# Patient Record
Sex: MALE | Race: BLACK OR AFRICAN AMERICAN | Employment: OTHER | URBAN - METROPOLITAN AREA
[De-identification: names, ages, dates, MRNs, and addresses within clinical notes are randomized per-mention and may not be internally consistent; named-entity substitution may affect disease eponyms.]

---

## 2017-02-06 ENCOUNTER — OFFICE VISIT (OUTPATIENT)
Dept: INTERNAL MEDICINE CLINIC | Age: 77
End: 2017-02-06

## 2017-02-06 VITALS
HEIGHT: 78 IN | OXYGEN SATURATION: 98 % | WEIGHT: 269.5 LBS | BODY MASS INDEX: 31.18 KG/M2 | TEMPERATURE: 98.1 F | SYSTOLIC BLOOD PRESSURE: 149 MMHG | DIASTOLIC BLOOD PRESSURE: 80 MMHG | HEART RATE: 64 BPM

## 2017-02-06 DIAGNOSIS — I10 ESSENTIAL HYPERTENSION: ICD-10-CM

## 2017-02-06 DIAGNOSIS — E78.5 DYSLIPIDEMIA: ICD-10-CM

## 2017-02-06 DIAGNOSIS — N40.0 BENIGN NON-NODULAR PROSTATIC HYPERPLASIA WITHOUT LOWER URINARY TRACT SYMPTOMS: ICD-10-CM

## 2017-02-06 DIAGNOSIS — M17.0 PRIMARY OSTEOARTHRITIS OF BOTH KNEES: ICD-10-CM

## 2017-02-06 DIAGNOSIS — I87.2 VENOUS INSUFFICIENCY: ICD-10-CM

## 2017-02-06 NOTE — MR AVS SNAPSHOT
Visit Information Date & Time Provider Department Dept. Phone Encounter #  
 2/6/2017 12:45 PM Marlen Tapia MD [facility] Medicine and Primary [facility] [phone] [id number] Upcoming Health Maintenance Date Due  
 EYE EXAM RETINAL OR DILATED Q1 3/31/2016 HEMOGLOBIN A1C Q6M 8/29/2016 MICROALBUMIN Q1 10/5/2016 LIPID PANEL Q1 2/28/2017 GLAUCOMA SCREENING Q2Y 3/31/2017 FOOT EXAM Q1 4/11/2017 MEDICARE YEARLY EXAM 4/12/2017 Pneumococcal 65+ Low/Medium Risk (2 of 2 - PPSV23) 2/6/2018 DTaP/Tdap/Td series (2 - Td) 2/6/2027 Allergies as of 2/6/2017  Review Complete On: 2/6/2017 By: Mickie Vazquez No Known Allergies Current Immunizations  Reviewed on 2/28/2011 Name Date Influenza Vaccine Whole 11/1/2010 Not reviewed this visit You Were Diagnosed With   
  
 Codes Comments DM (diabetes mellitus), type 2, uncontrolled, periph vascular complic ([facility] [address]    -  Primary ICD-10-CM: E11.51, E11.65 ICD-9-CM: 250.72, 443.81 Essential hypertension     ICD-10-CM: I10 
ICD-9-CM: 401.9 Dyslipidemia     ICD-10-CM: E78.5 ICD-9-CM: 272.4 Venous insufficiency     ICD-10-CM: I87.2 ICD-9-CM: 459.81 Primary osteoarthritis of both knees     ICD-10-CM: M17.0 ICD-9-CM: 715.16 Benign non-nodular prostatic hyperplasia without lower urinary tract symptoms     ICD-10-CM: N40.0 ICD-9-CM: 600.90 Vitals BP Pulse Temp Height(growth percentile) Weight(growth percentile) SpO2  
 149/80 (BP 1 Location: Left arm, BP Patient Position: Sitting) 64 98.1 °F (36.7 °C) (Oral) 6' 9\" (2.057 m) 269 lb 8 oz (122.2 kg) 98% BMI Smoking Status 28.88 kg/m2 Former Smoker Vitals History BMI and BSA Data Body Mass Index Body Surface Area  
 28.88 kg/m 2 2.64 m 2 Preferred Pharmacy Pharmacy Name Phone [address], [address] [place] [address] [phone] Your Updated Medication List  
  
   
This list is accurate as of: 2/6/17  3:48 PM.  Always use your most recent med list.  
  
  
  
  
 aspirin 81 mg tablet Take 81 mg by mouth. cephALEXin 500 mg capsule Commonly known as:  Delwyn Pay Take 1 Cap by mouth four (4) times daily. clindamycin 150 mg capsule Commonly known as:  CLEOCIN  
  
 FLUZONE HIGH-DOSE [id number] (PF) Syrg injection Generic drug:  influenza vaccine 2015-16 (65yr+)(PF)  
  
 * glucose blood VI test strips strip Commonly known as:  ACCU-CHEK CLAUDINE Use to test blood sugar twice daily. DX.E11.9  
  
 * glucose blood VI test strips strip Commonly known as:  ACCU-CHEK CLAUDINE PLUS TEST STRP Test three times daily HYDROcodone-acetaminophen 2.5-500 mg per tablet Commonly known as:  Christen Tiffanie Take 1 Tab by mouth every six (6) hours as needed for Pain. ibuprofen 400 mg tablet Commonly known as:  MOTRIN Take 400 mg by mouth as needed. insulin aspart 100 unit/mL Inpn Commonly known as:  Vergie Percy 15 units ac  
  
 insulin NPH/insulin regular 100 unit/mL (70-30) injection Commonly known as:  NovoLIN 70/30  
14 units ac breakfast and dinner Insulin Syringe-Needle U-100 1 mL 31 gauge x 5/16 Syrg USE ONE  TWICE DAILY  
  
 lisinopril 40 mg tablet Commonly known as:  Monalisa [place] Take 1 Tab by mouth daily. meloxicam 15 mg tablet Commonly known as:  MOBIC Take 1 Tab by mouth daily. metoprolol tartrate 25 mg tablet Commonly known as:  LOPRESSOR Take 1 Tab by mouth two (2) times a day. * NOVOTWIST 32 gauge x 1/5\" Ndle Generic drug:  pen needle, diabetic * Insulin Needles (Disposable) 30 gauge x 1/3\" Commonly known as:  NOVOFINE 30 Inject 5 times a day  
  
 oxyCODONE-acetaminophen 5-325 mg per tablet Commonly known as:  PERCOCET  
  
 simvastatin 20 mg tablet Commonly known as:  ZOCOR Take 1 Tab by mouth nightly. * Notice: This list has 4 medication(s) that are the same as other medications prescribed for you. Read the directions carefully, and ask your doctor or other care provider to review them with you. We Performed the Following AMB POC EKG ROUTINE W/ 12 LEADS, INTER & REP [26000 CPT(R)] APOLIPOPROTEIN B [id number] CPT(R)] CBC WITH AUTOMATED DIFF [76867 CPT(R)] HEMOGLOBIN A1C WITH EAG [51813 CPT(R)] LIPID PANEL [20700 CPT(R)] METABOLIC PANEL, COMPREHENSIVE [04152 CPT(R)] NE COLLECTION VENOUS BLOOD,VENIPUNCTURE P812651 CPT(R)] PSA DIAGNOSTIC (PROSTATIC SPECIFIC AG) [id number] CPT(R)] URINALYSIS W/ RFLX MICROSCOPIC [43954 CPT(R)] Introducing [facility] SERVICES! [employer] introduces Elixserve patient portal. Now you can access parts of your medical record, email your doctor's office, and request medication refills online. 1. In your internet browser, go to [email or web address] 2. Click on the First Time User? Click Here link in the Sign In box. You will see the New Member Sign Up page. 3. Enter your Elixserve Access Code exactly as it appears below. You will not need to use this code after youve completed the sign-up process. If you do not sign up before the expiration date, you must request a new code. · Elixserve Access Code: [email or web address] Expires: 5/7/2017  3:48 PM 
 
4. Enter the last four digits of your Social Security Number (xxxx) and Date of Birth (mm/dd/yyyy) as indicated and click Submit. You will be taken to the next sign-up page. 5. Create a Elixserve ID. This will be your Elixserve login ID and cannot be changed, so think of one that is secure and easy to remember. 6. Create a Elixserve password. You can change your password at any time. 7. Enter your Password Reset Question and Answer. This can be used at a later time if you forget your password. 8. Enter your e-mail address.  You will receive e-mail notification when new information is available in ONTRAPORT. 9. Click Sign Up. You can now view and download portions of your medical record. 10. Click the Download Summary menu link to download a portable copy of your medical information. If you have questions, please visit the Frequently Asked Questions section of the ONTRAPORT website. Remember, ONTRAPORT is NOT to be used for urgent needs. For medical emergencies, dial 911. Now available from your iPhone and Android! Please provide this summary of care documentation to your next provider. Your primary care clinician is listed as Liz Fam. If you have any questions after today's visit, please call [phone].

## 2017-02-06 NOTE — PROGRESS NOTES
Chief Complaint   Patient presents with    Diabetes     1 month follow up     Headache     patient states that he has been having \"intermittent\" headaches x a couple of months. 1. Have you been to the ER, urgent care clinic since your last visit? Hospitalized since your last visit? No    2. Have you seen or consulted any other health care providers outside of the [address] [place] [address] since your last visit? Include any pap smears or colon screening.  No

## 2017-02-07 LAB
ALBUMIN SERPL-MCNC: 4.1 G/DL (ref 3.5–4.8)
ALBUMIN/GLOB SERPL: 1.4 {RATIO} (ref 1.1–2.5)
ALP SERPL-CCNC: 63 IU/L (ref 39–117)
ALT SERPL-CCNC: 26 IU/L (ref 0–44)
APO B SERPL-MCNC: 91 MG/DL (ref 52–135)
APPEARANCE UR: CLEAR
AST SERPL-CCNC: 21 IU/L (ref 0–40)
BACTERIA #/AREA URNS HPF: ABNORMAL /[HPF]
BASOPHILS # BLD AUTO: 0 X10E3/UL (ref 0–0.2)
BASOPHILS NFR BLD AUTO: 0 %
BILIRUB SERPL-MCNC: 0.5 MG/DL (ref 0–1.2)
BILIRUB UR QL STRIP: NEGATIVE
BUN SERPL-MCNC: 15 MG/DL (ref 8–27)
BUN/CREAT SERPL: 12 (ref 10–22)
CALCIUM SERPL-MCNC: 9.6 MG/DL (ref 8.6–10.2)
CASTS URNS QL MICRO: ABNORMAL /LPF
CHLORIDE SERPL-SCNC: 101 MMOL/L (ref 96–106)
CHOLEST SERPL-MCNC: 152 MG/DL (ref 100–199)
CO2 SERPL-SCNC: 25 MMOL/L (ref 18–29)
COLOR UR: YELLOW
CREAT SERPL-MCNC: 1.3 MG/DL (ref 0.76–1.27)
EOSINOPHIL # BLD AUTO: 0.1 X10E3/UL (ref 0–0.4)
EOSINOPHIL NFR BLD AUTO: 2 %
EPI CELLS #/AREA URNS HPF: ABNORMAL /HPF
ERYTHROCYTE [DISTWIDTH] IN BLOOD BY AUTOMATED COUNT: 14.6 % (ref 12.3–15.4)
EST. AVERAGE GLUCOSE BLD GHB EST-MCNC: 280 MG/DL
GLOBULIN SER CALC-MCNC: 3 G/DL (ref 1.5–4.5)
GLUCOSE SERPL-MCNC: 112 MG/DL (ref 65–99)
GLUCOSE UR QL: NEGATIVE
HBA1C MFR BLD: 11.4 % (ref 4.8–5.6)
HCT VFR BLD AUTO: 39.3 % (ref 37.5–51)
HDLC SERPL-MCNC: 44 MG/DL
HGB BLD-MCNC: 13.2 G/DL (ref 12.6–17.7)
HGB UR QL STRIP: ABNORMAL
IMM GRANULOCYTES # BLD: 0 X10E3/UL (ref 0–0.1)
IMM GRANULOCYTES NFR BLD: 0 %
KETONES UR QL STRIP: NEGATIVE
LDLC SERPL CALC-MCNC: 95 MG/DL (ref 0–99)
LEUKOCYTE ESTERASE UR QL STRIP: NEGATIVE
LYMPHOCYTES # BLD AUTO: 2.2 X10E3/UL (ref 0.7–3.1)
LYMPHOCYTES NFR BLD AUTO: 37 %
MCH RBC QN AUTO: 28.1 PG (ref 26.6–33)
MCHC RBC AUTO-ENTMCNC: 33.6 G/DL (ref 31.5–35.7)
MCV RBC AUTO: 84 FL (ref 79–97)
MICRO URNS: ABNORMAL
MONOCYTES # BLD AUTO: 0.4 X10E3/UL (ref 0.1–0.9)
MONOCYTES NFR BLD AUTO: 7 %
MUCOUS THREADS URNS QL MICRO: PRESENT
NEUTROPHILS # BLD AUTO: 3.2 X10E3/UL (ref 1.4–7)
NEUTROPHILS NFR BLD AUTO: 54 %
NITRITE UR QL STRIP: NEGATIVE
PH UR STRIP: 5.5 [PH] (ref 5–7.5)
PLATELET # BLD AUTO: 220 X10E3/UL (ref 150–379)
POTASSIUM SERPL-SCNC: 4.8 MMOL/L (ref 3.5–5.2)
PROT SERPL-MCNC: 7.1 G/DL (ref 6–8.5)
PROT UR QL STRIP: ABNORMAL
PSA SERPL-MCNC: 1.2 NG/ML (ref 0–4)
RBC # BLD AUTO: 4.69 X10E6/UL (ref 4.14–5.8)
RBC #/AREA URNS HPF: ABNORMAL /HPF
SODIUM SERPL-SCNC: 141 MMOL/L (ref 134–144)
SP GR UR: 1.02 (ref 1–1.03)
TRIGL SERPL-MCNC: 64 MG/DL (ref 0–149)
UROBILINOGEN UR STRIP-MCNC: 0.2 MG/DL (ref 0.2–1)
VLDLC SERPL CALC-MCNC: 13 MG/DL (ref 5–40)
WBC # BLD AUTO: 5.9 X10E3/UL (ref 3.4–10.8)
WBC #/AREA URNS HPF: ABNORMAL /HPF

## 2017-02-07 NOTE — PROGRESS NOTES
[facility] and Primary Care  [place] [address]  [address]  [place] [address]  Phone:  [phone]  Fax: [phone]       Chief Complaint   Patient presents with    Diabetes     1 month follow up     Headache     patient states that he has been having \"intermittent\" headaches x a couple of months. .      SUBJECTIVE:    Akhil Morgan is a 68 y.o. male comes in for return visit after a long absence. Blood sugars have remained in the low 200's. Interestingly he does not do the thinks that he is supposed to do which we have discussed repetitively. He has not formally worked as has been the case for the last year and the two things that he was supposed to have done above and beyond follow-up is eat at the same time every day and to minimize carbohydrate intake. Since I last saw him he has gained a most amount of weight with significant hyperglycemia. He has been taking his antihypertensive medication as prescribed as is the case with his statin in view of his increased cardiovascular risk. He continues to have significant osteoarthritis of his knees but fortunately it is not symptomatic at this point and does not interfere with his functional status. Current Outpatient Prescriptions   Medication Sig Dispense Refill    Insulin Syringe-Needle U-100 1 mL 31 gauge x 5/16 syrg USE ONE  TWICE DAILY 100 Syringe 11    insulin NPH/insulin regular (NOVOLIN 70/30) 100 unit/mL (70-30) injection 14 units ac breakfast and dinner 10 mL 11    lisinopril (PRINIVIL, ZESTRIL) 40 mg tablet Take 1 Tab by mouth daily. 30 Tab 11    glucose blood VI test strips (ACCU-CHEK CLAUDINE PLUS TEST STRP) strip Test three times daily 300 Strip 4    glucose blood VI test strips (ACCU-CHEK CLAUDINE) strip Use to test blood sugar twice daily. DX.E11.9 200 Strip 3    simvastatin (ZOCOR) 20 mg tablet Take 1 Tab by mouth nightly.  90 Tab 3    metoprolol (LOPRESSOR) 25 mg tablet Take 1 Tab by mouth two (2) times a day. 180 Tab 3    meloxicam (MOBIC) 15 mg tablet Take 1 Tab by mouth daily. 30 Tab 0    clindamycin (CLEOCIN) 150 mg capsule   0    FLUZONE HIGH-DOSE 2015-16, PF, syrg injection   0    oxyCODONE-acetaminophen (PERCOCET) 5-325 mg per tablet   0    cephALEXin (KEFLEX) 500 mg capsule Take 1 Cap by mouth four (4) times daily. 40 Cap 0    insulin aspart (NOVOLOG FLEXPEN) 100 unit/mL flexpen 15 units ac 15 Syringe 3    Insulin Needles, Disposable, (NOVOFINE 30) 30 x 1/3 \" Inject 5 times a day 500 Package 3    NOVOTWIST 32 x 1/5 \" ndle       HYDROcodone-acetaminophen (LORTAB) 2.5-500 mg per tablet Take 1 Tab by mouth every six (6) hours as needed for Pain. 20 Tab 0    ibuprofen (MOTRIN) 400 mg tablet Take 400 mg by mouth as needed.  aspirin 81 mg tablet Take 81 mg by mouth.        Past Medical History   Diagnosis Date    CAD (coronary artery disease)      palpitations    Diabetes ([facility] [address]     Hypertension     Other ill-defined conditions(799.89)      left foot ulcer     Past Surgical History   Procedure Laterality Date    Hx other surgical       surgery to left foot ulcer, PICC right arm    Hx heent       bilateral cataract surgery     No Known Allergies      REVIEW OF SYSTEMS:  General: negative for - chills or fever  ENT: negative for - headaches, nasal congestion or tinnitus  Respiratory: negative for - cough, hemoptysis, shortness of breath or wheezing  Cardiovascular : negative for - chest pain, edema, palpitations or shortness of breath  Gastrointestinal: negative for - abdominal pain, blood in stools, heartburn or nausea/vomiting  Genito-Urinary: no dysuria, trouble voiding, or hematuria  Musculoskeletal: negative for - gait disturbance, joint pain, joint stiffness or joint swelling  Neurological: no TIA or stroke symptoms  Hematologic: no bruises, no bleeding, no swollen glands  Integument: no lumps, mole changes, nail changes or rash  Endocrine: no malaise/lethargy or unexpected weight changes      Social History     Social History    Marital status:      Spouse name: N/A    Number of children: 3    Years of education: 12     Occupational History    employed-----formerly retired      Social History Main Topics    Smoking status: Former Smoker    Smokeless tobacco: Never Used    Alcohol use No    Drug use: No    Sexual activity: Not Asked     Other Topics Concern    None     Social History Narrative     Family History   Problem Relation Age of Onset    Cancer Father     No Known Problems Mother        OBJECTIVE:    Visit Vitals    /80 (BP 1 Location: Left arm, BP Patient Position: Sitting)    Pulse 64    Temp 98.1 °F (36.7 °C) (Oral)    Ht 6' 9\" (2.057 m)    Wt 269 lb 8 oz (122.2 kg)    SpO2 98%    BMI 28.88 kg/m2     CONSTITUTIONAL: well , well nourished, appears age appropriate  EYES: perrla, eom intact  ENMT:moist mucous membranes, pharynx clear  NECK: supple. Thyroid normal  RESPIRATORY: Chest: clear to ascultation and percussion   CARDIOVASCULAR: Heart: regular rate and rhythm  GASTROINTESTINAL: Abdomen: soft, bowel sounds active  HEMATOLOGIC: no pathological lymph nodes palpated  MUSCULOSKELETAL: Extremities: 2+ edema distally, pulse 1+, bony exostosis ankle suggestive Charcot joints  INTEGUMENT: No unusual rashes or suspicious skin lesions noted. Nails appear normal, hyperpigmented changes distal lower extremities. NEUROLOGIC: non-focal exam   MENTAL STATUS: alert and oriented, appropriate affect  Rectal: No rectal masses, brown stool heme test negative, prostate 2+ firm and nontender      ASSESSMENT:  1. DM (diabetes mellitus), type 2, uncontrolled, periph vascular complic ([address]    2. Essential hypertension    3. Dyslipidemia    4. Venous insufficiency    5. Primary osteoarthritis of both knees    6. Benign non-nodular prostatic hyperplasia without lower urinary tract symptoms        PLAN:    1. His diabetes remains poorly controlled.   I again remind him of the importance of eating at the same time every day, specifically consuming breakfast, lunch and dinner and bedtime snack at nine. He will also have to watch carbohydrate intake, specifically avoiding sweets, white bread, and white potatoes. He is currently taking premixed insulin primarily for cost reasons at 20 units twice a day. He checks blood sugars quite sporadically and does not call any numbers in to the office. I suggest if at all possible he call at least several weeks of blood sugars to us. When he has done this he generally follows directions as far as timeliness of eating quite well. 2.  Blood pressure is excellent at 138/70. No adjustments are made. 3.  He will continue his statin as prescribed and efficacy and compliance will be assessed. 4.  He has chronic stasis changes of his lower extremities. Fortunately no ulcerations have formed but the skin is rather dry. I suggest using Vaseline to moisturize the skin. 5.  He does have osteoarthritic knees but they are reasonably stable at this point. .  Orders Placed This Encounter    APOLIPOPROTEIN B    CBC WITH AUTOMATED DIFF    LIPID PANEL    URINALYSIS W/ RFLX MICROSCOPIC    PROSTATE SPECIFIC AG    METABOLIC PANEL, COMPREHENSIVE    HEMOGLOBIN A1C WITH EAG    MICROSCOPIC EXAMINATION    AMB POC EKG ROUTINE W/ 12 LEADS, INTER & REP         Follow-up Disposition:  Return in about 3 months (around 5/6/2017).       Rachna Mike MD

## 2017-02-15 ENCOUNTER — TELEPHONE (OUTPATIENT)
Dept: INTERNAL MEDICINE CLINIC | Age: 77
End: 2017-02-15

## 2017-02-23 ENCOUNTER — TELEPHONE (OUTPATIENT)
Dept: INTERNAL MEDICINE CLINIC | Age: 77
End: 2017-02-23

## 2017-03-08 ENCOUNTER — TELEPHONE (OUTPATIENT)
Dept: INTERNAL MEDICINE CLINIC | Age: 77
End: 2017-03-08

## 2017-03-08 NOTE — TELEPHONE ENCOUNTER
Patient called in blood sugar readings  2/28      3/1     3/2     3/3     3/4      3/5      3/6         135        91     219     116   148      132    146  151       156     203     183   110     136     115 per dr. Dusty Chavez no change.

## 2017-05-15 ENCOUNTER — OFFICE VISIT (OUTPATIENT)
Dept: INTERNAL MEDICINE CLINIC | Age: 77
End: 2017-05-15

## 2017-05-15 VITALS
RESPIRATION RATE: 16 BRPM | SYSTOLIC BLOOD PRESSURE: 137 MMHG | BODY MASS INDEX: 30.78 KG/M2 | DIASTOLIC BLOOD PRESSURE: 78 MMHG | WEIGHT: 266 LBS | HEIGHT: 78 IN | TEMPERATURE: 97.7 F | HEART RATE: 60 BPM

## 2017-05-15 DIAGNOSIS — E78.5 DYSLIPIDEMIA: ICD-10-CM

## 2017-05-15 DIAGNOSIS — I87.2 VENOUS INSUFFICIENCY: ICD-10-CM

## 2017-05-15 DIAGNOSIS — I10 ESSENTIAL HYPERTENSION: ICD-10-CM

## 2017-05-15 DIAGNOSIS — R31.29 MICROSCOPIC HEMATURIA: ICD-10-CM

## 2017-05-15 NOTE — PROGRESS NOTES
[address] [place] [address] and Primary Care  [facility]  [address] [place] [address]  Phone:  [phone]  Fax: [phone]       Chief Complaint   Patient presents with    Hypertension     follow-up    Diabetes     follow-up   . SUBJECTIVE:    Leo Murphy is a 68 y.o. male comes in for return visit for follow-up of his diabetes. This is very good because he usually does not follow-up. I remind him that his biggest flaw is the dietary indiscretion related to excessive carbohydrate intake. We also talk about the importance of attempting to eat at the same time every day. This minimizes hypoglycemia and glycemic variability, both of which are quite important. He is not as physically active as he should be. He has a past history of primary hypertension and dyslipidemia. He has been having episodic swelling of his lower extremities which is a chronic, ongoing problem. Current Outpatient Prescriptions   Medication Sig Dispense Refill    Insulin Syringe-Needle U-100 1 mL 31 gauge x 5/16 syrg USE ONE  TWICE DAILY 100 Syringe 11    insulin NPH/insulin regular (NOVOLIN 70/30) 100 unit/mL (70-30) injection 14 units ac breakfast and dinner 10 mL 11    lisinopril (PRINIVIL, ZESTRIL) 40 mg tablet Take 1 Tab by mouth daily. 30 Tab 11    glucose blood VI test strips (ACCU-CHEK CLAUDINE PLUS TEST STRP) strip Test three times daily 300 Strip 4    glucose blood VI test strips (ACCU-CHEK CLAUDINE) strip Use to test blood sugar twice daily. DX.E11.9 200 Strip 3    simvastatin (ZOCOR) 20 mg tablet Take 1 Tab by mouth nightly. 90 Tab 3    metoprolol (LOPRESSOR) 25 mg tablet Take 1 Tab by mouth two (2) times a day. 180 Tab 3    meloxicam (MOBIC) 15 mg tablet Take 1 Tab by mouth daily.  30 Tab 0    clindamycin (CLEOCIN) 150 mg capsule   0    FLUZONE HIGH-DOSE 2015-16, PF, syrg injection   0    oxyCODONE-acetaminophen (PERCOCET) 5-325 mg per tablet   0    cephALEXin (KEFLEX) 500 mg capsule Take 1 Cap by mouth four (4) times daily. 40 Cap 0    insulin aspart (NOVOLOG FLEXPEN) 100 unit/mL flexpen 15 units ac 15 Syringe 3    Insulin Needles, Disposable, (NOVOFINE 30) 30 x 1/3 \" Inject 5 times a day 500 Package 3    NOVOTWIST 32 x 1/5 \" ndle       HYDROcodone-acetaminophen (LORTAB) 2.5-500 mg per tablet Take 1 Tab by mouth every six (6) hours as needed for Pain. 20 Tab 0    ibuprofen (MOTRIN) 400 mg tablet Take 400 mg by mouth as needed.  aspirin 81 mg tablet Take 81 mg by mouth.        Past Medical History:   Diagnosis Date    CAD (coronary artery disease)     palpitations    Diabetes ([facility] [address]     Hypertension     Other ill-defined conditions     left foot ulcer     Past Surgical History:   Procedure Laterality Date    HX HEENT      bilateral cataract surgery    HX OTHER SURGICAL      surgery to left foot ulcer, PICC right arm     No Known Allergies      REVIEW OF SYSTEMS:  General: negative for - chills or fever  ENT: negative for - headaches, nasal congestion or tinnitus  Respiratory: negative for - cough, hemoptysis, shortness of breath or wheezing  Cardiovascular : negative for - chest pain, edema, palpitations or shortness of breath  Gastrointestinal: negative for - abdominal pain, blood in stools, heartburn or nausea/vomiting  Genito-Urinary: no dysuria, trouble voiding, or hematuria  Musculoskeletal: negative for - gait disturbance, joint pain, joint stiffness or joint swelling  Neurological: no TIA or stroke symptoms  Hematologic: no bruises, no bleeding, no swollen glands  Integument: no lumps, mole changes, nail changes or rash  Endocrine: no malaise/lethargy or unexpected weight changes      Social History     Social History    Marital status:      Spouse name: N/A    Number of children: 4    Years of education: 12     Occupational History    employed-----formerly retired      Social History Main Topics    Smoking status: Former Smoker    Smokeless tobacco: Never Used    Alcohol use No    Drug use: No    Sexual activity: Not Asked     Other Topics Concern    None     Social History Narrative     Family History   Problem Relation Age of Onset    Cancer Father     No Known Problems Mother        OBJECTIVE:    Visit Vitals    /78    Pulse 60    Temp 97.7 °F (36.5 °C)    Resp 16    Ht 6' 9\" (2.057 m)    Wt 266 lb (120.7 kg)    BMI 28.5 kg/m2     CONSTITUTIONAL: well , well nourished, appears age appropriate  EYES: perrla, eom intact  ENMT:moist mucous membranes, pharynx clear  NECK: supple. Thyroid normal  RESPIRATORY: Chest: clear to ascultation and percussion   CARDIOVASCULAR: Heart: regular rate and rhythm  GASTROINTESTINAL: Abdomen: soft, bowel sounds active  HEMATOLOGIC: no pathological lymph nodes palpated  MUSCULOSKELETAL: Extremities: no edema, pulse 1+, Charcot joint of ankles  INTEGUMENT: No unusual rashes or suspicious skin lesions noted. Nails appear normal.  NEUROLOGIC: non-focal exam   MENTAL STATUS: alert and oriented, appropriate affect      ASSESSMENT:  1. DM (diabetes mellitus), type 2, uncontrolled, periph vascular complic ([address]    2. Dyslipidemia    3. Essential hypertension    4. Venous insufficiency    5. Microscopic hematuria        PLAN:    1. He states that his diabetes is much better. Hopefully this is indeed the case. His last hemoglobin A1C was 11.5. This is exclusively related to dietary indiscretion. 2. He will continue his statin as prescribe and efficacy and compliance will be assessed. 3. Blood pressure is excellent today. No adjustments are made. 4. He has chronic venous insufficiency leading to the edema of his lower extremities. This is no worse than it has been. This is asymmetrical, left being somewhat greater than the right. 5. He does have a history of microscopic hematuria and a urinalysis will be checked to ensure this has completely abated.       .  Orders Placed This Encounter    APOLIPOPROTEIN B  HEMOGLOBIN A1C WITH EAG    URINALYSIS W/ RFLX MICROSCOPIC    MICROSCOPIC EXAMINATION         Follow-up Disposition:  Return in about 3 months (around 8/15/2017).       Jill Diaz MD

## 2017-05-15 NOTE — MR AVS SNAPSHOT
Visit Information Date & Time Provider Department Dept. Phone Encounter #  
 5/15/2017 10:30 AM Williams Carranza MD [facility] Sports Medicine and [address] [id number] Upcoming Health Maintenance Date Due  
 EYE EXAM RETINAL OR DILATED Q1 3/31/2016 MICROALBUMIN Q1 10/5/2016 GLAUCOMA SCREENING Q2Y 3/31/2017 FOOT EXAM Q1 4/11/2017 MEDICARE YEARLY EXAM 4/12/2017 INFLUENZA AGE 9 TO ADULT 8/1/2017 HEMOGLOBIN A1C Q6M 8/6/2017 Pneumococcal 65+ Low/Medium Risk (2 of 2 - PPSV23) 2/6/2018 LIPID PANEL Q1 2/6/2018 DTaP/Tdap/Td series (2 - Td) 2/6/2027 Allergies as of 5/15/2017  Review Complete On: 5/15/2017 By: Seth Mckinley LPN No Known Allergies Current Immunizations  Reviewed on 2/28/2011 Name Date Influenza Vaccine Whole 11/1/2010 Not reviewed this visit You Were Diagnosed With   
  
 Codes Comments DM (diabetes mellitus), type 2, uncontrolled, periph vascular complic ([facility] [address]    -  Primary ICD-10-CM: E11.51, E11.65 ICD-9-CM: 250.72, 443.81 Dyslipidemia     ICD-10-CM: E78.5 ICD-9-CM: 272.4 Essential hypertension     ICD-10-CM: I10 
ICD-9-CM: 401.9 Venous insufficiency     ICD-10-CM: I87.2 ICD-9-CM: 459.81 Microscopic hematuria     ICD-10-CM: R31.29 ICD-9-CM: 599.72 Vitals BP Pulse Temp Resp Height(growth percentile) Weight(growth percentile)  
 137/78 60 97.7 °F (36.5 °C) 16 6' 9\" (2.057 m) 266 lb (120.7 kg) BMI Smoking Status 28.5 kg/m2 Former Smoker BMI and BSA Data Body Mass Index Body Surface Area 28.5 kg/m 2 2.63 m 2 Preferred Pharmacy Pharmacy Name Phone [facility] [address] [phone] Your Updated Medication List  
  
   
This list is accurate as of: 5/15/17  1:22 PM.  Always use your most recent med list.  
  
  
  
  
 aspirin 81 mg tablet Take 81 mg by mouth. cephALEXin 500 mg capsule Commonly known as:  Erling Soliman Take 1 Cap by mouth four (4) times daily. clindamycin 150 mg capsule Commonly known as:  CLEOCIN  
  
 FLUZONE HIGH-DOSE [id number] (PF) Syrg injection Generic drug:  influenza vaccine 2015-16 (65yr+)(PF)  
  
 * glucose blood VI test strips strip Commonly known as:  ACCU-CHEK CLAUDINE Use to test blood sugar twice daily. DX.E11.9  
  
 * glucose blood VI test strips strip Commonly known as:  ACCU-CHEK CLAUDINE PLUS TEST STRP Test three times daily HYDROcodone-acetaminophen 2.5-500 mg per tablet Commonly known as:  [address] Take 1 Tab by mouth every six (6) hours as needed for Pain. ibuprofen 400 mg tablet Commonly known as:  MOTRIN Take 400 mg by mouth as needed. insulin aspart 100 unit/mL Inpn Commonly known as:  Scott Rakers 15 units ac  
  
 insulin NPH/insulin regular 100 unit/mL (70-30) injection Commonly known as:  NovoLIN 70/30  
14 units ac breakfast and dinner Insulin Syringe-Needle U-100 1 mL 31 gauge x 5/16 Syrg USE ONE  TWICE DAILY  
  
 lisinopril 40 mg tablet Commonly known as:  Rexanne  Take 1 Tab by mouth daily. meloxicam 15 mg tablet Commonly known as:  MOBIC Take 1 Tab by mouth daily. metoprolol tartrate 25 mg tablet Commonly known as:  LOPRESSOR Take 1 Tab by mouth two (2) times a day. * NOVOTWIST 32 gauge x 1/5\" Ndle Generic drug:  pen needle, diabetic * Insulin Needles (Disposable) 30 gauge x 1/3\" Commonly known as:  NOVOFINE 30 Inject 5 times a day  
  
 oxyCODONE-acetaminophen 5-325 mg per tablet Commonly known as:  PERCOCET  
  
 simvastatin 20 mg tablet Commonly known as:  ZOCOR Take 1 Tab by mouth nightly. * Notice: This list has 4 medication(s) that are the same as other medications prescribed for you. Read the directions carefully, and ask your doctor or other care provider to review them with you. We Performed the Following APOLIPOPROTEIN B P0057747 CPT(R)] HEMOGLOBIN A1C WITH EAG [80650 CPT(R)] URINALYSIS W/ RFLX MICROSCOPIC [41580 CPT(R)] Introducing [facility] SERVICES! Selvin Ortiz introduces [employer] patient portal. Now you can access parts of your medical record, email your doctor's office, and request medication refills online. 1. In your internet browser, go to [email or web address] 2. Click on the First Time User? Click Here link in the Sign In box. You will see the New Member Sign Up page. 3. Enter your [employer] Access Code exactly as it appears below. You will not need to use this code after youve completed the sign-up process. If you do not sign up before the expiration date, you must request a new code. · [employer] Access Code: [email or web address] Expires: 8/13/2017  1:22 PM 
 
4. Enter the last four digits of your Social Security Number (xxxx) and Date of Birth (mm/dd/yyyy) as indicated and click Submit. You will be taken to the next sign-up page. 5. Create a Bio[employer]ct ID. This will be your [employer]ct login ID and cannot be changed, so think of one that is secure and easy to remember. 6. Create a [employer] password. You can change your password at any time. 7. Enter your Password Reset Question and Answer. This can be used at a later time if you forget your password. 8. Enter your e-mail address. You will receive e-mail notification when new information is available in [address] 9. Click Sign Up. You can now view and download portions of your medical record. 10. Click the Download Summary menu link to download a portable copy of your medical information. If you have questions, please visit the Frequently Asked Questions section of the [employer] website. Remember, [employer] is NOT to be used for urgent needs. For medical emergencies, dial 911. Now available from your iPhone and Android! Please provide this summary of care documentation to your next provider. Your primary care clinician is listed as Liz Fam. If you have any questions after today's visit, please call [phone].

## 2017-05-16 LAB
APO B SERPL-MCNC: 90 MG/DL (ref 52–135)
APPEARANCE UR: CLEAR
BACTERIA #/AREA URNS HPF: NORMAL /[HPF]
BILIRUB UR QL STRIP: NEGATIVE
CASTS URNS QL MICRO: NORMAL /LPF
COLOR UR: YELLOW
EPI CELLS #/AREA URNS HPF: NORMAL /HPF
EST. AVERAGE GLUCOSE BLD GHB EST-MCNC: 232 MG/DL
GLUCOSE UR QL: NEGATIVE
HBA1C MFR BLD: 9.7 % (ref 4.8–5.6)
HGB UR QL STRIP: NEGATIVE
KETONES UR QL STRIP: NEGATIVE
LEUKOCYTE ESTERASE UR QL STRIP: NEGATIVE
MICRO URNS: ABNORMAL
MUCOUS THREADS URNS QL MICRO: PRESENT
NITRITE UR QL STRIP: NEGATIVE
PH UR STRIP: 5.5 [PH] (ref 5–7.5)
PROT UR QL STRIP: ABNORMAL
RBC #/AREA URNS HPF: NORMAL /HPF
SP GR UR: 1.02 (ref 1–1.03)
UROBILINOGEN UR STRIP-MCNC: 0.2 MG/DL (ref 0.2–1)
WBC #/AREA URNS HPF: NORMAL /HPF

## 2017-05-26 ENCOUNTER — TELEPHONE (OUTPATIENT)
Dept: INTERNAL MEDICINE CLINIC | Age: 77
End: 2017-05-26

## 2017-05-26 NOTE — TELEPHONE ENCOUNTER
Patient notified by phone and ask how much insulin he is taking and he states 20 units bid. Patient also ask to return to the office for SPOT URINE FOR PROTEIN AND CREATININE.

## 2017-05-27 DIAGNOSIS — I10 ESSENTIAL HYPERTENSION: ICD-10-CM

## 2017-05-29 DIAGNOSIS — I10 ESSENTIAL HYPERTENSION: Primary | ICD-10-CM

## 2017-05-29 RX ORDER — METOPROLOL TARTRATE 25 MG/1
TABLET, FILM COATED ORAL
Qty: 180 TAB | Refills: 3 | Status: SHIPPED | OUTPATIENT
Start: 2017-05-29 | End: 2018-07-07 | Stop reason: SDUPTHER

## 2017-05-29 RX ORDER — LISINOPRIL 40 MG/1
40 TABLET ORAL DAILY
Qty: 90 TAB | Refills: 3 | Status: SHIPPED | OUTPATIENT
Start: 2017-05-29 | End: 2018-07-07 | Stop reason: SDUPTHER

## 2017-05-29 RX ORDER — LISINOPRIL 20 MG/1
TABLET ORAL
Qty: 90 TAB | Refills: 3 | OUTPATIENT
Start: 2017-05-29

## 2017-05-30 ENCOUNTER — LAB ONLY (OUTPATIENT)
Dept: INTERNAL MEDICINE CLINIC | Age: 77
End: 2017-05-30

## 2017-05-30 DIAGNOSIS — I10 ESSENTIAL HYPERTENSION: Primary | ICD-10-CM

## 2017-06-01 LAB
CREAT UR-MCNC: 96.1 MG/DL
PROT UR-MCNC: 67.3 MG/DL

## 2017-08-02 RX ORDER — BLOOD SUGAR DIAGNOSTIC
STRIP MISCELLANEOUS
Qty: 300 STRIP | Refills: 4 | Status: ON HOLD | OUTPATIENT
Start: 2017-08-02 | End: 2017-10-11

## 2017-09-07 ENCOUNTER — APPOINTMENT (OUTPATIENT)
Dept: GENERAL RADIOLOGY | Age: 77
DRG: 271 | End: 2017-09-07
Attending: INTERNAL MEDICINE
Payer: MEDICARE

## 2017-09-07 ENCOUNTER — OFFICE VISIT (OUTPATIENT)
Dept: INTERNAL MEDICINE CLINIC | Age: 77
End: 2017-09-07

## 2017-09-07 ENCOUNTER — HOSPITAL ENCOUNTER (INPATIENT)
Age: 77
LOS: 12 days | Discharge: HOME HEALTH | DRG: 271 | End: 2017-09-19
Attending: INTERNAL MEDICINE | Admitting: INTERNAL MEDICINE
Payer: MEDICARE

## 2017-09-07 VITALS
WEIGHT: 260.9 LBS | RESPIRATION RATE: 16 BRPM | HEART RATE: 64 BPM | TEMPERATURE: 98.7 F | BODY MASS INDEX: 30.19 KG/M2 | DIASTOLIC BLOOD PRESSURE: 65 MMHG | SYSTOLIC BLOOD PRESSURE: 144 MMHG | OXYGEN SATURATION: 97 % | HEIGHT: 78 IN

## 2017-09-07 DIAGNOSIS — L97.512 FOOT ULCER, RIGHT, WITH FAT LAYER EXPOSED (HCC): Primary | ICD-10-CM

## 2017-09-07 DIAGNOSIS — E11.610 CHARCOT FOOT DUE TO DIABETES MELLITUS (HCC): Chronic | ICD-10-CM

## 2017-09-07 PROBLEM — L97.502 FOOT ULCER WITH FAT LAYER EXPOSED (HCC): Status: ACTIVE | Noted: 2017-09-07

## 2017-09-07 LAB
ALBUMIN SERPL-MCNC: 3.2 G/DL (ref 3.5–5)
ALBUMIN/GLOB SERPL: 0.6 {RATIO} (ref 1.1–2.2)
ALP SERPL-CCNC: 72 U/L (ref 45–117)
ALT SERPL-CCNC: 28 U/L (ref 12–78)
ANION GAP SERPL CALC-SCNC: 6 MMOL/L (ref 5–15)
AST SERPL-CCNC: 18 U/L (ref 15–37)
BASOPHILS # BLD: 0 K/UL
BASOPHILS NFR BLD: 0 %
BILIRUB SERPL-MCNC: 0.6 MG/DL (ref 0.2–1)
BLASTS NFR BLD MANUAL: 0 %
BUN SERPL-MCNC: 26 MG/DL (ref 6–20)
BUN/CREAT SERPL: 17 (ref 12–20)
CALCIUM SERPL-MCNC: 9.4 MG/DL (ref 8.5–10.1)
CHLORIDE SERPL-SCNC: 103 MMOL/L (ref 97–108)
CO2 SERPL-SCNC: 27 MMOL/L (ref 21–32)
CREAT SERPL-MCNC: 1.52 MG/DL (ref 0.7–1.3)
DIFFERENTIAL METHOD BLD: ABNORMAL
EOSINOPHIL # BLD: 0.1 K/UL
EOSINOPHIL NFR BLD: 1 %
ERYTHROCYTE [DISTWIDTH] IN BLOOD BY AUTOMATED COUNT: 13.9 % (ref 11.5–14.5)
GLOBULIN SER CALC-MCNC: 5 G/DL (ref 2–4)
GLUCOSE BLD STRIP.AUTO-MCNC: 150 MG/DL (ref 65–100)
GLUCOSE BLD STRIP.AUTO-MCNC: 178 MG/DL (ref 65–100)
GLUCOSE BLD STRIP.AUTO-MCNC: 201 MG/DL (ref 65–100)
GLUCOSE SERPL-MCNC: 123 MG/DL (ref 65–100)
HCT VFR BLD AUTO: 36.5 % (ref 36.6–50.3)
HGB BLD-MCNC: 12.3 G/DL (ref 12.1–17)
LYMPHOCYTES # BLD: 1.5 K/UL
LYMPHOCYTES NFR BLD: 17 %
MANUAL DIFFERENTIAL PERFORMED BLD QL: ABNORMAL
MCH RBC QN AUTO: 28.4 PG (ref 26–34)
MCHC RBC AUTO-ENTMCNC: 33.7 G/DL (ref 30–36.5)
MCV RBC AUTO: 84.3 FL (ref 80–99)
METAMYELOCYTES NFR BLD MANUAL: 0 %
MONOCYTES # BLD: 0.9 K/UL
MONOCYTES NFR BLD: 10 %
MYELOCYTES NFR BLD MANUAL: 0 %
NEUTS BAND NFR BLD MANUAL: 0 %
NEUTS SEG # BLD: 6.4 K/UL
NEUTS SEG NFR BLD: 72 %
NRBC # BLD: 0 K/UL (ref 0–0.01)
NRBC BLD-RTO: 0 PER 100 WBC
OTHER CELLS NFR BLD MANUAL: 0 %
PLATELET # BLD AUTO: 260 K/UL (ref 150–400)
POTASSIUM SERPL-SCNC: 4.2 MMOL/L (ref 3.5–5.1)
PROMYELOCYTES NFR BLD MANUAL: 0 %
PROT SERPL-MCNC: 8.2 G/DL (ref 6.4–8.2)
RBC # BLD AUTO: 4.33 M/UL (ref 4.1–5.7)
RBC MORPH BLD: ABNORMAL
SERVICE CMNT-IMP: ABNORMAL
SODIUM SERPL-SCNC: 136 MMOL/L (ref 136–145)
WBC # BLD AUTO: 8.9 K/UL (ref 4.1–11.1)

## 2017-09-07 PROCEDURE — 87205 SMEAR GRAM STAIN: CPT | Performed by: INTERNAL MEDICINE

## 2017-09-07 PROCEDURE — 71020 XR CHEST PA LAT: CPT

## 2017-09-07 PROCEDURE — 87186 SC STD MICRODIL/AGAR DIL: CPT | Performed by: INTERNAL MEDICINE

## 2017-09-07 PROCEDURE — 74011636637 HC RX REV CODE- 636/637: Performed by: INTERNAL MEDICINE

## 2017-09-07 PROCEDURE — 82962 GLUCOSE BLOOD TEST: CPT

## 2017-09-07 PROCEDURE — 80053 COMPREHEN METABOLIC PANEL: CPT | Performed by: INTERNAL MEDICINE

## 2017-09-07 PROCEDURE — 74011250636 HC RX REV CODE- 250/636: Performed by: INTERNAL MEDICINE

## 2017-09-07 PROCEDURE — 74011250637 HC RX REV CODE- 250/637: Performed by: INTERNAL MEDICINE

## 2017-09-07 PROCEDURE — 65270000015 HC RM PRIVATE ONCOLOGY

## 2017-09-07 PROCEDURE — 85027 COMPLETE CBC AUTOMATED: CPT | Performed by: INTERNAL MEDICINE

## 2017-09-07 PROCEDURE — 87077 CULTURE AEROBIC IDENTIFY: CPT | Performed by: INTERNAL MEDICINE

## 2017-09-07 PROCEDURE — 36415 COLL VENOUS BLD VENIPUNCTURE: CPT | Performed by: INTERNAL MEDICINE

## 2017-09-07 RX ORDER — SODIUM CHLORIDE 0.9 % (FLUSH) 0.9 %
5-10 SYRINGE (ML) INJECTION EVERY 8 HOURS
Status: DISCONTINUED | OUTPATIENT
Start: 2017-09-07 | End: 2017-09-19 | Stop reason: HOSPADM

## 2017-09-07 RX ORDER — METOPROLOL TARTRATE 25 MG/1
25 TABLET, FILM COATED ORAL 2 TIMES DAILY
Status: DISCONTINUED | OUTPATIENT
Start: 2017-09-07 | End: 2017-09-19 | Stop reason: HOSPADM

## 2017-09-07 RX ORDER — ATORVASTATIN CALCIUM 20 MG/1
20 TABLET, FILM COATED ORAL DAILY
Status: DISCONTINUED | OUTPATIENT
Start: 2017-09-08 | End: 2017-09-19 | Stop reason: HOSPADM

## 2017-09-07 RX ORDER — INSULIN LISPRO 100 [IU]/ML
10 INJECTION, SOLUTION INTRAVENOUS; SUBCUTANEOUS
Status: DISCONTINUED | OUTPATIENT
Start: 2017-09-07 | End: 2017-09-09

## 2017-09-07 RX ORDER — HYDROCODONE BITARTRATE AND ACETAMINOPHEN 5; 325 MG/1; MG/1
1 TABLET ORAL
Status: DISCONTINUED | OUTPATIENT
Start: 2017-09-07 | End: 2017-09-19 | Stop reason: HOSPADM

## 2017-09-07 RX ORDER — ASPIRIN 81 MG/1
81 TABLET ORAL DAILY
Status: DISCONTINUED | OUTPATIENT
Start: 2017-09-08 | End: 2017-09-19 | Stop reason: HOSPADM

## 2017-09-07 RX ORDER — SODIUM CHLORIDE 0.9 % (FLUSH) 0.9 %
5-10 SYRINGE (ML) INJECTION AS NEEDED
Status: DISCONTINUED | OUTPATIENT
Start: 2017-09-07 | End: 2017-09-19 | Stop reason: HOSPADM

## 2017-09-07 RX ORDER — NALOXONE HYDROCHLORIDE 0.4 MG/ML
0.4 INJECTION, SOLUTION INTRAMUSCULAR; INTRAVENOUS; SUBCUTANEOUS AS NEEDED
Status: DISCONTINUED | OUTPATIENT
Start: 2017-09-07 | End: 2017-09-19 | Stop reason: HOSPADM

## 2017-09-07 RX ORDER — INSULIN GLARGINE 100 [IU]/ML
6 INJECTION, SOLUTION SUBCUTANEOUS DAILY
Status: DISCONTINUED | OUTPATIENT
Start: 2017-09-08 | End: 2017-09-08

## 2017-09-07 RX ORDER — LISINOPRIL 20 MG/1
40 TABLET ORAL DAILY
Status: DISCONTINUED | OUTPATIENT
Start: 2017-09-08 | End: 2017-09-19 | Stop reason: HOSPADM

## 2017-09-07 RX ORDER — ACETAMINOPHEN 325 MG/1
650 TABLET ORAL
Status: DISCONTINUED | OUTPATIENT
Start: 2017-09-07 | End: 2017-09-19 | Stop reason: HOSPADM

## 2017-09-07 RX ADMIN — Medication 10 ML: at 18:27

## 2017-09-07 RX ADMIN — METOPROLOL TARTRATE 25 MG: 25 TABLET, FILM COATED ORAL at 18:26

## 2017-09-07 RX ADMIN — INSULIN LISPRO 10 UNITS: 100 INJECTION, SOLUTION INTRAVENOUS; SUBCUTANEOUS at 18:24

## 2017-09-07 RX ADMIN — VANCOMYCIN HYDROCHLORIDE 3000 MG: 10 INJECTION, POWDER, LYOPHILIZED, FOR SOLUTION INTRAVENOUS at 17:37

## 2017-09-07 NOTE — IP AVS SNAPSHOT
[address] [facility] 
[phone] Patient: Matthew Guillen MRN: [id number] CQ You are allergic to the following No active allergies Recent Documentation Weight BMI Smoking Status 114.8 kg 27.12 kg/m2 Former Smoker Emergency Contacts Name Discharge Info Relation Home Work Mobile Lexy Olguin CAREGIVER [3] Spouse [3] [phone]    
 Maria Elena STEEL  Spouse [3] [phone] Desirae Silva  Daughter [21]   [phone] About your hospitalization You were admitted on:  2017 You last received care in the:  [facility] 2 GENERAL SURGERY You were discharged on:  2017 Unit phone number:  [phone] Why you were hospitalized Your primary diagnosis was:  Not on File Your diagnoses also included: Foot Ulcer With Fat Layer Exposed (Hcc) Providers Seen During Your Hospitalizations Provider Role Specialty Primary office phone Sanchez Rice MD Attending Provider Internal Medicine [phone] Naheed Ambrose MD Attending Provider Vascular Surgery [phone] Your Primary Care Physician (PCP) Primary Care Physician Office Phone Office Fax [facility] [phone] Follow-up Information Follow up With Details Comments Contact Info [address]  This is your home health provider  [address] 
1st Floor [facility] [address] 
[phone] Sanchez Rice MD   [facility] [address] [place] [address] 
[phone] [facility]  Request was sent to [facility] and they will receive order from Dr. Hamida Armas to process rolling walker request.  [address] [facility] [address] 
[phone] Naheed Ambrose MD In 1 week For wound re-check [address] [facility] 
[phone] Your Appointments Wednesday September 20, 2017 To Be Determined START OF CARE with CHRISTOPHER Dillon [address] ([address]) [address] ([address]) Thursday September 21, 2017 To Be Determined OT EVALUATION with DAR[address] ([address]) [address] ([address]) Current Discharge Medication List  
  
START taking these medications Dose & Instructions Dispensing Information Comments Morning Noon Evening Bedtime  
 amoxicillin-clavulanate 875-125 mg per tablet Commonly known as:  AUGMENTIN Your last dose was: Your next dose is:    
   
   
 Dose:  1 Tab Take 1 Tab by mouth every twelve (12) hours for 14 days. Quantity:  28 Tab Refills:  0 CONTINUE these medications which have NOT CHANGED Dose & Instructions Dispensing Information Comments Morning Noon Evening Bedtime  
 aspirin 81 mg tablet Your last dose was: Your next dose is:    
   
   
 Dose:  81 mg Take 81 mg by mouth. Refills:  0  
     
   
   
   
  
 * glucose blood VI test strips strip Commonly known as:  ACCU-CHEK CLAUDINE Your last dose was: Your next dose is:    
   
   
 Use to test blood sugar twice daily. DX.E11.9 Quantity:  200 Strip Refills:  3  
     
   
   
   
  
 * glucose blood VI test strips strip Commonly known as:  ACCU-CHEK CLAUDINE PLUS TEST STRP Your last dose was: Your next dose is:    
   
   
 Test three times daily Quantity:  300 Strip Refills:  4  
     
   
   
   
  
 * ACCU-CHEK CLAUDINE PLUS TEST STRP strip Generic drug:  glucose blood VI test strips Your last dose was: Your next dose is:    
   
   
 TEST THREE TIMES DAILY Quantity:  300 Strip Refills:  4  
     
   
   
   
  
 insulin NPH/insulin regular 100 unit/mL (70-30) injection Commonly known as:  NovoLIN 70/30 Your last dose was: Your next dose is:    
   
   
 18 units before meals breakfast and dinner Quantity:  2 Vial  
Refills:  11 Insulin Syringe-Needle U-100 1 mL 31 gauge x 5/16 Syrg Your last dose was: Your next dose is:    
   
   
 USE ONE  TWICE DAILY Quantity:  100 Syringe Refills:  11  
     
   
   
   
  
 lisinopril 40 mg tablet Commonly known as:  Marina Brenna Your last dose was: Your next dose is:    
   
   
 Dose:  40 mg Take 1 Tab by mouth daily. Quantity:  90 Tab Refills:  3  
     
   
   
   
  
 meloxicam 15 mg tablet Commonly known as:  MOBIC Your last dose was: Your next dose is:    
   
   
 Dose:  15 mg Take 1 Tab by mouth daily. Quantity:  30 Tab Refills:  0  
     
   
   
   
  
 metoprolol tartrate 25 mg tablet Commonly known as:  LOPRESSOR Your last dose was: Your next dose is: TAKE ONE TABLET BY MOUTH TWICE DAILY Quantity:  180 Tab Refills:  3  
     
   
   
   
  
 * NOVOTWIST 32 gauge x 1/5\" Ndle Generic drug:  pen needle, diabetic Your last dose was: Your next dose is:    
   
   
  Refills:  0  
     
   
   
   
  
 * Insulin Needles (Disposable) 30 gauge x 1/3\" Commonly known as:  NOVOFINE 30 Your last dose was: Your next dose is:    
   
   
 Inject 5 times a day Quantity:  500 Package Refills:  3  
     
   
   
   
  
 simvastatin 20 mg tablet Commonly known as:  ZOCOR Your last dose was: Your next dose is: TAKE ONE TABLET BY MOUTH NIGHTLY. Quantity:  90 Tab Refills:  3  
     
   
   
   
  
 * Notice:   This list has 5 medication(s) that are the same as other medications prescribed for you. Read the directions carefully, and ask your doctor or other care provider to review them with you. STOP taking these medications   
 cephALEXin 500 mg capsule Commonly known as:  KEFLEX  
   
  
 clindamycin 150 mg capsule Commonly known as:  CLEOCIN  
   
  
 FLUZONE HIGH-DOSE [id number] (PF) Syrg injection Generic drug:  influenza vaccine 2015-16 (65yr+)(PF)  
   
  
 HYDROcodone-acetaminophen 2.5-500 mg per tablet Commonly known as:  LORTAB  
   
  
 ibuprofen 400 mg tablet Commonly known as:  MOTRIN  
   
  
 insulin aspart 100 unit/mL Inpn Commonly known as:  NovoLOG Flexpen  
   
  
 oxyCODONE-acetaminophen 5-325 mg per tablet Commonly known as:  PERCOCET Where to Get Your Medications These medications were sent to [address] [place] [address], [address] [place], [place] [address] Phone:  [phone]  
  amoxicillin-clavulanate 875-125 mg per tablet Discharge Instructions General Discharge Instructions Patient ID: 
Rosa Garcia [id number] 
18 y.o. 
1940 Patient Instructions The following personal items were collected during your admission and were returned to you. Take Home Medications What to do at [facility] Recommended diet: Diabetic Diet Recommended activity: Activity as tolerated Follow-up with Radha Pollock MD  in 1 week. Information obtained by : 
I understand that if any problems occur once I am at home I am to contact my physician. I understand and acknowledge receipt of the instructions indicated above. Physician's or R.N.'s Signature                                                                  Date/Time Patient or Representative Signature                                                          Date/Time Discharge Orders None Introducing [facility] SERVICES! Anne Urias introduces [employer] patient portal. Now you can access parts of your medical record, email your doctor's office, and request medication refills online. 1. In your internet browser, go to [email or web address] 2. Click on the First Time User? Click Here link in the Sign In box. You will see the New Member Sign Up page. 3. Enter your XDC Access Code exactly as it appears below. You will not need to use this code after youve completed the sign-up process. If you do not sign up before the expiration date, you must request a new code. · X[employer] Access Code: [email or web address] Expires: 12/6/2017 11:20 AM 
 
4. Enter the last four digits of your Social Security Number (xxxx) and Date of Birth (mm/dd/yyyy) as indicated and click Submit. You will be taken to the next sign-up page. 5. Create a XDC ID. This will be your XDC login ID and cannot be changed, so think of one that is secure and easy to remember. 6. Create a XDC password. You can change your password at any time. 7. Enter your Password Reset Question and Answer. This can be used at a later time if you forget your password. 8. Enter your e-mail address. You will receive e-mail notification when new information is available in [address] 9. Click Sign Up. You can now view and download portions of your medical record. 10. Click the Download Summary menu link to download a portable copy of your medical information. If you have questions, please visit the Frequently Asked Questions section of the XDC website. Remember, [employer]DC is NOT to be used for urgent needs. For medical emergencies, dial 911. Now available from your iPhone and Android! General Information Please provide this summary of care documentation to your next provider. Patient Signature:  ____________________________________________________________ Date:  ____________________________________________________________  
  
Eileen Gerry Provider Signature:  ____________________________________________________________ Date:  ____________________________________________________________

## 2017-09-07 NOTE — PROGRESS NOTES
Pharmacy Automatic Renal Dosing Protocol - Antimicrobials    Indication for Antimicrobials: skin and soft tissue infection  Current Regimen of Each Antimicrobial (Start Day & Day of Therapy):  Vancomycin 3000mg IV x 1 loading dose ()  Vancomycin 2250mg IV q 24hr (to start on , Day 2)    Significant cultures:   : wound: pending    CAPD, Intermittent Hemodialysis or Renal Replacement Therapy: none  Paralysis, amputations, malnutrition: none  Recent Labs      17   1604   CREA  1.52*   BUN  26*   WBC  8.9     Temp (24hrs), Av.8 °F (37.1 °C), Min:98.7 °F (37.1 °C), Max:98.8 °F (37.1 °C)    Creatinine Clearance (ml/min): 57    Goal Vancomycin Level(s):    Impression/Plan:   -based on skin and soft tissue  -Give Vancomycin 3000mg IV x 1 loading dose on   -Start Vancomycin 2250mg IV q 24hr to start on   -should yield trough of ~13-17mcg/mL          Pharmacy will follow daily and adjust doses of monitored medications as appropriate for renal function and/or serum levels.     Thank you,  MARILYN Robbins

## 2017-09-07 NOTE — PROGRESS NOTES
Chief Complaint   Patient presents with   [facility] Wellness Visit   . SUBECTIVE:    Cricket Oleary is a 68 y.o. male Comes in for return visit stating that one to two weeks ago while at the beach developed an ulcer on his right foot. In reality it started as a bullous that burst, and he subsequently went to his podiatrist when he came home. This ws dressed and he was given antibiotics. He comes in today for follow up. He has severe peripheral neuropathy, so he has no pain. There is a Charcot joint also. His diabetic control has not historically been good. He states that since  This happened he has improved his diabetic control significantly.           Facility-Administered Medications Ordered in Other Visits   Medication Dose Route Frequency Provider Last Rate Last Dose    piperacillin-tazobactam (ZOSYN) 3.375 g in 0.9% sodium chloride (MBP/ADV) 100 mL  3.375 g IntraVENous Q8H Edilia Briseno MD 25 mL/hr at 09/09/17 0900 3.375 g at 09/09/17 0900    insulin glargine (LANTUS) injection 10 Units  10 Units SubCUTAneous DAILY Edilia Briseno MD   10 Units at 09/09/17 8007    aspirin delayed-release tablet 81 mg  81 mg Oral DAILY Edilia Briseno MD   81 mg at 09/09/17 0913    insulin lispro (HUMALOG) injection 10 Units  10 Units SubCUTAneous TIDAC Edilia Briseno MD   10 Units at 09/09/17 1142    lisinopril (PRINIVIL, ZESTRIL) tablet 40 mg  40 mg Oral DAILY Edilia Briseno MD   40 mg at 09/09/17 0912    metoprolol tartrate (LOPRESSOR) tablet 25 mg  25 mg Oral BID Edilia Briseno MD   25 mg at 09/09/17 0520    atorvastatin (LIPITOR) tablet 20 mg  20 mg Oral DAILY Edilia Briseno MD   20 mg at 09/09/17 0912    sodium chloride (NS) flush 5-10 mL  5-10 mL IntraVENous Q8H Edilia Briseno MD   10 mL at 09/09/17 1340    sodium chloride (NS) flush 5-10 mL  5-10 mL IntraVENous PRN Edilia Briseno MD        naloxone [facility]) injection 0.4 mg  0.4 mg IntraVENous PRN Edilia Briseno MD        acetaminophen (TYLENOL) tablet 650 mg  650 mg Oral Q4H PRN Harrison Whittaker MD        HYDROcodone-acetaminophen [facility]) 5-325 mg per tablet 1 Tab  1 Tab Oral Q4H PRN Harrison Whittaker MD        vancomycin (VANCOCIN) 2,250 mg in 0.9% sodium chloride 500 mL IVPB  2,250 mg IntraVENous Q24H Harrison Whittaker MD   2,250 mg at 09/08/17 1618     Past Medical History:   Diagnosis Date    CAD (coronary artery disease)     palpitations    Diabetes ([address]     Hypertension     Other ill-defined conditions     left foot ulcer     Past Surgical History:   Procedure Laterality Date    HX HEENT      bilateral cataract surgery    HX OTHER SURGICAL      surgery to left foot ulcer, PICC right arm     No Known Allergies    REVIEW OF SYSTEMS:  Review of Systems - Negative except   ENT ROS: negative for - headaches, hearing change, nasal congestion, oral lesions, tinnitus, visual changes or   Respiratory ROS: no cough, shortness of breath, or wheezing  Cardiovascular ROS: no chest pain or dyspnea on exertion  Gastrointestinal ROS: no abdominal pain, change in bowel habits, or black or blood  Genito-Urinary ROS: no dysuria, trouble voiding, or hematuria  Musculoskeletal ROS: negative  Neurological ROS: no TIA or stroke symptoms      Social History     Social History    Marital status:      Spouse name: N/A    Number of children: 4    Years of education: 12     Occupational History    employed-----formerly retired      Social History Main Topics    Smoking status: Former Smoker    Smokeless tobacco: Never Used    Alcohol use No    Drug use: No    Sexual activity: Not Asked     Other Topics Concern    None     Social History Narrative   r  Family History   Problem Relation Age of Onset    Cancer Father     No Known Problems Mother        OBJECTIVE:  Visit Vitals    /65 (BP 1 Location: Left arm, BP Patient Position: Sitting)    Pulse 64    Temp 98.7 °F (37.1 °C) (Oral)    Resp 16    Ht 6' 9\" (2.057 m)    Wt 260 lb 14.4 oz (118.3 kg)    SpO2 97%    BMI 27.96 kg/m2     ENT: perrla,  eom intact  NECK: supple. Thyroid normal, no JVD  CHEST: clear to ascultation and percussion   HEART: regular rate and rhythm  ABD: soft, bowel sounds active,   EXTREMITIES: no edema, pulse 1+, Charcot joint of ankles and feet  INTEGUMENT: Large ulcer ventral aspect right foot with ulceration of third fourth and fifth digits      ASSESSMENT:  1. Foot ulcer, right, with fat layer exposed ([address]    2. Charcot foot due to diabetes mellitus ([address]    3. DM (diabetes mellitus), type 2, uncontrolled, periph vascular complic ([address]        PLAN:    1. The patient's foot ulcer is somewhat bothersome. He has the skin on the fifth, fourth, and third digits has exfoliated and he has fat layer exposure. The toes appear to be viable, but grossly infected. In view of this, the patient will be admitted to one of the local hospitals for more aggressive care. Unfortunately this is in the setting of severe peripheral neuropathy. 2. Improved diabetic control is mandatory in order to allow white cells to be more effective at eradicating the infection. Follow-up Disposition:  Return Admit to [facility] today.       Rosanne Armas MD

## 2017-09-07 NOTE — IP AVS SNAPSHOT
[address] [facility] 
[phone] Patient: Rosa Garcia MRN: [id number] [id number] Current Discharge Medication List  
  
START taking these medications Dose & Instructions Dispensing Information Comments Morning Noon Evening Bedtime  
 amoxicillin-clavulanate 875-125 mg per tablet Commonly known as:  AUGMENTIN Your last dose was: Your next dose is:    
   
   
 Dose:  1 Tab Take 1 Tab by mouth every twelve (12) hours for 14 days. Quantity:  28 Tab Refills:  0 CONTINUE these medications which have NOT CHANGED Dose & Instructions Dispensing Information Comments Morning Noon Evening Bedtime  
 aspirin 81 mg tablet Your last dose was: Your next dose is:    
   
   
 Dose:  81 mg Take 81 mg by mouth. Refills:  0  
     
   
   
   
  
 * glucose blood VI test strips strip Commonly known as:  ACCU-CHEK CLAUDINE Your last dose was: Your next dose is:    
   
   
 Use to test blood sugar twice daily. DX.E11.9 Quantity:  200 Strip Refills:  3  
     
   
   
   
  
 * glucose blood VI test strips strip Commonly known as:  ACCU-CHEK CLAUDINE PLUS TEST STRP Your last dose was: Your next dose is:    
   
   
 Test three times daily Quantity:  300 Strip Refills:  4  
     
   
   
   
  
 * ACCU-CHEK CLAUDINE PLUS TEST STRP strip Generic drug:  glucose blood VI test strips Your last dose was: Your next dose is:    
   
   
 TEST THREE TIMES DAILY Quantity:  300 Strip Refills:  4  
     
   
   
   
  
 insulin NPH/insulin regular 100 unit/mL (70-30) injection Commonly known as:  NovoLIN 70/30 Your last dose was: Your next dose is:    
   
   
 18 units before meals breakfast and dinner  Quantity:  2 Vial  
Refills:  11  
     
   
   
   
  
 Insulin Syringe-Needle U-100 1 mL 31 gauge x 5/16 Syrg Your last dose was: Your next dose is:    
   
   
 USE ONE  TWICE DAILY Quantity:  100 Syringe Refills:  11  
     
   
   
   
  
 lisinopril 40 mg tablet Commonly known as:  Macey Primmer Your last dose was: Your next dose is:    
   
   
 Dose:  40 mg Take 1 Tab by mouth daily. Quantity:  90 Tab Refills:  3  
     
   
   
   
  
 meloxicam 15 mg tablet Commonly known as:  MOBIC Your last dose was: Your next dose is:    
   
   
 Dose:  15 mg Take 1 Tab by mouth daily. Quantity:  30 Tab Refills:  0  
     
   
   
   
  
 metoprolol tartrate 25 mg tablet Commonly known as:  LOPRESSOR Your last dose was: Your next dose is: TAKE ONE TABLET BY MOUTH TWICE DAILY Quantity:  180 Tab Refills:  3  
     
   
   
   
  
 * NOVOTWIST 32 gauge x 1/5\" Ndle Generic drug:  pen needle, diabetic Your last dose was: Your next dose is:    
   
   
  Refills:  0  
     
   
   
   
  
 * Insulin Needles (Disposable) 30 gauge x 1/3\" Commonly known as:  NOVOFINE 30 Your last dose was: Your next dose is:    
   
   
 Inject 5 times a day Quantity:  500 Package Refills:  3  
     
   
   
   
  
 simvastatin 20 mg tablet Commonly known as:  ZOCOR Your last dose was: Your next dose is: TAKE ONE TABLET BY MOUTH NIGHTLY. Quantity:  90 Tab Refills:  3  
     
   
   
   
  
 * Notice: This list has 5 medication(s) that are the same as other medications prescribed for you. Read the directions carefully, and ask your doctor or other care provider to review them with you. STOP taking these medications   
 cephALEXin 500 mg capsule Commonly known as:  KEFLEX  
   
  
 clindamycin 150 mg capsule Commonly known as:  CLEOCIN  
   
  
 FLUZONE HIGH-DOSE [id number] (PF) Syrg injection Generic drug:  influenza vaccine 2015-16 (65yr+)(PF)  
   
  
 HYDROcodone-acetaminophen 2.5-500 mg per tablet Commonly known as:  LORTAB  
   
  
 ibuprofen 400 mg tablet Commonly known as:  MOTRIN  
   
  
 insulin aspart 100 unit/mL Inpn Commonly known as:  NovoLOG Flexpen  
   
  
 oxyCODONE-acetaminophen 5-325 mg per tablet Commonly known as:  PERCOCET Where to Get Your Medications These medications were sent to [address] [place] [address], [address] [place] [address]. [address] Phone:  [phone]  
  amoxicillin-clavulanate 875-125 mg per tablet

## 2017-09-07 NOTE — MR AVS SNAPSHOT
Visit Information Date & Time Provider Department Dept. Phone Encounter #  
 9/7/2017  9:30 AM Marisol Ceja MD [facility] [phone] [id number] Upcoming Health Maintenance Date Due MICROALBUMIN Q1 10/5/2016 MEDICARE YEARLY EXAM 4/12/2017 GLAUCOMA SCREENING Q2Y 12/7/2017* FOOT EXAM Q1 12/7/2017* EYE EXAM RETINAL OR DILATED Q1 1/7/2018* HEMOGLOBIN A1C Q6M 11/15/2017 Pneumococcal 65+ Low/Medium Risk (2 of 2 - PPSV23) 2/6/2018 LIPID PANEL Q1 2/6/2018 DTaP/Tdap/Td series (2 - Td) 2/6/2027 *Topic was postponed. The date shown is not the original due date. Allergies as of 9/7/2017  Review Complete On: 9/7/2017 By: Shaheed Velez No Known Allergies Current Immunizations  Reviewed on 2/28/2011 Name Date Influenza Vaccine Whole 11/1/2010 Not reviewed this visit You Were Diagnosed With   
  
 Codes Comments Foot ulcer, right, with fat layer exposed ([facility] [address]    -  Primary ICD-10-CM: D52.339 ICD-9-CM: 707.15 Charcot foot due to diabetes mellitus ([facility] [address]     ICD-10-CM: E11.610 ICD-9-CM: 250.60, 713.5 DM (diabetes mellitus), type 2, uncontrolled, periph vascular complic (HCC)     INS-15-XQ: E11.51, E11.65 ICD-9-CM: 250.72, 443.81 Vitals BP Pulse Temp Resp Height(growth percentile) Weight(growth percentile) 144/65 (BP 1 Location: Left arm, BP Patient Position: Sitting) 64 98.7 °F (37.1 °C) (Oral) 16 6' 9\" (2.057 m) 260 lb 14.4 oz (118.3 kg) SpO2 BMI Smoking Status 97% 27.96 kg/m2 Former Smoker BMI and BSA Data Body Mass Index Body Surface Area  
 27.96 kg/m 2 2.6 m 2 Preferred Pharmacy Pharmacy Name Phone [address] [phone] Your Updated Medication List  
  
   
This list is accurate as of: 9/7/17 11:20 AM.  Always use your most recent med list.  
  
  
  
  
 aspirin 81 mg tablet Take 81 mg by mouth. cephALEXin 500 mg capsule Commonly known as:  [facility] Take 1 Cap by mouth four (4) times daily. clindamycin 150 mg capsule Commonly known as:  CLEOCIN  
  
 FLUZONE HIGH-DOSE [id number] (PF) Syrg injection Generic drug:  influenza vaccine 2015-16 (65yr+)(PF)  
  
 * glucose blood VI test strips strip Commonly known as:  ACCU-CHEK CLAUDINE Use to test blood sugar twice daily. DX.E11.9  
  
 * glucose blood VI test strips strip Commonly known as:  ACCU-CHEK CLAUDINE PLUS TEST STRP Test three times daily * ACCU-CHEK CLAUDINE PLUS TEST STRP strip Generic drug:  glucose blood VI test strips TEST THREE TIMES DAILY HYDROcodone-acetaminophen 2.5-500 mg per tablet Commonly known as:  [place] Corns Take 1 Tab by mouth every six (6) hours as needed for Pain. ibuprofen 400 mg tablet Commonly known as:  MOTRIN Take 400 mg by mouth as needed. insulin aspart 100 unit/mL Inpn Commonly known as:  [place] 15 units ac  
  
 insulin NPH/insulin regular 100 unit/mL (70-30) injection Commonly known as:  NovoLIN 70/30  
18 units before meals breakfast and dinner Insulin Syringe-Needle U-100 1 mL 31 gauge x 5/16 Syrg USE ONE  TWICE DAILY  
  
 lisinopril 40 mg tablet Commonly known as:  Tawny Bills Take 1 Tab by mouth daily. meloxicam 15 mg tablet Commonly known as:  MOBIC Take 1 Tab by mouth daily. metoprolol tartrate 25 mg tablet Commonly known as:  LOPRESSOR  
TAKE ONE TABLET BY MOUTH TWICE DAILY  
  
 * NOVOTWIST 32 gauge x 1/5\" Ndle Generic drug:  pen needle, diabetic * Insulin Needles (Disposable) 30 gauge x 1/3\" Commonly known as:  NOVOFINE 30 Inject 5 times a day  
  
 oxyCODONE-acetaminophen 5-325 mg per tablet Commonly known as:  PERCOCET  
  
 simvastatin 20 mg tablet Commonly known as:  ZOCOR  
TAKE ONE TABLET BY MOUTH NIGHTLY. * Notice: This list has 5 medication(s) that are the same as other medications prescribed for you. Read the directions carefully, and ask your doctor or other care provider to review them with you. Introducing [facility] SERVICES! [employer] introduces Ter[employer]Pass patient portal. Now you can access parts of your medical record, email your doctor's office, and request medication refills online. 1. In your internet browser, go to [email or web address] 2. Click on the First Time User? Click Here link in the Sign In box. You will see the New Member Sign Up page. 3. Enter your TerraPass Access Code exactly as it appears below. You will not need to use this code after youve completed the sign-up process. If you do not sign up before the expiration date, you must request a new code. · TerraPass Access Code: [email or web address] Expires: 12/6/2017 11:20 AM 
 
4. Enter the last four digits of your Social Security Number (xxxx) and Date of Birth (mm/dd/yyyy) as indicated and click Submit. You will be taken to the next sign-up page. 5. Create a TerraPass ID. This will be your TerraPass login ID and cannot be changed, so think of one that is secure and easy to remember. 6. Create a TerraPass password. You can change your password at any time. 7. Enter your Password Reset Question and Answer. This can be used at a later time if you forget your password. 8. Enter your e-mail address. You will receive e-mail notification when new information is available in [address] 9. Click Sign Up. You can now view and download portions of your medical record. 10. Click the Download Summary menu link to download a portable copy of your medical information. If you have questions, please visit the Frequently Asked Questions section of the TerraPass website. Remember, TerraPass is NOT to be used for urgent needs. For medical emergencies, dial 911. Now available from your iPhone and Android! Please provide this summary of care documentation to your next provider. Your primary care clinician is listed as Liz Fam. If you have any questions after today's visit, please call [phone].

## 2017-09-07 NOTE — PROGRESS NOTES
2:29 PM-Paged Dr. Melissa Salas regarding orders for patient. Per , Dr. Melissa Salas putting orders in at this time. Will await new orders. 3:45 PM- Spoke with Dr. Mary Galeana regarding consult about foot ulcer. MD recommended wound care team come to see patient and if needed, he will follow up with the patient tomorrow.

## 2017-09-07 NOTE — PROGRESS NOTES
Chief Complaint   Patient presents with   Olaf Garcia Annual Wellness Visit     1. Have you been to the ER, urgent care clinic since your last visit? Hospitalized since your last visit? No    2. Have you seen or consulted any other health care providers outside of the [address] [place] [address] since your last visit? Include any pap smears or colon screening.  No

## 2017-09-08 ENCOUNTER — APPOINTMENT (OUTPATIENT)
Dept: GENERAL RADIOLOGY | Age: 77
DRG: 271 | End: 2017-09-08
Attending: INTERNAL MEDICINE
Payer: MEDICARE

## 2017-09-08 ENCOUNTER — APPOINTMENT (OUTPATIENT)
Dept: MRI IMAGING | Age: 77
DRG: 271 | End: 2017-09-08
Attending: INTERNAL MEDICINE
Payer: MEDICARE

## 2017-09-08 LAB
ANION GAP SERPL CALC-SCNC: 4 MMOL/L (ref 5–15)
BUN SERPL-MCNC: 24 MG/DL (ref 6–20)
BUN/CREAT SERPL: 17 (ref 12–20)
CALCIUM SERPL-MCNC: 8.3 MG/DL (ref 8.5–10.1)
CHLORIDE SERPL-SCNC: 105 MMOL/L (ref 97–108)
CO2 SERPL-SCNC: 26 MMOL/L (ref 21–32)
CREAT SERPL-MCNC: 1.39 MG/DL (ref 0.7–1.3)
EST. AVERAGE GLUCOSE BLD GHB EST-MCNC: 206 MG/DL
GLUCOSE BLD STRIP.AUTO-MCNC: 102 MG/DL (ref 65–100)
GLUCOSE BLD STRIP.AUTO-MCNC: 138 MG/DL (ref 65–100)
GLUCOSE BLD STRIP.AUTO-MCNC: 182 MG/DL (ref 65–100)
GLUCOSE BLD STRIP.AUTO-MCNC: 95 MG/DL (ref 65–100)
GLUCOSE SERPL-MCNC: 137 MG/DL (ref 65–100)
HBA1C MFR BLD: 8.8 % (ref 4.2–6.3)
POTASSIUM SERPL-SCNC: 4.4 MMOL/L (ref 3.5–5.1)
SERVICE CMNT-IMP: ABNORMAL
SERVICE CMNT-IMP: NORMAL
SODIUM SERPL-SCNC: 135 MMOL/L (ref 136–145)

## 2017-09-08 PROCEDURE — 74011250636 HC RX REV CODE- 250/636: Performed by: INTERNAL MEDICINE

## 2017-09-08 PROCEDURE — 74011250637 HC RX REV CODE- 250/637: Performed by: INTERNAL MEDICINE

## 2017-09-08 PROCEDURE — 74011000258 HC RX REV CODE- 258: Performed by: INTERNAL MEDICINE

## 2017-09-08 PROCEDURE — A9577 INJ MULTIHANCE: HCPCS | Performed by: INTERNAL MEDICINE

## 2017-09-08 PROCEDURE — 80048 BASIC METABOLIC PNL TOTAL CA: CPT | Performed by: INTERNAL MEDICINE

## 2017-09-08 PROCEDURE — 65270000015 HC RM PRIVATE ONCOLOGY

## 2017-09-08 PROCEDURE — 93922 UPR/L XTREMITY ART 2 LEVELS: CPT

## 2017-09-08 PROCEDURE — 36415 COLL VENOUS BLD VENIPUNCTURE: CPT | Performed by: INTERNAL MEDICINE

## 2017-09-08 PROCEDURE — 73630 X-RAY EXAM OF FOOT: CPT

## 2017-09-08 PROCEDURE — 82962 GLUCOSE BLOOD TEST: CPT

## 2017-09-08 PROCEDURE — 83036 HEMOGLOBIN GLYCOSYLATED A1C: CPT | Performed by: INTERNAL MEDICINE

## 2017-09-08 PROCEDURE — 73720 MRI LWR EXTREMITY W/O&W/DYE: CPT

## 2017-09-08 PROCEDURE — 74011636637 HC RX REV CODE- 636/637: Performed by: INTERNAL MEDICINE

## 2017-09-08 RX ORDER — INSULIN GLARGINE 100 [IU]/ML
10 INJECTION, SOLUTION SUBCUTANEOUS DAILY
Status: DISCONTINUED | OUTPATIENT
Start: 2017-09-09 | End: 2017-09-13

## 2017-09-08 RX ADMIN — ASPIRIN 81 MG: 81 TABLET, COATED ORAL at 09:33

## 2017-09-08 RX ADMIN — PIPERACILLIN SODIUM,TAZOBACTAM SODIUM 3.38 G: 3; .375 INJECTION, POWDER, FOR SOLUTION INTRAVENOUS at 23:48

## 2017-09-08 RX ADMIN — INSULIN LISPRO 10 UNITS: 100 INJECTION, SOLUTION INTRAVENOUS; SUBCUTANEOUS at 17:15

## 2017-09-08 RX ADMIN — LISINOPRIL 40 MG: 20 TABLET ORAL at 09:30

## 2017-09-08 RX ADMIN — PIPERACILLIN SODIUM,TAZOBACTAM SODIUM 3.38 G: 3; .375 INJECTION, POWDER, FOR SOLUTION INTRAVENOUS at 15:19

## 2017-09-08 RX ADMIN — INSULIN LISPRO 10 UNITS: 100 INJECTION, SOLUTION INTRAVENOUS; SUBCUTANEOUS at 09:30

## 2017-09-08 RX ADMIN — METOPROLOL TARTRATE 25 MG: 25 TABLET, FILM COATED ORAL at 09:30

## 2017-09-08 RX ADMIN — ATORVASTATIN CALCIUM 20 MG: 20 TABLET, FILM COATED ORAL at 09:30

## 2017-09-08 RX ADMIN — INSULIN LISPRO 10 UNITS: 100 INJECTION, SOLUTION INTRAVENOUS; SUBCUTANEOUS at 12:35

## 2017-09-08 RX ADMIN — Medication 10 ML: at 15:19

## 2017-09-08 RX ADMIN — Medication 10 ML: at 06:00

## 2017-09-08 RX ADMIN — Medication 10 ML: at 19:35

## 2017-09-08 RX ADMIN — VANCOMYCIN HYDROCHLORIDE 2250 MG: 10 INJECTION, POWDER, LYOPHILIZED, FOR SOLUTION INTRAVENOUS at 16:18

## 2017-09-08 RX ADMIN — INSULIN GLARGINE 6 UNITS: 100 INJECTION, SOLUTION SUBCUTANEOUS at 09:30

## 2017-09-08 RX ADMIN — METOPROLOL TARTRATE 25 MG: 25 TABLET, FILM COATED ORAL at 17:15

## 2017-09-08 RX ADMIN — GADOBENATE DIMEGLUMINE 20 ML: 529 INJECTION, SOLUTION INTRAVENOUS at 20:09

## 2017-09-08 NOTE — PROGRESS NOTES
Primary Nurse Renée Frias and Ata Renner, RN performed a dual skin assessment on this patient Impairment noted- see wound doc flow sheet noted to right foot and toes.     Lobo score is 21

## 2017-09-08 NOTE — PROCEDURES
[facility]  *** FINAL REPORT ***    Name: Rodger Sosa  MRN: [id number]    Inpatient  : 02 Oct 1940  HIS Order #: [id number]  [address] [facility] Visit #: [id number]  Date: 08 Sep 2017    TYPE OF TEST: Peripheral Arterial Testing    REASON FOR TEST  Extremity ulceration (right side)    Right Leg  Doppler:    Normal  Ankle/Brachial: 0.89    Left Leg  Doppler:    Abnormal  Ankle/Brachial: 0.99    INTERPRETATION/FINDINGS  PROCEDURE:  Ankle, brachial and digital arterial pressures, CW Doppler   waveforms and digital PPG waveforms were performed. 1. Mild peripheral arterial disease indicated at rest in the right  leg. 2. Abnormality of the left dorsalis pedis continuous wave Doppler  signals noted. 3. The right ankle/brachial index is 0.89 and the left ankle/brachial  index is 0.99.  4. The right great toe/brachial index is 0.18 and the left great  toe/brachial index is 0.52 (Right severe pedal pulse/digit disease). ADDITIONAL COMMENTS    I have personally reviewed the data relevant to the interpretation of  this  study. TECHNOLOGIST: Tari Hampton.  IFRAH Stein, RDMS  Signed: 2017 02:16 PM    PHYSICIAN: Asaf Mcmillan MD  Signed: 09/15/2017 05:36 PM

## 2017-09-08 NOTE — PROGRESS NOTES
RN completed wound care on patients right foot according to the order and applied new dressing. Patient has wound care consult ordered. Wound appears to be large open area of tissue on top of foot with small wounds to toes.

## 2017-09-08 NOTE — H&P
[place] [place], [address]   HISTORY AND PHYSICAL       Name:  Olivia Landa   MR#:  [id number]   :  1940   Account #:  [de-identified]        Date of Adm:  2017       HISTORY OF PRESENT ILLNESS: The patient is a 68-year-old   gentleman who has a 30+ year history of diabetes, presented to the   office stating that 2 weeks ago while at the beach, he apparently   developed a large bulla on the ventral aspect of his right foot. This was   decompressed and he was left with a fairly large ulcer present. One   week later, he went to see his podiatrist who dressed the lesion and he   now in to see me. He has preexisting severe peripheral neuropathy   with an accompanying Charcot joint for his ankles. When I evaluated   him in the office, he had a fairly large ulcer on the ventral aspect with a   moderate amount of ulceration on the third, fourth and fifth digits. No   bones evident at this particular time. In view of this, it was elected to   admit him for further evaluation and care. Complicating this is the fact   that his diabetes has been poorly controlled historically primarily   because of noncompliance. His last hemoglobin A1c several months   ago was 9.7. PAST MEDICAL HISTORY   1. The patient has a history of coronary artery disease. 2. He has a long history of hypertension. 3. Known dyslipidemia and is in a secondary cardiovascular prevention   mode. 4. He has had cataract surgery. SOCIAL HISTORY: He is  and has 5 children, has a college   degree. He never smoked cigarettes, drank alcohol. ALLERGIES: HE HAS NO KNOWN ALLERGIES. CURRENT MEDICATIONS   Include the followin. Humulin 70/30 with 14 units a.c. breakfast and dinner. 2. Simvastatin 20 mg at bedtime. 3. Metoprolol 25 mg b.i.d.   4. Aspirin 81 mg daily. REVIEW OF SYSTEMS   HEENT: Partial compensated vision.    CARDIOVASCULAR: See past medical history, currently stable with   no chest pain, shortness of breath. PULMONARY: Negative. GASTROINTESTINAL: Negative. GENITOURINARY: Negative. NEUROLOGICAL: History of peripheral neuropathy. MUSCULOSKELETAL: See past medical history. FAMILY HISTORY: The patient's mother  of complications related   to alcoholism. Father  from cancer. She has 1 sister who is alive   and well. PHYSICAL EXAMINATION   VITAL SIGNS: Blood pressure 144/65, pulse 64, temperature 98.7   degrees Fahrenheit, respiratory rate 18. Height 6 feet 9 inches, weight   260 pounds. GENERAL: The patient is a 68-year-old well-developed, well-  nourished, fairly healthy-looking black male in no acute respiratory   distress at rest. He is alert, oriented x3 and cooperative. SKIN: Warm, dry without pallor or cyanosis. HEENT: Pupils equal, round and reactive to light. He has fair dentition. NECK: There is no JVD, HJR, thyromegaly, carotid bruits,   lymphadenopathy noted. CHEST: Clear to A and P. HEART: PMI is not palpable. S1, S2 within normal limits. No murmurs,   gallops noted. ABDOMEN: Soft, without obvious organomegaly, masses or areas of   tenderness. EXTREMITIES: Pulses are full distally. He has 1+ edema in both distal   lower extremities. There is bony enlargement of both ankles. There is a   large ulcer on the ventral aspect of the right foot extending from the   first metatarsal bone to the fifth. There is an ulcerated area involving   the third, fourth and fifth digits and that includes interdigital. No visible   bone is present. Pulses are full distally. NEUROLOGICAL: There is decreased sensation in both distal lower   extremities. IMPRESSION   1. Large ulcer of the ventral aspect of the right foot involving the third,   fourth and fifth digits. 2. Severe peripheral neuropathy. 3. Charcot joint. 4. Diabetes mellitus type 2, poorly controlled. 5. Primary hypertension. 6. Dyslipidemia.    7. History of coronary artery disease. DISPOSITION   1. Aggressive approach has to occur with this gentleman because he   is at high risk of progression and possible digital ulceration depending   on the integrity of the bones. Wound care will also be aggressive with   surgical intervention and possible debridement. 2. Second most important thing is tight diabetic control to ensure   maximizing the healing process with optimal white blood cell function. MD Carroll De La Garza Poster / Faiza Perez   D:  09/07/2017   21:56   T:  09/07/2017   22:52   Job #:  [id number]

## 2017-09-08 NOTE — PROGRESS NOTES
General Daily Progress Note    Admit Date: 9/7/2017    Subjective:     Patient has no complaint. Current Facility-Administered Medications   Medication Dose Route Frequency    [START ON 9/9/2017] piperacillin-tazobactam (ZOSYN) 3.375 g in 0.9% sodium chloride (MBP/ADV) 100 mL  3.375 g IntraVENous Q8H    aspirin delayed-release tablet 81 mg  81 mg Oral DAILY    insulin lispro (HUMALOG) injection 10 Units  10 Units SubCUTAneous TIDAC    lisinopril (PRINIVIL, ZESTRIL) tablet 40 mg  40 mg Oral DAILY    metoprolol tartrate (LOPRESSOR) tablet 25 mg  25 mg Oral BID    atorvastatin (LIPITOR) tablet 20 mg  20 mg Oral DAILY    sodium chloride (NS) flush 5-10 mL  5-10 mL IntraVENous Q8H    sodium chloride (NS) flush 5-10 mL  5-10 mL IntraVENous PRN    naloxone (NARCAN) injection 0.4 mg  0.4 mg IntraVENous PRN    acetaminophen (TYLENOL) tablet 650 mg  650 mg Oral Q4H PRN    HYDROcodone-acetaminophen (NORCO) 5-325 mg per tablet 1 Tab  1 Tab Oral Q4H PRN    vancomycin (VANCOCIN) 2,250 mg in 0.9% sodium chloride 500 mL IVPB  2,250 mg IntraVENous Q24H    insulin glargine (LANTUS) injection 6 Units  6 Units SubCUTAneous DAILY        Review of Systems  A comprehensive review of systems was negative.     Objective:     Patient Vitals for the past 24 hrs:   BP Temp Pulse Resp SpO2   09/08/17 1505 137/60 98.5 °F (36.9 °C) 66 18 99 %   09/08/17 0740 161/62 97.4 °F (36.3 °C) 70 16 100 %   09/08/17 0005 136/62 98.4 °F (36.9 °C) 78 16 100 %   09/07/17 1946 131/58 98.2 °F (36.8 °C) 78 16 98 %             Physical Exam:   Visit Vitals    /60 (BP 1 Location: Left arm, BP Patient Position: At rest)    Pulse 66    Temp 98.5 °F (36.9 °C)    Resp 18    SpO2 99%     General appearance: alert, cooperative, no distress, appears stated age  Neck: supple, symmetrical, trachea midline, no adenopathy, thyroid: not enlarged, symmetric, no tenderness/mass/nodules, no carotid bruit and no JVD  Lungs: clear to auscultation bilaterally  Heart: regular rate and rhythm, S1, S2 normal, no murmur, click, rub or gallop  Abdomen: soft, non-tender. Bowel sounds normal. No masses,  no organomegaly  Extremities: Bandaged right foot        Data Review   Recent Results (from the past 24 hour(s))   GLUCOSE, POC    Collection Time: 09/07/17  7:10 PM   Result Value Ref Range    Glucose (POC) 201 (H) 65 - 100 mg/dL    Performed by Cierra Kennedy    GLUCOSE, POC    Collection Time: 09/07/17  8:31 PM   Result Value Ref Range    Glucose (POC) 178 (H) 65 - 100 mg/dL    Performed by Grace Naylor (PCT)    HEMOGLOBIN A1C WITH EAG    Collection Time: 09/08/17 12:07 AM   Result Value Ref Range    Hemoglobin A1c 8.8 (H) 4.2 - 6.3 %    Est. average glucose 205 mg/dL   METABOLIC PANEL, BASIC    Collection Time: 09/08/17 12:07 AM   Result Value Ref Range    Sodium 135 (L) 136 - 145 mmol/L    Potassium 4.4 3.5 - 5.1 mmol/L    Chloride 105 97 - 108 mmol/L    CO2 26 21 - 32 mmol/L    Anion gap 4 (L) 5 - 15 mmol/L    Glucose 137 (H) 65 - 100 mg/dL    BUN 24 (H) 6 - 20 MG/DL    Creatinine 1.39 (H) 0.70 - 1.30 MG/DL    BUN/Creatinine ratio 17 12 - 20      GFR est AA >60 >60 ml/min/1.73m2    GFR est non-AA 50 (L) >60 ml/min/1.73m2    Calcium 8.3 (L) 8.5 - 10.1 MG/DL   GLUCOSE, POC    Collection Time: 09/08/17  7:43 AM   Result Value Ref Range    Glucose (POC) 138 (H) 65 - 100 mg/dL    Performed by Verneice Specking    GLUCOSE, POC    Collection Time: 09/08/17 11:45 AM   Result Value Ref Range    Glucose (POC) 182 (H) 65 - 100 mg/dL    Performed by Verneice Specking    GLUCOSE, POC    Collection Time: 09/08/17  4:45 PM   Result Value Ref Range    Glucose (POC) 95 65 - 100 mg/dL    Performed by Verneice Specking            Assessment:     Active Problems:    Foot ulcer with fat layer exposed ([address] (9/7/2017)        Plan:     1. Await results of CT/MRI to determine if there is any bony involvement. Pulse appears to be adequate for healing.   Continue broad-spectrum antibiotic coverage with wound care for now. 2.  Continue tight diabetic control. 3.  Ambulation slightly limited for now.

## 2017-09-08 NOTE — PROGRESS NOTES
Oncology Nursing Communication Tool      8:22 PM  9/7/2017     Bedside and Verbal shift change report given to Serena German RN (incoming nurse) by Beba Erickson (outgoing nurse) on Jama Trimble a 68 y.o. male who was admitted on 9/7/2017 12:34 PM. Report included the following information SBAR, Kardex, Intake/Output, MAR and Accordion. Significant changes during shift: none      Issues for physician to address: none            Code Status: Full Code     Infections: No current active infections     Allergies: Review of patient's allergies indicates no known allergies. Current diet: DIET DIABETIC CONSISTENT CARB Regular       Pain Controlled [x] yes [] no   Bowel Movement [] yes [x] no   Last Bowel Movement (date)             Vital Signs:   Patient Vitals for the past 12 hrs:   Temp Pulse Resp BP SpO2   09/07/17 1946 98.2 °F (36.8 °C) 78 16 131/58 98 %   09/07/17 1826 - 83 - 137/71 -   09/07/17 1738 - 81 - 167/79 -   09/07/17 1330 98.8 °F (37.1 °C) 71 16 139/59 98 %      Intake & Output:   No intake or output data in the 24 hours ending 09/07/17 2022   Laboratory Results:     Recent Results (from the past 12 hour(s))   METABOLIC PANEL, COMPREHENSIVE    Collection Time: 09/07/17  4:04 PM   Result Value Ref Range    Sodium 136 136 - 145 mmol/L    Potassium 4.2 3.5 - 5.1 mmol/L    Chloride 103 97 - 108 mmol/L    CO2 27 21 - 32 mmol/L    Anion gap 6 5 - 15 mmol/L    Glucose 123 (H) 65 - 100 mg/dL    BUN 26 (H) 6 - 20 MG/DL    Creatinine 1.52 (H) 0.70 - 1.30 MG/DL    BUN/Creatinine ratio 17 12 - 20      GFR est AA 54 (L) >60 ml/min/1.73m2    GFR est non-AA 45 (L) >60 ml/min/1.73m2    Calcium 9.4 8.5 - 10.1 MG/DL    Bilirubin, total 0.6 0.2 - 1.0 MG/DL    ALT (SGPT) 28 12 - 78 U/L    AST (SGOT) 18 15 - 37 U/L    Alk.  phosphatase 72 45 - 117 U/L    Protein, total 8.2 6.4 - 8.2 g/dL    Albumin 3.2 (L) 3.5 - 5.0 g/dL    Globulin 5.0 (H) 2.0 - 4.0 g/dL    A-G Ratio 0.6 (L) 1.1 - 2.2     CBC WITH MANUAL DIFF    Collection Time: 09/07/17  4:04 PM   Result Value Ref Range    WBC 8.9 4.1 - 11.1 K/uL    RBC 4.33 4.10 - 5.70 M/uL    HGB 12.3 12.1 - 17.0 g/dL    HCT 36.5 (L) 36.6 - 50.3 %    MCV 84.3 80.0 - 99.0 FL    MCH 28.4 26.0 - 34.0 PG    MCHC 33.7 30.0 - 36.5 g/dL    RDW 13.9 11.5 - 14.5 %    PLATELET 666 348 - 759 K/uL    NEUTROPHILS PENDING %    LYMPHOCYTES PENDING %    MONOCYTES PENDING %    EOSINOPHILS PENDING %    BASOPHILS PENDING %    BAND NEUTROPHILS PENDING %    PROMYELOCYTES PENDING %    MYELOCYTES PENDING %    METAMYELOCYTES PENDING %    BLASTS PENDING %    OTHER CELL PENDING     ABS. NEUTROPHILS PENDING K/UL    ABS. LYMPHOCYTES PENDING K/UL    ABS. MONOCYTES PENDING K/UL    ABS. EOSINOPHILS PENDING K/UL    ABS. BASOPHILS PENDING K/UL    RBC COMMENTS PENDING     DF PENDING     NRBC 0.0 0  WBC    ABSOLUTE NRBC 0.00 0.00 - 0.01 K/uL    DIFFERENTIAL MANUAL DIFFERENTIAL ORDERED     GLUCOSE, POC    Collection Time: 09/07/17  5:06 PM   Result Value Ref Range    Glucose (POC) 150 (H) 65 - 100 mg/dL    Performed by Casi Oh    GLUCOSE, POC    Collection Time: 09/07/17  7:10 PM   Result Value Ref Range    Glucose (POC) 201 (H) 65 - 100 mg/dL    Performed by Leandro Allan               Opportunity for questions and clarifications were given to the incoming nurse. Patient's bed is in low position, side rails x2, door open PRN, call bell within reach and patient not in distress.       Meera Rivera

## 2017-09-08 NOTE — PROGRESS NOTES
Oncology Nursing Communication Tool      7:54 AM  9/8/2017     Bedside shift change report given to Ashley Castillo RN (incoming nurse) by Param Herbert RN (outgoing nurse) on Mercedes Varghese a 68 y.o. male who was admitted on 9/7/2017 12:34 PM. Report included the following information SBAR, Kardex, MAR and Accordion. Significant changes during shift: Wound care completed, no complaints of pain, MRI of foot today      Issues for physician to address:             Code Status: Full Code     Infections: No current active infections     Allergies: Review of patient's allergies indicates no known allergies.      Current diet: DIET DIABETIC CONSISTENT CARB Regular       Pain Controlled [x] yes [] no   Bowel Movement [] yes [x] no   Last Bowel Movement (date)     9//7/17            Vital Signs:   Patient Vitals for the past 12 hrs:   Temp Pulse Resp BP SpO2   09/08/17 0740 97.4 °F (36.3 °C) 70 16 161/62 100 %   09/08/17 0005 98.4 °F (36.9 °C) 78 16 136/62 100 %      Intake & Output:   No intake or output data in the 24 hours ending 09/08/17 0754   Laboratory Results:     Recent Results (from the past 12 hour(s))   GLUCOSE, POC    Collection Time: 09/07/17  8:31 PM   Result Value Ref Range    Glucose (POC) 178 (H) 65 - 100 mg/dL    Performed by Kimberley Otero (PCT)    HEMOGLOBIN A1C WITH EAG    Collection Time: 09/08/17 12:07 AM   Result Value Ref Range    Hemoglobin A1c 8.8 (H) 4.2 - 6.3 %    Est. average glucose 462 mg/dL   METABOLIC PANEL, BASIC    Collection Time: 09/08/17 12:07 AM   Result Value Ref Range    Sodium 135 (L) 136 - 145 mmol/L    Potassium 4.4 3.5 - 5.1 mmol/L    Chloride 105 97 - 108 mmol/L    CO2 26 21 - 32 mmol/L    Anion gap 4 (L) 5 - 15 mmol/L    Glucose 137 (H) 65 - 100 mg/dL    BUN 24 (H) 6 - 20 MG/DL    Creatinine 1.39 (H) 0.70 - 1.30 MG/DL    BUN/Creatinine ratio 17 12 - 20      GFR est AA >60 >60 ml/min/1.73m2    GFR est non-AA 50 (L) >60 ml/min/1.73m2    Calcium 8.3 (L) 8.5 - 10.1 MG/DL              Opportunity for questions and clarifications were given to the incoming nurse. Patient's bed is in low position, side rails x2, door open PRN, call bell within reach and patient not in distress.       Edis Gerber RN

## 2017-09-08 NOTE — PROGRESS NOTES
Spoke with Dr. Hamida Armas. Ok to add Zosyn for gram neg coverage.     Zosyn dosed at 3.375g IV q8h extended dosing interval per protocol

## 2017-09-08 NOTE — PROGRESS NOTES
Pt is a 67 y/o male that was admitted on 9/7/17 d/t Dx of DM foot ulcer with fat layer exposed. Pt is alert and oriented. Pt is a Full Code. Pt is retired from the [employer]. Pt lives with his spouse in a two story home with 7 [address]. Pt bedroom is on the first floor. Pt has a straight cane and glucometer. Pt has had HH in the past but can not remember the name of the provider. Pt has been to rehab in the past on [facility] but he cannot remember the name of the SNF and it was not a good experience. Pt indicated that he is I with ADLs and IADLs. Pt drives. Pt's preferred pharmacy is [facility]. CM will continue to monitor discharge plan. Care Management Interventions  PCP Verified by CM: Yes (Pt sees Dr. Brent Moss every 3 MO. Dr. Brent Moss is his PCP and attending MD in the hospital. )  Mode of Transport at Discharge: Other (see comment) (Pt spouse will transport him home in the car. )  Transition of Care Consult (CM Consult): Discharge Planning  Discharge Durable Medical Equipment: No (Pt has cane and glucometer)  Physical Therapy Consult: No  Occupational Therapy Consult: No  Speech Therapy Consult: No  Current Support Network: Lives with Spouse, Own Home (Pt lives with spouse in a two story home with 7 [address].   Bedroom on first floor. )  Confirm Follow Up Transport: Self  Discharge Location  Discharge Placement:  (TBD)    Brenda Daniel

## 2017-09-08 NOTE — WOUND CARE
Wound care consult for POA right foot wound. Patient is a 67 y/o 6'9\". 260lb AAM with DM with a gangrenous wound to right lateral, dorsal foot and includes 3rd, 4th and 5th toes. PMHX: CAD, HTN. Patient states he was on vacation last weekend when a large blister appeared on his right foot and it popped had had foul smelling drainage. He went to his \"foot doctor\", Dr Sohan Roman Tuesday and he gave him some antibiotics and then he went to Dr Sarah Quinn his PCP yesterday who sent him directly to [facility]. Right lateral, dorsal and plantar gangrenous vascular wound extending to include the 3rd, 4th and 5th toes. No pulses palpable in foot, skin cool to touch with some edema. The base of wound is black, gangrenous smelling with a few areas of skin peeled back allowing scan amount of soupy drainage. Dr Reilly Pastor consulted and saw patient this am - consult should have been made to vascular surgeon, not general surgeon, but believe that Dr Reilly Pastor will pass it on to his vascular partner Dr Caity Sanon and order some vascular tests. In the mean time:  WOUND POA CONDITIONS    Wound Foot Right (Active)   DRESSING STATUS Removed 9/8/2017 10:54 AM   DRESSING TYPE Dry dressing 9/8/2017 10:54 AM   Non-Pressure Injury Full thickness (subcut/muscle) 9/8/2017 10:54 AM   Wound Length (cm) 13 cm 9/8/2017 10:54 AM   Wound Width (cm) 13 cm 9/8/2017 10:54 AM   Wound Depth (cm) 0.1 9/8/2017 10:54 AM   Wound Surface area (cm^3) 16.9 cm^2 9/8/2017 10:54 AM   Condition of Base Eschar;Other (comment) 9/8/2017 10:54 AM   Tissue Type Black; Yellow;Red 9/8/2017 10:54 AM   Tissue Type Percent Black 95 % 9/8/2017 10:54 AM   Tissue Type Percent Red 2 9/8/2017 10:54 AM   Tissue Type Percent Yellow 3 9/8/2017 10:54 AM   Drainage Amount  Scant 9/8/2017 10:54 AM   Drainage Color Serosanguinous 9/8/2017 10:54 AM   Wound Odor Strong; Foul 9/8/2017 10:54 AM   Periwound Skin Condition Erythema, non-blanchable;Edematous 9/8/2017 10:54 AM   Cleansing and Cleansing Agents  Betadine 9/8/2017 10:54 AM   Dressing Type Applied Dry dressing 9/8/2017 10:54 AM   Procedure Tolerated Well 9/8/2017 10:54 AM   Number of days:1           Recommend and done by AMADO Hopper 23 nurse:  Right foot wound: daily cleanse wound and toes with Betadine/Povidine moist 4x4. Apply Betadine moist 4x4's on wound and weave one between toes, cover with dry dressing.   Peggy Klein, RN, CWON, zone ph# [phone]

## 2017-09-08 NOTE — ROUTINE PROCESS

## 2017-09-08 NOTE — PROGRESS NOTES
[facility]  Preliminary Report: Ankle Brachial Index    Pressure (mmHg) Right    Left    Brachial:  160   158  Ankle PTA:  109   159  Ankle DPA:  142   146  Great Toe:  29   83    Waveform:  Right   Left  CFA:   Triphasic  Triphasic  Popliteal:  Triphasic  Triphasic  PTA:   Triphasic  Triphasic  DPA: Biphasic  Monophasic  Great Toe:  Reduced  Pulsatile    Right NORA:   0.89  Left NORA: 0.99  Right TBI: 0.18  Left TBI: 0.52    Final report to follow.     CFA = Common Femoral Artery  PTA = Posterior Tibial Artery  DPA = Dorsalis Pedis Artery  NORA = Ankle Brachial Index  TBI = Toe Brachial Index  NC = Non-compressible

## 2017-09-08 NOTE — PROGRESS NOTES
Bedside shift change report given to Felicitas RN (oncoming nurse) by Belkis Lazo RN (offgoing nurse). Report included the following information SBAR.

## 2017-09-08 NOTE — PROGRESS NOTES
Visited by Brenda Concepcion Partner on 9/8/17.   EVERT Pacheco, [facility], [address] [place] [address] [address]     Paging Service  [phone]

## 2017-09-08 NOTE — PROGRESS NOTES
Surgery    Courtesy    Pt has diabetic foot infection but I personally do not feel a pulse in the foot and thus also likely has a vascular component.   Favor vascular consultation +/- podiatry consultation as I do not treat either condition    Tameka Nolan MD FACS

## 2017-09-09 LAB
ANION GAP SERPL CALC-SCNC: 8 MMOL/L (ref 5–15)
BASOPHILS # BLD: 0 K/UL (ref 0–0.1)
BASOPHILS NFR BLD: 0 % (ref 0–1)
BUN SERPL-MCNC: 20 MG/DL (ref 6–20)
BUN/CREAT SERPL: 15 (ref 12–20)
CALCIUM SERPL-MCNC: 8.5 MG/DL (ref 8.5–10.1)
CHLORIDE SERPL-SCNC: 104 MMOL/L (ref 97–108)
CO2 SERPL-SCNC: 24 MMOL/L (ref 21–32)
CREAT SERPL-MCNC: 1.37 MG/DL (ref 0.7–1.3)
EOSINOPHIL # BLD: 0.1 K/UL (ref 0–0.4)
EOSINOPHIL NFR BLD: 2 % (ref 0–7)
ERYTHROCYTE [DISTWIDTH] IN BLOOD BY AUTOMATED COUNT: 13.7 % (ref 11.5–14.5)
GLUCOSE BLD STRIP.AUTO-MCNC: 104 MG/DL (ref 65–100)
GLUCOSE BLD STRIP.AUTO-MCNC: 120 MG/DL (ref 65–100)
GLUCOSE BLD STRIP.AUTO-MCNC: 218 MG/DL (ref 65–100)
GLUCOSE BLD STRIP.AUTO-MCNC: 98 MG/DL (ref 65–100)
GLUCOSE SERPL-MCNC: 114 MG/DL (ref 65–100)
HCT VFR BLD AUTO: 32.2 % (ref 36.6–50.3)
HGB BLD-MCNC: 10.6 G/DL (ref 12.1–17)
LYMPHOCYTES # BLD: 1.8 K/UL (ref 0.8–3.5)
LYMPHOCYTES NFR BLD: 27 % (ref 12–49)
MCH RBC QN AUTO: 27.7 PG (ref 26–34)
MCHC RBC AUTO-ENTMCNC: 32.9 G/DL (ref 30–36.5)
MCV RBC AUTO: 84.1 FL (ref 80–99)
MONOCYTES # BLD: 0.8 K/UL (ref 0–1)
MONOCYTES NFR BLD: 11 % (ref 5–13)
NEUTS SEG # BLD: 4 K/UL (ref 1.8–8)
NEUTS SEG NFR BLD: 60 % (ref 32–75)
PLATELET # BLD AUTO: 228 K/UL (ref 150–400)
POTASSIUM SERPL-SCNC: 4.4 MMOL/L (ref 3.5–5.1)
RBC # BLD AUTO: 3.83 M/UL (ref 4.1–5.7)
SERVICE CMNT-IMP: ABNORMAL
SERVICE CMNT-IMP: NORMAL
SODIUM SERPL-SCNC: 136 MMOL/L (ref 136–145)
WBC # BLD AUTO: 6.7 K/UL (ref 4.1–11.1)

## 2017-09-09 PROCEDURE — 74011250636 HC RX REV CODE- 250/636: Performed by: INTERNAL MEDICINE

## 2017-09-09 PROCEDURE — 74011250637 HC RX REV CODE- 250/637: Performed by: INTERNAL MEDICINE

## 2017-09-09 PROCEDURE — 82962 GLUCOSE BLOOD TEST: CPT

## 2017-09-09 PROCEDURE — 85025 COMPLETE CBC W/AUTO DIFF WBC: CPT | Performed by: INTERNAL MEDICINE

## 2017-09-09 PROCEDURE — 36415 COLL VENOUS BLD VENIPUNCTURE: CPT | Performed by: INTERNAL MEDICINE

## 2017-09-09 PROCEDURE — 65270000015 HC RM PRIVATE ONCOLOGY

## 2017-09-09 PROCEDURE — 80048 BASIC METABOLIC PNL TOTAL CA: CPT | Performed by: INTERNAL MEDICINE

## 2017-09-09 PROCEDURE — 74011636637 HC RX REV CODE- 636/637: Performed by: INTERNAL MEDICINE

## 2017-09-09 PROCEDURE — 74011000258 HC RX REV CODE- 258: Performed by: INTERNAL MEDICINE

## 2017-09-09 RX ORDER — INSULIN LISPRO 100 [IU]/ML
10 INJECTION, SOLUTION INTRAVENOUS; SUBCUTANEOUS
Status: DISCONTINUED | OUTPATIENT
Start: 2017-09-10 | End: 2017-09-19 | Stop reason: HOSPADM

## 2017-09-09 RX ORDER — INSULIN LISPRO 100 [IU]/ML
10 INJECTION, SOLUTION INTRAVENOUS; SUBCUTANEOUS
Status: DISCONTINUED | OUTPATIENT
Start: 2017-09-09 | End: 2017-09-19 | Stop reason: HOSPADM

## 2017-09-09 RX ORDER — INSULIN LISPRO 100 [IU]/ML
15 INJECTION, SOLUTION INTRAVENOUS; SUBCUTANEOUS
Status: DISCONTINUED | OUTPATIENT
Start: 2017-09-10 | End: 2017-09-14

## 2017-09-09 RX ADMIN — INSULIN GLARGINE 10 UNITS: 100 INJECTION, SOLUTION SUBCUTANEOUS at 09:06

## 2017-09-09 RX ADMIN — PIPERACILLIN SODIUM,TAZOBACTAM SODIUM 3.38 G: 3; .375 INJECTION, POWDER, FOR SOLUTION INTRAVENOUS at 15:58

## 2017-09-09 RX ADMIN — INSULIN LISPRO 10 UNITS: 100 INJECTION, SOLUTION INTRAVENOUS; SUBCUTANEOUS at 17:02

## 2017-09-09 RX ADMIN — Medication 10 ML: at 22:41

## 2017-09-09 RX ADMIN — ASPIRIN 81 MG: 81 TABLET, COATED ORAL at 09:13

## 2017-09-09 RX ADMIN — PIPERACILLIN SODIUM,TAZOBACTAM SODIUM 3.38 G: 3; .375 INJECTION, POWDER, FOR SOLUTION INTRAVENOUS at 23:45

## 2017-09-09 RX ADMIN — ATORVASTATIN CALCIUM 20 MG: 20 TABLET, FILM COATED ORAL at 09:12

## 2017-09-09 RX ADMIN — PIPERACILLIN SODIUM,TAZOBACTAM SODIUM 3.38 G: 3; .375 INJECTION, POWDER, FOR SOLUTION INTRAVENOUS at 09:00

## 2017-09-09 RX ADMIN — Medication 10 ML: at 13:40

## 2017-09-09 RX ADMIN — VANCOMYCIN HYDROCHLORIDE 2250 MG: 10 INJECTION, POWDER, LYOPHILIZED, FOR SOLUTION INTRAVENOUS at 17:32

## 2017-09-09 RX ADMIN — LISINOPRIL 40 MG: 20 TABLET ORAL at 09:12

## 2017-09-09 RX ADMIN — METOPROLOL TARTRATE 25 MG: 25 TABLET, FILM COATED ORAL at 17:43

## 2017-09-09 RX ADMIN — METOPROLOL TARTRATE 25 MG: 25 TABLET, FILM COATED ORAL at 09:09

## 2017-09-09 RX ADMIN — INSULIN LISPRO 10 UNITS: 100 INJECTION, SOLUTION INTRAVENOUS; SUBCUTANEOUS at 11:42

## 2017-09-09 RX ADMIN — Medication 10 ML: at 04:30

## 2017-09-09 RX ADMIN — INSULIN LISPRO 10 UNITS: 100 INJECTION, SOLUTION INTRAVENOUS; SUBCUTANEOUS at 09:22

## 2017-09-09 NOTE — PROGRESS NOTES
Patient Active Problem List   Diagnosis Code    DM (diabetes mellitus), type 2, uncontrolled, periph vascular complic ([facility] P18.51, C60.71    Charcot foot due to diabetes mellitus ([facility] E11.610    Essential hypertension I10    Dyslipidemia E78.5    Leg ulcer ([facility] L97.909    Venous insufficiency I87.2    Synovitis of knee M65.9    Primary osteoarthritis of both knees M17.0    Foot ulcer with fat layer exposed ([facility] L97.502       Current Facility-Administered Medications:     piperacillin-tazobactam (ZOSYN) 3.375 g in 0.9% sodium chloride (MBP/ADV) 100 mL, 3.375 g, IntraVENous, Q8H, Octavio Riley MD, Last Rate: 25 mL/hr at 09/09/17 0900, 3.375 g at 09/09/17 0900    insulin glargine (LANTUS) injection 10 Units, 10 Units, SubCUTAneous, DAILY, Octavio Riley MD, 10 Units at 09/09/17 0906    aspirin delayed-release tablet 81 mg, 81 mg, Oral, DAILY, Octavio Riley MD, 81 mg at 09/09/17 0913    insulin lispro (HUMALOG) injection 10 Units, 10 Units, SubCUTAneous, TIDAC, Octavio Riley MD, 10 Units at 09/09/17 1142    lisinopril (PRINIVIL, ZESTRIL) tablet 40 mg, 40 mg, Oral, DAILY, Octavio Riley MD, 40 mg at 09/09/17 0912    metoprolol tartrate (LOPRESSOR) tablet 25 mg, 25 mg, Oral, BID, Octavio Riley MD, 25 mg at 09/09/17 0909    atorvastatin (LIPITOR) tablet 20 mg, 20 mg, Oral, DAILY, Octavio Riley MD, 20 mg at 09/09/17 0912    sodium chloride (NS) flush 5-10 mL, 5-10 mL, IntraVENous, Q8H, Octavio Riley MD, 10 mL at 09/09/17 0430    sodium chloride (NS) flush 5-10 mL, 5-10 mL, IntraVENous, PRN, Octavio Riley MD    naloxone [facility]) injection 0.4 mg, 0.4 mg, IntraVENous, PRN, Octavio Riley MD    acetaminophen (TYLENOL) tablet 650 mg, 650 mg, Oral, Q4H PRN, Octavio Riley MD    HYDROcodone-acetaminophen [facility]) 5-325 mg per tablet 1 Tab, 1 Tab, Oral, Q4H PRN, Octavio Riley MD    vancomycin (VANCOCIN) 2,250 mg in 0.9% sodium chloride 500 mL IVPB, 2,250 mg, IntraVENous, Q24H, Avery COHEN Mika Matson MD, 2,250 mg at 09/08/17 1618    No Known Allergies    Patient Vitals for the past 24 hrs:   Temp Pulse Resp BP SpO2   09/09/17 0743 98.1 °F (36.7 °C) 97 16 144/61 (!) 70 %   09/09/17 0019 98.4 °F (36.9 °C) 69 18 146/60 99 %   09/08/17 2022 98.5 °F (36.9 °C) 68 18 160/77 96 %   09/08/17 1505 98.5 °F (36.9 °C) 66 18 137/60 99 %     Recent Results (from the past 24 hour(s))   GLUCOSE, POC    Collection Time: 09/08/17  4:45 PM   Result Value Ref Range    Glucose (POC) 95 65 - 100 mg/dL    Performed by Alina Jarquin    GLUCOSE, POC    Collection Time: 09/08/17  9:11 PM   Result Value Ref Range    Glucose (POC) 102 (H) 65 - 100 mg/dL    Performed by Omi Collier    METABOLIC PANEL, BASIC    Collection Time: 09/09/17  4:11 AM   Result Value Ref Range    Sodium 136 136 - 145 mmol/L    Potassium 4.4 3.5 - 5.1 mmol/L    Chloride 104 97 - 108 mmol/L    CO2 24 21 - 32 mmol/L    Anion gap 8 5 - 15 mmol/L    Glucose 114 (H) 65 - 100 mg/dL    BUN 20 6 - 20 MG/DL    Creatinine 1.37 (H) 0.70 - 1.30 MG/DL    BUN/Creatinine ratio 15 12 - 20      GFR est AA >60 >60 ml/min/1.73m2    GFR est non-AA 51 (L) >60 ml/min/1.73m2    Calcium 8.5 8.5 - 10.1 MG/DL   CBC WITH AUTOMATED DIFF    Collection Time: 09/09/17  4:11 AM   Result Value Ref Range    WBC 6.7 4.1 - 11.1 K/uL    RBC 3.83 (L) 4.10 - 5.70 M/uL    HGB 10.6 (L) 12.1 - 17.0 g/dL    HCT 32.2 (L) 36.6 - 50.3 %    MCV 84.1 80.0 - 99.0 FL    MCH 27.7 26.0 - 34.0 PG    MCHC 32.9 30.0 - 36.5 g/dL    RDW 13.7 11.5 - 14.5 %    PLATELET 247 901 - 362 K/uL    NEUTROPHILS 60 32 - 75 %    LYMPHOCYTES 27 12 - 49 %    MONOCYTES 11 5 - 13 %    EOSINOPHILS 2 0 - 7 %    BASOPHILS 0 0 - 1 %    ABS. NEUTROPHILS 4.0 1.8 - 8.0 K/UL    ABS. LYMPHOCYTES 1.8 0.8 - 3.5 K/UL    ABS. MONOCYTES 0.8 0.0 - 1.0 K/UL    ABS. EOSINOPHILS 0.1 0.0 - 0.4 K/UL    ABS.  BASOPHILS 0.0 0.0 - 0.1 K/UL   GLUCOSE, POC    Collection Time: 09/09/17  7:48 AM   Result Value Ref Range    Glucose (POC) 120 (H) 65 - 100 mg/dL    Performed by [employer]    GLUCOSE, POC    Collection Time: 09/09/17 11:32 AM   Result Value Ref Range    Glucose (POC) 218 (H) 65 - 100 mg/dL    Performed by [employer]      All Micro Results     Procedure Component Value Units Date/Time    CULTURE, Sanjeev Reagin STAIN [[id number]]  (Abnormal) Collected:  09/07/17 8415    Order Status:  Completed Specimen:  Wound from Ulcer Updated:  09/08/17 1322     Special Requests: NO SPECIAL REQUESTS        GRAM STAIN RARE WBCS SEEN         FEW GRAM NEGATIVE RODS        Culture result:         LIGHT PROBABLE PROTEUS SPECIES (A)              LIGHT PROBABLE 2ND GRAM NEGATIVE EMMA (A)              LIGHT MIXED SKIN EM ISOLATED            Exam:  Awake. No pain. Foot dressed. (Pt says wound looks clean)    Continue wound care.

## 2017-09-09 NOTE — PROGRESS NOTES
Oncology Nursing Communication Tool      7:50 PM  9/9/2017     Bedside shift change report given to CHRISTOPHER Smyth (incoming nurse) by Maria Antonia Romero RN (outgoing nurse) on Warren Burt a 68 y.o. male who was admitted on 9/7/2017 12:34 PM. Report included the following information SBAR, Kardex and MAR. Significant changes during shift: none       Issues for physician to address:  MRI results          Code Status: Full Code     Infections: No current active infections     Allergies: Review of patient's allergies indicates no known allergies. Current diet: DIET DIABETIC CONSISTENT CARB Regular       Pain Controlled [x] yes [] no   Bowel Movement [x] yes [] no   Last Bowel Movement (date)     9/7            Vital Signs:   Patient Vitals for the past 12 hrs:   Temp Pulse Resp BP SpO2   09/09/17 1743 - 63 - 132/59 -   09/09/17 1446 98.3 °F (36.8 °C) 62 16 125/57 99 %      Intake & Output:   No intake or output data in the 24 hours ending 09/09/17 1950   Laboratory Results:     Recent Results (from the past 12 hour(s))   GLUCOSE, POC    Collection Time: 09/09/17 11:32 AM   Result Value Ref Range    Glucose (POC) 218 (H) 65 - 100 mg/dL    Performed by Wesley Denise    GLUCOSE, POC    Collection Time: 09/09/17  4:38 PM   Result Value Ref Range    Glucose (POC) 104 (H) 65 - 100 mg/dL    Performed by Ayaka Stallings for questions and clarifications were given to the incoming nurse. Patient's bed is in low position, side rails x2, door open PRN, call bell within reach and patient not in distress.       Maria Antonia Romero [facility]

## 2017-09-09 NOTE — PROGRESS NOTES
Pt  at 0745,  Scheduled med for Humalog for 10 units with no sliding scale. Nurse \" I do not feel comfortable giving him 10 units\"  Dr. Daija Rothman, Brionna Patrick it\".    Nurse administered 10 units of Humalog per order  Nurse will continue to monitor pt

## 2017-09-09 NOTE — PROGRESS NOTES
Oncology Nursing Communication Tool      6:54 AM  9/9/2017     Bedside shift change report given to [facility] and Ju Johnson RN (incoming nurse) by Yanick Gomez RN (outgoing nurse) on Rosa Garcia a 68 y.o. male who was admitted on 9/7/2017 12:34 PM. Report included the following information SBAR, Valencia Kin and Recent Results. Significant changes during shift: None      Issues for physician to address: None           Code Status: Full Code     Infections: No current active infections     Allergies: Review of patient's allergies indicates no known allergies.      Current diet: DIET DIABETIC CONSISTENT CARB Regular       Pain Controlled [x] yes [] no   Bowel Movement [] yes [x] no   Last Bowel Movement (date)     9/7/17            Vital Signs:   Patient Vitals for the past 12 hrs:   Temp Pulse Resp BP SpO2   09/09/17 0019 98.4 °F (36.9 °C) 69 18 146/60 99 %   09/08/17 2022 98.5 °F (36.9 °C) 68 18 160/77 96 %      Intake & Output:   No intake or output data in the 24 hours ending 09/09/17 0654   Laboratory Results:     Recent Results (from the past 12 hour(s))   GLUCOSE, POC    Collection Time: 09/08/17  9:11 PM   Result Value Ref Range    Glucose (POC) 102 (H) 65 - 100 mg/dL    Performed by [facility]    METABOLIC PANEL, BASIC    Collection Time: 09/09/17  4:11 AM   Result Value Ref Range    Sodium 136 136 - 145 mmol/L    Potassium 4.4 3.5 - 5.1 mmol/L    Chloride 104 97 - 108 mmol/L    CO2 24 21 - 32 mmol/L    Anion gap 8 5 - 15 mmol/L    Glucose 114 (H) 65 - 100 mg/dL    BUN 20 6 - 20 MG/DL    Creatinine 1.37 (H) 0.70 - 1.30 MG/DL    BUN/Creatinine ratio 15 12 - 20      GFR est AA >60 >60 ml/min/1.73m2    GFR est non-AA 51 (L) >60 ml/min/1.73m2    Calcium 8.5 8.5 - 10.1 MG/DL   CBC WITH AUTOMATED DIFF    Collection Time: 09/09/17  4:11 AM   Result Value Ref Range    WBC 6.7 4.1 - 11.1 K/uL    RBC 3.83 (L) 4.10 - 5.70 M/uL    HGB 10.6 (L) 12.1 - 17.0 g/dL    HCT 32.2 (L) 36.6 - 50.3 %    MCV 84.1 80.0 - 99.0 FL    MCH 27.7 26.0 - 34.0 PG    MCHC 32.9 30.0 - 36.5 g/dL    RDW 13.7 11.5 - 14.5 %    PLATELET 308 585 - 375 K/uL    NEUTROPHILS 60 32 - 75 %    LYMPHOCYTES 27 12 - 49 %    MONOCYTES 11 5 - 13 %    EOSINOPHILS 2 0 - 7 %    BASOPHILS 0 0 - 1 %    ABS. NEUTROPHILS 4.0 1.8 - 8.0 K/UL    ABS. LYMPHOCYTES 1.8 0.8 - 3.5 K/UL    ABS. MONOCYTES 0.8 0.0 - 1.0 K/UL    ABS. EOSINOPHILS 0.1 0.0 - 0.4 K/UL    ABS. BASOPHILS 0.0 0.0 - 0.1 K/UL              Opportunity for questions and clarifications were given to the incoming nurse. Patient's bed is in low position, side rails x2, door open PRN, call bell within reach and patient not in distress.       Abby Contreras RN

## 2017-09-10 LAB
ANION GAP SERPL CALC-SCNC: 7 MMOL/L (ref 5–15)
BACTERIA SPEC CULT: ABNORMAL
BUN SERPL-MCNC: 16 MG/DL (ref 6–20)
BUN/CREAT SERPL: 12 (ref 12–20)
CALCIUM SERPL-MCNC: 8.6 MG/DL (ref 8.5–10.1)
CHLORIDE SERPL-SCNC: 105 MMOL/L (ref 97–108)
CO2 SERPL-SCNC: 24 MMOL/L (ref 21–32)
CREAT SERPL-MCNC: 1.32 MG/DL (ref 0.7–1.3)
DATE LAST DOSE: ABNORMAL
GLUCOSE BLD STRIP.AUTO-MCNC: 114 MG/DL (ref 65–100)
GLUCOSE BLD STRIP.AUTO-MCNC: 123 MG/DL (ref 65–100)
GLUCOSE BLD STRIP.AUTO-MCNC: 138 MG/DL (ref 65–100)
GLUCOSE BLD STRIP.AUTO-MCNC: 175 MG/DL (ref 65–100)
GLUCOSE SERPL-MCNC: 128 MG/DL (ref 65–100)
GRAM STN SPEC: ABNORMAL
GRAM STN SPEC: ABNORMAL
POTASSIUM SERPL-SCNC: 4.4 MMOL/L (ref 3.5–5.1)
REPORTED DOSE,DOSE: ABNORMAL UNITS
REPORTED DOSE/TIME,TMG: ABNORMAL
SERVICE CMNT-IMP: ABNORMAL
SODIUM SERPL-SCNC: 136 MMOL/L (ref 136–145)
VANCOMYCIN TROUGH SERPL-MCNC: 10.4 UG/ML (ref 5–10)

## 2017-09-10 PROCEDURE — 74011000258 HC RX REV CODE- 258: Performed by: INTERNAL MEDICINE

## 2017-09-10 PROCEDURE — 74011250636 HC RX REV CODE- 250/636: Performed by: INTERNAL MEDICINE

## 2017-09-10 PROCEDURE — 36415 COLL VENOUS BLD VENIPUNCTURE: CPT | Performed by: INTERNAL MEDICINE

## 2017-09-10 PROCEDURE — 82962 GLUCOSE BLOOD TEST: CPT

## 2017-09-10 PROCEDURE — 74011636637 HC RX REV CODE- 636/637: Performed by: INTERNAL MEDICINE

## 2017-09-10 PROCEDURE — 80202 ASSAY OF VANCOMYCIN: CPT | Performed by: INTERNAL MEDICINE

## 2017-09-10 PROCEDURE — 80048 BASIC METABOLIC PNL TOTAL CA: CPT | Performed by: INTERNAL MEDICINE

## 2017-09-10 PROCEDURE — 65270000015 HC RM PRIVATE ONCOLOGY

## 2017-09-10 PROCEDURE — 74011250637 HC RX REV CODE- 250/637: Performed by: INTERNAL MEDICINE

## 2017-09-10 RX ADMIN — INSULIN LISPRO 15 UNITS: 100 INJECTION, SOLUTION INTRAVENOUS; SUBCUTANEOUS at 09:31

## 2017-09-10 RX ADMIN — ATORVASTATIN CALCIUM 20 MG: 20 TABLET, FILM COATED ORAL at 09:33

## 2017-09-10 RX ADMIN — ASPIRIN 81 MG: 81 TABLET, COATED ORAL at 09:00

## 2017-09-10 RX ADMIN — INSULIN LISPRO 10 UNITS: 100 INJECTION, SOLUTION INTRAVENOUS; SUBCUTANEOUS at 16:55

## 2017-09-10 RX ADMIN — METOPROLOL TARTRATE 25 MG: 25 TABLET, FILM COATED ORAL at 17:01

## 2017-09-10 RX ADMIN — INSULIN LISPRO 10 UNITS: 100 INJECTION, SOLUTION INTRAVENOUS; SUBCUTANEOUS at 14:04

## 2017-09-10 RX ADMIN — VANCOMYCIN HYDROCHLORIDE 2250 MG: 10 INJECTION, POWDER, LYOPHILIZED, FOR SOLUTION INTRAVENOUS at 20:45

## 2017-09-10 RX ADMIN — INSULIN GLARGINE 10 UNITS: 100 INJECTION, SOLUTION SUBCUTANEOUS at 09:47

## 2017-09-10 RX ADMIN — METOPROLOL TARTRATE 25 MG: 25 TABLET, FILM COATED ORAL at 09:33

## 2017-09-10 RX ADMIN — LISINOPRIL 40 MG: 20 TABLET ORAL at 09:33

## 2017-09-10 RX ADMIN — PIPERACILLIN SODIUM,TAZOBACTAM SODIUM 3.38 G: 3; .375 INJECTION, POWDER, FOR SOLUTION INTRAVENOUS at 09:32

## 2017-09-10 RX ADMIN — PIPERACILLIN SODIUM,TAZOBACTAM SODIUM 3.38 G: 3; .375 INJECTION, POWDER, FOR SOLUTION INTRAVENOUS at 16:56

## 2017-09-10 RX ADMIN — Medication 10 ML: at 05:32

## 2017-09-10 RX ADMIN — Medication 10 ML: at 14:04

## 2017-09-10 NOTE — PROGRESS NOTES
Patient Active Problem List   Diagnosis Code    DM (diabetes mellitus), type 2, uncontrolled, periph vascular complic ([facility] U35.07, I28.23    Charcot foot due to diabetes mellitus ([facility] E11.610    Essential hypertension I10    Dyslipidemia E78.5    Leg ulcer ([facility] L97.909    Venous insufficiency I87.2    Synovitis of knee M65.9    Primary osteoarthritis of both knees M17.0    Foot ulcer with fat layer exposed ([facility] L97.502       Current Facility-Administered Medications:     insulin lispro (HUMALOG) injection 10 Units, 10 Units, SubCUTAneous, ACL, Deedee Cleveland MD    insulin lispro (HUMALOG) injection 15 Units, 15 Units, SubCUTAneous, ACB, Deedee Cleveland MD, 15 Units at 09/10/17 0931    insulin lispro (HUMALOG) injection 10 Units, 10 Units, SubCUTAneous, ACD, Deedee Cleveland MD, 10 Units at 09/09/17 1702    piperacillin-tazobactam (ZOSYN) 3.375 g in 0.9% sodium chloride (MBP/ADV) 100 mL, 3.375 g, IntraVENous, Q8H, Deedee Cleveland MD, Last Rate: 25 mL/hr at 09/10/17 0932, 3.375 g at 09/10/17 0932    insulin glargine (LANTUS) injection 10 Units, 10 Units, SubCUTAneous, DAILY, Deedee Cleveland MD, 10 Units at 09/10/17 5423    aspirin delayed-release tablet 81 mg, 81 mg, Oral, DAILY, Deedee Cleveland MD, 81 mg at 09/10/17 0900    lisinopril (PRINIVIL, ZESTRIL) tablet 40 mg, 40 mg, Oral, DAILY, Deedee Cleveland MD, 40 mg at 09/10/17 0933    metoprolol tartrate (LOPRESSOR) tablet 25 mg, 25 mg, Oral, BID, Deedee Cleveland MD, 25 mg at 09/10/17 0933    atorvastatin (LIPITOR) tablet 20 mg, 20 mg, Oral, DAILY, Deedee Cleveland MD, 20 mg at 09/10/17 0933    sodium chloride (NS) flush 5-10 mL, 5-10 mL, IntraVENous, Q8H, Deedee Cleveland MD, 10 mL at 09/10/17 0532    sodium chloride (NS) flush 5-10 mL, 5-10 mL, IntraVENous, PRN, Deedee Cleveland MD    naloxone [facility]) injection 0.4 mg, 0.4 mg, IntraVENous, PRN, Deedee Cleveland MD    acetaminophen (TYLENOL) tablet 650 mg, 650 mg, Oral, Q4H PRN, Deedee Salts, MD    HYDROcodone-acetaminophen (NORCO) 5-325 mg per tablet 1 Tab, 1 Tab, Oral, Q4H PRN, Laura Sanford MD    vancomycin (VANCOCIN) 2,250 mg in 0.9% sodium chloride 500 mL IVPB, 2,250 mg, IntraVENous, Q24H, Laura Sanford MD, 2,250 mg at 09/09/17 1732     Patient Vitals for the past 24 hrs:   Temp Pulse Resp BP SpO2   09/10/17 0933 - 68 - 149/62 -   09/10/17 0653 98.3 °F (36.8 °C) 67 16 153/66 97 %   09/09/17 2215 98.3 °F (36.8 °C) 65 16 146/57 98 %   09/09/17 1920 98.4 °F (36.9 °C) 70 16 127/50 98 %   09/09/17 1743 - 63 - 132/59 -   09/09/17 1446 98.3 °F (36.8 °C) 62 16 125/57 99 %     Recent Results (from the past 24 hour(s))   GLUCOSE, POC    Collection Time: 09/09/17  4:38 PM   Result Value Ref Range    Glucose (POC) 104 (H) 65 - 100 mg/dL    Performed by Haleigh Davalos    GLUCOSE, POC    Collection Time: 09/09/17  8:52 PM   Result Value Ref Range    Glucose (POC) 98 65 - 100 mg/dL    Performed by [address] [place] [address] 95, BASIC    Collection Time: 09/10/17  5:32 AM   Result Value Ref Range    Sodium 136 136 - 145 mmol/L    Potassium 4.4 3.5 - 5.1 mmol/L    Chloride 105 97 - 108 mmol/L    CO2 24 21 - 32 mmol/L    Anion gap 7 5 - 15 mmol/L    Glucose 128 (H) 65 - 100 mg/dL    BUN 16 6 - 20 MG/DL    Creatinine 1.32 (H) 0.70 - 1.30 MG/DL    BUN/Creatinine ratio 12 12 - 20      GFR est AA >60 >60 ml/min/1.73m2    GFR est non-AA 53 (L) >60 ml/min/1.73m2    Calcium 8.6 8.5 - 10.1 MG/DL   GLUCOSE, POC    Collection Time: 09/10/17  8:22 AM   Result Value Ref Range    Glucose (POC) 138 (H) 65 - 100 mg/dL    Performed by Omar Humphries      Exam:  Awake. No pain. Foot dressed.      Continue wound care.

## 2017-09-10 NOTE — PROGRESS NOTES
Problem: Patient Education: Go to Patient Education Activity  Goal: Patient/Family Education  Outcome: Progressing Towards Goal  Patient education provided on basic diabetes self care including importance of checking his feet daily for wounds as well as receiving prompt medical attention for any injuries. Rationale for maintaining tight glycemic control provided; patient verbalized understanding. Will continue to reinforce throughout the shift.

## 2017-09-11 LAB
GLUCOSE BLD STRIP.AUTO-MCNC: 115 MG/DL (ref 65–100)
GLUCOSE BLD STRIP.AUTO-MCNC: 133 MG/DL (ref 65–100)
GLUCOSE BLD STRIP.AUTO-MCNC: 140 MG/DL (ref 65–100)
GLUCOSE BLD STRIP.AUTO-MCNC: 187 MG/DL (ref 65–100)
SERVICE CMNT-IMP: ABNORMAL

## 2017-09-11 PROCEDURE — 74011250636 HC RX REV CODE- 250/636: Performed by: INTERNAL MEDICINE

## 2017-09-11 PROCEDURE — 82962 GLUCOSE BLOOD TEST: CPT

## 2017-09-11 PROCEDURE — 74011000258 HC RX REV CODE- 258: Performed by: INTERNAL MEDICINE

## 2017-09-11 PROCEDURE — 65270000015 HC RM PRIVATE ONCOLOGY

## 2017-09-11 PROCEDURE — 74011636637 HC RX REV CODE- 636/637: Performed by: INTERNAL MEDICINE

## 2017-09-11 PROCEDURE — 74011250637 HC RX REV CODE- 250/637: Performed by: INTERNAL MEDICINE

## 2017-09-11 RX ORDER — VANCOMYCIN HYDROCHLORIDE
1250 EVERY 12 HOURS
Status: DISCONTINUED | OUTPATIENT
Start: 2017-09-11 | End: 2017-09-19 | Stop reason: HOSPADM

## 2017-09-11 RX ADMIN — ASPIRIN 81 MG: 81 TABLET, COATED ORAL at 08:30

## 2017-09-11 RX ADMIN — Medication 10 ML: at 00:14

## 2017-09-11 RX ADMIN — ATORVASTATIN CALCIUM 20 MG: 20 TABLET, FILM COATED ORAL at 08:30

## 2017-09-11 RX ADMIN — PIPERACILLIN SODIUM,TAZOBACTAM SODIUM 3.38 G: 3; .375 INJECTION, POWDER, FOR SOLUTION INTRAVENOUS at 00:13

## 2017-09-11 RX ADMIN — VANCOMYCIN HYDROCHLORIDE 1250 MG: 10 INJECTION, POWDER, LYOPHILIZED, FOR SOLUTION INTRAVENOUS at 12:48

## 2017-09-11 RX ADMIN — INSULIN LISPRO 10 UNITS: 100 INJECTION, SOLUTION INTRAVENOUS; SUBCUTANEOUS at 12:13

## 2017-09-11 RX ADMIN — HYDROCODONE BITARTRATE AND ACETAMINOPHEN 1 TABLET: 5; 325 TABLET ORAL at 00:22

## 2017-09-11 RX ADMIN — PIPERACILLIN SODIUM,TAZOBACTAM SODIUM 3.38 G: 3; .375 INJECTION, POWDER, FOR SOLUTION INTRAVENOUS at 15:33

## 2017-09-11 RX ADMIN — METOPROLOL TARTRATE 25 MG: 25 TABLET, FILM COATED ORAL at 08:30

## 2017-09-11 RX ADMIN — Medication 10 ML: at 08:40

## 2017-09-11 RX ADMIN — PIPERACILLIN SODIUM,TAZOBACTAM SODIUM 3.38 G: 3; .375 INJECTION, POWDER, FOR SOLUTION INTRAVENOUS at 08:28

## 2017-09-11 RX ADMIN — INSULIN GLARGINE 10 UNITS: 100 INJECTION, SOLUTION SUBCUTANEOUS at 10:22

## 2017-09-11 RX ADMIN — LISINOPRIL 40 MG: 20 TABLET ORAL at 08:30

## 2017-09-11 RX ADMIN — INSULIN LISPRO 10 UNITS: 100 INJECTION, SOLUTION INTRAVENOUS; SUBCUTANEOUS at 17:36

## 2017-09-11 RX ADMIN — Medication 10 ML: at 15:39

## 2017-09-11 RX ADMIN — METOPROLOL TARTRATE 25 MG: 25 TABLET, FILM COATED ORAL at 17:38

## 2017-09-11 RX ADMIN — INSULIN LISPRO 6 UNITS: 100 INJECTION, SOLUTION INTRAVENOUS; SUBCUTANEOUS at 08:26

## 2017-09-11 RX ADMIN — VANCOMYCIN HYDROCHLORIDE 1250 MG: 10 INJECTION, POWDER, LYOPHILIZED, FOR SOLUTION INTRAVENOUS at 22:35

## 2017-09-11 NOTE — PROGRESS NOTES
Oncology Nursing Communication Tool      7:30 PM  9/11/2017     Bedside and Verbal shift change report given to Aviva/CHRISTOPHER Farnsworth (incoming nurse) by Jean-Paul Jacinto (outgoing nurse) on Cornel Carpio a 68 y.o. male who was admitted on 9/7/2017 12:34 PM. Report included the following information SBAR, Kardex, Intake/Output, MAR, Accordion and Recent Results. Significant changes during shift: RN consulted with Dr. Rachel Yanes prior to each Humalog administration, Dr. Rachel Yanes advised to decrease morning dose to 6 units, afternoon and evening were administered as ordered per Dr. Rachel Yanes. Issues for physician to address: Blood glucose and insulin requirements          Code Status: Full Code     Infections: No current active infections     Allergies: Review of patient's allergies indicates no known allergies.      Current diet: DIET DIABETIC CONSISTENT CARB Regular       Pain Controlled [x] yes [] no   Bowel Movement [] yes [x] no   Last Bowel Movement (date)             Vital Signs:   Patient Vitals for the past 12 hrs:   Temp Pulse Resp BP SpO2   09/11/17 1738 - 72 - 139/68 -   09/11/17 1451 98.4 °F (36.9 °C) 68 18 156/59 100 %   09/11/17 0830 - 75 - 124/65 -   09/11/17 0738 98.4 °F (36.9 °C) 69 18 170/73 99 %      Intake & Output:     Intake/Output Summary (Last 24 hours) at 09/11/17 1809  Last data filed at 09/11/17 1741   Gross per 24 hour   Intake                0 ml   Output              925 ml   Net             -925 ml      Laboratory Results:     Recent Results (from the past 12 hour(s))   GLUCOSE, POC    Collection Time: 09/11/17  7:23 AM   Result Value Ref Range    Glucose (POC) 115 (H) 65 - 100 mg/dL    Performed by Ricardo Durham)    GLUCOSE, POC    Collection Time: 09/11/17 11:07 AM   Result Value Ref Range    Glucose (POC) 187 (H) 65 - 100 mg/dL    Performed by Emmanuel 64, POC    Collection Time: 09/11/17  4:31 PM   Result Value Ref Range    Glucose (POC) 133 (H) 65 - 100 mg/dL    Performed by [facility] for questions and clarifications were given to the incoming nurse. Patient's bed is in low position, side rails x2, door open PRN, call bell within reach and patient not in distress.       Art Olivera

## 2017-09-11 NOTE — PROGRESS NOTES
Bedside shift change report given to Bernarda Roberto RN (oncoming nurse) by Brodie Arrington RN (offgoing nurse). Report included the following information SBAR, Kardex, Intake/Output, MAR, Accordion and Recent Results. Patient Vitals for the past 12 hrs:   Temp Pulse Resp BP SpO2   09/10/17 1917 98.3 °F (36.8 °C) 60 18 125/48 100 %   09/10/17 1701 - 60 - 136/58 -   09/10/17 1520 98.6 °F (37 °C) (!) 58 16 125/68 100 %   09/10/17 0933 - 68 - 149/62 -     Patient is in bed; bed is in lowest position, wheels locked, side rails times two, call light, bedside tray, phone, and personal items are within reach. Patient is alert and oriented times four and is up ad darrian. He has been instructed to call for assistance and has verbalized understanding of instructions provided. An opportunity for questions and clarification was provided.

## 2017-09-11 NOTE — PROGRESS NOTES
Pharmacy Antimicrobial stewardship:  Please consider deescalating antibiotics. Patient is currently on both Zosyn and Vancomycin. All growth from wound ulcer are sensitive to Zosyn. Please consider discontinuing the Vancomycin .   Sharonda Hankins [facility]

## 2017-09-11 NOTE — PROGRESS NOTES
Oncology Nursing Communication Tool      8:02 AM  9/11/2017     Bedside shift change report given to Yudelka Naik RN (incoming nurse) by Narciso Hernandez (outgoing nurse) on Boubacar Ronquillo a 68 y.o. male who was admitted on 9/7/2017 12:34 PM. Report included the following information SBAR, Kardex, Intake/Output, MAR and Recent Results. Significant changes during shift: none      Issues for physician to address: none            Code Status: Full Code     Infections: No current active infections     Allergies: Review of patient's allergies indicates no known allergies. Current diet: DIET DIABETIC CONSISTENT CARB Regular       Pain Controlled [x] yes [] no   Bowel Movement [] yes [x] no   Last Bowel Movement (date)     9/10/17            Vital Signs:   Patient Vitals for the past 12 hrs:   Temp Pulse Resp BP SpO2   09/11/17 0738 98.4 °F (36.9 °C) 69 18 170/73 99 %   09/10/17 2324 98.5 °F (36.9 °C) 61 18 138/65 99 %      Intake & Output:   No intake or output data in the 24 hours ending 09/11/17 0802   Laboratory Results:     Recent Results (from the past 12 hour(s))   GLUCOSE, POC    Collection Time: 09/10/17  9:07 PM   Result Value Ref Range    Glucose (POC) 123 (H) 65 - 100 mg/dL    Performed by Anette Hernandez 30, POC    Collection Time: 09/11/17  7:23 AM   Result Value Ref Range    Glucose (POC) 115 (H) 65 - 100 mg/dL    Performed by Anival Bryant)               Opportunity for questions and clarifications were given to the incoming nurse. Patient's bed is in low position, side rails x2, door open PRN, call bell within reach and patient not in distress.       Narciso Hernandez

## 2017-09-11 NOTE — PROGRESS NOTES
Problem: Falls - Risk of  Goal: *Absence of Falls  Document Jose Fall Risk and appropriate interventions in the flowsheet. Outcome: Progressing Towards Goal  Fall Risk Interventions:  Mobility Interventions: Communicate number of staff needed for ambulation/transfer           Medication Interventions: Patient to call before getting OOB, Teach patient to arise slowly           History of Falls Interventions:  Investigate reason for fall

## 2017-09-11 NOTE — PROGRESS NOTES
Pharmacy Automatic Renal Dosing Protocol - Antimicrobials    Indication for Antimicrobials: skin and soft tissue infection, foot ulcer    Current Regimen of Each Antimicrobial (Start Day & Day of Therapy):  Vancomycin 2250mg IV q 24hr (start , Day 4  Zosyn 3.375g IV q8h (start  Day 3    Significant cultures:   : Ulcer wound: Moderate PROVIDENCIA STUARTII, Moderate PSEUDOMONAS AERUGINOSA and LIGHT PROTEUS SPECIES, light skin mixed skin lb (Final) (all sensitive to Zosyn)    CAPD, Intermittent Hemodialysis or Renal Replacement Therapy: none  Paralysis, amputations, malnutrition: none  Recent Labs      09/10/17   0532  17   0411   CREA  1.32*  1.37*   BUN  16  20   WBC   --   6.7     Temp (24hrs), Av.5 °F (36.9 °C), Min:98.3 °F (36.8 °C), Max:98.6 °F (37 °C)    Creatinine Clearance (ml/min): ~62    Goal Vancomycin Level(s):    Impression/Plan:   - Cultures are all sensitive to Zosyn. Will leave note to deescalate and try to tiger text also. In the meanwhile, the trough of 10.4 is below goal . Will change dose to 1250 mg q12h for a predicted trough of 16.3     - Per Admission note yesterday evening, \"Aggressive approach has to occur with this gentleman because he is at high risk of progression and possible digital ulceration depending on the integrity of the bones. Wound care will also be aggressive with   surgical intervention and possible debridement. \"           Pharmacy will follow daily and adjust doses of monitored medications as appropriate for renal function and/or serum levels.     Thank you,  [address], [facility]

## 2017-09-12 LAB
ANION GAP SERPL CALC-SCNC: 6 MMOL/L (ref 5–15)
BUN SERPL-MCNC: 12 MG/DL (ref 6–20)
BUN/CREAT SERPL: 9 (ref 12–20)
CALCIUM SERPL-MCNC: 8.7 MG/DL (ref 8.5–10.1)
CHLORIDE SERPL-SCNC: 105 MMOL/L (ref 97–108)
CO2 SERPL-SCNC: 25 MMOL/L (ref 21–32)
CREAT SERPL-MCNC: 1.28 MG/DL (ref 0.7–1.3)
GLUCOSE BLD STRIP.AUTO-MCNC: 116 MG/DL (ref 65–100)
GLUCOSE BLD STRIP.AUTO-MCNC: 126 MG/DL (ref 65–100)
GLUCOSE BLD STRIP.AUTO-MCNC: 134 MG/DL (ref 65–100)
GLUCOSE BLD STRIP.AUTO-MCNC: 164 MG/DL (ref 65–100)
GLUCOSE BLD STRIP.AUTO-MCNC: 187 MG/DL (ref 65–100)
GLUCOSE SERPL-MCNC: 131 MG/DL (ref 65–100)
POTASSIUM SERPL-SCNC: 4.2 MMOL/L (ref 3.5–5.1)
SERVICE CMNT-IMP: ABNORMAL
SODIUM SERPL-SCNC: 136 MMOL/L (ref 136–145)

## 2017-09-12 PROCEDURE — 36415 COLL VENOUS BLD VENIPUNCTURE: CPT | Performed by: INTERNAL MEDICINE

## 2017-09-12 PROCEDURE — 74011636637 HC RX REV CODE- 636/637: Performed by: INTERNAL MEDICINE

## 2017-09-12 PROCEDURE — 80048 BASIC METABOLIC PNL TOTAL CA: CPT | Performed by: INTERNAL MEDICINE

## 2017-09-12 PROCEDURE — 65270000015 HC RM PRIVATE ONCOLOGY

## 2017-09-12 PROCEDURE — 74011250637 HC RX REV CODE- 250/637: Performed by: INTERNAL MEDICINE

## 2017-09-12 PROCEDURE — 74011250636 HC RX REV CODE- 250/636: Performed by: INTERNAL MEDICINE

## 2017-09-12 PROCEDURE — 82962 GLUCOSE BLOOD TEST: CPT

## 2017-09-12 PROCEDURE — 74011000258 HC RX REV CODE- 258: Performed by: INTERNAL MEDICINE

## 2017-09-12 RX ORDER — FENTANYL CITRATE 50 UG/ML
25-50 INJECTION, SOLUTION INTRAMUSCULAR; INTRAVENOUS
Status: DISCONTINUED | OUTPATIENT
Start: 2017-09-12 | End: 2017-09-12 | Stop reason: ALTCHOICE

## 2017-09-12 RX ORDER — HEPARIN SODIUM 1000 [USP'U]/ML
1000-10000 INJECTION, SOLUTION INTRAVENOUS; SUBCUTANEOUS
Status: DISCONTINUED | OUTPATIENT
Start: 2017-09-12 | End: 2017-09-12 | Stop reason: ALTCHOICE

## 2017-09-12 RX ORDER — HEPARIN SODIUM 200 [USP'U]/100ML
500 INJECTION, SOLUTION INTRAVENOUS ONCE
Status: DISCONTINUED | OUTPATIENT
Start: 2017-09-12 | End: 2017-09-12 | Stop reason: ALTCHOICE

## 2017-09-12 RX ORDER — LIDOCAINE HYDROCHLORIDE 10 MG/ML
1-20 INJECTION INFILTRATION; PERINEURAL
Status: DISCONTINUED | OUTPATIENT
Start: 2017-09-12 | End: 2017-09-12 | Stop reason: ALTCHOICE

## 2017-09-12 RX ORDER — INSULIN GLARGINE 100 [IU]/ML
5 INJECTION, SOLUTION SUBCUTANEOUS ONCE
Status: COMPLETED | OUTPATIENT
Start: 2017-09-12 | End: 2017-09-12

## 2017-09-12 RX ORDER — MIDAZOLAM HYDROCHLORIDE 1 MG/ML
.5-2 INJECTION, SOLUTION INTRAMUSCULAR; INTRAVENOUS
Status: DISCONTINUED | OUTPATIENT
Start: 2017-09-12 | End: 2017-09-12 | Stop reason: ALTCHOICE

## 2017-09-12 RX ADMIN — INSULIN LISPRO 5 UNITS: 100 INJECTION, SOLUTION INTRAVENOUS; SUBCUTANEOUS at 17:50

## 2017-09-12 RX ADMIN — Medication 10 ML: at 05:34

## 2017-09-12 RX ADMIN — VANCOMYCIN HYDROCHLORIDE 1250 MG: 10 INJECTION, POWDER, LYOPHILIZED, FOR SOLUTION INTRAVENOUS at 11:59

## 2017-09-12 RX ADMIN — Medication 10 ML: at 16:23

## 2017-09-12 RX ADMIN — POTASSIUM CHLORIDE: 149 INJECTION, SOLUTION, CONCENTRATE INTRAVENOUS at 09:30

## 2017-09-12 RX ADMIN — INSULIN GLARGINE 5 UNITS: 100 INJECTION, SOLUTION SUBCUTANEOUS at 18:14

## 2017-09-12 RX ADMIN — METOPROLOL TARTRATE 25 MG: 25 TABLET, FILM COATED ORAL at 17:52

## 2017-09-12 RX ADMIN — PIPERACILLIN SODIUM,TAZOBACTAM SODIUM 3.38 G: 3; .375 INJECTION, POWDER, FOR SOLUTION INTRAVENOUS at 01:21

## 2017-09-12 RX ADMIN — PIPERACILLIN SODIUM,TAZOBACTAM SODIUM 3.38 G: 3; .375 INJECTION, POWDER, FOR SOLUTION INTRAVENOUS at 07:43

## 2017-09-12 RX ADMIN — METOPROLOL TARTRATE 25 MG: 25 TABLET, FILM COATED ORAL at 08:34

## 2017-09-12 RX ADMIN — ATORVASTATIN CALCIUM 20 MG: 20 TABLET, FILM COATED ORAL at 08:34

## 2017-09-12 RX ADMIN — Medication 10 ML: at 01:22

## 2017-09-12 RX ADMIN — VANCOMYCIN HYDROCHLORIDE 1250 MG: 10 INJECTION, POWDER, LYOPHILIZED, FOR SOLUTION INTRAVENOUS at 22:42

## 2017-09-12 RX ADMIN — LISINOPRIL 40 MG: 20 TABLET ORAL at 08:34

## 2017-09-12 RX ADMIN — Medication 10 ML: at 22:42

## 2017-09-12 RX ADMIN — PIPERACILLIN SODIUM,TAZOBACTAM SODIUM 3.38 G: 3; .375 INJECTION, POWDER, FOR SOLUTION INTRAVENOUS at 16:17

## 2017-09-12 NOTE — PROGRESS NOTES
Oncology Nursing Communication Tool      7:26 PM  9/12/2017     Bedside and Verbal shift change report given to Nelia Liu RN (incoming nurse) by Flora Sr (outgoing nurse) on Warren Burt a 68 y.o. male who was admitted on 9/7/2017 12:34 PM. Report included the following information SBAR, Kardex, Intake/Output, MAR, Accordion and Recent Results. Significant changes during shift: Pt arteriogram has been rescheduled for tomorrow. Patient can eat tonight. Please see note regarding pt insulin. Issues for physician to address: Patient glucose and insulin requirements for tomorrow. Code Status: Full Code     Infections: No current active infections     Allergies: Review of patient's allergies indicates no known allergies.      Current diet: DIET DIABETIC CONSISTENT CARB Regular       Pain Controlled [x] yes [] no   Bowel Movement [] yes [x] no   Last Bowel Movement (date)             Vital Signs:   Patient Vitals for the past 12 hrs:   Temp Pulse Resp BP SpO2   09/12/17 1916 98 °F (36.7 °C) 75 18 139/55 97 %   09/12/17 1752 - 80 - 151/69 -   09/12/17 1345 98.1 °F (36.7 °C) 69 18 147/82 99 %   09/12/17 0837 - 75 - 136/67 -   09/12/17 0745 97.8 °F (36.6 °C) 77 16 171/80 98 %      Intake & Output:     Intake/Output Summary (Last 24 hours) at 09/12/17 1926  Last data filed at 09/12/17 1815   Gross per 24 hour   Intake              500 ml   Output              325 ml   Net              175 ml      Laboratory Results:     Recent Results (from the past 12 hour(s))   GLUCOSE, POC    Collection Time: 09/12/17 11:27 AM   Result Value Ref Range    Glucose (POC) 126 (H) 65 - 100 mg/dL    Performed by Kianna Atkins    GLUCOSE, POC    Collection Time: 09/12/17  4:26 PM   Result Value Ref Range    Glucose (POC) 116 (H) 65 - 100 mg/dL    Performed by Kavya Parsons    GLUCOSE, POC    Collection Time: 09/12/17  6:56 PM   Result Value Ref Range    Glucose (POC) 164 (H) 65 - 100 mg/dL Performed by Matthew Crook               Opportunity for questions and clarifications were given to the incoming nurse. Patient's bed is in low position, side rails x2, door open PRN, call bell within reach and patient not in distress.       Rosalio Johnson

## 2017-09-12 NOTE — PROGRESS NOTES
RN called cath lab to get update on time of patients procedure. Cath lab RN informed RN it would be at least another hour. 1649:  Dr. Seferino Oswald at bedside and informed patient that arteriogram will not be completed today and has been rescheduled until tomorrow at lunch time. Per Dr. Seferino Oswald patient may have diabetic dinner tray this evening and he will put in dietary orders for tomorrow. RN to contact Dr. Dmitriy Arzate to inform. 1659:  RN contacted Dr. Dmitriy Arzate to inform of above. Dr. Dmitriy Arzate informed RN to administer 5 units Lantus this evening and 5 units of Humalog prior to patient's dinner. RN verified by readback.

## 2017-09-12 NOTE — PROGRESS NOTES
Patient is to be NPO for arteriogram at 1415. Dr. Curtis Carpenter advised RN to hold insulin while patient is NPO and to hold Lantus until after the procedure, message sent to pharmacy to retime for post procedure.

## 2017-09-12 NOTE — PROGRESS NOTES
General Daily Progress Note    Admit Date: 9/7/2017    Subjective:     Patient has no complaint. Current Facility-Administered Medications   Medication Dose Route Frequency    0.45% sodium chloride 1,000 mL with potassium chloride 10 mEq infusion   IntraVENous CONTINUOUS    [START ON 9/13/2017] Vancomycin Trough Reminder Note  1 Each Other ONCE    vancomycin (VANCOCIN) 1250 mg in  ml infusion  1,250 mg IntraVENous Q12H    insulin lispro (HUMALOG) injection 10 Units  10 Units SubCUTAneous ACL    insulin lispro (HUMALOG) injection 15 Units  15 Units SubCUTAneous ACB    insulin lispro (HUMALOG) injection 10 Units  10 Units SubCUTAneous ACD    piperacillin-tazobactam (ZOSYN) 3.375 g in 0.9% sodium chloride (MBP/ADV) 100 mL  3.375 g IntraVENous Q8H    insulin glargine (LANTUS) injection 10 Units  10 Units SubCUTAneous DAILY    aspirin delayed-release tablet 81 mg  81 mg Oral DAILY    lisinopril (PRINIVIL, ZESTRIL) tablet 40 mg  40 mg Oral DAILY    metoprolol tartrate (LOPRESSOR) tablet 25 mg  25 mg Oral BID    atorvastatin (LIPITOR) tablet 20 mg  20 mg Oral DAILY    sodium chloride (NS) flush 5-10 mL  5-10 mL IntraVENous Q8H    sodium chloride (NS) flush 5-10 mL  5-10 mL IntraVENous PRN    naloxone (NARCAN) injection 0.4 mg  0.4 mg IntraVENous PRN    acetaminophen (TYLENOL) tablet 650 mg  650 mg Oral Q4H PRN    HYDROcodone-acetaminophen (NORCO) 5-325 mg per tablet 1 Tab  1 Tab Oral Q4H PRN        Review of Systems  A comprehensive review of systems was negative.     Objective:     Patient Vitals for the past 24 hrs:   BP Temp Pulse Resp SpO2 Weight   09/12/17 1345 147/82 98.1 °F (36.7 °C) 69 18 99 % -   09/12/17 1217 - - - - - 253 lb 1.4 oz (114.8 kg)   09/12/17 0837 136/67 - 75 - - -   09/12/17 0745 171/80 97.8 °F (36.6 °C) 77 16 98 % -   09/11/17 2354 162/65 98.3 °F (36.8 °C) 65 18 97 % -   09/11/17 1829 156/74 98.5 °F (36.9 °C) 74 18 99 % -   09/11/17 1738 139/68 - 72 - - -   09/11/17 1454 156/59 98.4 °F (36.9 °C) 68 18 100 % -        09/10 1901 - 09/12 0700  In: 500 [P.O.:500]  Out: 36 [Urine:925]    Physical Exam:   Visit Vitals    /82 (BP 1 Location: Left arm, BP Patient Position: Sitting)    Pulse 69    Temp 98.1 °F (36.7 °C)    Resp 18    Wt 253 lb 1.4 oz (114.8 kg)    SpO2 99%    BMI 27.12 kg/m2     General appearance: alert, cooperative, no distress, appears stated age  Neck: supple, symmetrical, trachea midline, no adenopathy, thyroid: not enlarged, symmetric, no tenderness/mass/nodules, no carotid bruit and no JVD  Lungs: clear to auscultation bilaterally  Heart: regular rate and rhythm, S1, S2 normal, no murmur, click, rub or gallop  Abdomen: soft, non-tender.  Bowel sounds normal. No masses,  no organomegaly  Extremities: Bandaged right foot        Data Review   Recent Results (from the past 24 hour(s))   GLUCOSE, POC    Collection Time: 09/11/17  4:31 PM   Result Value Ref Range    Glucose (POC) 133 (H) 65 - 100 mg/dL    Performed by [employer], POC    Collection Time: 09/11/17  9:36 PM   Result Value Ref Range    Glucose (POC) 140 (H) 65 - 100 mg/dL    Performed by Chacho Jensen    METABOLIC PANEL, BASIC    Collection Time: 09/12/17  4:07 AM   Result Value Ref Range    Sodium 136 136 - 145 mmol/L    Potassium 4.2 3.5 - 5.1 mmol/L    Chloride 105 97 - 108 mmol/L    CO2 25 21 - 32 mmol/L    Anion gap 6 5 - 15 mmol/L    Glucose 131 (H) 65 - 100 mg/dL    BUN 12 6 - 20 MG/DL    Creatinine 1.28 0.70 - 1.30 MG/DL    BUN/Creatinine ratio 9 (L) 12 - 20      GFR est AA >60 >60 ml/min/1.73m2    GFR est non-AA 55 (L) >60 ml/min/1.73m2    Calcium 8.7 8.5 - 10.1 MG/DL   GLUCOSE, POC    Collection Time: 09/12/17  7:22 AM   Result Value Ref Range    Glucose (POC) 134 (H) 65 - 100 mg/dL    Performed by [employer], POC    Collection Time: 09/12/17 11:27 AM   Result Value Ref Range    Glucose (POC) 126 (H) 65 - 100 mg/dL    Performed by Fouzia Aponte Assessment:     Active Problems:    Foot ulcer with fat layer exposed ([address] (9/7/2017)        Plan:     1. Arteriogram today for assessment of circulatory status. 2.  Continue current wound care with parenteral antibiotics. 3.  Continue diabetic control. General Daily Progress Note    Admit Date: 9/7/2017    Subjective:     Patient has no complaint . Current Facility-Administered Medications   Medication Dose Route Frequency    0.45% sodium chloride 1,000 mL with potassium chloride 10 mEq infusion   IntraVENous CONTINUOUS    [START ON 9/13/2017] Vancomycin Trough Reminder Note  1 Each Other ONCE    vancomycin (VANCOCIN) 1250 mg in  ml infusion  1,250 mg IntraVENous Q12H    insulin lispro (HUMALOG) injection 10 Units  10 Units SubCUTAneous ACL    insulin lispro (HUMALOG) injection 15 Units  15 Units SubCUTAneous ACB    insulin lispro (HUMALOG) injection 10 Units  10 Units SubCUTAneous ACD    piperacillin-tazobactam (ZOSYN) 3.375 g in 0.9% sodium chloride (MBP/ADV) 100 mL  3.375 g IntraVENous Q8H    insulin glargine (LANTUS) injection 10 Units  10 Units SubCUTAneous DAILY    aspirin delayed-release tablet 81 mg  81 mg Oral DAILY    lisinopril (PRINIVIL, ZESTRIL) tablet 40 mg  40 mg Oral DAILY    metoprolol tartrate (LOPRESSOR) tablet 25 mg  25 mg Oral BID    atorvastatin (LIPITOR) tablet 20 mg  20 mg Oral DAILY    sodium chloride (NS) flush 5-10 mL  5-10 mL IntraVENous Q8H    sodium chloride (NS) flush 5-10 mL  5-10 mL IntraVENous PRN    naloxone (NARCAN) injection 0.4 mg  0.4 mg IntraVENous PRN    acetaminophen (TYLENOL) tablet 650 mg  650 mg Oral Q4H PRN    HYDROcodone-acetaminophen (NORCO) 5-325 mg per tablet 1 Tab  1 Tab Oral Q4H PRN        Review of Systems  A comprehensive review of systems was negative.     Objective:     Patient Vitals for the past 24 hrs:   BP Temp Pulse Resp SpO2 Weight   09/12/17 1345 147/82 98.1 °F (36.7 °C) 69 18 99 % -   09/12/17 1217 - - - - - 253 lb 1.4 oz (114.8 kg)   09/12/17 0837 136/67 - 75 - - -   09/12/17 0745 171/80 97.8 °F (36.6 °C) 77 16 98 % -   09/11/17 2354 162/65 98.3 °F (36.8 °C) 65 18 97 % -   09/11/17 1829 156/74 98.5 °F (36.9 °C) 74 18 99 % -   09/11/17 1738 139/68 - 72 - - -   09/11/17 1451 156/59 98.4 °F (36.9 °C) 68 18 100 % -        09/10 1901 - 09/12 0700  In: 500 [P.O.:500]  Out: 925 [Urine:925]    Physical Exam:   Visit Vitals    /82 (BP 1 Location: Left arm, BP Patient Position: Sitting)    Pulse 69    Temp 98.1 °F (36.7 °C)    Resp 18    Wt 253 lb 1.4 oz (114.8 kg)    SpO2 99%    BMI 27.12 kg/m2     General appearance: alert, cooperative, no distress, appears stated age  Neck: supple, symmetrical, trachea midline, no adenopathy, thyroid: not enlarged, symmetric, no tenderness/mass/nodules, no carotid bruit and no JVD  Lungs: clear to auscultation bilaterally  Heart: regular rate and rhythm, S1, S2 normal, no murmur, click, rub or gallop  Abdomen: soft, non-tender.  Bowel sounds normal. No masses,  no organomegaly  Extremities: Right foot bandaged        Data Review   Recent Results (from the past 24 hour(s))   GLUCOSE, POC    Collection Time: 09/11/17  4:31 PM   Result Value Ref Range    Glucose (POC) 133 (H) 65 - 100 mg/dL    Performed by [employer], POC    Collection Time: 09/11/17  9:36 PM   Result Value Ref Range    Glucose (POC) 140 (H) 65 - 100 mg/dL    Performed by Antonia Luke    METABOLIC PANEL, BASIC    Collection Time: 09/12/17  4:07 AM   Result Value Ref Range    Sodium 136 136 - 145 mmol/L    Potassium 4.2 3.5 - 5.1 mmol/L    Chloride 105 97 - 108 mmol/L    CO2 25 21 - 32 mmol/L    Anion gap 6 5 - 15 mmol/L    Glucose 131 (H) 65 - 100 mg/dL    BUN 12 6 - 20 MG/DL    Creatinine 1.28 0.70 - 1.30 MG/DL    BUN/Creatinine ratio 9 (L) 12 - 20      GFR est AA >60 >60 ml/min/1.73m2    GFR est non-AA 55 (L) >60 ml/min/1.73m2 Calcium 8.7 8.5 - 10.1 MG/DL   GLUCOSE, POC    Collection Time: 09/12/17  7:22 AM   Result Value Ref Range    Glucose (POC) 134 (H) 65 - 100 mg/dL    Performed by Eleanor Patel, POC    Collection Time: 09/12/17 11:27 AM   Result Value Ref Range    Glucose (POC) 126 (H) 65 - 100 mg/dL    Performed by Beata Giron            Assessment:     Active Problems:    Foot ulcer with fat layer exposed ([facility] [address] (9/7/2017)        Plan:     1. Arteriogram today for vascular assessment. 2.  Continue wound care with parenteral antibiotics. 3.  Continue diabetic control.

## 2017-09-12 NOTE — PROGRESS NOTES
Oncology Nursing Communication Tool      6:52 AM  9/12/2017     Bedside shift change report given to Maria D Mac RN (incoming nurse) by Jessica Roman (outgoing nurse) on Emiliana Mosley a 68 y.o. male who was admitted on 9/7/2017 12:34 PM. Report included the following information SBAR, Kardex, Intake/Output, MAR and Recent Results. Significant changes during shift: none      Issues for physician to address: none            Code Status: Full Code     Infections: No current active infections     Allergies: Review of patient's allergies indicates no known allergies. Current diet: DIET DIABETIC CONSISTENT CARB Regular  DIET NPO       Pain Controlled [x] yes [] no   Bowel Movement [] yes [x] no   Last Bowel Movement (date)     9/11/17            Vital Signs:   Patient Vitals for the past 12 hrs:   Temp Pulse Resp BP SpO2   09/11/17 2354 98.3 °F (36.8 °C) 65 18 162/65 97 %      Intake & Output:     Intake/Output Summary (Last 24 hours) at 09/12/17 3349  Last data filed at 09/11/17 1841   Gross per 24 hour   Intake              500 ml   Output              925 ml   Net             -425 ml      Laboratory Results:     Recent Results (from the past 12 hour(s))   GLUCOSE, POC    Collection Time: 09/11/17  9:36 PM   Result Value Ref Range    Glucose (POC) 140 (H) 65 - 100 mg/dL    Performed by Jaime Souza    METABOLIC PANEL, BASIC    Collection Time: 09/12/17  4:07 AM   Result Value Ref Range    Sodium 136 136 - 145 mmol/L    Potassium 4.2 3.5 - 5.1 mmol/L    Chloride 105 97 - 108 mmol/L    CO2 25 21 - 32 mmol/L    Anion gap 6 5 - 15 mmol/L    Glucose 131 (H) 65 - 100 mg/dL    BUN 12 6 - 20 MG/DL    Creatinine 1.28 0.70 - 1.30 MG/DL    BUN/Creatinine ratio 9 (L) 12 - 20      GFR est AA >60 >60 ml/min/1.73m2    GFR est non-AA 55 (L) >60 ml/min/1.73m2    Calcium 8.7 8.5 - 10.1 MG/DL              Opportunity for questions and clarifications were given to the incoming nurse.  Patient's bed is in low position, side rails x2, door open PRN, call bell within reach and patient not in distress.       Wilbur Valverde

## 2017-09-12 NOTE — PROGRESS NOTES
General Daily Progress Note    Admit Date: 9/7/2017    Subjective:     Patient has no complaint . Current Facility-Administered Medications   Medication Dose Route Frequency    vancomycin (VANCOCIN) 1250 mg in  ml infusion  1,250 mg IntraVENous Q12H    insulin lispro (HUMALOG) injection 10 Units  10 Units SubCUTAneous ACL    insulin lispro (HUMALOG) injection 15 Units  15 Units SubCUTAneous ACB    insulin lispro (HUMALOG) injection 10 Units  10 Units SubCUTAneous ACD    piperacillin-tazobactam (ZOSYN) 3.375 g in 0.9% sodium chloride (MBP/ADV) 100 mL  3.375 g IntraVENous Q8H    insulin glargine (LANTUS) injection 10 Units  10 Units SubCUTAneous DAILY    aspirin delayed-release tablet 81 mg  81 mg Oral DAILY    lisinopril (PRINIVIL, ZESTRIL) tablet 40 mg  40 mg Oral DAILY    metoprolol tartrate (LOPRESSOR) tablet 25 mg  25 mg Oral BID    atorvastatin (LIPITOR) tablet 20 mg  20 mg Oral DAILY    sodium chloride (NS) flush 5-10 mL  5-10 mL IntraVENous Q8H    sodium chloride (NS) flush 5-10 mL  5-10 mL IntraVENous PRN    naloxone (NARCAN) injection 0.4 mg  0.4 mg IntraVENous PRN    acetaminophen (TYLENOL) tablet 650 mg  650 mg Oral Q4H PRN    HYDROcodone-acetaminophen (NORCO) 5-325 mg per tablet 1 Tab  1 Tab Oral Q4H PRN        Review of Systems  A comprehensive review of systems was negative.     Objective:     Patient Vitals for the past 24 hrs:   BP Temp Pulse Resp SpO2   09/11/17 1829 156/74 98.5 °F (36.9 °C) 74 18 99 %   09/11/17 1738 139/68 - 72 - -   09/11/17 1451 156/59 98.4 °F (36.9 °C) 68 18 100 %   09/11/17 0830 124/65 - 75 - -   09/11/17 0738 170/73 98.4 °F (36.9 °C) 69 18 99 %   09/10/17 2324 138/65 98.5 °F (36.9 °C) 61 18 99 %        09/10 0701 - 09/11 1900  In: 500 [P.O.:500]  Out: 925 [Urine:925]    Physical Exam:   Visit Vitals    /74    Pulse 74    Temp 98.5 °F (36.9 °C)    Resp 18    SpO2 99%     General appearance: alert, cooperative, no distress, appears stated age  Neck: supple, symmetrical, trachea midline, no adenopathy, thyroid: not enlarged, symmetric, no tenderness/mass/nodules, no carotid bruit and no JVD  Lungs: clear to auscultation bilaterally  Heart: regular rate and rhythm, S1, S2 normal, no murmur, click, rub or gallop  Abdomen: soft, non-tender. Bowel sounds normal. No masses,  no organomegaly  Extremities: Ulcer currently dry as is the third fourth and fifth digits. Eschar versus necrotic tissue present        Data Review   Recent Results (from the past 24 hour(s))   GLUCOSE, POC    Collection Time: 09/11/17  7:23 AM   Result Value Ref Range    Glucose (POC) 115 (H) 65 - 100 mg/dL    Performed by Vanessa Moreira    GLUCOSE, POC    Collection Time: 09/11/17 11:07 AM   Result Value Ref Range    Glucose (POC) 187 (H) 65 - 100 mg/dL    Performed by Tammy Jacob    GLUCOSE, POC    Collection Time: 09/11/17  4:31 PM   Result Value Ref Range    Glucose (POC) 133 (H) 65 - 100 mg/dL    Performed by Jono Jacobs            Assessment:     Active Problems:    Foot ulcer with fat layer exposed ([address] (9/7/2017)        Plan:     1. MRI does not suggest osteomyelitis indicating only soft tissue changes. The current wounds look reasonably stable with no obvious reason for debridement. Continue IV antibiotics. Will await vascular surgery comments. 2.  Continue diabetic control. General Daily Progress Note    Admit Date: 9/7/2017    Subjective:     Patient has no complaint .     Current Facility-Administered Medications   Medication Dose Route Frequency    vancomycin (VANCOCIN) 1250 mg in  ml infusion  1,250 mg IntraVENous Q12H    insulin lispro (HUMALOG) injection 10 Units  10 Units SubCUTAneous ACL    insulin lispro (HUMALOG) injection 15 Units  15 Units SubCUTAneous ACB    insulin lispro (HUMALOG) injection 10 Units  10 Units SubCUTAneous ACD    piperacillin-tazobactam (ZOSYN) 3.375 g in 0.9% sodium chloride (MBP/ADV) 100 mL  3.375 g IntraVENous Q8H    insulin glargine (LANTUS) injection 10 Units  10 Units SubCUTAneous DAILY    aspirin delayed-release tablet 81 mg  81 mg Oral DAILY    lisinopril (PRINIVIL, ZESTRIL) tablet 40 mg  40 mg Oral DAILY    metoprolol tartrate (LOPRESSOR) tablet 25 mg  25 mg Oral BID    atorvastatin (LIPITOR) tablet 20 mg  20 mg Oral DAILY    sodium chloride (NS) flush 5-10 mL  5-10 mL IntraVENous Q8H    sodium chloride (NS) flush 5-10 mL  5-10 mL IntraVENous PRN    naloxone (NARCAN) injection 0.4 mg  0.4 mg IntraVENous PRN    acetaminophen (TYLENOL) tablet 650 mg  650 mg Oral Q4H PRN    HYDROcodone-acetaminophen (NORCO) 5-325 mg per tablet 1 Tab  1 Tab Oral Q4H PRN        Review of Systems  A comprehensive review of systems was negative. Objective:     Patient Vitals for the past 24 hrs:   BP Temp Pulse Resp SpO2   09/11/17 1829 156/74 98.5 °F (36.9 °C) 74 18 99 %   09/11/17 1738 139/68 - 72 - -   09/11/17 1451 156/59 98.4 °F (36.9 °C) 68 18 100 %   09/11/17 0830 124/65 - 75 - -   09/11/17 0738 170/73 98.4 °F (36.9 °C) 69 18 99 %   09/10/17 2324 138/65 98.5 °F (36.9 °C) 61 18 99 %        09/10 0701 - 09/11 1900  In: 500 [P.O.:500]  Out: 925 [Urine:925]    Physical Exam: General appearance: alert, cooperative, no distress, appears stated age  Neck: supple, symmetrical, trachea midline, no adenopathy, thyroid: not enlarged, symmetric, no tenderness/mass/nodules, no carotid bruit and no JVD  Lungs: clear to auscultation bilaterally  Heart: regular rate and rhythm, S1, S2 normal, no murmur, click, rub or gallop  Abdomen: soft, non-tender.  Bowel sounds normal. No masses,  no organomegaly  Extremities: Dry wounds        Data Review   Recent Results (from the past 24 hour(s))   GLUCOSE, POC    Collection Time: 09/11/17  7:23 AM   Result Value Ref Range    Glucose (POC) 115 (H) 65 - 100 mg/dL    Performed by Dread Fernández)    GLUCOSE, POC Collection Time: 09/11/17 11:07 AM   Result Value Ref Range    Glucose (POC) 187 (H) 65 - 100 mg/dL    Performed by Mario Jasmine    GLUCOSE, POC    Collection Time: 09/11/17  4:31 PM   Result Value Ref Range    Glucose (POC) 133 (H) 65 - 100 mg/dL    Performed by Kianna Atkins            Assessment:     Active Problems:    Foot ulcer with fat layer exposed ([address] (9/7/2017)        Plan:     1. Continue wound care. Treatment probably not necessary. No bony involvement. Await comments regarding vascular status. 2.;  Continue diabetic control.

## 2017-09-12 NOTE — PROGRESS NOTES
RN called pharmacy to check compatibility of continuous fluids with antibiotic,  Ok to run 0.45% normal saline with 10 mEq potassium chloride with zosyn and vancomycin per pharmacy.

## 2017-09-12 NOTE — PROGRESS NOTES
Spiritual Care Assessment/Progress Notes    Lorraine Marquez [id number]  [id number]    1940  68 y.o.  male    Patient Telephone Number: [phone] (home)   Advent Affiliation: Moravian   Language: English   Extended Emergency Contact Information  Primary Emergency Contact: Nicho Edge  Address: [address], [address] Phone: [phone]  Relation: Spouse  Secondary Emergency Contact: [address] [facility] Phone: [phone]  Relation: Spouse   Patient Active Problem List    Diagnosis Date Noted    Foot ulcer with fat layer exposed ([facility] 09/07/2017    Synovitis of knee 03/19/2016    Primary osteoarthritis of both knees 03/19/2016    Leg ulcer ([facility] 07/02/2015    Venous insufficiency 07/02/2015    Essential hypertension 05/03/2015    Dyslipidemia 05/03/2015    Charcot foot due to diabetes mellitus ([facility] 08/19/2014    DM (diabetes mellitus), type 2, uncontrolled, periph vascular complic ([facility] 27/55/9804        Date: 9/12/2017       Level of Advent/Spiritual Activity:  []         Involved in hussein tradition/spiritual practice    []         Not involved in hussein tradition/spiritual practice  [x]         Spiritually oriented    []         Claims no spiritual orientation    []         seeking spiritual identity  []         Feels alienated from Yazdanism practice/tradition  []         Feels angry about Yazdanism practice/tradition  []         Spirituality/Yazdanism tradition is a resource for coping at this time.   []         Not able to assess due to medical condition    Services Provided Today:  []         crisis intervention    []         reading Scriptures  [x]         spiritual assessment    []         prayer  [x]         empathic listening/emotional support  []         rites and rituals (cite in comments)  [x]         life review     []         Yazdanism support  []         theological development    []         advocacy  []         ethical dialog     [] blessing  []         bereavement support    []         support to family  []         anticipatory grief support   []         help with AMD  []         spiritual guidance    []         meditation      Spiritual Care Needs  []         Emotional Support  []         Spiritual/Methodist Care  []         Loss/Adjustment  []         Advocacy/Referral                /Ethics  [x]         No needs expressed at               this time  []         Other: (note in               comments)  [address]  []         Follow up visits with               pt/family  []         Provide materials  []         Schedule sacraments  []         Contact Community               Clergy  [x]         Follow up as needed  []         Other: (note in               comments)     Comments:   Initial visit on Gen Surg for spiritual assessment. No visitors present. Provided listening presence as patient engaged in life review. Patient talked about growing up in [place], his marriage to a [place] woman, raising four daughters and working as a . He shared about coming of age at the beginning of the Civil Rights era and the impact that has had on him and his beliefs. Advised of  availability.   EVERT Veliz, [facility], [address]     Paging Service  [phone]

## 2017-09-13 LAB
ANION GAP SERPL CALC-SCNC: 6 MMOL/L (ref 5–15)
BUN SERPL-MCNC: 12 MG/DL (ref 6–20)
BUN/CREAT SERPL: 9 (ref 12–20)
CALCIUM SERPL-MCNC: 8.9 MG/DL (ref 8.5–10.1)
CHLORIDE SERPL-SCNC: 105 MMOL/L (ref 97–108)
CO2 SERPL-SCNC: 24 MMOL/L (ref 21–32)
CREAT SERPL-MCNC: 1.33 MG/DL (ref 0.7–1.3)
DATE LAST DOSE: ABNORMAL
GLUCOSE BLD STRIP.AUTO-MCNC: 130 MG/DL (ref 65–100)
GLUCOSE BLD STRIP.AUTO-MCNC: 144 MG/DL (ref 65–100)
GLUCOSE BLD STRIP.AUTO-MCNC: 167 MG/DL (ref 65–100)
GLUCOSE BLD STRIP.AUTO-MCNC: 169 MG/DL (ref 65–100)
GLUCOSE SERPL-MCNC: 164 MG/DL (ref 65–100)
POTASSIUM SERPL-SCNC: 4.2 MMOL/L (ref 3.5–5.1)
REPORTED DOSE,DOSE: ABNORMAL UNITS
REPORTED DOSE/TIME,TMG: 2300
SERVICE CMNT-IMP: ABNORMAL
SODIUM SERPL-SCNC: 135 MMOL/L (ref 136–145)
VANCOMYCIN TROUGH SERPL-MCNC: 16.1 UG/ML (ref 5–10)

## 2017-09-13 PROCEDURE — 75710 ARTERY X-RAYS ARM/LEG: CPT

## 2017-09-13 PROCEDURE — C1887 CATHETER, GUIDING: HCPCS

## 2017-09-13 PROCEDURE — 65660000000 HC RM CCU STEPDOWN

## 2017-09-13 PROCEDURE — 76937 US GUIDE VASCULAR ACCESS: CPT

## 2017-09-13 PROCEDURE — C1769 GUIDE WIRE: HCPCS

## 2017-09-13 PROCEDURE — 74011000250 HC RX REV CODE- 250

## 2017-09-13 PROCEDURE — 74011250636 HC RX REV CODE- 250/636

## 2017-09-13 PROCEDURE — 36415 COLL VENOUS BLD VENIPUNCTURE: CPT | Performed by: INTERNAL MEDICINE

## 2017-09-13 PROCEDURE — C1894 INTRO/SHEATH, NON-LASER: HCPCS

## 2017-09-13 PROCEDURE — 75625 CONTRAST EXAM ABDOMINL AORTA: CPT

## 2017-09-13 PROCEDURE — 80202 ASSAY OF VANCOMYCIN: CPT | Performed by: INTERNAL MEDICINE

## 2017-09-13 PROCEDURE — 74011636637 HC RX REV CODE- 636/637: Performed by: INTERNAL MEDICINE

## 2017-09-13 PROCEDURE — 04CU3ZZ EXTIRPATION OF MATTER FROM LEFT PERONEAL ARTERY, PERCUTANEOUS APPROACH: ICD-10-PCS | Performed by: SURGERY

## 2017-09-13 PROCEDURE — 85347 COAGULATION TIME ACTIVATED: CPT

## 2017-09-13 PROCEDURE — 99152 MOD SED SAME PHYS/QHP 5/>YRS: CPT

## 2017-09-13 PROCEDURE — 37229 HC ARTHERC TIB/PER UNI +/-PTA INIT: CPT

## 2017-09-13 PROCEDURE — 74011250636 HC RX REV CODE- 250/636: Performed by: SURGERY

## 2017-09-13 PROCEDURE — B4001ZZ PLAIN RADIOGRAPHY OF ABDOMINAL AORTA USING LOW OSMOLAR CONTRAST: ICD-10-PCS | Performed by: SURGERY

## 2017-09-13 PROCEDURE — 04CQ3ZZ EXTIRPATION OF MATTER FROM LEFT ANTERIOR TIBIAL ARTERY, PERCUTANEOUS APPROACH: ICD-10-PCS | Performed by: SURGERY

## 2017-09-13 PROCEDURE — 77030029065 HC DRSG HEMO QCLOT ZMED -B

## 2017-09-13 PROCEDURE — 77030019697 HC SYR ANGI INFL MRTM -B

## 2017-09-13 PROCEDURE — 99153 MOD SED SAME PHYS/QHP EA: CPT

## 2017-09-13 PROCEDURE — 65270000029 HC RM PRIVATE

## 2017-09-13 PROCEDURE — 82962 GLUCOSE BLOOD TEST: CPT

## 2017-09-13 PROCEDURE — 74011250637 HC RX REV CODE- 250/637: Performed by: INTERNAL MEDICINE

## 2017-09-13 PROCEDURE — C1725 CATH, TRANSLUMIN NON-LASER: HCPCS

## 2017-09-13 PROCEDURE — 04CS3ZZ EXTIRPATION OF MATTER FROM LEFT POSTERIOR TIBIAL ARTERY, PERCUTANEOUS APPROACH: ICD-10-PCS | Performed by: SURGERY

## 2017-09-13 PROCEDURE — 74011000258 HC RX REV CODE- 258: Performed by: INTERNAL MEDICINE

## 2017-09-13 PROCEDURE — 37228 HC PTA TIB/PER UNI INIT: CPT

## 2017-09-13 PROCEDURE — 74011250636 HC RX REV CODE- 250/636: Performed by: INTERNAL MEDICINE

## 2017-09-13 PROCEDURE — 74011636320 HC RX REV CODE- 636/320

## 2017-09-13 PROCEDURE — 80048 BASIC METABOLIC PNL TOTAL CA: CPT | Performed by: INTERNAL MEDICINE

## 2017-09-13 RX ORDER — FENTANYL CITRATE 50 UG/ML
25-50 INJECTION, SOLUTION INTRAMUSCULAR; INTRAVENOUS
Status: DISCONTINUED | OUTPATIENT
Start: 2017-09-13 | End: 2017-09-13

## 2017-09-13 RX ORDER — SODIUM CHLORIDE 9 MG/ML
50 INJECTION, SOLUTION INTRAVENOUS CONTINUOUS
Status: DISCONTINUED | OUTPATIENT
Start: 2017-09-13 | End: 2017-09-14

## 2017-09-13 RX ORDER — MIDAZOLAM HYDROCHLORIDE 1 MG/ML
INJECTION, SOLUTION INTRAMUSCULAR; INTRAVENOUS
Status: COMPLETED
Start: 2017-09-13 | End: 2017-09-13

## 2017-09-13 RX ORDER — IODIXANOL 320 MG/ML
INJECTION, SOLUTION INTRAVASCULAR
Status: COMPLETED
Start: 2017-09-13 | End: 2017-09-13

## 2017-09-13 RX ORDER — MIDAZOLAM HYDROCHLORIDE 1 MG/ML
.5-2 INJECTION, SOLUTION INTRAMUSCULAR; INTRAVENOUS
Status: DISCONTINUED | OUTPATIENT
Start: 2017-09-13 | End: 2017-09-13

## 2017-09-13 RX ORDER — HYDRALAZINE HYDROCHLORIDE 20 MG/ML
INJECTION INTRAMUSCULAR; INTRAVENOUS
Status: COMPLETED
Start: 2017-09-13 | End: 2017-09-13

## 2017-09-13 RX ORDER — FENTANYL CITRATE 50 UG/ML
INJECTION, SOLUTION INTRAMUSCULAR; INTRAVENOUS
Status: COMPLETED
Start: 2017-09-13 | End: 2017-09-13

## 2017-09-13 RX ORDER — HYDRALAZINE HYDROCHLORIDE 20 MG/ML
10 INJECTION INTRAMUSCULAR; INTRAVENOUS ONCE
Status: COMPLETED | OUTPATIENT
Start: 2017-09-13 | End: 2017-09-13

## 2017-09-13 RX ORDER — PROTAMINE SULFATE 10 MG/ML
INJECTION, SOLUTION INTRAVENOUS
Status: COMPLETED
Start: 2017-09-13 | End: 2017-09-13

## 2017-09-13 RX ORDER — PROTAMINE SULFATE 10 MG/ML
40 INJECTION, SOLUTION INTRAVENOUS
Status: COMPLETED | OUTPATIENT
Start: 2017-09-13 | End: 2017-09-13

## 2017-09-13 RX ORDER — INSULIN GLARGINE 100 [IU]/ML
10 INJECTION, SOLUTION SUBCUTANEOUS DAILY
Status: DISCONTINUED | OUTPATIENT
Start: 2017-09-13 | End: 2017-09-18

## 2017-09-13 RX ORDER — LIDOCAINE HYDROCHLORIDE 10 MG/ML
1-20 INJECTION INFILTRATION; PERINEURAL
Status: DISCONTINUED | OUTPATIENT
Start: 2017-09-13 | End: 2017-09-13

## 2017-09-13 RX ORDER — VERAPAMIL HYDROCHLORIDE 2.5 MG/ML
INJECTION, SOLUTION INTRAVENOUS
Status: COMPLETED
Start: 2017-09-13 | End: 2017-09-13

## 2017-09-13 RX ORDER — HEPARIN SODIUM 200 [USP'U]/100ML
500 INJECTION, SOLUTION INTRAVENOUS ONCE
Status: COMPLETED | OUTPATIENT
Start: 2017-09-13 | End: 2017-09-13

## 2017-09-13 RX ORDER — ONDANSETRON 2 MG/ML
8 INJECTION INTRAMUSCULAR; INTRAVENOUS ONCE
Status: COMPLETED | OUTPATIENT
Start: 2017-09-13 | End: 2017-09-13

## 2017-09-13 RX ORDER — HEPARIN SODIUM 1000 [USP'U]/ML
INJECTION, SOLUTION INTRAVENOUS; SUBCUTANEOUS
Status: COMPLETED
Start: 2017-09-13 | End: 2017-09-13

## 2017-09-13 RX ORDER — IODIXANOL 320 MG/ML
0-200 INJECTION, SOLUTION INTRAVASCULAR
Status: DISCONTINUED | OUTPATIENT
Start: 2017-09-13 | End: 2017-09-13

## 2017-09-13 RX ORDER — LIDOCAINE HYDROCHLORIDE 10 MG/ML
INJECTION INFILTRATION; PERINEURAL
Status: COMPLETED
Start: 2017-09-13 | End: 2017-09-13

## 2017-09-13 RX ORDER — VERAPAMIL HYDROCHLORIDE 2.5 MG/ML
5 INJECTION, SOLUTION INTRAVENOUS ONCE
Status: COMPLETED | OUTPATIENT
Start: 2017-09-13 | End: 2017-09-13

## 2017-09-13 RX ORDER — ONDANSETRON 2 MG/ML
INJECTION INTRAMUSCULAR; INTRAVENOUS
Status: COMPLETED
Start: 2017-09-13 | End: 2017-09-13

## 2017-09-13 RX ORDER — HEPARIN SODIUM 200 [USP'U]/100ML
INJECTION, SOLUTION INTRAVENOUS
Status: COMPLETED
Start: 2017-09-13 | End: 2017-09-13

## 2017-09-13 RX ORDER — HEPARIN SODIUM 1000 [USP'U]/ML
1000-10000 INJECTION, SOLUTION INTRAVENOUS; SUBCUTANEOUS
Status: DISCONTINUED | OUTPATIENT
Start: 2017-09-13 | End: 2017-09-13

## 2017-09-13 RX ADMIN — INSULIN GLARGINE 10 UNITS: 100 INJECTION, SOLUTION SUBCUTANEOUS at 08:49

## 2017-09-13 RX ADMIN — PIPERACILLIN SODIUM,TAZOBACTAM SODIUM 3.38 G: 3; .375 INJECTION, POWDER, FOR SOLUTION INTRAVENOUS at 23:59

## 2017-09-13 RX ADMIN — MIDAZOLAM HYDROCHLORIDE 1 MG: 1 INJECTION, SOLUTION INTRAMUSCULAR; INTRAVENOUS at 13:13

## 2017-09-13 RX ADMIN — HYDRALAZINE HYDROCHLORIDE 10 MG: 20 INJECTION INTRAMUSCULAR; INTRAVENOUS at 14:30

## 2017-09-13 RX ADMIN — Medication 10 ML: at 22:14

## 2017-09-13 RX ADMIN — FENTANYL CITRATE 50 MCG: 50 INJECTION, SOLUTION INTRAMUSCULAR; INTRAVENOUS at 14:55

## 2017-09-13 RX ADMIN — LIDOCAINE HYDROCHLORIDE 2 ML: 10 INJECTION, SOLUTION INFILTRATION; PERINEURAL at 13:13

## 2017-09-13 RX ADMIN — MIDAZOLAM HYDROCHLORIDE 1 MG: 1 INJECTION INTRAMUSCULAR; INTRAVENOUS at 13:13

## 2017-09-13 RX ADMIN — HEPARIN SODIUM 1000 UNITS: 200 INJECTION, SOLUTION INTRAVENOUS at 13:31

## 2017-09-13 RX ADMIN — PROTAMINE SULFATE 40 MG: 10 INJECTION, SOLUTION INTRAVENOUS at 14:21

## 2017-09-13 RX ADMIN — LISINOPRIL 40 MG: 20 TABLET ORAL at 08:48

## 2017-09-13 RX ADMIN — ONDANSETRON HYDROCHLORIDE 8 MG: 2 INJECTION, SOLUTION INTRAMUSCULAR; INTRAVENOUS at 14:49

## 2017-09-13 RX ADMIN — MIDAZOLAM HYDROCHLORIDE 1 MG: 1 INJECTION, SOLUTION INTRAMUSCULAR; INTRAVENOUS at 13:19

## 2017-09-13 RX ADMIN — IODIXANOL 85 ML: 320 INJECTION, SOLUTION INTRAVASCULAR at 14:13

## 2017-09-13 RX ADMIN — ACETAMINOPHEN 650 MG: 325 TABLET ORAL at 19:44

## 2017-09-13 RX ADMIN — PIPERACILLIN SODIUM,TAZOBACTAM SODIUM 3.38 G: 3; .375 INJECTION, POWDER, FOR SOLUTION INTRAVENOUS at 08:52

## 2017-09-13 RX ADMIN — HEPARIN SODIUM 7000 UNITS: 1000 INJECTION, SOLUTION INTRAVENOUS; SUBCUTANEOUS at 13:27

## 2017-09-13 RX ADMIN — ATORVASTATIN CALCIUM 20 MG: 20 TABLET, FILM COATED ORAL at 08:48

## 2017-09-13 RX ADMIN — HEPARIN SODIUM 1000 UNITS: 200 INJECTION, SOLUTION INTRAVENOUS at 13:33

## 2017-09-13 RX ADMIN — VERAPAMIL HYDROCHLORIDE 5 MG: 2.5 INJECTION, SOLUTION INTRAVENOUS at 13:58

## 2017-09-13 RX ADMIN — VANCOMYCIN HYDROCHLORIDE 1250 MG: 10 INJECTION, POWDER, LYOPHILIZED, FOR SOLUTION INTRAVENOUS at 11:31

## 2017-09-13 RX ADMIN — FENTANYL CITRATE 50 MCG: 50 INJECTION, SOLUTION INTRAMUSCULAR; INTRAVENOUS at 14:50

## 2017-09-13 RX ADMIN — ONDANSETRON 8 MG: 2 INJECTION INTRAMUSCULAR; INTRAVENOUS at 14:49

## 2017-09-13 RX ADMIN — FENTANYL CITRATE 50 MCG: 50 INJECTION, SOLUTION INTRAMUSCULAR; INTRAVENOUS at 13:13

## 2017-09-13 RX ADMIN — VERAPAMIL HYDROCHLORIDE 5 MG: 2.5 INJECTION INTRAVENOUS at 13:58

## 2017-09-13 RX ADMIN — LIDOCAINE HYDROCHLORIDE 2 ML: 10 INJECTION INFILTRATION; PERINEURAL at 13:13

## 2017-09-13 RX ADMIN — PIPERACILLIN SODIUM,TAZOBACTAM SODIUM 3.38 G: 3; .375 INJECTION, POWDER, FOR SOLUTION INTRAVENOUS at 00:41

## 2017-09-13 RX ADMIN — PIPERACILLIN SODIUM,TAZOBACTAM SODIUM 3.38 G: 3; .375 INJECTION, POWDER, FOR SOLUTION INTRAVENOUS at 15:48

## 2017-09-13 RX ADMIN — FENTANYL CITRATE 50 MCG: 50 INJECTION, SOLUTION INTRAMUSCULAR; INTRAVENOUS at 13:19

## 2017-09-13 RX ADMIN — VANCOMYCIN HYDROCHLORIDE 1250 MG: 10 INJECTION, POWDER, LYOPHILIZED, FOR SOLUTION INTRAVENOUS at 22:10

## 2017-09-13 RX ADMIN — INSULIN LISPRO 10 UNITS: 100 INJECTION, SOLUTION INTRAVENOUS; SUBCUTANEOUS at 17:51

## 2017-09-13 RX ADMIN — METOPROLOL TARTRATE 25 MG: 25 TABLET, FILM COATED ORAL at 17:51

## 2017-09-13 RX ADMIN — Medication 10 ML: at 06:00

## 2017-09-13 RX ADMIN — INSULIN LISPRO 8 UNITS: 100 INJECTION, SOLUTION INTRAVENOUS; SUBCUTANEOUS at 08:14

## 2017-09-13 RX ADMIN — HYDROCODONE BITARTRATE AND ACETAMINOPHEN 1 TABLET: 5; 325 TABLET ORAL at 15:47

## 2017-09-13 RX ADMIN — METOPROLOL TARTRATE 25 MG: 25 TABLET, FILM COATED ORAL at 08:48

## 2017-09-13 NOTE — PROGRESS NOTES
Pharmacy Automatic Renal Dosing Protocol - Antimicrobials    Indication for Antimicrobials: Foot ulcer with osteomyelitis of R foot 5th digit  Current Regimen of Each Antimicrobial (Start Day & Day of Therapy):  Vancomycin 1250 mg IV every 12 hours ( Day #7)  Zosyn 3.375 g IV every 8 hours ( Day #6)    Goal Vancomycin Level (if needed): 15-20 mcg/mL   Level(s) Ordered (if needed): None  Measured / Extrapolated Vancomycin Levels (if drawn):   17 @ 10:11- 16.1 mcg/mL (extrapolated to 15.6 mcg/mL)    Significant Cultures:   : Ulcer wound: Moderate PROVIDENCIA STUARTII, Moderate PSEUDOMONAS AERUGINOSA and LIGHT PROTEUS SPECIES, light skin mixed skin lb (Final) (all sensitive to Zosyn)  CAPD, Hemodialysis or Renal Replacement Therapy: None documented    Paralysis, amputations, malnutrition: None documented   Recent Labs      17   0607  17   0407   CREA  1.33*  1.28   BUN  12  12     Temp (24hrs), Av °F (36.7 °C), Min:98 °F (36.7 °C), Max:98.1 °F (36.7 °C)    Creatinine Clearance (Creatinine Clearance (ml/min)): 66 mL/min      Impression/Plan: Vancomycin trough resulted as therapeutic (15.6 mcg/mL) so will continue current regimen. Will continue current regimen for Zosyn as appropriate for indication and renal function. Pharmacy will follow daily and adjust medications as appropriate for renal function and/or serum levels.     Thank you,  Faina Dejesus, PHARMD

## 2017-09-13 NOTE — PROGRESS NOTES
RN contacted Juana Martin, pharmacist regarding patients overlapping Zosyn and Vancomycin. Pharmacist informed RN that Zosyn and Vancomycin were ok to run together.

## 2017-09-13 NOTE — PROGRESS NOTES
General Daily Progress Note    Admit Date: 9/7/2017    Subjective:     Patient has no complaint. Current Facility-Administered Medications   Medication Dose Route Frequency    insulin glargine (LANTUS) injection 10 Units  10 Units SubCUTAneous DAILY    0.45% sodium chloride 1,000 mL with potassium chloride 10 mEq infusion   IntraVENous CONTINUOUS    Vancomycin Trough Reminder Note  1 Each Other ONCE    vancomycin (VANCOCIN) 1250 mg in  ml infusion  1,250 mg IntraVENous Q12H    insulin lispro (HUMALOG) injection 10 Units  10 Units SubCUTAneous ACL    insulin lispro (HUMALOG) injection 15 Units  15 Units SubCUTAneous ACB    insulin lispro (HUMALOG) injection 10 Units  10 Units SubCUTAneous ACD    piperacillin-tazobactam (ZOSYN) 3.375 g in 0.9% sodium chloride (MBP/ADV) 100 mL  3.375 g IntraVENous Q8H    aspirin delayed-release tablet 81 mg  81 mg Oral DAILY    lisinopril (PRINIVIL, ZESTRIL) tablet 40 mg  40 mg Oral DAILY    metoprolol tartrate (LOPRESSOR) tablet 25 mg  25 mg Oral BID    atorvastatin (LIPITOR) tablet 20 mg  20 mg Oral DAILY    sodium chloride (NS) flush 5-10 mL  5-10 mL IntraVENous Q8H    sodium chloride (NS) flush 5-10 mL  5-10 mL IntraVENous PRN    naloxone (NARCAN) injection 0.4 mg  0.4 mg IntraVENous PRN    acetaminophen (TYLENOL) tablet 650 mg  650 mg Oral Q4H PRN    HYDROcodone-acetaminophen (NORCO) 5-325 mg per tablet 1 Tab  1 Tab Oral Q4H PRN        Review of Systems  A comprehensive review of systems was negative.     Objective:     Patient Vitals for the past 24 hrs:   BP Temp Pulse Resp SpO2 Weight   09/13/17 0848 134/63 - 75 - - -   09/13/17 0729 135/68 98 °F (36.7 °C) 93 18 100 % -   09/12/17 2243 141/58 98.1 °F (36.7 °C) 71 18 97 % -   09/12/17 1916 139/55 98 °F (36.7 °C) 75 18 97 % -   09/12/17 1752 151/69 - 80 - - -   09/12/17 1345 147/82 98.1 °F (36.7 °C) 69 18 99 % -   09/12/17 1217 - - - - - 253 lb 1.4 oz (114.8 kg)     09/13 0701 - 09/13 1900  In: 240 [P.O.:240]  Out: -   09/11 1901 - 09/13 0700  In: 500 [P.O.:500]  Out: 525 [Urine:525]    Physical Exam:   Visit Vitals    /63    Pulse 75    Temp 98 °F (36.7 °C)    Resp 18    Wt 253 lb 1.4 oz (114.8 kg)    SpO2 100%    BMI 27.12 kg/m2     General appearance: alert, cooperative, no distress, appears stated age  Neck: supple, symmetrical, trachea midline, no adenopathy, thyroid: not enlarged, symmetric, no tenderness/mass/nodules, no carotid bruit and no JVD  Lungs: clear to auscultation bilaterally  Heart: regular rate and rhythm, S1, S2 normal, no murmur, click, rub or gallop  Abdomen: soft, non-tender.  Bowel sounds normal. No masses,  no organomegaly  Extremities: extremities normal, atraumatic, no cyanosis or edema  Neurologic: Grossly normal        Data Review   Recent Results (from the past 24 hour(s))   GLUCOSE, POC    Collection Time: 09/12/17 11:27 AM   Result Value Ref Range    Glucose (POC) 126 (H) 65 - 100 mg/dL    Performed by [employer], POC    Collection Time: 09/12/17  4:26 PM   Result Value Ref Range    Glucose (POC) 116 (H) 65 - 100 mg/dL    Performed by [place] [employer]    GLUCOSE, POC    Collection Time: 09/12/17  6:56 PM   Result Value Ref Range    Glucose (POC) 164 (H) 65 - 100 mg/dL    Performed by Mae Channel    GLUCOSE, POC    Collection Time: 09/12/17  9:39 PM   Result Value Ref Range    Glucose (POC) 187 (H) 65 - 100 mg/dL    Performed by [id number], BASIC    Collection Time: 09/13/17  6:07 AM   Result Value Ref Range    Sodium 135 (L) 136 - 145 mmol/L    Potassium 4.2 3.5 - 5.1 mmol/L    Chloride 105 97 - 108 mmol/L    CO2 24 21 - 32 mmol/L    Anion gap 6 5 - 15 mmol/L    Glucose 164 (H) 65 - 100 mg/dL    BUN 12 6 - 20 MG/DL    Creatinine 1.33 (H) 0.70 - 1.30 MG/DL    BUN/Creatinine ratio 9 (L) 12 - 20      GFR est AA >60 >60 ml/min/1.73m2    GFR est non-AA 52 (L) >60 ml/min/1.73m2    Calcium 8.9 8.5 - 10.1 MG/DL   GLUCOSE, POC Collection Time: 09/13/17  7:19 AM   Result Value Ref Range    Glucose (POC) 167 (H) 65 - 100 mg/dL    Performed by Jackie Scherer)            Assessment:     Active Problems:    Foot ulcer with fat layer exposed ([address] (9/7/2017)        Plan:     1. Arteriogram today. 2.  Continue wound care and parenteral antibiotics. 3.  Continue diabetic control.

## 2017-09-13 NOTE — PROGRESS NOTES
Nutrition Services      Nutrition Screen:  Wt Readings from Last 10 Encounters:   09/12/17 114.8 kg (253 lb 1.4 oz)   09/07/17 118.3 kg (260 lb 14.4 oz)   09/08/17 117.9 kg (260 lb)   05/15/17 120.7 kg (266 lb)   02/06/17 122.2 kg (269 lb 8 oz)   06/02/16 121.1 kg (267 lb)   04/11/16 120.2 kg (265 lb)   02/29/16 123.4 kg (272 lb)   11/13/15 73.4 kg (161 lb 12.8 oz)   10/05/15 122.1 kg (269 lb 1.6 oz)     Body mass index is 27.12 kg/(m^2). Supplements:                        _____ ordered ______  declined. __ __  Pt is nutritionally stable at this time, will rescreen in 7 days. _x __    Pt is at nutritional risk and will be rescreened in 2-3 days. __ __  Pt is at moderate or high nutritional risk, will refer to RD for assessment.        Ann Wetzel  Dietetic Technician, Registered

## 2017-09-13 NOTE — PROGRESS NOTES
TRANSFER - OUT REPORT:    Verbal report given to Dex Burnett RN(name) on Silvia Kinsey  being transferred to IVCU(unit) for routine progression of care. Report consisted of patients Situation, Background, Assessment and   Recommendations(SBAR). Information from the following report(s) SBAR, Kardex, Intake/Output, MAR, Accordion and Recent Results was reviewed with the receiving nurse. RN advised receiving RN of patients blood glucose results and insulin administration over last couple days and advised RN of ongoing conversations with Dr. Gatito Mary. Lines:   Peripheral IV 09/09/17 Left; Inner Forearm (Active)   Site Assessment Clean, dry, & intact 9/13/2017  7:27 AM   Phlebitis Assessment 0 9/13/2017  7:27 AM   Infiltration Assessment 0 9/13/2017  7:27 AM   Dressing Status Clean, dry, & intact 9/13/2017  7:27 AM   Dressing Type Tape;Transparent 9/13/2017  7:27 AM   Hub Color/Line Status Pink 9/13/2017  7:27 AM   Action Taken Other (comment) 9/12/2017  7:10 PM   Alcohol Cap Used No 9/11/2017  8:30 PM       Peripheral IV 09/12/17 Right Forearm (Active)   Site Assessment Clean, dry, & intact 9/13/2017  7:27 AM   Phlebitis Assessment 0 9/13/2017  7:27 AM   Infiltration Assessment 0 9/13/2017  7:27 AM   Dressing Status Clean, dry, & intact 9/13/2017  7:27 AM   Dressing Type Tape;Transparent 9/13/2017  7:27 AM   Hub Color/Line Status Pink; Infusing 9/13/2017  7:27 AM        Opportunity for questions and clarification was provided. Patient to be transported from cath lab to [facility] for routine progression of care.

## 2017-09-13 NOTE — PROCEDURES
RLE angio shows mild SFA stenosis, 95% TPT stenosis with single vessel runoff via peroneal which reconstitutes the distal PT into plantar arteries. Proximal AT and PT occluded. Distal AT and DP reconstituted. TPT stenosis treated w/ atherectomy and angioplasty using CSI 1.25 and 4x4 balloon. No residual stenosis on completion angio. Will need foot debridement +/- toe amps Friday.

## 2017-09-13 NOTE — PROGRESS NOTES
Patient being transported off unit for arteriogram.  Pts belongings left in room, with exception of patients wallet which he gave to his daughter to hold.

## 2017-09-13 NOTE — PROGRESS NOTES
Surgical Progress Note - Vika Trimble ACNP-BC  9/13/2017 3:01 PM       Subjective:     Mr. Billie Jacobsen was admitted last week for and infected right foot wound. After evaluation it was determined that patient has ischemic injury. Plan is for arteriogram when it is available. Nursing Data:   VSS   Exam:     General: NAD  Cardio/Pulm: Regular and even  Abd: Soft, NT, ND  Extremities: Right groing pulse intact. Unable to assess popliteal pulse or pedal pulse. Skin: Dressing to RLE C/D/I. Neuro: A&Ox 3. Very pleasant. Lab Review:     No results for input(s): WBC, HGB, HCT, PLT, HGBEXT, HCTEXT, PLTEXT in the last 72 hours. Recent Labs       09/12/17   0407   NA   136   K    4.2   CL   105   CO2   25   BUN    12   CREA    1.28   GLU    131*   CA    8.7        Assessment:      PVD likely w microvascular arterial disease. H/O venous sufficiency        DM foot wound of the right ventral foot involving the 3-5th digits. 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 Day 6 of IV antibx. Currently on Vancomycin and Zosyn w wound care. Wound cx positive for Providencia stuartii, Proteus, and Pseudomonas.      Poorly controlled IDDM - BG control improved since admission. CAD    Plan:     Arrange for Arteriogram as soon as possible.     Management of comorbidities by primary team.

## 2017-09-13 NOTE — PROGRESS NOTES
Dr. Diane Denney informed RN to administer 10 units of Lantus this morning and 8 units of Humalog. RN verified by readback. Pt is to be NPO for procedure after 1030.

## 2017-09-14 ENCOUNTER — ANESTHESIA EVENT (OUTPATIENT)
Dept: SURGERY | Age: 77
DRG: 271 | End: 2017-09-14
Payer: MEDICARE

## 2017-09-14 LAB
ANION GAP SERPL CALC-SCNC: 8 MMOL/L (ref 5–15)
BUN SERPL-MCNC: 16 MG/DL (ref 6–20)
BUN/CREAT SERPL: 12 (ref 12–20)
CALCIUM SERPL-MCNC: 8.4 MG/DL (ref 8.5–10.1)
CHLORIDE SERPL-SCNC: 104 MMOL/L (ref 97–108)
CO2 SERPL-SCNC: 24 MMOL/L (ref 21–32)
CREAT SERPL-MCNC: 1.39 MG/DL (ref 0.7–1.3)
GLUCOSE BLD STRIP.AUTO-MCNC: 130 MG/DL (ref 65–100)
GLUCOSE BLD STRIP.AUTO-MCNC: 135 MG/DL (ref 65–100)
GLUCOSE BLD STRIP.AUTO-MCNC: 96 MG/DL (ref 65–100)
GLUCOSE SERPL-MCNC: 145 MG/DL (ref 65–100)
POTASSIUM SERPL-SCNC: 4.1 MMOL/L (ref 3.5–5.1)
SERVICE CMNT-IMP: ABNORMAL
SERVICE CMNT-IMP: ABNORMAL
SERVICE CMNT-IMP: NORMAL
SODIUM SERPL-SCNC: 136 MMOL/L (ref 136–145)

## 2017-09-14 PROCEDURE — 74011250636 HC RX REV CODE- 250/636: Performed by: INTERNAL MEDICINE

## 2017-09-14 PROCEDURE — 74011250636 HC RX REV CODE- 250/636: Performed by: NURSE PRACTITIONER

## 2017-09-14 PROCEDURE — 74011000258 HC RX REV CODE- 258: Performed by: INTERNAL MEDICINE

## 2017-09-14 PROCEDURE — 80048 BASIC METABOLIC PNL TOTAL CA: CPT | Performed by: INTERNAL MEDICINE

## 2017-09-14 PROCEDURE — 36415 COLL VENOUS BLD VENIPUNCTURE: CPT | Performed by: INTERNAL MEDICINE

## 2017-09-14 PROCEDURE — 82962 GLUCOSE BLOOD TEST: CPT

## 2017-09-14 PROCEDURE — 65270000029 HC RM PRIVATE

## 2017-09-14 PROCEDURE — 74011000258 HC RX REV CODE- 258: Performed by: NURSE PRACTITIONER

## 2017-09-14 PROCEDURE — 74011250637 HC RX REV CODE- 250/637: Performed by: INTERNAL MEDICINE

## 2017-09-14 PROCEDURE — 74011636637 HC RX REV CODE- 636/637: Performed by: INTERNAL MEDICINE

## 2017-09-14 PROCEDURE — 65660000000 HC RM CCU STEPDOWN

## 2017-09-14 PROCEDURE — 74011250637 HC RX REV CODE- 250/637: Performed by: SURGERY

## 2017-09-14 RX ORDER — INSULIN LISPRO 100 [IU]/ML
10 INJECTION, SOLUTION INTRAVENOUS; SUBCUTANEOUS
Status: DISCONTINUED | OUTPATIENT
Start: 2017-09-15 | End: 2017-09-17

## 2017-09-14 RX ORDER — MORPHINE SULFATE 2 MG/ML
1 INJECTION, SOLUTION INTRAMUSCULAR; INTRAVENOUS
Status: DISCONTINUED | OUTPATIENT
Start: 2017-09-14 | End: 2017-09-19 | Stop reason: HOSPADM

## 2017-09-14 RX ORDER — ZOLPIDEM TARTRATE 5 MG/1
5 TABLET ORAL ONCE
Status: COMPLETED | OUTPATIENT
Start: 2017-09-14 | End: 2017-09-14

## 2017-09-14 RX ADMIN — INSULIN LISPRO 10 UNITS: 100 INJECTION, SOLUTION INTRAVENOUS; SUBCUTANEOUS at 11:55

## 2017-09-14 RX ADMIN — INSULIN LISPRO 15 UNITS: 100 INJECTION, SOLUTION INTRAVENOUS; SUBCUTANEOUS at 08:15

## 2017-09-14 RX ADMIN — ZOLPIDEM TARTRATE 5 MG: 5 TABLET ORAL at 21:34

## 2017-09-14 RX ADMIN — VANCOMYCIN HYDROCHLORIDE 1250 MG: 10 INJECTION, POWDER, LYOPHILIZED, FOR SOLUTION INTRAVENOUS at 22:50

## 2017-09-14 RX ADMIN — METOPROLOL TARTRATE 25 MG: 25 TABLET, FILM COATED ORAL at 18:37

## 2017-09-14 RX ADMIN — INSULIN GLARGINE 10 UNITS: 100 INJECTION, SOLUTION SUBCUTANEOUS at 14:17

## 2017-09-14 RX ADMIN — ASPIRIN 81 MG: 81 TABLET, COATED ORAL at 08:16

## 2017-09-14 RX ADMIN — METOPROLOL TARTRATE 25 MG: 25 TABLET, FILM COATED ORAL at 08:16

## 2017-09-14 RX ADMIN — ATORVASTATIN CALCIUM 20 MG: 20 TABLET, FILM COATED ORAL at 08:16

## 2017-09-14 RX ADMIN — INSULIN LISPRO 10 UNITS: 100 INJECTION, SOLUTION INTRAVENOUS; SUBCUTANEOUS at 18:37

## 2017-09-14 RX ADMIN — VANCOMYCIN HYDROCHLORIDE 1250 MG: 10 INJECTION, POWDER, LYOPHILIZED, FOR SOLUTION INTRAVENOUS at 12:28

## 2017-09-14 RX ADMIN — PIPERACILLIN SODIUM,TAZOBACTAM SODIUM 3.38 G: 3; .375 INJECTION, POWDER, FOR SOLUTION INTRAVENOUS at 08:15

## 2017-09-14 RX ADMIN — POTASSIUM CHLORIDE: 149 INJECTION, SOLUTION, CONCENTRATE INTRAVENOUS at 15:20

## 2017-09-14 RX ADMIN — LISINOPRIL 40 MG: 20 TABLET ORAL at 08:16

## 2017-09-14 RX ADMIN — PIPERACILLIN SODIUM,TAZOBACTAM SODIUM 3.38 G: 3; .375 INJECTION, POWDER, FOR SOLUTION INTRAVENOUS at 16:56

## 2017-09-14 NOTE — PROGRESS NOTES
Surgical Progress Note - Maria C Cornell ACNP-BC  2017 12:24 PM       Subjective:     Mr. Sonido Alcantara is a 67 yo AAM who was admitted 1 week ago for a right foot wound w osteomylitis. He is s/p atherectomy and angioplasty of the TPT on 17. Nursing Data:     Visit Vitals    /54 (BP 1 Location: Left arm, BP Patient Position: At rest)    Pulse 69    Temp 98.3 °F (36.8 °C)    Resp 16    Wt 114.8 kg (253 lb 1.4 oz)    SpO2 99%    BMI 27.12 kg/m2      ---------------------------------------------------------------------------------------------------------  Temp (24hrs), Av.4 °F (36.9 °C), Min:98.3 °F (36.8 °C), Max:98.7 °F (37.1 °C)    ---------------------------------------------------------------------------------------------------------    Intake/Output Summary (Last 24 hours) at 17 1224  Last data filed at 17 0723   Gross per 24 hour   Intake                0 ml   Output              900 ml   Net             -900 ml       Exam:     Physical Exam   Constitutional: He is oriented to person, place, and time. He appears well-nourished. HENT:   Head: Normocephalic and atraumatic. Eyes: Pupils are equal, round, and reactive to light. Neck: Normal range of motion. Cardiovascular: Normal rate and regular rhythm. Pulmonary/Chest: Effort normal and breath sounds normal.   Abdominal: Soft. Bowel sounds are normal.   Musculoskeletal: Normal range of motion. Neurological: He is alert and oriented to person, place, and time. Skin:            Lab Review:     .   Recent Results (from the past 24 hour(s))   GLUCOSE, POC    Collection Time: 17  3:09 PM   Result Value Ref Range    Glucose (POC) 130 (H) 65 - 100 mg/dL    Performed by Margot Bhardwaj ([facility])*    GLUCOSE, POC    Collection Time: 17  5:05 PM   Result Value Ref Range    Glucose (POC) 144 (H) 65 - 100 mg/dL    Performed by MARCY Lomeli    Collection Time: 17  3:47 AM   Result Value Ref Range    Sodium 136 136 - 145 mmol/L    Potassium 4.1 3.5 - 5.1 mmol/L    Chloride 104 97 - 108 mmol/L    CO2 24 21 - 32 mmol/L    Anion gap 8 5 - 15 mmol/L    Glucose 145 (H) 65 - 100 mg/dL    BUN 16 6 - 20 MG/DL    Creatinine 1.39 (H) 0.70 - 1.30 MG/DL    BUN/Creatinine ratio 12 12 - 20      GFR est AA >60 >60 ml/min/1.73m2    GFR est non-AA 50 (L) >60 ml/min/1.73m2    Calcium 8.4 (L) 8.5 - 10.1 MG/DL   GLUCOSE, POC    Collection Time: 09/14/17  7:32 AM   Result Value Ref Range    Glucose (POC) 130 (H) 65 - 100 mg/dL    Performed by [address], POC    Collection Time: 09/14/17 11:35 AM   Result Value Ref Range    Glucose (POC) 135 (H) 65 - 100 mg/dL    Performed by Neel GAMEZ      XR Results (most recent):    Results from Hospital Encounter encounter on 09/07/17   XR FOOT RT MIN 3 V   Narrative EXAM:  XR FOOT RT MIN 3 V    INDICATION:   Assess bony integrity-----questionable suggestive features of  osteomyelitis third fourth fifth digits. COMPARISON:  None. FINDINGS:  Three views of the right foot demonstrate degenerative change in the  1st interphalangeal joint. No visible cortical disruption in the 4th or 5th  digits. Plantar heel spur. Minimal degenerative change in the midfoot. Minimal  soft tissue swelling about the ankle         Impression IMPRESSION:    1. No visible cortical disruption in the 4th or 5th digits. If there is clinical  concern for osteomyelitis, MRI is the imaging study of choice. 2. Degenerative appearing change in the 1st interphalangeal joint. Assessment:      PAD s/p atherectomy and angioplasty of the TPT on 09/13/17.    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        DM foot wound of the right ventral foot involving the 3-5th digits.    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  Day 8 of IV antibx.  Currently on Vancomycin and Zosyn w wound care.  Wound cx positive for Providencia stuartii, Proteus, and Pseudomonas.      Poorly controlled IDDM - BG control improved since admission. CKD III. Unchanged since presentation.       CAD    Plan:     OR in the am for likely amputation of the 3-5 digits and wound debridement. Wound will need to heal by second intention as he does not have enough healthy tissue for closure. Continue current management for DM as serum BG is fairly well controlled. Creatinine is stable. Plan per Dr. Alyssa Salcedo. VTE prophylaxis:  Hold for procedure in the am.     Disposition:   Likely home w HH. sometime next week.

## 2017-09-14 NOTE — PROGRESS NOTES
General Daily Progress Note    Admit Date: 9/7/2017    Subjective:     Patient has no complaint. Current Facility-Administered Medications   Medication Dose Route Frequency    [START ON 9/15/2017] insulin lispro (HUMALOG) injection 10 Units  10 Units SubCUTAneous ACB    insulin glargine (LANTUS) injection 10 Units  10 Units SubCUTAneous DAILY    0.45% sodium chloride 1,000 mL with potassium chloride 10 mEq infusion   IntraVENous CONTINUOUS    vancomycin (VANCOCIN) 1250 mg in  ml infusion  1,250 mg IntraVENous Q12H    insulin lispro (HUMALOG) injection 10 Units  10 Units SubCUTAneous ACL    insulin lispro (HUMALOG) injection 10 Units  10 Units SubCUTAneous ACD    piperacillin-tazobactam (ZOSYN) 3.375 g in 0.9% sodium chloride (MBP/ADV) 100 mL  3.375 g IntraVENous Q8H    aspirin delayed-release tablet 81 mg  81 mg Oral DAILY    lisinopril (PRINIVIL, ZESTRIL) tablet 40 mg  40 mg Oral DAILY    metoprolol tartrate (LOPRESSOR) tablet 25 mg  25 mg Oral BID    atorvastatin (LIPITOR) tablet 20 mg  20 mg Oral DAILY    sodium chloride (NS) flush 5-10 mL  5-10 mL IntraVENous Q8H    sodium chloride (NS) flush 5-10 mL  5-10 mL IntraVENous PRN    naloxone (NARCAN) injection 0.4 mg  0.4 mg IntraVENous PRN    acetaminophen (TYLENOL) tablet 650 mg  650 mg Oral Q4H PRN    HYDROcodone-acetaminophen (NORCO) 5-325 mg per tablet 1 Tab  1 Tab Oral Q4H PRN        Review of Systems  A comprehensive review of systems was negative.     Objective:     Patient Vitals for the past 24 hrs:   BP Temp Pulse Resp SpO2 Height   09/14/17 0723 132/54 98.3 °F (36.8 °C) 69 16 99 % -   09/14/17 0354 117/56 98.4 °F (36.9 °C) 69 16 100 % -   09/14/17 0008 122/48 98.7 °F (37.1 °C) 64 17 96 % -   09/13/17 2100 (!) 125/114 - 79 21 98 % -   09/13/17 2000 134/51 - 88 14 96 % -   09/13/17 1900 144/52 - 96 18 96 % -   09/13/17 1800 166/78 98.3 °F (36.8 °C) 94 16 98 % -   09/13/17 1700 161/64 - 88 - 97 % -   09/13/17 1630 165/69 - 86 22 96 % -   09/13/17 1615 161/68 - 84 16 95 % -   09/13/17 1600 163/68 - 84 18 96 % -   09/13/17 1545 155/69 - 82 16 97 % -   09/13/17 1535 160/65 - 81 21 96 % -   09/13/17 1505 161/68 98.3 °F (36.8 °C) 79 16 95 % -     09/14 0701 - 09/14 1900  In: -   Out: 450 [Urine:450]  09/12 1901 - 09/14 0700  In: 240 [P.O.:240]  Out: 850 [Urine:850]    Physical Exam:   Visit Vitals    /54 (BP 1 Location: Left arm, BP Patient Position: At rest)    Pulse 69    Temp 98.3 °F (36.8 °C)    Resp 16    Wt 253 lb 1.4 oz (114.8 kg)    SpO2 99%    BMI 27.12 kg/m2     General appearance: alert, cooperative, no distress, appears stated age  Neck: supple, symmetrical, trachea midline, no adenopathy, thyroid: not enlarged, symmetric, no tenderness/mass/nodules, no carotid bruit and no JVD  Lungs: clear to auscultation bilaterally  Heart: regular rate and rhythm, S1, S2 normal, no murmur, click, rub or gallop  Abdomen: soft, non-tender.  Bowel sounds normal. No masses,  no organomegaly  Extremities: edema, bandaged right foot        Data Review   Recent Results (from the past 24 hour(s))   VANCOMYCIN, TROUGH    Collection Time: 09/13/17 10:11 AM   Result Value Ref Range    Vancomycin,trough 16.1 (H) 5.0 - 10.0 ug/mL    Reported dose date: 20170912      Reported dose time: 2300      Reported dose: 1250MG UNITS   GLUCOSE, POC    Collection Time: 09/13/17 11:24 AM   Result Value Ref Range    Glucose (POC) 169 (H) 65 - 100 mg/dL    Performed by Saqib Vines    GLUCOSE, POC    Collection Time: 09/13/17  3:09 PM   Result Value Ref Range    Glucose (POC) 130 (H) 65 - 100 mg/dL    Performed by Jacqulyne Barthel ([facility])*    GLUCOSE, POC    Collection Time: 09/13/17  5:05 PM   Result Value Ref Range    Glucose (POC) 144 (H) 65 - 100 mg/dL    Performed by Yani, BASIC    Collection Time: 09/14/17  3:47 AM   Result Value Ref Range    Sodium 136 136 - 145 mmol/L    Potassium 4.1 3.5 - 5.1 mmol/L    Chloride 104 97 - 108 mmol/L    CO2 24 21 - 32 mmol/L    Anion gap 8 5 - 15 mmol/L    Glucose 145 (H) 65 - 100 mg/dL    BUN 16 6 - 20 MG/DL    Creatinine 1.39 (H) 0.70 - 1.30 MG/DL    BUN/Creatinine ratio 12 12 - 20      GFR est AA >60 >60 ml/min/1.73m2    GFR est non-AA 50 (L) >60 ml/min/1.73m2    Calcium 8.4 (L) 8.5 - 10.1 MG/DL   GLUCOSE, POC    Collection Time: 09/14/17  7:32 AM   Result Value Ref Range    Glucose (POC) 130 (H) 65 - 100 mg/dL    Performed by Lizbeth Dougherty            Assessment:     Active Problems:    Foot ulcer with fat layer exposed ([address] (9/7/2017)        Plan:     1. Continue wound care and parenteral antibiotics. Will discuss with Dr. Zelda Gee need for debridement. 2.  Hopefully vascular status is significantly better allowing increased blood volume right foot to facilitate healing. 3.  Continue diabetic control.

## 2017-09-14 NOTE — PROGRESS NOTES
Bedside shift change report given to Bassam Yeung (oncoming nurse) by Scott Curry (offgoing nurse). Report included the following information SBAR, Kardex, Intake/Output, MAR and Recent Results. Pt up am walking in room, no complaints of pain/sob, groin site remains same.

## 2017-09-15 ENCOUNTER — ANESTHESIA (OUTPATIENT)
Dept: SURGERY | Age: 77
DRG: 271 | End: 2017-09-15
Payer: MEDICARE

## 2017-09-15 LAB
ACT BLD: 142 SECS (ref 79–138)
ACT BLD: 180 SECS (ref 79–138)
ANION GAP SERPL CALC-SCNC: 5 MMOL/L (ref 5–15)
BUN SERPL-MCNC: 15 MG/DL (ref 6–20)
BUN/CREAT SERPL: 11 (ref 12–20)
CALCIUM SERPL-MCNC: 8.4 MG/DL (ref 8.5–10.1)
CHLORIDE SERPL-SCNC: 107 MMOL/L (ref 97–108)
CO2 SERPL-SCNC: 25 MMOL/L (ref 21–32)
CREAT SERPL-MCNC: 1.37 MG/DL (ref 0.7–1.3)
ERYTHROCYTE [DISTWIDTH] IN BLOOD BY AUTOMATED COUNT: 13.7 % (ref 11.5–14.5)
GLUCOSE BLD STRIP.AUTO-MCNC: 135 MG/DL (ref 65–100)
GLUCOSE BLD STRIP.AUTO-MCNC: 152 MG/DL (ref 65–100)
GLUCOSE BLD STRIP.AUTO-MCNC: 157 MG/DL (ref 65–100)
GLUCOSE BLD STRIP.AUTO-MCNC: 179 MG/DL (ref 65–100)
GLUCOSE BLD STRIP.AUTO-MCNC: 237 MG/DL (ref 65–100)
GLUCOSE SERPL-MCNC: 196 MG/DL (ref 65–100)
HCT VFR BLD AUTO: 30.8 % (ref 36.6–50.3)
HGB BLD-MCNC: 10.2 G/DL (ref 12.1–17)
MCH RBC QN AUTO: 27.9 PG (ref 26–34)
MCHC RBC AUTO-ENTMCNC: 33.1 G/DL (ref 30–36.5)
MCV RBC AUTO: 84.2 FL (ref 80–99)
PLATELET # BLD AUTO: 268 K/UL (ref 150–400)
POTASSIUM SERPL-SCNC: 4.4 MMOL/L (ref 3.5–5.1)
RBC # BLD AUTO: 3.66 M/UL (ref 4.1–5.7)
SERVICE CMNT-IMP: ABNORMAL
SODIUM SERPL-SCNC: 137 MMOL/L (ref 136–145)
WBC # BLD AUTO: 5.4 K/UL (ref 4.1–11.1)

## 2017-09-15 PROCEDURE — 0Y6T0Z0 DETACHMENT AT RIGHT 3RD TOE, COMPLETE, OPEN APPROACH: ICD-10-PCS | Performed by: SURGERY

## 2017-09-15 PROCEDURE — 74011000258 HC RX REV CODE- 258: Performed by: NURSE PRACTITIONER

## 2017-09-15 PROCEDURE — 74011000258 HC RX REV CODE- 258: Performed by: INTERNAL MEDICINE

## 2017-09-15 PROCEDURE — 74011250636 HC RX REV CODE- 250/636: Performed by: NURSE PRACTITIONER

## 2017-09-15 PROCEDURE — 77030020782 HC GWN BAIR PAWS FLX 3M -B

## 2017-09-15 PROCEDURE — 74011250637 HC RX REV CODE- 250/637: Performed by: INTERNAL MEDICINE

## 2017-09-15 PROCEDURE — 74011636637 HC RX REV CODE- 636/637: Performed by: INTERNAL MEDICINE

## 2017-09-15 PROCEDURE — 74011000250 HC RX REV CODE- 250: Performed by: SURGERY

## 2017-09-15 PROCEDURE — 85027 COMPLETE CBC AUTOMATED: CPT | Performed by: NURSE PRACTITIONER

## 2017-09-15 PROCEDURE — 77030012881 HC SHOE PSTOP DERY -A

## 2017-09-15 PROCEDURE — 87077 CULTURE AEROBIC IDENTIFY: CPT | Performed by: SURGERY

## 2017-09-15 PROCEDURE — 76060000032 HC ANESTHESIA 0.5 TO 1 HR: Performed by: SURGERY

## 2017-09-15 PROCEDURE — 77030031139 HC SUT VCRL2 J&J -A: Performed by: SURGERY

## 2017-09-15 PROCEDURE — 74011250636 HC RX REV CODE- 250/636: Performed by: INTERNAL MEDICINE

## 2017-09-15 PROCEDURE — 74011250636 HC RX REV CODE- 250/636

## 2017-09-15 PROCEDURE — 77030018836 HC SOL IRR NACL ICUM -A: Performed by: SURGERY

## 2017-09-15 PROCEDURE — 76010000138 HC OR TIME 0.5 TO 1 HR: Performed by: SURGERY

## 2017-09-15 PROCEDURE — 74011000250 HC RX REV CODE- 250

## 2017-09-15 PROCEDURE — 87205 SMEAR GRAM STAIN: CPT | Performed by: SURGERY

## 2017-09-15 PROCEDURE — 77030002916 HC SUT ETHLN J&J -A: Performed by: SURGERY

## 2017-09-15 PROCEDURE — 77030011640 HC PAD GRND REM COVD -A: Performed by: SURGERY

## 2017-09-15 PROCEDURE — 88305 TISSUE EXAM BY PATHOLOGIST: CPT | Performed by: SURGERY

## 2017-09-15 PROCEDURE — 76210000006 HC OR PH I REC 0.5 TO 1 HR: Performed by: SURGERY

## 2017-09-15 PROCEDURE — 74011000272 HC RX REV CODE- 272: Performed by: SURGERY

## 2017-09-15 PROCEDURE — 88311 DECALCIFY TISSUE: CPT | Performed by: SURGERY

## 2017-09-15 PROCEDURE — 87075 CULTR BACTERIA EXCEPT BLOOD: CPT | Performed by: SURGERY

## 2017-09-15 PROCEDURE — 77030026438 HC STYL ET INTUB CARD -A: Performed by: ANESTHESIOLOGY

## 2017-09-15 PROCEDURE — 0Y6V0Z0 DETACHMENT AT RIGHT 4TH TOE, COMPLETE, OPEN APPROACH: ICD-10-PCS | Performed by: SURGERY

## 2017-09-15 PROCEDURE — 77030008684 HC TU ET CUF COVD -B: Performed by: ANESTHESIOLOGY

## 2017-09-15 PROCEDURE — 80048 BASIC METABOLIC PNL TOTAL CA: CPT | Performed by: INTERNAL MEDICINE

## 2017-09-15 PROCEDURE — 65270000029 HC RM PRIVATE

## 2017-09-15 PROCEDURE — 87186 SC STD MICRODIL/AGAR DIL: CPT | Performed by: SURGERY

## 2017-09-15 PROCEDURE — 77030019908 HC STETH ESOPH SIMS -A: Performed by: ANESTHESIOLOGY

## 2017-09-15 PROCEDURE — 0Y6X0Z0 DETACHMENT AT RIGHT 5TH TOE, COMPLETE, OPEN APPROACH: ICD-10-PCS | Performed by: SURGERY

## 2017-09-15 PROCEDURE — 82962 GLUCOSE BLOOD TEST: CPT

## 2017-09-15 PROCEDURE — 36415 COLL VENOUS BLD VENIPUNCTURE: CPT | Performed by: INTERNAL MEDICINE

## 2017-09-15 RX ORDER — MORPHINE SULFATE 2 MG/ML
4 INJECTION, SOLUTION INTRAMUSCULAR; INTRAVENOUS
Status: DISCONTINUED | OUTPATIENT
Start: 2017-09-15 | End: 2017-09-19 | Stop reason: HOSPADM

## 2017-09-15 RX ORDER — FENTANYL CITRATE 50 UG/ML
INJECTION, SOLUTION INTRAMUSCULAR; INTRAVENOUS AS NEEDED
Status: DISCONTINUED | OUTPATIENT
Start: 2017-09-15 | End: 2017-09-15 | Stop reason: HOSPADM

## 2017-09-15 RX ORDER — ACETAMINOPHEN 10 MG/ML
INJECTION, SOLUTION INTRAVENOUS AS NEEDED
Status: DISCONTINUED | OUTPATIENT
Start: 2017-09-15 | End: 2017-09-15 | Stop reason: HOSPADM

## 2017-09-15 RX ORDER — SUCCINYLCHOLINE CHLORIDE 20 MG/ML
INJECTION INTRAMUSCULAR; INTRAVENOUS AS NEEDED
Status: DISCONTINUED | OUTPATIENT
Start: 2017-09-15 | End: 2017-09-15 | Stop reason: HOSPADM

## 2017-09-15 RX ORDER — ETOMIDATE 2 MG/ML
INJECTION INTRAVENOUS AS NEEDED
Status: DISCONTINUED | OUTPATIENT
Start: 2017-09-15 | End: 2017-09-15 | Stop reason: HOSPADM

## 2017-09-15 RX ORDER — ONDANSETRON 2 MG/ML
INJECTION INTRAMUSCULAR; INTRAVENOUS AS NEEDED
Status: DISCONTINUED | OUTPATIENT
Start: 2017-09-15 | End: 2017-09-15 | Stop reason: HOSPADM

## 2017-09-15 RX ORDER — LIDOCAINE HYDROCHLORIDE 20 MG/ML
INJECTION, SOLUTION EPIDURAL; INFILTRATION; INTRACAUDAL; PERINEURAL AS NEEDED
Status: DISCONTINUED | OUTPATIENT
Start: 2017-09-15 | End: 2017-09-15 | Stop reason: HOSPADM

## 2017-09-15 RX ORDER — SODIUM CHLORIDE, SODIUM LACTATE, POTASSIUM CHLORIDE, CALCIUM CHLORIDE 600; 310; 30; 20 MG/100ML; MG/100ML; MG/100ML; MG/100ML
INJECTION, SOLUTION INTRAVENOUS
Status: DISCONTINUED | OUTPATIENT
Start: 2017-09-15 | End: 2017-09-15 | Stop reason: HOSPADM

## 2017-09-15 RX ORDER — MORPHINE SULFATE 2 MG/ML
2 INJECTION, SOLUTION INTRAMUSCULAR; INTRAVENOUS
Status: DISCONTINUED | OUTPATIENT
Start: 2017-09-15 | End: 2017-09-19 | Stop reason: HOSPADM

## 2017-09-15 RX ORDER — OXYCODONE AND ACETAMINOPHEN 5; 325 MG/1; MG/1
1-2 TABLET ORAL
Status: DISCONTINUED | OUTPATIENT
Start: 2017-09-15 | End: 2017-09-19 | Stop reason: HOSPADM

## 2017-09-15 RX ORDER — ROCURONIUM BROMIDE 10 MG/ML
INJECTION, SOLUTION INTRAVENOUS AS NEEDED
Status: DISCONTINUED | OUTPATIENT
Start: 2017-09-15 | End: 2017-09-15 | Stop reason: HOSPADM

## 2017-09-15 RX ORDER — MIDAZOLAM HYDROCHLORIDE 1 MG/ML
INJECTION, SOLUTION INTRAMUSCULAR; INTRAVENOUS AS NEEDED
Status: DISCONTINUED | OUTPATIENT
Start: 2017-09-15 | End: 2017-09-15 | Stop reason: HOSPADM

## 2017-09-15 RX ADMIN — ETOMIDATE 40 MG: 2 INJECTION INTRAVENOUS at 16:01

## 2017-09-15 RX ADMIN — INSULIN GLARGINE 5 UNITS: 100 INJECTION, SOLUTION SUBCUTANEOUS at 13:23

## 2017-09-15 RX ADMIN — POTASSIUM CHLORIDE: 149 INJECTION, SOLUTION, CONCENTRATE INTRAVENOUS at 20:56

## 2017-09-15 RX ADMIN — METOPROLOL TARTRATE 25 MG: 25 TABLET, FILM COATED ORAL at 08:40

## 2017-09-15 RX ADMIN — SUCCINYLCHOLINE CHLORIDE 160 MG: 20 INJECTION INTRAMUSCULAR; INTRAVENOUS at 16:01

## 2017-09-15 RX ADMIN — PIPERACILLIN SODIUM,TAZOBACTAM SODIUM 3.38 G: 3; .375 INJECTION, POWDER, FOR SOLUTION INTRAVENOUS at 16:27

## 2017-09-15 RX ADMIN — MIDAZOLAM HYDROCHLORIDE 2 MG: 1 INJECTION, SOLUTION INTRAMUSCULAR; INTRAVENOUS at 15:52

## 2017-09-15 RX ADMIN — PIPERACILLIN SODIUM,TAZOBACTAM SODIUM 3.38 G: 3; .375 INJECTION, POWDER, FOR SOLUTION INTRAVENOUS at 00:53

## 2017-09-15 RX ADMIN — ONDANSETRON 4 MG: 2 INJECTION INTRAMUSCULAR; INTRAVENOUS at 16:22

## 2017-09-15 RX ADMIN — PIPERACILLIN SODIUM,TAZOBACTAM SODIUM 3.38 G: 3; .375 INJECTION, POWDER, FOR SOLUTION INTRAVENOUS at 08:40

## 2017-09-15 RX ADMIN — LISINOPRIL 40 MG: 20 TABLET ORAL at 08:40

## 2017-09-15 RX ADMIN — ACETAMINOPHEN 1000 MG: 10 INJECTION, SOLUTION INTRAVENOUS at 16:20

## 2017-09-15 RX ADMIN — SODIUM CHLORIDE, SODIUM LACTATE, POTASSIUM CHLORIDE, CALCIUM CHLORIDE: 600; 310; 30; 20 INJECTION, SOLUTION INTRAVENOUS at 15:52

## 2017-09-15 RX ADMIN — ATORVASTATIN CALCIUM 20 MG: 20 TABLET, FILM COATED ORAL at 08:40

## 2017-09-15 RX ADMIN — Medication 10 ML: at 20:57

## 2017-09-15 RX ADMIN — ROCURONIUM BROMIDE 10 MG: 10 INJECTION, SOLUTION INTRAVENOUS at 16:01

## 2017-09-15 RX ADMIN — METOPROLOL TARTRATE 25 MG: 25 TABLET, FILM COATED ORAL at 18:14

## 2017-09-15 RX ADMIN — FENTANYL CITRATE 100 MCG: 50 INJECTION, SOLUTION INTRAMUSCULAR; INTRAVENOUS at 16:01

## 2017-09-15 RX ADMIN — VANCOMYCIN HYDROCHLORIDE 1250 MG: 10 INJECTION, POWDER, LYOPHILIZED, FOR SOLUTION INTRAVENOUS at 14:57

## 2017-09-15 RX ADMIN — VANCOMYCIN HYDROCHLORIDE 1250 MG: 10 INJECTION, POWDER, LYOPHILIZED, FOR SOLUTION INTRAVENOUS at 22:22

## 2017-09-15 RX ADMIN — LIDOCAINE HYDROCHLORIDE 80 MG: 20 INJECTION, SOLUTION EPIDURAL; INFILTRATION; INTRACAUDAL; PERINEURAL at 16:01

## 2017-09-15 NOTE — ANESTHESIA PREPROCEDURE EVALUATION
Anesthetic History   No history of anesthetic complications            Review of Systems / Medical History  Patient summary reviewed, nursing notes reviewed and pertinent labs reviewed    Pulmonary                Comments: Former smoker   Neuro/Psych   Within defined limits           Cardiovascular    Hypertension: poorly controlled          PAD and hyperlipidemia  Pertinent negatives: No CAD  Exercise tolerance: >4 METS  Comments: Palpitations     GI/Hepatic/Renal         Renal disease: CRI       Endo/Other    Diabetes: poorly controlled, type 2, using insulin    Arthritis     Other Findings   Comments: Charcot foot secondary to DMII           Physical Exam    Airway  Mallampati: II  TM Distance: > 6 cm  Neck ROM: normal range of motion   Mouth opening: Normal     Cardiovascular  Regular rate and rhythm,  S1 and S2 normal,  no murmur, click, rub, or gallop             Dental    Dentition: Poor dentition  Comments: Multiple missing teeth, none loose.    Pulmonary  Breath sounds clear to auscultation               Abdominal  GI exam deferred       Other Findings            Anesthetic Plan    ASA: 3  Anesthesia type: general          Induction: Intravenous  Anesthetic plan and risks discussed with: Patient

## 2017-09-15 NOTE — PROGRESS NOTES
Problem: Falls - Risk of  Goal: *Absence of Falls  Document Jose Fall Risk and appropriate interventions in the flowsheet. Outcome: Progressing Towards Goal  Fall Risk Interventions:  Mobility Interventions: Communicate number of staff needed for ambulation/transfer, Utilize walker, cane, or other assitive device           Medication Interventions: Patient to call before getting OOB, Teach patient to arise slowly     Elimination Interventions: Call light in reach     History of Falls Interventions:  Investigate reason for fall

## 2017-09-15 NOTE — PROGRESS NOTES
General Daily Progress Note    Admit Date: 9/7/2017    Subjective:     Patient has no complaint . Current Facility-Administered Medications   Medication Dose Route Frequency    insulin lispro (HUMALOG) injection 10 Units  10 Units SubCUTAneous ACB    morphine injection 1 mg  1 mg IntraVENous Q3H PRN    insulin glargine (LANTUS) injection 10 Units  10 Units SubCUTAneous DAILY    0.45% sodium chloride 1,000 mL with potassium chloride 10 mEq infusion   IntraVENous CONTINUOUS    vancomycin (VANCOCIN) 1250 mg in  ml infusion  1,250 mg IntraVENous Q12H    insulin lispro (HUMALOG) injection 10 Units  10 Units SubCUTAneous ACL    insulin lispro (HUMALOG) injection 10 Units  10 Units SubCUTAneous ACD    piperacillin-tazobactam (ZOSYN) 3.375 g in 0.9% sodium chloride (MBP/ADV) 100 mL  3.375 g IntraVENous Q8H    aspirin delayed-release tablet 81 mg  81 mg Oral DAILY    lisinopril (PRINIVIL, ZESTRIL) tablet 40 mg  40 mg Oral DAILY    metoprolol tartrate (LOPRESSOR) tablet 25 mg  25 mg Oral BID    atorvastatin (LIPITOR) tablet 20 mg  20 mg Oral DAILY    sodium chloride (NS) flush 5-10 mL  5-10 mL IntraVENous Q8H    sodium chloride (NS) flush 5-10 mL  5-10 mL IntraVENous PRN    naloxone (NARCAN) injection 0.4 mg  0.4 mg IntraVENous PRN    acetaminophen (TYLENOL) tablet 650 mg  650 mg Oral Q4H PRN    HYDROcodone-acetaminophen (NORCO) 5-325 mg per tablet 1 Tab  1 Tab Oral Q4H PRN        Review of Systems  A comprehensive review of systems was negative.     Objective:     Patient Vitals for the past 24 hrs:   BP Temp Pulse Resp SpO2   09/15/17 0707 157/58 98.4 °F (36.9 °C) 67 16 100 %   09/15/17 0239 133/56 97.8 °F (36.6 °C) 65 16 97 %   09/14/17 1839 136/63 - 73 - -   09/14/17 1837 136/63 98.3 °F (36.8 °C) 70 16 100 %   09/14/17 1545 143/65 99.1 °F (37.3 °C) 71 16 100 %   09/14/17 1257 130/50 98.2 °F (36.8 °C) 70 16 99 %        09/13 1901 - 09/15 0700  In: 300 [P.O.:300]  Out: 2850 [Urine:2850]    Physical Exam:   Visit Vitals    /58    Pulse 67    Temp 98.4 °F (36.9 °C)    Resp 16    Wt 253 lb 1.4 oz (114.8 kg)    SpO2 100%    BMI 27.12 kg/m2     General appearance: alert, cooperative, no distress, appears stated age  Neck: supple, symmetrical, trachea midline, no adenopathy, thyroid: not enlarged, symmetric, no tenderness/mass/nodules, no carotid bruit and no JVD  Lungs: clear to auscultation bilaterally  Heart: regular rate and rhythm, S1, S2 normal, no murmur, click, rub or gallop  Abdomen: soft, non-tender.  Bowel sounds normal. No masses,  no organomegaly  Extremities: edema , bandaged right foot  Neurologic: Grossly normal        Data Review   Recent Results (from the past 24 hour(s))   GLUCOSE, POC    Collection Time: 09/14/17 11:35 AM   Result Value Ref Range    Glucose (POC) 135 (H) 65 - 100 mg/dL    Performed by [address], POC    Collection Time: 09/14/17  5:04 PM   Result Value Ref Range    Glucose (POC) 96 65 - 100 mg/dL    Performed by Henry Kelley    METABOLIC PANEL, BASIC    Collection Time: 09/15/17  2:33 AM   Result Value Ref Range    Sodium 137 136 - 145 mmol/L    Potassium 4.4 3.5 - 5.1 mmol/L    Chloride 107 97 - 108 mmol/L    CO2 25 21 - 32 mmol/L    Anion gap 5 5 - 15 mmol/L    Glucose 196 (H) 65 - 100 mg/dL    BUN 15 6 - 20 MG/DL    Creatinine 1.37 (H) 0.70 - 1.30 MG/DL    BUN/Creatinine ratio 11 (L) 12 - 20      GFR est AA >60 >60 ml/min/1.73m2    GFR est non-AA 51 (L) >60 ml/min/1.73m2    Calcium 8.4 (L) 8.5 - 10.1 MG/DL   CBC W/O DIFF    Collection Time: 09/15/17  2:33 AM   Result Value Ref Range    WBC 5.4 4.1 - 11.1 K/uL    RBC 3.66 (L) 4.10 - 5.70 M/uL    HGB 10.2 (L) 12.1 - 17.0 g/dL    HCT 30.8 (L) 36.6 - 50.3 %    MCV 84.2 80.0 - 99.0 FL    MCH 27.9 26.0 - 34.0 PG    MCHC 33.1 30.0 - 36.5 g/dL    RDW 13.7 11.5 - 14.5 %    PLATELET 370 289 - 833 K/uL   GLUCOSE, POC    Collection Time: 09/15/17  7:34 AM   Result Value Ref Range    Glucose (POC) 179 (H) 65 - 100 mg/dL    Performed by Oral Nunez            Assessment:     Active Problems:    Foot ulcer with fat layer exposed ([facility] [address] (9/7/2017)        Plan:     1.  OR today for surgical debridement. 2.  Hopefully vascular status has improved to facilitate anticipated healing. 3.  Wound care continues with parenteral antibiotics and new orders anticipated postoperatively. 4.  Continue diabetic control.

## 2017-09-15 NOTE — PROGRESS NOTES
Brief Nutrition Note    Chart reviewed. Attempted to visit with pt however he was in OR. RN documentation shows pt has good appetite. BG controlled <180 mg/dL. Will follow-up at a later date.     Jeremias De Los Santos RDN  Pager: [phone]

## 2017-09-15 NOTE — PROGRESS NOTES
Met with patient earlier today about arranging home care. Patient wanted this writer to check with his daughter. Have left a message for Nadeen Seip [phone].

## 2017-09-15 NOTE — ROUTINE PROCESS
TRANSFER - IN REPORT:    Verbal report received from JJ Dodson RN(name) on Jama Trimble  being received from OR(unit) for routine post - op      Report consisted of patients Situation, Background, Assessment and   Recommendations(SBAR). Information from the following report(s) OR Summary was reviewed with the receiving nurse. Opportunity for questions and clarification was provided. Assessment completed upon patients arrival to unit and care assumed.

## 2017-09-15 NOTE — PERIOP NOTES
TRANSFER - OUT REPORT:    Verbal report given to [facility] RN(name) on Burnadette Montserratian  being transferred to 2103(unit) for routine progression of care       Report consisted of patients Situation, Background, Assessment and   Recommendations(SBAR). Information from the following report(s) OR Summary, Procedure Summary, Intake/Output and MAR was reviewed with the receiving nurse. Opportunity for questions and clarification was provided.       Patient transported with:   Monitor  Registered Nurse

## 2017-09-15 NOTE — BRIEF OP NOTE
BRIEF OPERATIVE NOTE    Date of Procedure: 9/15/2017   Preoperative Diagnosis: PVD  Postoperative Diagnosis: Gangrene right foot      Procedure(s):  RIGHT FOOT DEBRIDEMENT THIRD FOURTH FIFTH TOE AMPUTATION RIGHT FOOT   Surgeon(s) and Role:     * Naheed Ambrose MD - Primary         Assistant Staff:       Surgical Staff:  Circ-1: Raquel Sanches RN  Scrub Tech-1: Marcie Radford  Float Staff: Juana Richey RN; Cam Vogel  Event Time In   Incision Start 1613   Incision Close 1644     Anesthesia: General   Estimated Blood Loss: 150 cc  Specimens:   ID Type Source Tests Collected by Time Destination   1 : Right Foot Third, Fourth and Fifth Toes and Metatarsal Head Preservative Foot, left  Naheed Ambrose MD 9/15/2017 1627 Pathology   1 : Right Foot Wound Wound Foot, Right AEROBIC/ANAEROBIC CULTURE, GRAM STAIN Naheed Ambrose MD 9/15/2017 1639 Microbiology      Findings: 3rd, 4th, and 5th toes and metatarsal heads resected. No gross pus. All necrotic tissue removed. Wound partially closed to reduce wound volume. Will need to cont IV Abx and start wound care tomorrow. Weight bearing as tolerated with post-op shoe.    Complications: None  Implants: * No implants in log *

## 2017-09-15 NOTE — ANESTHESIA POSTPROCEDURE EVALUATION
Post-Anesthesia Evaluation and Assessment    Patient: Duong Mcmanus MRN: [id number]  SSN: [id number]    YOB: 1940  Age: 68 y.o. Sex: male       Cardiovascular Function/Vital Signs  Visit Vitals    /61    Pulse 71    Temp 36.7 °C (98.1 °F)    Resp 17    Wt 114.8 kg (253 lb 1.4 oz)    SpO2 99%    BMI 27.12 kg/m2       Patient is status post general anesthesia for Procedure(s):  RIGHT FOOT DEBRIDEMENT THIRD FOURTH FIFTH TOE AMPUTATION RIGHT FOOT . Nausea/Vomiting: None    Postoperative hydration reviewed and adequate. Pain:  Pain Scale 1: Numeric (0 - 10) (09/15/17 1730)  Pain Intensity 1: 0 (09/15/17 1730)   Managed    Neurological Status:   Neuro (WDL): Exceptions to WDL (09/15/17 1657)  Neuro  Neurologic State: Alert;Drowsy; Eyes open spontaneously (09/15/17 1657)  Orientation Level: Oriented to person;Oriented to place;Oriented to situation (09/15/17 1657)  Cognition: Follows commands (09/15/17 1657)  Speech: Clear (09/15/17 1657)  LUE Motor Response: Purposeful (09/15/17 1657)  LLE Motor Response: Purposeful (09/15/17 1657)  RUE Motor Response: Purposeful (09/15/17 1657)  RLE Motor Response: Purposeful (09/15/17 1657)   At baseline    Mental Status and Level of Consciousness: Arousable    Pulmonary Status:   O2 Device: Room air (09/15/17 1710)   Adequate oxygenation and airway patent    Complications related to anesthesia: None    Post-anesthesia assessment completed.  No concerns    Signed By: Kings Aguero MD     September 15, 2017

## 2017-09-16 LAB
ANION GAP SERPL CALC-SCNC: 7 MMOL/L (ref 5–15)
BUN SERPL-MCNC: 12 MG/DL (ref 6–20)
BUN/CREAT SERPL: 10 (ref 12–20)
CALCIUM SERPL-MCNC: 7.5 MG/DL (ref 8.5–10.1)
CHLORIDE SERPL-SCNC: 107 MMOL/L (ref 97–108)
CO2 SERPL-SCNC: 24 MMOL/L (ref 21–32)
CREAT SERPL-MCNC: 1.19 MG/DL (ref 0.7–1.3)
ERYTHROCYTE [DISTWIDTH] IN BLOOD BY AUTOMATED COUNT: 13.6 % (ref 11.5–14.5)
GLUCOSE BLD STRIP.AUTO-MCNC: 113 MG/DL (ref 65–100)
GLUCOSE BLD STRIP.AUTO-MCNC: 134 MG/DL (ref 65–100)
GLUCOSE BLD STRIP.AUTO-MCNC: 161 MG/DL (ref 65–100)
GLUCOSE BLD STRIP.AUTO-MCNC: 199 MG/DL (ref 65–100)
GLUCOSE SERPL-MCNC: 150 MG/DL (ref 65–100)
HCT VFR BLD AUTO: 26.7 % (ref 36.6–50.3)
HGB BLD-MCNC: 9 G/DL (ref 12.1–17)
MCH RBC QN AUTO: 28.3 PG (ref 26–34)
MCHC RBC AUTO-ENTMCNC: 33.7 G/DL (ref 30–36.5)
MCV RBC AUTO: 84 FL (ref 80–99)
PLATELET # BLD AUTO: 225 K/UL (ref 150–400)
POTASSIUM SERPL-SCNC: 4.1 MMOL/L (ref 3.5–5.1)
RBC # BLD AUTO: 3.18 M/UL (ref 4.1–5.7)
SERVICE CMNT-IMP: ABNORMAL
SODIUM SERPL-SCNC: 138 MMOL/L (ref 136–145)
WBC # BLD AUTO: 5.3 K/UL (ref 4.1–11.1)

## 2017-09-16 PROCEDURE — 74011250636 HC RX REV CODE- 250/636: Performed by: INTERNAL MEDICINE

## 2017-09-16 PROCEDURE — 97162 PT EVAL MOD COMPLEX 30 MIN: CPT | Performed by: PHYSICAL THERAPIST

## 2017-09-16 PROCEDURE — 74011636637 HC RX REV CODE- 636/637: Performed by: INTERNAL MEDICINE

## 2017-09-16 PROCEDURE — 80048 BASIC METABOLIC PNL TOTAL CA: CPT | Performed by: SURGERY

## 2017-09-16 PROCEDURE — 74011000258 HC RX REV CODE- 258: Performed by: INTERNAL MEDICINE

## 2017-09-16 PROCEDURE — 85027 COMPLETE CBC AUTOMATED: CPT | Performed by: SURGERY

## 2017-09-16 PROCEDURE — 74011250637 HC RX REV CODE- 250/637: Performed by: INTERNAL MEDICINE

## 2017-09-16 PROCEDURE — 74011000258 HC RX REV CODE- 258: Performed by: SURGERY

## 2017-09-16 PROCEDURE — 74011250636 HC RX REV CODE- 250/636: Performed by: SURGERY

## 2017-09-16 PROCEDURE — 82962 GLUCOSE BLOOD TEST: CPT

## 2017-09-16 PROCEDURE — 36415 COLL VENOUS BLD VENIPUNCTURE: CPT | Performed by: SURGERY

## 2017-09-16 PROCEDURE — 97110 THERAPEUTIC EXERCISES: CPT | Performed by: PHYSICAL THERAPIST

## 2017-09-16 PROCEDURE — 97116 GAIT TRAINING THERAPY: CPT | Performed by: PHYSICAL THERAPIST

## 2017-09-16 PROCEDURE — 65270000029 HC RM PRIVATE

## 2017-09-16 RX ADMIN — Medication 10 ML: at 23:47

## 2017-09-16 RX ADMIN — VANCOMYCIN HYDROCHLORIDE 1250 MG: 10 INJECTION, POWDER, LYOPHILIZED, FOR SOLUTION INTRAVENOUS at 11:00

## 2017-09-16 RX ADMIN — PIPERACILLIN SODIUM,TAZOBACTAM SODIUM 4.5 G: 4; .5 INJECTION, POWDER, FOR SOLUTION INTRAVENOUS at 23:47

## 2017-09-16 RX ADMIN — INSULIN LISPRO 10 UNITS: 100 INJECTION, SOLUTION INTRAVENOUS; SUBCUTANEOUS at 08:00

## 2017-09-16 RX ADMIN — VANCOMYCIN HYDROCHLORIDE 1250 MG: 10 INJECTION, POWDER, LYOPHILIZED, FOR SOLUTION INTRAVENOUS at 23:51

## 2017-09-16 RX ADMIN — LISINOPRIL 40 MG: 20 TABLET ORAL at 18:58

## 2017-09-16 RX ADMIN — METOPROLOL TARTRATE 25 MG: 25 TABLET, FILM COATED ORAL at 18:57

## 2017-09-16 RX ADMIN — PIPERACILLIN SODIUM,TAZOBACTAM SODIUM 4.5 G: 4; .5 INJECTION, POWDER, FOR SOLUTION INTRAVENOUS at 16:00

## 2017-09-16 RX ADMIN — INSULIN LISPRO 10 UNITS: 100 INJECTION, SOLUTION INTRAVENOUS; SUBCUTANEOUS at 11:30

## 2017-09-16 RX ADMIN — INSULIN GLARGINE 10 UNITS: 100 INJECTION, SOLUTION SUBCUTANEOUS at 13:00

## 2017-09-16 RX ADMIN — ASPIRIN 81 MG: 81 TABLET, COATED ORAL at 09:58

## 2017-09-16 RX ADMIN — INSULIN LISPRO 10 UNITS: 100 INJECTION, SOLUTION INTRAVENOUS; SUBCUTANEOUS at 16:30

## 2017-09-16 RX ADMIN — METOPROLOL TARTRATE 25 MG: 25 TABLET, FILM COATED ORAL at 09:57

## 2017-09-16 RX ADMIN — PIPERACILLIN SODIUM,TAZOBACTAM SODIUM 3.38 G: 3; .375 INJECTION, POWDER, FOR SOLUTION INTRAVENOUS at 00:52

## 2017-09-16 RX ADMIN — ATORVASTATIN CALCIUM 20 MG: 20 TABLET, FILM COATED ORAL at 09:58

## 2017-09-16 RX ADMIN — PIPERACILLIN SODIUM,TAZOBACTAM SODIUM 3.38 G: 3; .375 INJECTION, POWDER, FOR SOLUTION INTRAVENOUS at 08:00

## 2017-09-16 NOTE — PROGRESS NOTES
Problem: Mobility Impaired (Adult and Pediatric)  Goal: *Acute Goals and Plan of Care (Insert Text)  Physical Therapy Goals  Initiated 9/16/2017  1. Patient will transfer from bed to chair and chair to bed with modified independence using the least restrictive device within 4 day(s). 3. Patient will perform sit to stand with modified independence within 4 day(s). 4. Patient will ambulate with supervision/set-up for 100 feet with the least restrictive device within 4 day(s). 5. Patient will ascend/descend 7 stairs with 1 handrail(s) with minimal assistance/contact guard assist within 4 day(s). PHYSICAL THERAPY EVALUATION  Patient: Be Mustafa (51 y.o. male)  Date: 9/16/2017  Primary Diagnosis: foot ulcer  Foot ulcer with fat layer exposed ([address]  PVD  Procedure(s) (LRB):  RIGHT FOOT DEBRIDEMENT THIRD FOURTH FIFTH TOE AMPUTATION RIGHT FOOT  (Right) 1 Day Post-Op   Precautions: fall; skin         ASSESSMENT :  Based on the objective data described below, the patient presents with impaired mobility following R 3, 4 & 5th toe amputations on R foot. 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 Owns a post op shoe which is preferable to that provided here as he shoe size is 16. Discussed pros and cons of shoe with floating forefoot; use of crutches vs walker; cane vs crutch. Amenable to walker given hx of imbalance & impaired sensation. Lives with wife in 2 story home; bedroom 1st floor; 7 [address]. Per patient, daughter able to assist in arranging for discharge needs. Patient will benefit from home health PT upon discharge. Needs tall rolling walker. Patient will benefit from skilled intervention to address the above impairments.   Patients rehabilitation potential is considered to be Excellent  Factors which may influence rehabilitation potential include:   [X]         None noted  [ ] Mental ability/status  [ ]         Medical condition  [ ]         Home/family situation and support systems  [ ]         Safety awareness  [ ]         Pain tolerance/management  [ ]         Other:        PLAN :  Recommendations and Planned Interventions:  [X]           Bed Mobility Training             [X]    Neuromuscular Re-Education  [X]           Transfer Training                   [ ]    Orthotic/Prosthetic Training  [X]           Gait Training                         [ ]    Modalities  [X]           Therapeutic Exercises           [X]    Edema Management/Control  [X]           Therapeutic Activities            [X]    Patient and Family Training/Education  [ ]           Other (comment):     Frequency/Duration: Patient will be followed by physical therapy  5 times a week to address goals. Discharge Recommendations: Home Health  Further Equipment Recommendations for Discharge: rolling walker       SUBJECTIVE:   Patient stated Tell me about. ....       OBJECTIVE DATA SUMMARY:   HISTORY:    Past Medical History:   Diagnosis Date    CAD (coronary artery disease)       palpitations    Diabetes ([facility]      Hypertension      Other ill-defined conditions       left foot ulcer     Past Surgical History:   Procedure Laterality Date    HX HEENT         bilateral cataract surgery    HX OTHER SURGICAL         surgery to left foot ulcer, PICC right arm     Prior Level of Function/Home Situation: see above  Personal factors and/or comorbidities impacting plan of care: neuropathy & mm atrophy  Home Situation  Home Environment: Private residence  One/Two Story Residence: Two story  Living Alone: No  Support Systems: Family member(s)  Patient Expects to be Discharged to[de-identified] Private residence  Current DME Used/Available at Home: marli Ribeiro     EXAMINATION/PRESENTATION/DECISION MAKING:   Critical Behavior:  Neurologic State: Alert  Orientation Level: Oriented X4  Cognition: Appropriate for age attention/concentration Hearing: Auditory  Auditory Impairment: Hard of hearing, bilateral  Hearing Aids/Status: Does not own  Skin:  Some bleeding   Edema: at site of ace; rewrapped. Toes R; foot L - tho reportedly down vs adm   Range Of Motion:   limited at ankles bilat                       Strength:    Reduced but functional   Atrophy intrinsics hands                    Tone & Sensation:   Absent feet                                 Coordination: WNL UEs     Vision:  NT     Functional Mobility:  Bed Mobility: mod I              Transfers: contact                             Balance:   Sitting intact  Standing static good with walker  Dynamic good with walker     Ambulation/Gait Training:   Step to with rolling walker 60'   Contact & cues                                                                 Therapeutic Exercises: Ankle pumping  LAQ  Seated push ups     Functional Measure:     Elder Mobility Scale      13/20            EMS and G-code impairment scale:  Percentage of impairment CH  0% CI  1-19% CJ  20-39% CK  40-59% CL  60-79% CM  80-99% CN  100%   EMS Score 0-20 20 17-19 13-16 9-12 5-8 1-4 0      Scores under 10  generally these patients are dependent in mobility maneuvers; require help with  basic ADL, such as transfers, toileting and dressing. Scores between 10  13  generally these patients are borderline in terms of safe mobility and  independence in ADL i.e. they require some help with some mobility maneuvers. Scores over 14  Generally these patients are able to perform mobility maneuvers alone and safely  and are independent in basic ADL. G codes: In compliance with CMSs Claims Based Outcome Reporting, the following G-code set was chosen for this patient based on their primary functional limitation being treated:      The outcome measure chosen to determine the severity of the functional limitation was the elderly mobility scale with a score of 13/20 which was correlated with the impairment scale. · Mobility - Walking and Moving Around:               - CURRENT STATUS:    CJ - 20%-39% impaired, limited or restricted               - GOAL STATUS:           CJ - 20%-39% impaired, limited or restricted               - D/C STATUS:                       ---------------To be determined---------------      Physical Therapy Evaluation Charge Determination   History Examination Presentation Decision-Making   MEDIUM  Complexity : 1-2 comorbidities / personal factors will impact the outcome/ POC  MEDIUM Complexity : 3 Standardized tests and measures addressing body structure, function, activity limitation and / or participation in recreation  LOW Complexity : Stable, uncomplicated  Other outcome measures elderly mob scale  MEDIUM      Based on the above components, the patient evaluation is determined to be of the following complexity level: MEDIUM     Pain:    denies                 Activity Tolerance: Without problem  Please refer to the flowsheet for vital signs taken during this treatment. After treatment:   [X]         Patient left in no apparent distress sitting up in chair  [ ]         Patient left in no apparent distress in bed  [X]         Call bell left within reach  [X]         Nursing notified  [ ]         Caregiver present  [ ]         Bed alarm activated      COMMUNICATION/EDUCATION:   The patients plan of care was discussed with: Registered Nurse.  [X]         Fall prevention education was provided and the patient/caregiver indicated understanding. [X]         Patient/family have participated as able in goal setting and plan of care. [ ]         Patient/family agree to work toward stated goals and plan of care. [ ]         Patient understands intent and goals of therapy, but is neutral about his/her participation. [ ]         Patient is unable to participate in goal setting and plan of care.      Thank you for this referral.  Leilani Hogan, PT

## 2017-09-16 NOTE — PROGRESS NOTES
Pharmacy Automatic Renal Dosing Protocol - Antimicrobials    Indication for Antimicrobials: Foot ulcer with osteomyelitis of R foot 5th digit    Current Regimen of Each Antimicrobial (Start Day & Day of Therapy):  Vancomycin 1250 mg IV every 12 hours ( Day #10)  Zosyn 3.375 g IV every 8 hours ( Day #9)    Goal Vancomycin Level (if needed): 15-20 mcg/mL   Level(s) Ordered (if needed): None  Measured / Extrapolated Vancomycin Levels (if drawn):   17 @ 10:11- 16.1 mcg/mL (extrapolated to 15.6 mcg/mL)    Significant Cultures:   : Ulcer wound: Moderate PROVIDENCIA STUARTII, Moderate PSEUDOMONAS AERUGINOSA and LIGHT PROTEUS SPECIES, light skin mixed skin lb (Final) (all sensitive to Zosyn)    CAPD, Hemodialysis or Renal Replacement Therapy: None documented    Paralysis, amputations, malnutrition: None documented   Recent Labs      17   0401  09/15/17   0233  17   0347   CREA  1.19  1.37*  1.39*   BUN  12  15  16   WBC  5.3  5.4   --      Temp (24hrs), Av.3 °F (36.8 °C), Min:97.8 °F (36.6 °C), Max:98.9 °F (37.2 °C)    Creatinine Clearance (Creatinine Clearance (ml/min)): ~73 mL/min      Impression/Plan:    -Patient had wound debridement yesterday (9/15), cx growing GNR (preliminary)- will reconsider asking for abx de-escalation once results are finalized  -Vancomycin being dosed by pharmacy, trough ordered for   -Pip-tazo dose adjusted to 4.5 g IV every 8 hours for pseudomonal coverage       Pharmacy will follow daily and adjust medications as appropriate for renal function and/or serum levels.     Thank you,  Len Lowe PHARMD     Renal Dosing Tables on [employer]

## 2017-09-16 NOTE — PROGRESS NOTES
Report taken from Viet WHITE,[email or web address]. Pt. Arrived here approx. 1810. Pt. Alert and denies pain. Laughing and joking with staff and family. Brought from PACU already in bed. Pt. and family oriented to floor,also phone,TV, and call lt. Kitchen called for tray to be deliverd,as diet order was present. Pt. Given ice and water. Tolerated well. Denies pain. Dsg. To R ft. CDI. Wife states that edema in both feet is \"much better already\".

## 2017-09-16 NOTE — PROGRESS NOTES
General Daily Progress Note    Admit Date: 9/7/2017    Subjective:     Patient has no complaint. Current Facility-Administered Medications   Medication Dose Route Frequency    morphine injection 4 mg  4 mg IntraVENous Q3H PRN    morphine injection 2 mg  2 mg IntraVENous Q3H PRN    oxyCODONE-acetaminophen (PERCOCET) 5-325 mg per tablet 1-2 Tab  1-2 Tab Oral Q4H PRN    insulin lispro (HUMALOG) injection 10 Units  10 Units SubCUTAneous ACB    morphine injection 1 mg  1 mg IntraVENous Q3H PRN    insulin glargine (LANTUS) injection 10 Units  10 Units SubCUTAneous DAILY    0.45% sodium chloride 1,000 mL with potassium chloride 10 mEq infusion   IntraVENous CONTINUOUS    vancomycin (VANCOCIN) 1250 mg in  ml infusion  1,250 mg IntraVENous Q12H    insulin lispro (HUMALOG) injection 10 Units  10 Units SubCUTAneous ACL    insulin lispro (HUMALOG) injection 10 Units  10 Units SubCUTAneous ACD    piperacillin-tazobactam (ZOSYN) 3.375 g in 0.9% sodium chloride (MBP/ADV) 100 mL  3.375 g IntraVENous Q8H    aspirin delayed-release tablet 81 mg  81 mg Oral DAILY    lisinopril (PRINIVIL, ZESTRIL) tablet 40 mg  40 mg Oral DAILY    metoprolol tartrate (LOPRESSOR) tablet 25 mg  25 mg Oral BID    atorvastatin (LIPITOR) tablet 20 mg  20 mg Oral DAILY    sodium chloride (NS) flush 5-10 mL  5-10 mL IntraVENous Q8H    sodium chloride (NS) flush 5-10 mL  5-10 mL IntraVENous PRN    naloxone (NARCAN) injection 0.4 mg  0.4 mg IntraVENous PRN    acetaminophen (TYLENOL) tablet 650 mg  650 mg Oral Q4H PRN    HYDROcodone-acetaminophen (NORCO) 5-325 mg per tablet 1 Tab  1 Tab Oral Q4H PRN        Review of Systems  A comprehensive review of systems was negative.     Objective:     Patient Vitals for the past 24 hrs:   BP Temp Pulse Resp SpO2   09/16/17 0410 160/69 98.9 °F (37.2 °C) 78 16 100 %   09/15/17 2010 147/49 98.6 °F (37 °C) 76 17 99 %   09/15/17 1845 153/68 98 °F (36.7 °C) 70 20 99 %   09/15/17 1815 147/69 97.9 °F (36.6 °C) 72 20 99 %   09/15/17 1800 144/66 - 68 22 99 %   09/15/17 1745 154/75 98.4 °F (36.9 °C) 74 19 100 %   09/15/17 1730 155/61 - 71 17 99 %   09/15/17 1720 159/75 - 73 17 99 %   09/15/17 1716 (!) 150/96 - 78 23 98 %   09/15/17 1710 145/69 - 71 13 98 %   09/15/17 1705 150/71 - 72 18 98 %   09/15/17 1700 139/68 - 72 16 98 %   09/15/17 1657 - 98.1 °F (36.7 °C) 72 20 98 %   09/15/17 1448 176/79 97.8 °F (36.6 °C) 79 19 94 %   09/15/17 1120 146/66 98.4 °F (36.9 °C) 66 16 100 %        09/14 1901 - 09/16 0700  In: 1025 [P.O.:250; I.V.:775]  Out: 2250 [Urine:2150]    Physical Exam:   Visit Vitals    /69 (BP 1 Location: Left arm)    Pulse 78    Temp 98.9 °F (37.2 °C)    Resp 16    Wt 253 lb 1.4 oz (114.8 kg)    SpO2 100%    BMI 27.12 kg/m2     General appearance: alert, cooperative, no distress, appears stated age  Neck: supple, symmetrical, trachea midline, no adenopathy, thyroid: not enlarged, symmetric, no tenderness/mass/nodules, no carotid bruit and no JVD  Lungs: clear to auscultation bilaterally  Heart: regular rate and rhythm, S1, S2 normal, no murmur, click, rub or gallop  Abdomen: soft, non-tender.  Bowel sounds normal. No masses,  no organomegaly  Extremities: edema bandaged right foot        Data Review   Recent Results (from the past 24 hour(s))   GLUCOSE, POC    Collection Time: 09/15/17 11:18 AM   Result Value Ref Range    Glucose (POC) 157 (H) 65 - 100 mg/dL    Performed by Evan, POC    Collection Time: 09/15/17  2:58 PM   Result Value Ref Range    Glucose (POC) 152 (H) 65 - 100 mg/dL    Performed by Rowan Cleary    CULTURE, WOUND Minnette Hoe STAIN    Collection Time: 09/15/17  4:40 PM   Result Value Ref Range    Special Requests: NO SPECIAL REQUESTS      GRAM STAIN NO WBC'S SEEN      GRAM STAIN NO ORGANISMS SEEN      Culture result: PENDING    GLUCOSE, POC    Collection Time: 09/15/17  5:00 PM   Result Value Ref Range    Glucose (POC) 135 (H) 65 - 100 mg/dL    Performed by [address], POC    Collection Time: 09/15/17  8:57 PM   Result Value Ref Range    Glucose (POC) 237 (H) 65 - 100 mg/dL    Performed by Ronit De Paz    CBC W/O DIFF    Collection Time: 09/16/17  4:01 AM   Result Value Ref Range    WBC 5.3 4.1 - 11.1 K/uL    RBC 3.18 (L) 4.10 - 5.70 M/uL    HGB 9.0 (L) 12.1 - 17.0 g/dL    HCT 26.7 (L) 36.6 - 50.3 %    MCV 84.0 80.0 - 99.0 FL    MCH 28.3 26.0 - 34.0 PG    MCHC 33.7 30.0 - 36.5 g/dL    RDW 13.6 11.5 - 14.5 %    PLATELET 027 328 - 546 K/uL   METABOLIC PANEL, BASIC    Collection Time: 09/16/17  4:01 AM   Result Value Ref Range    Sodium 138 136 - 145 mmol/L    Potassium 4.1 3.5 - 5.1 mmol/L    Chloride 107 97 - 108 mmol/L    CO2 24 21 - 32 mmol/L    Anion gap 7 5 - 15 mmol/L    Glucose 150 (H) 65 - 100 mg/dL    BUN 12 6 - 20 MG/DL    Creatinine 1.19 0.70 - 1.30 MG/DL    BUN/Creatinine ratio 10 (L) 12 - 20      GFR est AA >60 >60 ml/min/1.73m2    GFR est non-AA 59 (L) >60 ml/min/1.73m2    Calcium 7.5 (L) 8.5 - 10.1 MG/DL   GLUCOSE, POC    Collection Time: 09/16/17  7:42 AM   Result Value Ref Range    Glucose (POC) 134 (H) 65 - 100 mg/dL    Performed by Cedrick Laser (PCT)            Assessment:     Active Problems:    Foot ulcer with fat layer exposed ([address] (9/7/2017)        Plan:     1. Postoperative course uneventful for now. Continue wound care and parenteral antibiotics. 2.  Continue active diabetic control. General Daily Progress Note    Admit Date: 9/7/2017    Subjective:     Patient has no complaint.     Current Facility-Administered Medications   Medication Dose Route Frequency    morphine injection 4 mg  4 mg IntraVENous Q3H PRN    morphine injection 2 mg  2 mg IntraVENous Q3H PRN    oxyCODONE-acetaminophen (PERCOCET) 5-325 mg per tablet 1-2 Tab  1-2 Tab Oral Q4H PRN    insulin lispro (HUMALOG) injection 10 Units  10 Units SubCUTAneous ACB    morphine injection 1 mg  1 mg IntraVENous Q3H PRN    insulin glargine (LANTUS) injection 10 Units  10 Units SubCUTAneous DAILY    0.45% sodium chloride 1,000 mL with potassium chloride 10 mEq infusion   IntraVENous CONTINUOUS    vancomycin (VANCOCIN) 1250 mg in  ml infusion  1,250 mg IntraVENous Q12H    insulin lispro (HUMALOG) injection 10 Units  10 Units SubCUTAneous ACL    insulin lispro (HUMALOG) injection 10 Units  10 Units SubCUTAneous ACD    piperacillin-tazobactam (ZOSYN) 3.375 g in 0.9% sodium chloride (MBP/ADV) 100 mL  3.375 g IntraVENous Q8H    aspirin delayed-release tablet 81 mg  81 mg Oral DAILY    lisinopril (PRINIVIL, ZESTRIL) tablet 40 mg  40 mg Oral DAILY    metoprolol tartrate (LOPRESSOR) tablet 25 mg  25 mg Oral BID    atorvastatin (LIPITOR) tablet 20 mg  20 mg Oral DAILY    sodium chloride (NS) flush 5-10 mL  5-10 mL IntraVENous Q8H    sodium chloride (NS) flush 5-10 mL  5-10 mL IntraVENous PRN    naloxone (NARCAN) injection 0.4 mg  0.4 mg IntraVENous PRN    acetaminophen (TYLENOL) tablet 650 mg  650 mg Oral Q4H PRN    HYDROcodone-acetaminophen (NORCO) 5-325 mg per tablet 1 Tab  1 Tab Oral Q4H PRN        Review of Systems  A comprehensive review of systems was negative.     Objective:     Patient Vitals for the past 24 hrs:   BP Temp Pulse Resp SpO2   09/16/17 0410 160/69 98.9 °F (37.2 °C) 78 16 100 %   09/15/17 2010 147/49 98.6 °F (37 °C) 76 17 99 %   09/15/17 1845 153/68 98 °F (36.7 °C) 70 20 99 %   09/15/17 1815 147/69 97.9 °F (36.6 °C) 72 20 99 %   09/15/17 1800 144/66 - 68 22 99 %   09/15/17 1745 154/75 98.4 °F (36.9 °C) 74 19 100 %   09/15/17 1730 155/61 - 71 17 99 %   09/15/17 1720 159/75 - 73 17 99 %   09/15/17 1716 (!) 150/96 - 78 23 98 %   09/15/17 1710 145/69 - 71 13 98 %   09/15/17 1705 150/71 - 72 18 98 %   09/15/17 1700 139/68 - 72 16 98 %   09/15/17 1657 - 98.1 °F (36.7 °C) 72 20 98 %   09/15/17 1448 176/79 97.8 °F (36.6 °C) 79 19 94 %   09/15/17 1120 146/66 98.4 °F (36.9 °C) 66 16 100 %        09/14 1901 - 09/16 0700  In: 1025 [P.O.:250; I.V.:775]  Out: 2250 [Urine:2150]    Physical Exam:   Visit Vitals    /69 (BP 1 Location: Left arm)    Pulse 78    Temp 98.9 °F (37.2 °C)    Resp 16    Wt 253 lb 1.4 oz (114.8 kg)    SpO2 100%    BMI 27.12 kg/m2     General appearance: alert, cooperative, no distress, appears stated age  Neck: supple, symmetrical, trachea midline, no adenopathy, thyroid: not enlarged, symmetric, no tenderness/mass/nodules, no carotid bruit and no JVD  Lungs: clear to auscultation bilaterally  Heart: regular rate and rhythm, S1, S2 normal, no murmur, click, rub or gallop  Abdomen: soft, non-tender.  Bowel sounds normal. No masses,  no organomegaly  Extremities: edema bandaged right foot        Data Review   Recent Results (from the past 24 hour(s))   GLUCOSE, POC    Collection Time: 09/15/17 11:18 AM   Result Value Ref Range    Glucose (POC) 157 (H) 65 - 100 mg/dL    Performed by Evan, POC    Collection Time: 09/15/17  2:58 PM   Result Value Ref Range    Glucose (POC) 152 (H) 65 - 100 mg/dL    Performed by [address] [place] [address], WOUND Shay Freud STAIN    Collection Time: 09/15/17  4:40 PM   Result Value Ref Range    Special Requests: NO SPECIAL REQUESTS      GRAM STAIN NO WBC'S SEEN      GRAM STAIN NO ORGANISMS SEEN      Culture result: PENDING    GLUCOSE, POC    Collection Time: 09/15/17  5:00 PM   Result Value Ref Range    Glucose (POC) 135 (H) 65 - 100 mg/dL    Performed by [address], POC    Collection Time: 09/15/17  8:57 PM   Result Value Ref Range    Glucose (POC) 237 (H) 65 - 100 mg/dL    Performed by Daniel Gonzalez    CBC W/O DIFF    Collection Time: 09/16/17  4:01 AM   Result Value Ref Range    WBC 5.3 4.1 - 11.1 K/uL    RBC 3.18 (L) 4.10 - 5.70 M/uL    HGB 9.0 (L) 12.1 - 17.0 g/dL    HCT 26.7 (L) 36.6 - 50.3 %    MCV 84.0 80.0 - 99.0 FL    MCH 28.3 26.0 - 34.0 PG    MCHC 33.7 30.0 - 36.5 g/dL    RDW 13.6 11.5 - 14.5 %    PLATELET 741 023 - 065 K/uL   METABOLIC PANEL, BASIC    Collection Time: 09/16/17  4:01 AM   Result Value Ref Range    Sodium 138 136 - 145 mmol/L    Potassium 4.1 3.5 - 5.1 mmol/L    Chloride 107 97 - 108 mmol/L    CO2 24 21 - 32 mmol/L    Anion gap 7 5 - 15 mmol/L    Glucose 150 (H) 65 - 100 mg/dL    BUN 12 6 - 20 MG/DL    Creatinine 1.19 0.70 - 1.30 MG/DL    BUN/Creatinine ratio 10 (L) 12 - 20      GFR est AA >60 >60 ml/min/1.73m2    GFR est non-AA 59 (L) >60 ml/min/1.73m2    Calcium 7.5 (L) 8.5 - 10.1 MG/DL   GLUCOSE, POC    Collection Time: 09/16/17  7:42 AM   Result Value Ref Range    Glucose (POC) 134 (H) 65 - 100 mg/dL    Performed by Samira Naranjo (PCT)            Assessment:     Active Problems:    Foot ulcer with fat layer exposed ([address] (9/7/2017)        Plan:     1. Postoperative course uneventful thus far. Continue wound care and parenteral antibiotics. 2.  Continue diabetic control.

## 2017-09-16 NOTE — PROGRESS NOTES
Patient without complaints  To remain as an inpatient throught the weekend for IVabx and local wound care.

## 2017-09-17 LAB
DATE LAST DOSE: ABNORMAL
GLUCOSE BLD STRIP.AUTO-MCNC: 141 MG/DL (ref 65–100)
GLUCOSE BLD STRIP.AUTO-MCNC: 152 MG/DL (ref 65–100)
GLUCOSE BLD STRIP.AUTO-MCNC: 179 MG/DL (ref 65–100)
REPORTED DOSE,DOSE: ABNORMAL UNITS
REPORTED DOSE/TIME,TMG: ABNORMAL
SERVICE CMNT-IMP: ABNORMAL
VANCOMYCIN TROUGH SERPL-MCNC: 14.5 UG/ML (ref 5–10)

## 2017-09-17 PROCEDURE — 74011250636 HC RX REV CODE- 250/636: Performed by: INTERNAL MEDICINE

## 2017-09-17 PROCEDURE — 82962 GLUCOSE BLOOD TEST: CPT

## 2017-09-17 PROCEDURE — 74011000258 HC RX REV CODE- 258: Performed by: SURGERY

## 2017-09-17 PROCEDURE — 74011000258 HC RX REV CODE- 258: Performed by: NURSE PRACTITIONER

## 2017-09-17 PROCEDURE — 74011250636 HC RX REV CODE- 250/636: Performed by: SURGERY

## 2017-09-17 PROCEDURE — 74011636637 HC RX REV CODE- 636/637: Performed by: INTERNAL MEDICINE

## 2017-09-17 PROCEDURE — 36415 COLL VENOUS BLD VENIPUNCTURE: CPT | Performed by: SURGERY

## 2017-09-17 PROCEDURE — 74011250636 HC RX REV CODE- 250/636: Performed by: NURSE PRACTITIONER

## 2017-09-17 PROCEDURE — 80202 ASSAY OF VANCOMYCIN: CPT | Performed by: SURGERY

## 2017-09-17 PROCEDURE — 65270000029 HC RM PRIVATE

## 2017-09-17 PROCEDURE — 74011250637 HC RX REV CODE- 250/637: Performed by: INTERNAL MEDICINE

## 2017-09-17 RX ORDER — INSULIN LISPRO 100 [IU]/ML
14 INJECTION, SOLUTION INTRAVENOUS; SUBCUTANEOUS
Status: DISCONTINUED | OUTPATIENT
Start: 2017-09-17 | End: 2017-09-19 | Stop reason: HOSPADM

## 2017-09-17 RX ADMIN — LISINOPRIL 40 MG: 20 TABLET ORAL at 08:10

## 2017-09-17 RX ADMIN — INSULIN GLARGINE 10 UNITS: 100 INJECTION, SOLUTION SUBCUTANEOUS at 14:36

## 2017-09-17 RX ADMIN — Medication 10 ML: at 11:50

## 2017-09-17 RX ADMIN — POTASSIUM CHLORIDE: 149 INJECTION, SOLUTION, CONCENTRATE INTRAVENOUS at 07:37

## 2017-09-17 RX ADMIN — PIPERACILLIN SODIUM,TAZOBACTAM SODIUM 4.5 G: 4; .5 INJECTION, POWDER, FOR SOLUTION INTRAVENOUS at 08:09

## 2017-09-17 RX ADMIN — VANCOMYCIN HYDROCHLORIDE 1250 MG: 10 INJECTION, POWDER, LYOPHILIZED, FOR SOLUTION INTRAVENOUS at 11:49

## 2017-09-17 RX ADMIN — POTASSIUM CHLORIDE: 149 INJECTION, SOLUTION, CONCENTRATE INTRAVENOUS at 22:46

## 2017-09-17 RX ADMIN — METOPROLOL TARTRATE 25 MG: 25 TABLET, FILM COATED ORAL at 08:09

## 2017-09-17 RX ADMIN — ATORVASTATIN CALCIUM 20 MG: 20 TABLET, FILM COATED ORAL at 08:09

## 2017-09-17 RX ADMIN — VANCOMYCIN HYDROCHLORIDE 1250 MG: 10 INJECTION, POWDER, LYOPHILIZED, FOR SOLUTION INTRAVENOUS at 22:46

## 2017-09-17 RX ADMIN — ASPIRIN 81 MG: 81 TABLET, COATED ORAL at 08:10

## 2017-09-17 RX ADMIN — INSULIN LISPRO 14 UNITS: 100 INJECTION, SOLUTION INTRAVENOUS; SUBCUTANEOUS at 08:10

## 2017-09-17 RX ADMIN — Medication 10 ML: at 07:41

## 2017-09-17 RX ADMIN — PIPERACILLIN SODIUM,TAZOBACTAM SODIUM 4.5 G: 4; .5 INJECTION, POWDER, FOR SOLUTION INTRAVENOUS at 16:13

## 2017-09-17 RX ADMIN — INSULIN LISPRO 10 UNITS: 100 INJECTION, SOLUTION INTRAVENOUS; SUBCUTANEOUS at 11:49

## 2017-09-17 RX ADMIN — METOPROLOL TARTRATE 25 MG: 25 TABLET, FILM COATED ORAL at 17:14

## 2017-09-17 RX ADMIN — INSULIN LISPRO 10 UNITS: 100 INJECTION, SOLUTION INTRAVENOUS; SUBCUTANEOUS at 16:53

## 2017-09-17 NOTE — PROGRESS NOTES
Bedside and Verbal shift change report given to Greta West rn (oncoming nurse) by Ge Rodriguez (offgoing nurse). Report included the following information SBAR, Procedure Summary, Intake/Output, MAR and Recent Results.

## 2017-09-17 NOTE — PROGRESS NOTES
Pt. Remained alert and oriented all day. Voided over 1000 cc clear nathan urine. No bm since 9/14. Pt. denies pain. R foot and lower leg has dsg underneath ace wrap. Intact. Old blood noted on edge of foot. IVF + as ordered. . Pt. Up and walking in hallway x2 today,using walker well. Also has shoe on R foot. Pt. Otherwise up in chair all day. Diet tolerated with most of every tray eaten. Report given to oncoming shift and all details discussed. Yanet West RN.

## 2017-09-17 NOTE — PROGRESS NOTES
Occupational Therapy [address] OT order and noted there is NO active OT order at this time. Unclear if this was done in error or intentional.  Please RE CONSULT OT if evaluation is needed.       [facility] Staff OTR/L

## 2017-09-17 NOTE — PROGRESS NOTES
General Daily Progress Note    Admit Date: 9/7/2017    Subjective:     Patient has no complaint . Current Facility-Administered Medications   Medication Dose Route Frequency    insulin lispro (HUMALOG) injection 14 Units  14 Units SubCUTAneous ACB    Vancomycin trough  1 Each Other ONCE    piperacillin-tazobactam (ZOSYN) 4.5 g in 0.9% sodium chloride (MBP/ADV) 100 mL  4.5 g IntraVENous Q8H    morphine injection 4 mg  4 mg IntraVENous Q3H PRN    morphine injection 2 mg  2 mg IntraVENous Q3H PRN    oxyCODONE-acetaminophen (PERCOCET) 5-325 mg per tablet 1-2 Tab  1-2 Tab Oral Q4H PRN    morphine injection 1 mg  1 mg IntraVENous Q3H PRN    insulin glargine (LANTUS) injection 10 Units  10 Units SubCUTAneous DAILY    0.45% sodium chloride 1,000 mL with potassium chloride 10 mEq infusion   IntraVENous CONTINUOUS    vancomycin (VANCOCIN) 1250 mg in  ml infusion  1,250 mg IntraVENous Q12H    insulin lispro (HUMALOG) injection 10 Units  10 Units SubCUTAneous ACL    insulin lispro (HUMALOG) injection 10 Units  10 Units SubCUTAneous ACD    aspirin delayed-release tablet 81 mg  81 mg Oral DAILY    lisinopril (PRINIVIL, ZESTRIL) tablet 40 mg  40 mg Oral DAILY    metoprolol tartrate (LOPRESSOR) tablet 25 mg  25 mg Oral BID    atorvastatin (LIPITOR) tablet 20 mg  20 mg Oral DAILY    sodium chloride (NS) flush 5-10 mL  5-10 mL IntraVENous Q8H    sodium chloride (NS) flush 5-10 mL  5-10 mL IntraVENous PRN    naloxone (NARCAN) injection 0.4 mg  0.4 mg IntraVENous PRN    acetaminophen (TYLENOL) tablet 650 mg  650 mg Oral Q4H PRN    HYDROcodone-acetaminophen (NORCO) 5-325 mg per tablet 1 Tab  1 Tab Oral Q4H PRN        Review of Systems  A comprehensive review of systems was negative.     Objective:     Patient Vitals for the past 24 hrs:   BP Temp Pulse Resp SpO2   09/17/17 0810 144/64 - 75 - -   09/17/17 0809 144/64 - 75 - -   09/17/17 0754 144/64 98.2 °F (36.8 °C) 75 17 99 %   09/17/17 0427 153/53 98.6 °F (37 °C) 75 16 96 %   09/16/17 2324 133/53 98.4 °F (36.9 °C) 70 17 94 %   09/16/17 2010 145/60 99.2 °F (37.3 °C) 72 18 99 %   09/16/17 1536 148/60 98.9 °F (37.2 °C) 76 16 99 %   09/16/17 1143 145/67 98.4 °F (36.9 °C) 81 16 95 %        09/15 1901 - 09/17 0700  In: 200 [I.V.:200]  Out: 1325 [Urine:1325]    Physical Exam:   Visit Vitals    /64    Pulse 75    Temp 98.2 °F (36.8 °C)    Resp 17    Wt 253 lb 1.4 oz (114.8 kg)    SpO2 99%    BMI 27.12 kg/m2     General appearance: alert, cooperative, no distress, appears stated age  Neck: supple, symmetrical, trachea midline, no adenopathy, thyroid: not enlarged, symmetric, no tenderness/mass/nodules, no carotid bruit and no JVD  Lungs: clear to auscultation bilaterally  Heart: regular rate and rhythm, S1, S2 normal, no murmur, click, rub or gallop  Abdomen: unremarkable  Extremities: Bandaged right foot        Data Review   Recent Results (from the past 24 hour(s))   GLUCOSE, POC    Collection Time: 09/16/17 11:51 AM   Result Value Ref Range    Glucose (POC) 199 (H) 65 - 100 mg/dL    Performed by Prabhjot Lucas (PCT)    GLUCOSE, POC    Collection Time: 09/16/17  4:44 PM   Result Value Ref Range    Glucose (POC) 161 (H) 65 - 100 mg/dL    Performed by Carolina Buchanan)    GLUCOSE, POC    Collection Time: 09/16/17  8:42 PM   Result Value Ref Range    Glucose (POC) 113 (H) 65 - 100 mg/dL    Performed by Carolina Buchanan)    GLUCOSE, POC    Collection Time: 09/17/17  7:49 AM   Result Value Ref Range    Glucose (POC) 152 (H) 65 - 100 mg/dL    Performed by Laurie Warner (PCT)            Assessment:     Active Problems:    Foot ulcer with fat layer exposed ([facility] [address] (9/7/2017)        Plan:     1. Continue wound care and parenteral antibiotics. 2.  Continue tight diabetic control.   3.  We will mobilize

## 2017-09-17 NOTE — PROGRESS NOTES
End of Shift Nursing Note    Bedside shift change report given to Sonido Rogers (oncoming nurse) by Lit Appiah (offgoing nurse). Report included the following information SBAR, Kardex, Intake/Output, MAR and Recent Results. Zone Phone:   [phone]    Significant changes during shift:    0   Non-emergent issues for physician to address:   0     Number times ambulated in hallway past shift: 0      Number of times OOB to chair past shift: 2    POD #: 2     Vital Signs:    Temp: 98.5 °F (36.9 °C)     Pulse (Heart Rate): 65     BP: 118/65     Resp Rate: 17     O2 Sat (%): 100 %    Lines & Drains:     Urinary Catheter? No   Placement Date: 0   Medical Necessity: 0  Central Line? No   Placement Date: 0   Medical Necessity: 0  PICC Line? No   Placement Date: 0   Medical Necessity: 0    NG tube [] in [] removed [x] not applicable   Drains [] in [] removed [x] not applicable     Skin Integrity:      Wounds: no   Dressings Present: no    Wound Concerns: no      GI:    Current diet:  DIET DIABETIC CONSISTENT CARB Regular    Nausea: NO  Vomiting: NO  Bowel Sounds: YES  Flatus: YES  Last Bowel Movement: today   Appearance: 0    Respiratory:  Supplemental O2: No      Device: 0   via 0 Liters/min     Incentive Spirometer: YES  Volume: 1200  Coughing and Deep Breathing: YES  Oral Care: YES  Understanding (patient/family education): YES   Getting out of bed: YES  Head of bed elevation: YES    Patient Safety:    Falls Score: 1  Mobility Score: 1  Bed Alarm On? No  Sitter? No      Opportunity for questions and clarification was given to oncoming nurse. Patient bed is in low position, side rails are up x 2, door & observation blinds open as needed, call bell within reach and patient not in distress.     Kaila Leiva RN

## 2017-09-17 NOTE — PROGRESS NOTES
Problem: Falls - Risk of  Goal: *Absence of Falls  Document Jose Fall Risk and appropriate interventions in the flowsheet.    Outcome: Progressing Towards Goal  Fall Risk Interventions:  Mobility Interventions: Assess mobility with egress test, Communicate number of staff needed for ambulation/transfer, Patient to call before getting OOB, PT Consult for assist device competence           Medication Interventions: Patient to call before getting OOB     Elimination Interventions: Call light in reach, Patient to call for help with toileting needs, Toilet paper/wipes in reach, Toileting schedule/hourly rounds, Urinal in reach     History of Falls Interventions: Door open when patient unattended, Room close to nurse's station

## 2017-09-17 NOTE — PROGRESS NOTES
End of Shift Nursing Note    Bedside shift change report given to Garima Brand RN (oncoming nurse) by Julien Casarez RN (offgoing nurse). Report included the following information SBAR, Kardex, OR Summary, Intake/Output and MAR. Zone Phone:       Significant changes during shift:    none   Non-emergent issues for physician to address:   none     Number times ambulated in hallway past shift: 0      Number of times OOB to chair past shift: pt slept in recliner, was up to BR    POD #: 2     Vital Signs:    Temp: 98.4 °F (36.9 °C)     Pulse (Heart Rate): 70     BP: 133/53     Resp Rate: 17     O2 Sat (%): 94 %    Lines & Drains:     Urinary Catheter? No       NG tube [] in [] removed [x] not applicable   Drains [] in [] removed [x] not applicable     Skin Integrity:      Wounds: yes   Dressings Present: yes    Wound Concerns: no      GI:    Current diet:  DIET DIABETIC CONSISTENT CARB Regular    Nausea: NO  Vomiting: NO  Bowel Sounds: YES  Flatus: YES  Last Bowel ovement: several days ago   Appearance:     Respiratory:  Supplemental O2: No         Incentive Spirometer: YES  Volume:   Coughing and Deep Breathing: YES  Oral Care: YES  Understanding (patient/family education): YES   Getting out of bed: YES  Head of bed elevation: YES    Patient Safety:    Falls Score: 1  Mobility Score: 1  Bed Alarm On? No  Sitter? No      Opportunity for questions and clarification was given to oncoming nurse. Patient bed is in low position, side rails are up x n/a, door & observation blinds open as needed, call bell within reach and patient not in distress.     Lelsie Iqbal RN

## 2017-09-18 LAB
BACTERIA SPEC CULT: ABNORMAL
BACTERIA SPEC CULT: NORMAL
GLUCOSE BLD STRIP.AUTO-MCNC: 118 MG/DL (ref 65–100)
GLUCOSE BLD STRIP.AUTO-MCNC: 183 MG/DL (ref 65–100)
GLUCOSE BLD STRIP.AUTO-MCNC: 195 MG/DL (ref 65–100)
GLUCOSE BLD STRIP.AUTO-MCNC: 95 MG/DL (ref 65–100)
GRAM STN SPEC: ABNORMAL
GRAM STN SPEC: ABNORMAL
SERVICE CMNT-IMP: ABNORMAL
SERVICE CMNT-IMP: NORMAL
SERVICE CMNT-IMP: NORMAL

## 2017-09-18 PROCEDURE — 74011250637 HC RX REV CODE- 250/637: Performed by: INTERNAL MEDICINE

## 2017-09-18 PROCEDURE — 77030018836 HC SOL IRR NACL ICUM -A

## 2017-09-18 PROCEDURE — 74011250636 HC RX REV CODE- 250/636: Performed by: INTERNAL MEDICINE

## 2017-09-18 PROCEDURE — 77030009526 HC GEL CARSYN MDII -A

## 2017-09-18 PROCEDURE — 74011250636 HC RX REV CODE- 250/636: Performed by: SURGERY

## 2017-09-18 PROCEDURE — 65270000029 HC RM PRIVATE

## 2017-09-18 PROCEDURE — 74011636637 HC RX REV CODE- 636/637: Performed by: INTERNAL MEDICINE

## 2017-09-18 PROCEDURE — 82962 GLUCOSE BLOOD TEST: CPT

## 2017-09-18 PROCEDURE — 74011000258 HC RX REV CODE- 258: Performed by: SURGERY

## 2017-09-18 RX ORDER — INSULIN GLARGINE 100 [IU]/ML
14 INJECTION, SOLUTION SUBCUTANEOUS DAILY
Status: DISCONTINUED | OUTPATIENT
Start: 2017-09-18 | End: 2017-09-18 | Stop reason: SDUPTHER

## 2017-09-18 RX ORDER — INSULIN GLARGINE 100 [IU]/ML
14 INJECTION, SOLUTION SUBCUTANEOUS DAILY
Status: DISCONTINUED | OUTPATIENT
Start: 2017-09-18 | End: 2017-09-19 | Stop reason: HOSPADM

## 2017-09-18 RX ADMIN — VANCOMYCIN HYDROCHLORIDE 1250 MG: 10 INJECTION, POWDER, LYOPHILIZED, FOR SOLUTION INTRAVENOUS at 22:34

## 2017-09-18 RX ADMIN — ATORVASTATIN CALCIUM 20 MG: 20 TABLET, FILM COATED ORAL at 08:54

## 2017-09-18 RX ADMIN — PIPERACILLIN SODIUM,TAZOBACTAM SODIUM 4.5 G: 4; .5 INJECTION, POWDER, FOR SOLUTION INTRAVENOUS at 01:15

## 2017-09-18 RX ADMIN — PIPERACILLIN SODIUM,TAZOBACTAM SODIUM 4.5 G: 4; .5 INJECTION, POWDER, FOR SOLUTION INTRAVENOUS at 08:52

## 2017-09-18 RX ADMIN — INSULIN LISPRO 10 UNITS: 100 INJECTION, SOLUTION INTRAVENOUS; SUBCUTANEOUS at 11:30

## 2017-09-18 RX ADMIN — PIPERACILLIN SODIUM,TAZOBACTAM SODIUM 4.5 G: 4; .5 INJECTION, POWDER, FOR SOLUTION INTRAVENOUS at 15:57

## 2017-09-18 RX ADMIN — LISINOPRIL 40 MG: 20 TABLET ORAL at 08:54

## 2017-09-18 RX ADMIN — INSULIN GLARGINE 14 UNITS: 100 INJECTION, SOLUTION SUBCUTANEOUS at 16:19

## 2017-09-18 RX ADMIN — INSULIN LISPRO 10 UNITS: 100 INJECTION, SOLUTION INTRAVENOUS; SUBCUTANEOUS at 15:59

## 2017-09-18 RX ADMIN — METOPROLOL TARTRATE 25 MG: 25 TABLET, FILM COATED ORAL at 18:20

## 2017-09-18 RX ADMIN — VANCOMYCIN HYDROCHLORIDE 1250 MG: 10 INJECTION, POWDER, LYOPHILIZED, FOR SOLUTION INTRAVENOUS at 12:35

## 2017-09-18 RX ADMIN — METOPROLOL TARTRATE 25 MG: 25 TABLET, FILM COATED ORAL at 08:54

## 2017-09-18 RX ADMIN — ASPIRIN 81 MG: 81 TABLET, COATED ORAL at 08:54

## 2017-09-18 RX ADMIN — Medication 5 ML: at 06:00

## 2017-09-18 RX ADMIN — Medication 10 ML: at 22:34

## 2017-09-18 RX ADMIN — Medication 1 CAPSULE: at 10:00

## 2017-09-18 RX ADMIN — INSULIN LISPRO 14 UNITS: 100 INJECTION, SOLUTION INTRAVENOUS; SUBCUTANEOUS at 08:53

## 2017-09-18 NOTE — PROGRESS NOTES
Physical Therapy Note    Chart reviewed. Noted active orders were discontinued and new orders placed for 9/18/17 at 1800. Will defer PT intervention this date and follow up tomorrow as appropriate.     Thank you,  Elizabeth Burrows, PT, DPT

## 2017-09-18 NOTE — PROGRESS NOTES
CM left voicemail for ms. Echols Socks to return call. CM will await return call. Pt prefers for daughter to chose [facility] provider. CM faxed Wound VAC form to Angelito Dolan. CM left voicemail for [employer] ([address] for [address] at [phone]).      Ya Candelaria, MSW, [address] [place], [place] [phone]

## 2017-09-18 NOTE — WOUND CARE
Wound care asked by Dr Low Sommer to fill out home wound VAC application. Dr Low Sommer did patients wound care today and [facility] nurse unwrapped dressing for measurements and then re-wrapped dressing. Staff nurses to do daily wound care as ordered by MD and wound VAC [employer] application filled out and returned to .   Yash Askew RN, CWON, zone ph# [phone]

## 2017-09-18 NOTE — DIABETES MGMT
DTC Progress Note    Recommendations/ Comments: If appropriate, please consider adding Lispro [place] insulin with normal sensitivity to aide in BG control to help with wound healing    Chart reviewed on [place]. Patient is a 68 y.o. male with known diabetes on intensive insulin injection program at home. A1c:   Lab Results   Component Value Date/Time    Hemoglobin A1c 8.8 09/08/2017 12:07 AM    Hemoglobin A1c 9.7 05/15/2017 01:11 PM       Recent Glucose Results: Lab Results   Component Value Date/Time    GLUCPOC 183 (H) 09/18/2017 11:44 AM    GLUCPOC 195 (H) 09/18/2017 07:47 AM    GLUCPOC 179 (H) 09/17/2017 04:22 PM        Lab Results   Component Value Date/Time    Creatinine 1.19 09/16/2017 04:01 AM     Estimated Creatinine Clearance: 73.4 mL/min (based on Cr of 1.19). Active Orders   Diet    DIET DIABETIC CONSISTENT CARB Regular        PO intake: Patient Vitals for the past 72 hrs:   % Diet Eaten   09/17/17 1440 100 %   09/17/17 1345 100 %   09/15/17 1845 100 %       Will continue to follow as needed. Thank you.   Jenaro Kruger, [address], [address]  Office:  [phone]

## 2017-09-18 NOTE — PROGRESS NOTES
General Daily Progress Note    Admit Date: 9/7/2017    Subjective:     Patient has no complaint other than wondering how the wound is progressing. Current Facility-Administered Medications   Medication Dose Route Frequency    insulin glargine (LANTUS) injection 14 Units  14 Units SubCUTAneous DAILY    lactobac ac& pc-s.therm-b.anim (EM Q/RISAQUAD)  1 Cap Oral DAILY    insulin lispro (HUMALOG) injection 14 Units  14 Units SubCUTAneous ACB    piperacillin-tazobactam (ZOSYN) 4.5 g in 0.9% sodium chloride (MBP/ADV) 100 mL  4.5 g IntraVENous Q8H    morphine injection 4 mg  4 mg IntraVENous Q3H PRN    morphine injection 2 mg  2 mg IntraVENous Q3H PRN    oxyCODONE-acetaminophen (PERCOCET) 5-325 mg per tablet 1-2 Tab  1-2 Tab Oral Q4H PRN    morphine injection 1 mg  1 mg IntraVENous Q3H PRN    vancomycin (VANCOCIN) 1250 mg in  ml infusion  1,250 mg IntraVENous Q12H    insulin lispro (HUMALOG) injection 10 Units  10 Units SubCUTAneous ACL    insulin lispro (HUMALOG) injection 10 Units  10 Units SubCUTAneous ACD    aspirin delayed-release tablet 81 mg  81 mg Oral DAILY    lisinopril (PRINIVIL, ZESTRIL) tablet 40 mg  40 mg Oral DAILY    metoprolol tartrate (LOPRESSOR) tablet 25 mg  25 mg Oral BID    atorvastatin (LIPITOR) tablet 20 mg  20 mg Oral DAILY    sodium chloride (NS) flush 5-10 mL  5-10 mL IntraVENous Q8H    sodium chloride (NS) flush 5-10 mL  5-10 mL IntraVENous PRN    naloxone (NARCAN) injection 0.4 mg  0.4 mg IntraVENous PRN    acetaminophen (TYLENOL) tablet 650 mg  650 mg Oral Q4H PRN    HYDROcodone-acetaminophen (NORCO) 5-325 mg per tablet 1 Tab  1 Tab Oral Q4H PRN        Review of Systems  A comprehensive review of systems was negative.     Objective:     Patient Vitals for the past 24 hrs:   BP Temp Pulse Resp SpO2   09/18/17 0500 139/76 99.1 °F (37.3 °C) 73 18 97 %   09/18/17 0115 154/58 99.2 °F (37.3 °C) 66 18 95 %   09/17/17 2052 157/69 97.8 °F (36.6 °C) 70 17 99 %   09/17/17 1555 159/60 98.6 °F (37 °C) 73 16 97 %   09/17/17 1058 118/65 98.5 °F (36.9 °C) 65 17 100 %        09/16 1901 - 09/18 0700  In: 5776.3 [P.O.:960; I.V.:4816.3]  Out: 4675 [Urine:4675]    Physical Exam:   Visit Vitals    /76    Pulse 73    Temp 99.1 °F (37.3 °C)    Resp 18    Wt 253 lb 1.4 oz (114.8 kg)    SpO2 97%    BMI 27.12 kg/m2     General appearance: alert, cooperative, no distress, appears stated age  Neck: supple, symmetrical, trachea midline, no adenopathy, thyroid: not enlarged, symmetric, no tenderness/mass/nodules, no carotid bruit and no JVD  Lungs: clear to auscultation bilaterally  Heart: regular rate and rhythm, S1, S2 normal, no murmur, click, rub or gallop  Abdomen: soft, non-tender. Bowel sounds normal. No masses,  no organomegaly  Extremities: edema bandaged right foot        Data Review   Recent Results (from the past 24 hour(s))   VANCOMYCIN, TROUGH    Collection Time: 09/17/17 11:30 AM   Result Value Ref Range    Vancomycin,trough 14.5 (H) 5.0 - 10.0 ug/mL    Reported dose date: NOT PROVIDED      Reported dose time: NOT PROVIDED      Reported dose: NOT PROVIDED UNITS   GLUCOSE, POC    Collection Time: 09/17/17 11:36 AM   Result Value Ref Range    Glucose (POC) 141 (H) 65 - 100 mg/dL    Performed by Shiela Mcdowell, POC    Collection Time: 09/17/17  4:22 PM   Result Value Ref Range    Glucose (POC) 179 (H) 65 - 100 mg/dL    Performed by Pedro Becker \"Devi\"*    GLUCOSE, POC    Collection Time: 09/18/17  7:47 AM   Result Value Ref Range    Glucose (POC) 195 (H) 65 - 100 mg/dL    Performed by Crispin Dumont (PCT)            Assessment:     Active Problems:    Foot ulcer with fat layer exposed ([facility] [address] (9/7/2017)        Plan:     1. Wound evaluation by Dr. Racheal Pacheco today with continuation of wound care and IV antibiotics. 2.  Continue diabetic control. 3.  Will mobilize.

## 2017-09-18 NOTE — PROGRESS NOTES
CM finally spoke with Ms. Marybeth Stearns and was advised Mr. Prather's other daughter, Edwin Keller, will be deciding the [facility] agency. CM was provided contact number for Edwin Keller ([phone].)     CM contacted Ms. Cary Roberto and received a voicemail. CM left message requesting return call for [facility] agency selection. MIGUE Levin, [address] [facility]   [phone]    Update 04:03 pm:     CM spoke with Ms. Cary Roberto regarding [facility] provider she chose, [facility]. CM sent referral for [facility] to [facility] as instructed. CM contacted [employer] for wound vac and advised of family's choice for [facility] provider. Care Management Interventions  PCP Verified by CM: Yes (Pt sees Dr. Naz Vicente every 3 MO. Dr. Naz Vicente is his PCP and attending MD in the hospital. )  Mode of Transport at Discharge: Other (see comment) (Pt spouse will transport him home in the car. )  Transition of Care Consult (CM Consult): Home Health ([facility] )  [address]: No  Reason Outside [place]:  ([address] )  Discharge Durable Medical Equipment: No (Pt has cane and glucometer)  Physical Therapy Consult: No  Occupational Therapy Consult: No  Speech Therapy Consult: No  Current Support Network: Lives with Spouse, Own Home (Pt lives with spouse in a two story home with [address].   Bedroom on first floor. )  Confirm Follow Up Transport: Self  Plan discussed with Pt/Family/Caregiver: Yes  Freedom of Choice Offered: Yes [facility] )  Discharge Location  Discharge Placement: Home with home health (TBD)    MS PoonamW, [address] [facility]   [phone]

## 2017-09-18 NOTE — PROGRESS NOTES
End of Shift Nursing Note    Bedside shift change report given to [address] (oncoming nurse) by Alla Mcdowell RN (offgoing nurse). Report included the following information SBAR, Kardex, OR Summary, Intake/Output, MAR, Recent Results, Med Rec Status and Cardiac Rhythm SR/ST. Zone Phone:  [phone]    Significant changes during shift:   dressing change to R foot offered, pt declined stating he wanted it to be done by a \"wound care specialist\"   Non-emergent issues for physician to address:   none     Number times ambulated in hallway past shift: 0      Number of times OOB to chair past shift: pt stays in recliner all night    POD #: 3     Vital Signs:    Temp: 99.2 °F (37.3 °C)     Pulse (Heart Rate): 66     BP: 154/58     Resp Rate: 18     O2 Sat (%): 95 %    Lines & Drains:     Urinary Catheter? No      NG tube [] in [] removed [x] not applicable   Drains [] in [] removed [x] not applicable     Skin Integrity:      Wounds: yes   Dressings Present: yes    Wound Concerns: yes      GI:    Current diet:  DIET DIABETIC CONSISTENT CARB Regular    Nausea: NO  Vomiting: NO  Bowel Sounds: YES  Flatus: YES  Last Bowel Movement: yesterday   Appearance:     Respiratory:  Supplemental O2: No         Incentive Spirometer: YES  Volume:   Coughing and Deep Breathing: YES  Oral Care: YES  Understanding (patient/family education): YES   Getting out of bed: YES  Head of bed elevation: YES    Patient Safety:    Falls Score: 2  Mobility Score: 0  Bed Alarm On? No  Sitter? No      Opportunity for questions and clarification was given to oncoming nurse. Patient bed is in low position, side rails are up x 2, door & observation blinds open as needed, call bell within reach and patient not in distress.     Brendan White, RN

## 2017-09-18 NOTE — PROGRESS NOTES
Vascular    Wound looks great  Cx's noted  Needs home VAC  Home w/ 2 wks Augmentin when VAC available

## 2017-09-19 ENCOUNTER — HOME HEALTH ADMISSION (OUTPATIENT)
Dept: HOME HEALTH SERVICES | Facility: HOME HEALTH | Age: 77
End: 2017-09-19
Payer: MEDICARE

## 2017-09-19 VITALS
BODY MASS INDEX: 27.12 KG/M2 | WEIGHT: 253.09 LBS | RESPIRATION RATE: 18 BRPM | SYSTOLIC BLOOD PRESSURE: 132 MMHG | OXYGEN SATURATION: 100 % | TEMPERATURE: 98.1 F | DIASTOLIC BLOOD PRESSURE: 65 MMHG | HEART RATE: 69 BPM

## 2017-09-19 LAB
ANION GAP SERPL CALC-SCNC: 6 MMOL/L (ref 5–15)
BUN SERPL-MCNC: 14 MG/DL (ref 6–20)
BUN/CREAT SERPL: 11 (ref 12–20)
CALCIUM SERPL-MCNC: 8.7 MG/DL (ref 8.5–10.1)
CHLORIDE SERPL-SCNC: 108 MMOL/L (ref 97–108)
CO2 SERPL-SCNC: 26 MMOL/L (ref 21–32)
CREAT SERPL-MCNC: 1.24 MG/DL (ref 0.7–1.3)
ERYTHROCYTE [DISTWIDTH] IN BLOOD BY AUTOMATED COUNT: 13.9 % (ref 11.5–14.5)
GLUCOSE BLD STRIP.AUTO-MCNC: 104 MG/DL (ref 65–100)
GLUCOSE BLD STRIP.AUTO-MCNC: 147 MG/DL (ref 65–100)
GLUCOSE BLD STRIP.AUTO-MCNC: 89 MG/DL (ref 65–100)
GLUCOSE SERPL-MCNC: 86 MG/DL (ref 65–100)
HCT VFR BLD AUTO: 28.6 % (ref 36.6–50.3)
HGB BLD-MCNC: 9.5 G/DL (ref 12.1–17)
MCH RBC QN AUTO: 27.8 PG (ref 26–34)
MCHC RBC AUTO-ENTMCNC: 33.2 G/DL (ref 30–36.5)
MCV RBC AUTO: 83.6 FL (ref 80–99)
PLATELET # BLD AUTO: 274 K/UL (ref 150–400)
POTASSIUM SERPL-SCNC: 3.9 MMOL/L (ref 3.5–5.1)
RBC # BLD AUTO: 3.42 M/UL (ref 4.1–5.7)
SERVICE CMNT-IMP: ABNORMAL
SERVICE CMNT-IMP: ABNORMAL
SERVICE CMNT-IMP: NORMAL
SODIUM SERPL-SCNC: 140 MMOL/L (ref 136–145)
WBC # BLD AUTO: 6.8 K/UL (ref 4.1–11.1)

## 2017-09-19 PROCEDURE — 97116 GAIT TRAINING THERAPY: CPT

## 2017-09-19 PROCEDURE — 97110 THERAPEUTIC EXERCISES: CPT

## 2017-09-19 PROCEDURE — 36415 COLL VENOUS BLD VENIPUNCTURE: CPT | Performed by: SURGERY

## 2017-09-19 PROCEDURE — 85027 COMPLETE CBC AUTOMATED: CPT | Performed by: SURGERY

## 2017-09-19 PROCEDURE — 74011000258 HC RX REV CODE- 258: Performed by: SURGERY

## 2017-09-19 PROCEDURE — 74011250636 HC RX REV CODE- 250/636: Performed by: SURGERY

## 2017-09-19 PROCEDURE — 74011250636 HC RX REV CODE- 250/636: Performed by: INTERNAL MEDICINE

## 2017-09-19 PROCEDURE — 74011636637 HC RX REV CODE- 636/637: Performed by: INTERNAL MEDICINE

## 2017-09-19 PROCEDURE — 82962 GLUCOSE BLOOD TEST: CPT

## 2017-09-19 PROCEDURE — 80048 BASIC METABOLIC PNL TOTAL CA: CPT | Performed by: SURGERY

## 2017-09-19 PROCEDURE — 77030009526 HC GEL CARSYN MDII -A

## 2017-09-19 PROCEDURE — 74011250637 HC RX REV CODE- 250/637: Performed by: INTERNAL MEDICINE

## 2017-09-19 RX ORDER — AMOXICILLIN AND CLAVULANATE POTASSIUM 875; 125 MG/1; MG/1
1 TABLET, FILM COATED ORAL EVERY 12 HOURS
Qty: 28 TAB | Refills: 0 | Status: SHIPPED | OUTPATIENT
Start: 2017-09-19 | End: 2017-10-03

## 2017-09-19 RX ADMIN — INSULIN LISPRO 10 UNITS: 100 INJECTION, SOLUTION INTRAVENOUS; SUBCUTANEOUS at 11:20

## 2017-09-19 RX ADMIN — METOPROLOL TARTRATE 25 MG: 25 TABLET, FILM COATED ORAL at 08:47

## 2017-09-19 RX ADMIN — ATORVASTATIN CALCIUM 20 MG: 20 TABLET, FILM COATED ORAL at 08:47

## 2017-09-19 RX ADMIN — Medication 10 ML: at 11:22

## 2017-09-19 RX ADMIN — PIPERACILLIN SODIUM,TAZOBACTAM SODIUM 4.5 G: 4; .5 INJECTION, POWDER, FOR SOLUTION INTRAVENOUS at 08:48

## 2017-09-19 RX ADMIN — PIPERACILLIN SODIUM,TAZOBACTAM SODIUM 4.5 G: 4; .5 INJECTION, POWDER, FOR SOLUTION INTRAVENOUS at 00:51

## 2017-09-19 RX ADMIN — Medication 1 CAPSULE: at 08:46

## 2017-09-19 RX ADMIN — INSULIN GLARGINE 14 UNITS: 100 INJECTION, SOLUTION SUBCUTANEOUS at 13:18

## 2017-09-19 RX ADMIN — ASPIRIN 81 MG: 81 TABLET, COATED ORAL at 08:47

## 2017-09-19 RX ADMIN — LISINOPRIL 40 MG: 20 TABLET ORAL at 08:47

## 2017-09-19 RX ADMIN — Medication 5 ML: at 09:21

## 2017-09-19 RX ADMIN — Medication 10 ML: at 04:07

## 2017-09-19 RX ADMIN — VANCOMYCIN HYDROCHLORIDE 1250 MG: 10 INJECTION, POWDER, LYOPHILIZED, FOR SOLUTION INTRAVENOUS at 11:47

## 2017-09-19 NOTE — PROGRESS NOTES
Interdisciplinary Rounds were completed on this patient. Rounds included nursing, clinical care leader, pharmacy, and case management. Patient was doing well without problems. Patient had the following concerns: none. Goals for the day will include: mobilize.      Anticipated discharge date: 9/19/2017

## 2017-09-19 NOTE — PROGRESS NOTES
CM spoke with Dr. Collette Hooker and he advised he would be doing the discharge since Dr. Jeffry Cancino has met with pt and cleared for discharge. CM advised Dr. Collette Hooker of the rolling walker request from PT and pt and due to pt's insurance the request would need to come from PCP. Dr. Collette Hooker instructed CM to send referral to DME provider and have the DME provider fax their order form to his office for completion. CM sent request to [employer] [facility] as instructed and provided PCP information to fax order. CM updated AVS to reflect information. There were no additional discharge needs. Care Management Interventions  PCP Verified by CM: Yes (Pt sees Dr. Collette Hooker every 3 MO. Dr. Collette Hooker is his PCP and attending MD in the hospital. )  Mode of Transport at Discharge: Other (see comment) (Pt spouse will transport him home in the car. )  Transition of Care Consult (CM Consult): [address]: Yes  Reason Outside [place]:  ( )  Discharge Durable Medical Equipment: No (Pt has cane and glucometer; CM sent referral for rolling walker to [employer] )  Physical Therapy Consult: No  Occupational Therapy Consult: No  Speech Therapy Consult: No  Current Support Network: Lives with Spouse, Own Home (Pt lives with spouse in a two story home with [address].   Bedroom on first floor. )  Confirm Follow Up Transport: Self  Plan discussed with Pt/Family/Caregiver: Yes  Freedom of Choice Offered: Yes [facility] )  Discharge Location  Discharge Placement: Home with home health    MIGUE Urena, [employer]   [phone]

## 2017-09-19 NOTE — PROGRESS NOTES
End of Shift Nursing Note    Bedside shift change report given to [address] [place] [address] (oncoming nurse) by Corby Cagle RN (offgoing nurse). Report included the following information SBAR, Kardex, OR Summary, Intake/Output, MAR and Recent Results. Zone Phone: [phone]    Significant changes during shift:    none   Non-emergent issues for physician to address:   none     Number times ambulated in hallway past shift:1    Number of times OOB to chair past shift: slept in recliner all night    POD #: 4     Vital Signs:    Temp: 98.7 °F (37.1 °C)     Pulse (Heart Rate): 68     BP: 151/75     Resp Rate: 20     O2 Sat (%): 98 %    Lines & Drains:        NG tube [] in [] removed [x] not applicable   Drains [] in [] removed [x] not applicable     Skin Integrity:      Wounds: yes   Dressings Present: yes    Wound Concerns: no      GI:    Current diet:  DIET DIABETIC CONSISTENT CARB Regular    Nausea: NO  Vomiting: NO  Bowel Sounds: YES  Flatus: YES  Last Bowel Movement: 9/17   Appearance:     Respiratory:  Supplemental O2: No            Incentive Spirometer: YES  Volume:   Coughing and Deep Breathing: YES  Oral Care: YES  Understanding (patient/family education): YES   Getting out of bed: YES  Head of bed elevation: YES    Patient Safety:    Falls Score: 1  Mobility Score: 0  Bed Alarm On? No  Sitter? No      Opportunity for questions and clarification was given to oncoming nurse. Patient bed is in low position, side rails are up x 2, door & observation blinds open as needed, call bell within reach and patient not in distress.     Fannie Lees RN

## 2017-09-19 NOTE — PROGRESS NOTES
Peripheral IV removed. Cath tip intact. Discharge instructions given to pt and wife. HH to follow up with pt starting tomorrow. Pt educated on where to find new antibiotic prescription. Pt and wife denied any further questions for RN or MD. Pt taken by volunteer services to front entrance. All of personal belongings with pt wife.

## 2017-09-19 NOTE — DISCHARGE INSTRUCTIONS
General Discharge Instructions    Patient ID:  Be Mustafa  [id number]  75 y.o.  1940    Patient Instructions        The following personal items were collected during your admission and were returned to you. Take Home Medications           What to do at Home    Recommended diet: Diabetic Diet    Recommended activity: Activity as tolerated    Follow-up with Yulia Jolly MD  in 1 week. Information obtained by :  I understand that if any problems occur once I am at home I am to contact my physician. I understand and acknowledge receipt of the instructions indicated above.                                                                                                                                            Physician's or R.N.'s Signature                                                                  Date/Time                                                                                                                                              Patient or Representative Signature                                                          Date/Time

## 2017-09-19 NOTE — PROGRESS NOTES
Problem: Mobility Impaired (Adult and Pediatric)  Goal: *Acute Goals and Plan of Care (Insert Text)  Physical Therapy Goals  Initiated 9/16/2017  1. Patient will transfer from bed to chair and chair to bed with modified independence using the least restrictive device within 4 day(s). 3. Patient will perform sit to stand with modified independence within 4 day(s). 4. Patient will ambulate with supervision/set-up for 100 feet with the least restrictive device within 4 day(s). 5. Patient will ascend/descend 7 stairs with 1 handrail(s) with minimal assistance/contact guard assist within 4 day(s). PHYSICAL THERAPY TREATMENT  Patient: Jhonatan Kwong (41 y.o. male)  Date: 9/19/2017  Diagnosis: Foot ulcer with fat layer exposed ([address] PVD      Procedure(s) (LRB):  RIGHT FOOT DEBRIDEMENT THIRD FOURTH FIFTH TOE AMPUTATION RIGHT FOOT  (Right) 4 Days Post-Op      Precautions:   falls  Chart, physical therapy assessment, plan of care and goals were reviewed. ASSESSMENT: pt tolerated tx well, no LOB or SOB, does well with transfers and ther-ex, good motivation, vc's for safety and proper RW use. Progression toward goals:  [ ]      Improving appropriately and progressing toward goals  [X]      Improving slowly and progressing toward goals  [ ]      Not making progress toward goals and plan of care will be adjusted       PLAN:  Patient continues to benefit from skilled intervention to address the above impairments. Continue treatment per established plan of care.   Discharge Recommendations:  Home Health  Further Equipment Recommendations for Discharge:  rolling walker       OBJECTIVE DATA SUMMARY:      Critical Behavior:  Neurologic State: Alert  Orientation Level: Oriented X4  Cognition: Appropriate decision making, Appropriate for age attention/concentration, Appropriate safety awareness     Functional Mobility Training:  Bed Mobility:     Transfers:  Sit to Stand: Stand-by asssistance  Stand to Sit: Stand-by asssistance  Interventions: Verbal cues  Level of Assistance: Stand-by asssistance      Balance:  Sitting: Intact; Without support  Standing: Intact; With support  Standing - Static: Good;Constant support  Standing - Dynamic : Good      Ambulation/Gait Training:  Distance (ft): 120 Feet (ft)  Assistive Device: Gait belt;Walker, rolling  Ambulation - Level of Assistance: Stand-by asssistance  Gait Abnormalities: Decreased step clearance; Step to gait  Right Side Weight Bearing: Heel  Left Side Weight Bearing: Full  Base of Support: Widened  Stance: Right decreased  Speed/Consuelo: Slow  Step Length: Left shortened;Right shortened     Therapeutic Exercises:   sitting  EXERCISE   Sets   Reps   Active Active Assist   Passive   Comments   Ankle pumps 1 10 [X] [ ] [ ] bilat   Hip flex     1 10 [X] [ ] [ ] \"   Toe tap 1 10 [X] [ ] [ ] LLE   Knee ext 1 10 [X] [ ] [ ] \"      Pain:  Pain Scale 1: Numeric (0 - 10)  Pain Intensity 1: 0     Activity Tolerance: good     After treatment:   [X] Patient left in no apparent distress sitting up in chair  [ ] Patient left in no apparent distress in bed  [X] Call bell left within reach  [X] Nursing notified  [ ] Caregiver present  [ ] Bed alarm activated      COMMUNICATION/COLLABORATION:   The patients plan of care was discussed with: Registered Nurse     Dori Pinedo PTA   Time Calculation: 25 mins

## 2017-09-19 NOTE — PROGRESS NOTES
CM provided pt with second IM letter     Eddie Parker, MSW, [facility]   [phone]

## 2017-09-19 NOTE — PROGRESS NOTES
CM received call from Ms. Jessica Evans advising pt would like to change [facility] agency to [facility]. CM ensured [facility] was no on delay then sent referral via CC. CM alerted [address] to the change. There were no additional changes. CM updated AVS.     Care Management Interventions  PCP Verified by CM: Yes (Pt sees Dr. Bisi Chopra every 3 MO. Dr. Bisi Chopra is his PCP and attending MD in the hospital. )  Mode of Transport at Discharge: Other (see comment) (Pt spouse will transport him home in the car. )  Transition of Care Consult (CM Consult): [address]: Yes  Reason Outside [place]:  ( )  Discharge Durable Medical Equipment: No (Pt has cane and glucometer)  Physical Therapy Consult: No  Occupational Therapy Consult: No  Speech Therapy Consult: No  Current Support Network: Lives with Spouse, Own Home (Pt lives with spouse in a two story home with [address].   Bedroom on first floor. )  Confirm Follow Up Transport: Self  Plan discussed with Pt/Family/Caregiver: Yes  Freedom of Choice Offered: Yes [facility] )  Discharge Location  Discharge Placement: Home with home health    Triston Delatorre, MSW, [facility]   [phone]

## 2017-09-20 ENCOUNTER — HOME CARE VISIT (OUTPATIENT)
Dept: SCHEDULING | Facility: HOME HEALTH | Age: 77
End: 2017-09-20
Payer: MEDICARE

## 2017-09-20 PROCEDURE — G0299 HHS/HOSPICE OF RN EA 15 MIN: HCPCS

## 2017-09-20 PROCEDURE — 400013 HH SOC

## 2017-09-20 PROCEDURE — 3331090001 HH PPS REVENUE CREDIT

## 2017-09-20 PROCEDURE — G0151 HHCP-SERV OF PT,EA 15 MIN: HCPCS

## 2017-09-20 PROCEDURE — 3331090002 HH PPS REVENUE DEBIT

## 2017-09-20 NOTE — DISCHARGE SUMMARY
Evan Montalvo, [facility]   DISCHARGE SUMMARY       Name:  Wandy Sandoval   MR#:  [id number]   :  1940   Account #:  [de-identified]        Date of Adm:  2017       HISTORY OF PRESENT ILLNESS: The patient is a 80-year-old   gentleman who was at 30+ year history of diabetes, presented to the   office stating that 2 weeks ago while at the beach, he apparently   developed a large bullous on the ventral aspect of his right foot. This   was decompressed, and he was left with a fairly large ulcer. One week   later, he went to see a podiatrist who dressed the lesion and started   him on p.o. antibiotics. He has existing severe peripheral neuropathy   with an accompanying Charcot joint of his ankles. When I evaluated   him in the office, he had a fairly large ulcer on the ventral aspect with   moderate amount of ulceration on the third, fourth and fifth digits. No   bones were evident at that evaluation. In view of this, it was elected to   admit him for further evaluation and care. Complicating this is the fact   that the patient's diabetes has been poorly controlled historically,   primarily because of noncompliance. His last hemoglobin A1c several   months ago was 9.7. PAST MEDICAL HISTORY, SOCIAL HISTORY, REVIEW OF   SYSTEMS, FAMILY HISTORY, PHYSICAL EXAMINATION:   As in admitting H and P.    LABORATORY VALUES: Initial hemoglobin was 12.3, white count 8.9,   MCV was 84.3, platelet count 058 with the following differential: 72   segs, 17 lymphocytes, 10 monocytes and 1 eosinophil. At the time of   discharge, hemoglobin was 9.5 with a white count 6.8. Abnormalities   on the comprehensive profile, limited BUN and creatinine 26 and 1.52. Albumin was 3.2. Hemoglobin A1c was 8.8. At time of discharge, BUN   and creatinine were 14 and 1.24 respectively.  Initial cultures revealed 3   organisms; Providencia stuartii, Pseudomonas aeruginosa, and Proteus mirabilis. Repeat culture at the time of surgery revealed   Proteus mirabilis, Enterococcal faecalis,  Pseudomonas and a few   gram-positive cocci. RADIOGRAPHS: Chest x-ray negative. X-ray of the right foot revealed   no visible cortical disruption of the fourth or fifth digits. Degenerative   changes noted in the first digit. MRI of the right foot revealed normal   enhancement edema of the middle and distal phalanges of the fourth   and fifth digits. Early osteomyelitis is difficult to exclude. CONSULTATIONS: Primary consultation obtained with Vascular   Surgery, Dr. Janes Lynn, and his comments will be elaborated on in   the hospital course. HOSPITAL COURSE: The patient was admitted, placed on parenteral   antibiotics and wound care was started. He was initially seen in   consultation by General Surgery who deferred to Vascular Surgery. Arterial Doppler studies, atrial brachial ratio of 0.89 on the right and   0.99 on the left. Interestingly, it was read as mild peripheral arterial   disease on the right with  abnormality of the left dorsalis pedis pulse. The right great toe to brachial index was 0.18 on the left with 0.52 on   the right. It was felt by Dr. Lluvia Connelly that ischemic disease existed, and the goal   was to increase flow to the foot. There appeared to be adequate flow   proximally. The patient was taken to the arteriogram, which revealed   mild stenosis of the superficial femoral artery. There was 95%    stenosis with single-vessel runoff of perineal artery  reconstituted in   distal posterior tibial into the plantar arteries. The proximal anterior   tibial and posterior tibial were occluded. The distal anterior tibial and   dorsalis pedis reconstituted. Stenosis was treated with an   atherectomy and angioplasty. No residual stenosis existed. Two days later, the patient underwent surgical debridement with   removal of the third, fourth and fifth digits.  There was excellent granulation tissue postoperatively. The patient was subsequently   discharged home with a wound VAC. He remained on parenteral   antibiotics the entire stay with rather broad-spectrum coverage. His diabetes was tightly controlled during his hospital stay also. He did have chronic edema of his lower extremities, but this was   Secondary to venous insufficiency. FINAL DIAGNOSES   1. Large right foot ulcer. 2. Peripheral vascular occlusive disease. 3. Diabetes mellitus. 4. Severe peripheral neuropathy. 5. Charcot joint of ankle secondary to peripheral neuropathy. 6. Venous insufficiency. 7. Primary hypertension. 8. Possible early diabetic nephropathy, manifested by mild proteinuria. 9. Dyslipidemia. OPERATIONS AND PROCEDURES UNDERGONE THIS   ADMISSION   1. Arteriogram with atherectomy and angioplasty. 2. Status post amputation of the third, fourth and fifth digits of his right   foot with surgical debridement. DISPOSITION   1. The patient will be discharged home ambulatory on an ADA diet with   bedtime snack. 2. Wound care will assist coming by the house on a daily basis. Will   have a wound VAC placed continuously. DISCHARGE MEDICATIONS   Include the following   1. Augmentin 875/125 one tablet b.i.d. for 14 days. 2. Aspirin 81 mg daily. 3. Premixed NPH/regular insulin 18 units a.c. breakfast and dinner. 4. Lisinopril 40 mg daily. 5. Metoprolol tartrate 25 mg b.i.d.   6. Simvastatin 20 mg at bedtime. The patient will follow up with Dr. Dinorah Stoll and myself in 1 week. MD Carmen Junior / Yvrose Rodriguez   D:  09/20/2017   08:22   T:  09/20/2017   10:36   Job #:  [id number]

## 2017-09-21 ENCOUNTER — HOME CARE VISIT (OUTPATIENT)
Dept: SCHEDULING | Facility: HOME HEALTH | Age: 77
End: 2017-09-21
Payer: MEDICARE

## 2017-09-21 VITALS
TEMPERATURE: 97.8 F | DIASTOLIC BLOOD PRESSURE: 86 MMHG | SYSTOLIC BLOOD PRESSURE: 170 MMHG | OXYGEN SATURATION: 98 % | RESPIRATION RATE: 16 BRPM | HEART RATE: 63 BPM

## 2017-09-21 VITALS
TEMPERATURE: 98 F | SYSTOLIC BLOOD PRESSURE: 140 MMHG | DIASTOLIC BLOOD PRESSURE: 80 MMHG | HEART RATE: 87 BPM | OXYGEN SATURATION: 98 %

## 2017-09-21 VITALS
DIASTOLIC BLOOD PRESSURE: 86 MMHG | SYSTOLIC BLOOD PRESSURE: 170 MMHG | TEMPERATURE: 97.8 F | OXYGEN SATURATION: 98 % | HEART RATE: 63 BPM

## 2017-09-21 PROCEDURE — G0152 HHCP-SERV OF OT,EA 15 MIN: HCPCS

## 2017-09-21 PROCEDURE — 3331090001 HH PPS REVENUE CREDIT

## 2017-09-21 PROCEDURE — 3331090002 HH PPS REVENUE DEBIT

## 2017-09-21 RX ORDER — FERROUS GLUCONATE 324(37.5)
324 TABLET ORAL
Qty: 90 TAB | Refills: 0 | Status: SHIPPED | OUTPATIENT
Start: 2017-09-21 | End: 2017-11-25 | Stop reason: SDUPTHER

## 2017-09-22 ENCOUNTER — HOME CARE VISIT (OUTPATIENT)
Dept: SCHEDULING | Facility: HOME HEALTH | Age: 77
End: 2017-09-22
Payer: MEDICARE

## 2017-09-22 PROCEDURE — G0300 HHS/HOSPICE OF LPN EA 15 MIN: HCPCS

## 2017-09-22 PROCEDURE — 3331090002 HH PPS REVENUE DEBIT

## 2017-09-22 PROCEDURE — G0151 HHCP-SERV OF PT,EA 15 MIN: HCPCS

## 2017-09-22 PROCEDURE — 3331090001 HH PPS REVENUE CREDIT

## 2017-09-23 PROCEDURE — 3331090001 HH PPS REVENUE CREDIT

## 2017-09-23 PROCEDURE — 3331090002 HH PPS REVENUE DEBIT

## 2017-09-24 VITALS
TEMPERATURE: 97.8 F | HEART RATE: 70 BPM | RESPIRATION RATE: 16 BRPM | DIASTOLIC BLOOD PRESSURE: 88 MMHG | OXYGEN SATURATION: 98 % | SYSTOLIC BLOOD PRESSURE: 160 MMHG

## 2017-09-24 PROCEDURE — 3331090001 HH PPS REVENUE CREDIT

## 2017-09-24 PROCEDURE — 3331090002 HH PPS REVENUE DEBIT

## 2017-09-25 ENCOUNTER — HOME CARE VISIT (OUTPATIENT)
Dept: SCHEDULING | Facility: HOME HEALTH | Age: 77
End: 2017-09-25
Payer: MEDICARE

## 2017-09-25 ENCOUNTER — PATIENT OUTREACH (OUTPATIENT)
Dept: INTERNAL MEDICINE CLINIC | Age: 77
End: 2017-09-25

## 2017-09-25 VITALS
SYSTOLIC BLOOD PRESSURE: 160 MMHG | HEART RATE: 70 BPM | DIASTOLIC BLOOD PRESSURE: 88 MMHG | OXYGEN SATURATION: 98 % | TEMPERATURE: 97.8 F

## 2017-09-25 VITALS
OXYGEN SATURATION: 98 % | HEART RATE: 88 BPM | TEMPERATURE: 86.4 F | SYSTOLIC BLOOD PRESSURE: 156 MMHG | RESPIRATION RATE: 18 BRPM | DIASTOLIC BLOOD PRESSURE: 84 MMHG

## 2017-09-25 PROCEDURE — 3331090001 HH PPS REVENUE CREDIT

## 2017-09-25 PROCEDURE — G0299 HHS/HOSPICE OF RN EA 15 MIN: HCPCS

## 2017-09-25 PROCEDURE — 3331090002 HH PPS REVENUE DEBIT

## 2017-09-26 ENCOUNTER — HOME CARE VISIT (OUTPATIENT)
Dept: HOME HEALTH SERVICES | Facility: HOME HEALTH | Age: 77
End: 2017-09-26
Payer: MEDICARE

## 2017-09-26 ENCOUNTER — HOME CARE VISIT (OUTPATIENT)
Dept: SCHEDULING | Facility: HOME HEALTH | Age: 77
End: 2017-09-26
Payer: MEDICARE

## 2017-09-26 PROCEDURE — 3331090001 HH PPS REVENUE CREDIT

## 2017-09-26 PROCEDURE — 3331090002 HH PPS REVENUE DEBIT

## 2017-09-26 PROCEDURE — G0151 HHCP-SERV OF PT,EA 15 MIN: HCPCS

## 2017-09-27 ENCOUNTER — HOME CARE VISIT (OUTPATIENT)
Dept: SCHEDULING | Facility: HOME HEALTH | Age: 77
End: 2017-09-27
Payer: MEDICARE

## 2017-09-27 VITALS
HEART RATE: 88 BPM | DIASTOLIC BLOOD PRESSURE: 84 MMHG | SYSTOLIC BLOOD PRESSURE: 150 MMHG | OXYGEN SATURATION: 98 % | TEMPERATURE: 97.6 F

## 2017-09-27 PROCEDURE — G0300 HHS/HOSPICE OF LPN EA 15 MIN: HCPCS

## 2017-09-27 PROCEDURE — 3331090002 HH PPS REVENUE DEBIT

## 2017-09-27 PROCEDURE — 3331090001 HH PPS REVENUE CREDIT

## 2017-09-28 ENCOUNTER — PATIENT OUTREACH (OUTPATIENT)
Dept: INTERNAL MEDICINE CLINIC | Age: 77
End: 2017-09-28

## 2017-09-28 ENCOUNTER — OFFICE VISIT (OUTPATIENT)
Dept: INTERNAL MEDICINE CLINIC | Age: 77
End: 2017-09-28

## 2017-09-28 ENCOUNTER — HOME CARE VISIT (OUTPATIENT)
Dept: HOME HEALTH SERVICES | Facility: HOME HEALTH | Age: 77
End: 2017-09-28
Payer: MEDICARE

## 2017-09-28 VITALS
HEART RATE: 65 BPM | OXYGEN SATURATION: 100 % | WEIGHT: 255.9 LBS | HEIGHT: 78 IN | BODY MASS INDEX: 29.61 KG/M2 | SYSTOLIC BLOOD PRESSURE: 151 MMHG | RESPIRATION RATE: 16 BRPM | TEMPERATURE: 98.1 F | DIASTOLIC BLOOD PRESSURE: 72 MMHG

## 2017-09-28 VITALS
TEMPERATURE: 98.2 F | HEART RATE: 77 BPM | DIASTOLIC BLOOD PRESSURE: 78 MMHG | SYSTOLIC BLOOD PRESSURE: 130 MMHG | RESPIRATION RATE: 18 BRPM | OXYGEN SATURATION: 98 %

## 2017-09-28 DIAGNOSIS — G61.89 OTHER INFLAMMATORY POLYNEUROPATHIES (HCC): ICD-10-CM

## 2017-09-28 DIAGNOSIS — I87.2 VENOUS INSUFFICIENCY: ICD-10-CM

## 2017-09-28 DIAGNOSIS — L97.512 FOOT ULCER WITH FAT LAYER EXPOSED, RIGHT (HCC): Primary | ICD-10-CM

## 2017-09-28 DIAGNOSIS — I63.9 CEREBROVASCULAR ACCIDENT (CVA), UNSPECIFIED MECHANISM (HCC): ICD-10-CM

## 2017-09-28 DIAGNOSIS — I73.9 PERIPHERAL VASCULAR OCCLUSIVE DISEASE (HCC): ICD-10-CM

## 2017-09-28 PROBLEM — G62.9 PERIPHERAL NEUROPATHY: Status: ACTIVE | Noted: 2017-09-28

## 2017-09-28 PROCEDURE — 3331090002 HH PPS REVENUE DEBIT

## 2017-09-28 PROCEDURE — 3331090001 HH PPS REVENUE CREDIT

## 2017-09-28 NOTE — PROGRESS NOTES
[address] [place] [address] and Primary Care  [place] [address]  [address] [place] [address]  Phone:  [phone]  Fax: [phone]       Chief Complaint   Patient presents with   [facility] Follow Up   . SUBJECTIVE:    Cricket Oleary is a 68 y.o. male Comes in for return visit for his first visit to see me since being discharged from the hospital for a large ulcer on his right foot. He has severe diabetic neuropathy, which complicated the process. It was also complicated by vascular insufficiency. He saw Dr. Mariusz Winn, his surgeon several days ago, who was quite pleased with his progress. He currently has a wound VAC in place. Diabetic control has been excellent. He has been using his Premix Insulin 20 units a.c breakfast and dinner. He has had no symptomatic hypoglycemia. He is making an effort to eat at the same time every day. He has a history of venous insufficiency, which leads to orthostatic edema of his lower extremities. Finally, when I saw him walking he is using the cane with the right arm, which is actually the inappropriate side. He states that he had a difficult time using the cane on the left side because of weakness of his upper extremity and a degree of weakness of his left lower extremity. He is vague about the onset of this, however. Current Outpatient Prescriptions   Medication Sig Dispense Refill    ferrous gluconate 324 mg (37.5 mg iron) tablet Take 1 Tab by mouth three (3) times daily (with meals). 90 Tab 0    naproxen sodium (ALEVE) 220 mg cap Take 220 mg by mouth two (2) times daily as needed (Pain).  amoxicillin-clavulanate (AUGMENTIN) 875-125 mg per tablet Take 1 Tab by mouth every twelve (12) hours for 14 days.  28 Tab 0    insulin NPH/insulin regular (NOVOLIN 70/30) 100 unit/mL (70-30) injection 18 units before meals breakfast and dinner 2 Vial 11    ACCU-CHEK CLAUDINE PLUS TEST STRP strip TEST THREE TIMES DAILY 300 Strip 4    metoprolol tartrate (LOPRESSOR) 25 mg tablet TAKE ONE TABLET BY MOUTH TWICE DAILY 180 Tab 3    lisinopril (PRINIVIL, ZESTRIL) 40 mg tablet Take 1 Tab by mouth daily. 90 Tab 3    simvastatin (ZOCOR) 20 mg tablet TAKE ONE TABLET BY MOUTH NIGHTLY. 90 Tab 3    Insulin Syringe-Needle U-100 1 mL 31 gauge x 5/16 syrg USE ONE  TWICE DAILY 100 Syringe 11    glucose blood VI test strips (ACCU-CHEK CLAUDINE PLUS TEST STRP) strip Test three times daily 300 Strip 4    glucose blood VI test strips (ACCU-CHEK CLAUDINE) strip Use to test blood sugar twice daily. DX.E11.9 200 Strip 3    meloxicam (MOBIC) 15 mg tablet Take 1 Tab by mouth daily. 30 Tab 0    Insulin Needles, Disposable, (NOVOFINE 30) 30 x 1/3 \" Inject 5 times a day 500 Package 3    aspirin 81 mg tablet Take 81 mg by mouth.        Past Medical History:   Diagnosis Date    CAD (coronary artery disease)     palpitations    Diabetes [facility]     Hypertension     Other ill-defined conditions     left foot ulcer     Past Surgical History:   Procedure Laterality Date    HX HEENT      bilateral cataract surgery    HX OTHER SURGICAL      surgery to left foot ulcer, PICC right arm     No Known Allergies      REVIEW OF SYSTEMS:  General: negative for - chills or fever  ENT: negative for - headaches, nasal congestion or tinnitus  Respiratory: negative for - cough, hemoptysis, shortness of breath or wheezing  Cardiovascular : negative for - chest pain, edema, palpitations or shortness of breath  Gastrointestinal: negative for - abdominal pain, blood in stools, heartburn or nausea/vomiting  Genito-Urinary: no dysuria, trouble voiding, or hematuria  Musculoskeletal: negative for - gait disturbance, joint pain, joint stiffness or joint swelling  Neurological: no TIA or stroke symptoms  Hematologic: no bruises, no bleeding, no swollen glands  Integument: no lumps, mole changes, nail changes or rash  Endocrine: no malaise/lethargy or unexpected weight changes      Social History     Social History    Marital status:      Spouse name: N/A    Number of children: 3    Years of education: 12     Occupational History    employed-----formerly retired      Social History Main Topics    Smoking status: Former Smoker    Smokeless tobacco: Never Used    Alcohol use No    Drug use: No    Sexual activity: Not Asked     Other Topics Concern    None     Social History Narrative     Family History   Problem Relation Age of Onset    Cancer Father     No Known Problems Mother        OBJECTIVE:    Visit Vitals    /72    Pulse 65    Temp 98.1 °F (36.7 °C) (Oral)    Resp 16    Ht 6' 9\" (2.057 m)    Wt 255 lb 14.4 oz (116.1 kg)    SpO2 100%    BMI 27.42 kg/m2     CONSTITUTIONAL: well , well nourished, appears age appropriate  EYES: perrla, eom intact  ENMT:moist mucous membranes, pharynx clear  NECK: supple. Thyroid normal  RESPIRATORY: Chest: clear to ascultation and percussion   CARDIOVASCULAR: Heart: regular rate and rhythm  GASTROINTESTINAL: Abdomen: soft, bowel sounds active  HEMATOLOGIC: no pathological lymph nodes palpated  MUSCULOSKELETAL: Extremities: no edema, pulse 1+   INTEGUMENT: Bandaged right foot with wound VAC connection  NEUROLOGIC: non-focal exam   MENTAL STATUS: alert and oriented, appropriate affect      ASSESSMENT:  1. Foot ulcer with fat layer exposed, right ([address]    2. Peripheral vascular occlusive disease ([address]    3. DM (diabetes mellitus), type 2, uncontrolled, periph vascular complic ([address]    4. Venous insufficiency    5. Other inflammatory polyneuropathies ([address]    6. Cerebrovascular accident (CVA), unspecified mechanism ([address]        PLAN:    1. His foot ulcer remains and I did not remove his dressing today. He has a wound VAC with him. Based on comments made by the vascular surgeon it appears that there is excellent healing.     2. He has peripheral vascular occlusive disease, but he had an atherectomy of one of his arteries, which increased the flow to the right side of his foot. The compromise was primarily in the smaller vessels of his foot. The procedure done was adequate enough to facilitate healing of the amputated area. 3. I remind him that tight control of his diabetes facilitates his healing process. Without this he would not heal adequately. It appears that he is euglycemic currently and understands this. 4. He does have swelling of his lower extremities related to venous insufficiency. Nothing need be done for that at this point because it is not causing any major problems. 5. Finally, I remind him that his peripheral neuropathy is severe to the point where he does not require any analgesics. This also created the joint problem of his foot leading to a Charcot joint. Hopefully he has not had a CVA leading to a mild left hemiparesis. There is really nothing that can be done because he is maxed out on all of his vascular protective items. For now I will observe. Follow-up Disposition:  Return in about 4 weeks (around 10/26/2017).       Ila Khan MD

## 2017-09-28 NOTE — MR AVS SNAPSHOT
Visit Information Date & Time Provider Department Dept. Phone Encounter #  
 9/28/2017 12:00 PM Kb Griffin MD [facility] Medicine and Primary Care [phone] [id number] Your Appointments 10/20/2017 10:45 AM  
Any with Kb Griffin MD  
[address] [place] [address] and Primary Care [facility]) Appt Note: 3 week f/u  
 [address] [facility]  
  
   
 [id number] Upcoming Health Maintenance Date Due MICROALBUMIN Q1 10/5/2016 MEDICARE YEARLY EXAM 4/12/2017 GLAUCOMA SCREENING Q2Y 12/7/2017* FOOT EXAM Q1 12/7/2017* EYE EXAM RETINAL OR DILATED Q1 1/7/2018* Pneumococcal 65+ Low/Medium Risk (2 of 2 - PPSV23) 2/6/2018 LIPID PANEL Q1 2/6/2018 HEMOGLOBIN A1C Q6M 3/8/2018 DTaP/Tdap/Td series (2 - Td) 2/6/2027 *Topic was postponed. The date shown is not the original due date. Allergies as of 9/28/2017  Review Complete On: 9/15/2017 By: Nader Campos RN No Known Allergies Current Immunizations  Reviewed on 2/28/2011 Name Date Influenza Vaccine Whole 11/1/2010 Not reviewed this visit Vitals BP Pulse Temp Resp Height(growth percentile) Weight(growth percentile) 151/72 65 98.1 °F (36.7 °C) (Oral) 16 6' 9\" (2.057 m) 255 lb 14.4 oz (116.1 kg) SpO2 BMI Smoking Status 100% 27.42 kg/m2 Former Smoker BMI and BSA Data Body Mass Index Body Surface Area  
 27.42 kg/m 2 2.58 m 2 Preferred Pharmacy Pharmacy Name Phone [facility] [address] [facility] [address] [phone] Your Updated Medication List  
  
   
This list is accurate as of: 9/28/17  1:45 PM.  Always use your most recent med list.  
  
  
  
  
 ALEVE 220 mg Cap Generic drug:  naproxen sodium Take 220 mg by mouth two (2) times daily as needed (Pain). amoxicillin-clavulanate 875-125 mg per tablet Commonly known as:  AUGMENTIN Take 1 Tab by mouth every twelve (12) hours for 14 days. aspirin 81 mg tablet Take 81 mg by mouth. ferrous gluconate 324 mg (37.5 mg iron) tablet Take 1 Tab by mouth three (3) times daily (with meals). * glucose blood VI test strips strip Commonly known as:  ACCU-CHEK CLAUDINE Use to test blood sugar twice daily. DX.E11.9  
  
 * glucose blood VI test strips strip Commonly known as:  ACCU-CHEK CLAUDINE PLUS TEST STRP Test three times daily * ACCU-CHEK CLAUDINE PLUS TEST STRP strip Generic drug:  glucose blood VI test strips TEST THREE TIMES DAILY Insulin Needles (Disposable) 30 gauge x 1/3\" Commonly known as:  NOVOFINE 30 Inject 5 times a day  
  
 insulin NPH/insulin regular 100 unit/mL (70-30) injection Commonly known as:  NovoLIN 70/30  
18 units before meals breakfast and dinner Insulin Syringe-Needle U-100 1 mL 31 gauge x 5/16 Syrg USE ONE  TWICE DAILY  
  
 lisinopril 40 mg tablet Commonly known as:  Karn Desanctis Take 1 Tab by mouth daily. meloxicam 15 mg tablet Commonly known as:  MOBIC Take 1 Tab by mouth daily. metoprolol tartrate 25 mg tablet Commonly known as:  LOPRESSOR  
TAKE ONE TABLET BY MOUTH TWICE DAILY  
  
 simvastatin 20 mg tablet Commonly known as:  ZOCOR  
TAKE ONE TABLET BY MOUTH NIGHTLY. * Notice: This list has 3 medication(s) that are the same as other medications prescribed for you. Read the directions carefully, and ask your doctor or other care provider to review them with you. To-Do List   
 09/29/2017 12:00 PM  
  Appointment with Shayy Dexter at [place] [address]  
  
 10/02/2017 To Be Determined Appointment with Shayy Dexter at [place] [address]  
  
 10/03/2017 To Be Determined Appointment with Geri Youssef PT at [place] [address]  
  
 10/04/2017 To Be Determined Appointment with Guillermina Estrada at [place] [address]  
  
 10/04/2017 To Be Determined Appointment with Fly Whitaker OT at [place] [address]  
  
 10/05/2017 To Be Determined Appointment with Suzy Dumont PT at [place] [address]  
  
 10/06/2017 To Be Determined Appointment with Guillermina Estrada at [place] [address]  
  
 10/09/2017 To Be Determined Appointment with Guillermina Estrada at [place] [address]  
  
 10/11/2017 To Be Determined Appointment with Guillermina Estrada at [place] [address]  
  
 10/13/2017 To Be Determined Appointment with Rafaela Mora RN at [place] [address] Introducing \[facility] [place] [facility]\"" & HEALTH SERVICES! [facility] introduces Track patient portal. Now you can access parts of your medical record, email your doctor's office, and request medication refills online. 1. In your internet browser, go to [email or web address] 2. Click on the First Time User? Click Here link in the Sign In box. You will see the New Member Sign Up page. 3. Enter your Track Access Code exactly as it appears below. You will not need to use this code after youve completed the sign-up process. If you do not sign up before the expiration date, you must request a new code. · Track Access Code: [email or web address] Expires: 12/6/2017 11:20 AM 
 
4. Enter the last four digits of your Social Security Number (xxxx) and Date of Birth (mm/dd/yyyy) as indicated and click Submit. You will be taken to the next sign-up page. 5. Create a Konyt ID. This will be your Track login ID and cannot be changed, so think of one that is secure and easy to remember. 6. Create a Track password. You can change your password at any time. 7. Enter your Password Reset Question and Answer.  This can be used at a later time if you forget your password. 8. Enter your e-mail address. You will receive e-mail notification when new information is available in [address] 9. Click Sign Up. You can now view and download portions of your medical record. 10. Click the Download Summary menu link to download a portable copy of your medical information. If you have questions, please visit the Frequently Asked Questions section of the [employer] website. Remember, [employer] is NOT to be used for urgent needs. For medical emergencies, dial 911. Now available from your iPhone and Android! Please provide this summary of care documentation to your next provider. Your primary care clinician is listed as Liz Fam. If you have any questions after today's visit, please call [phone].

## 2017-09-29 ENCOUNTER — HOME CARE VISIT (OUTPATIENT)
Dept: SCHEDULING | Facility: HOME HEALTH | Age: 77
End: 2017-09-29
Payer: MEDICARE

## 2017-09-29 VITALS
RESPIRATION RATE: 18 BRPM | TEMPERATURE: 97.7 F | HEART RATE: 61 BPM | SYSTOLIC BLOOD PRESSURE: 100 MMHG | OXYGEN SATURATION: 98 % | DIASTOLIC BLOOD PRESSURE: 68 MMHG

## 2017-09-29 VITALS
OXYGEN SATURATION: 98 % | DIASTOLIC BLOOD PRESSURE: 60 MMHG | HEART RATE: 78 BPM | SYSTOLIC BLOOD PRESSURE: 140 MMHG | TEMPERATURE: 98 F

## 2017-09-29 PROCEDURE — G0300 HHS/HOSPICE OF LPN EA 15 MIN: HCPCS

## 2017-09-29 PROCEDURE — G0151 HHCP-SERV OF PT,EA 15 MIN: HCPCS

## 2017-09-29 PROCEDURE — A5120 SKIN BARRIER, WIPE OR SWAB: HCPCS

## 2017-09-29 PROCEDURE — 3331090001 HH PPS REVENUE CREDIT

## 2017-09-29 PROCEDURE — G0152 HHCP-SERV OF OT,EA 15 MIN: HCPCS

## 2017-09-29 PROCEDURE — 3331090002 HH PPS REVENUE DEBIT

## 2017-09-29 PROCEDURE — A4371 SKIN BARRIER POWDER PER OZ: HCPCS

## 2017-09-30 VITALS
HEART RATE: 60 BPM | DIASTOLIC BLOOD PRESSURE: 86 MMHG | TEMPERATURE: 97.7 F | SYSTOLIC BLOOD PRESSURE: 140 MMHG | OXYGEN SATURATION: 99 %

## 2017-09-30 PROCEDURE — 3331090002 HH PPS REVENUE DEBIT

## 2017-09-30 PROCEDURE — 3331090001 HH PPS REVENUE CREDIT

## 2017-10-01 PROCEDURE — 3331090002 HH PPS REVENUE DEBIT

## 2017-10-01 PROCEDURE — 3331090001 HH PPS REVENUE CREDIT

## 2017-10-02 ENCOUNTER — HOME CARE VISIT (OUTPATIENT)
Dept: SCHEDULING | Facility: HOME HEALTH | Age: 77
End: 2017-10-02
Payer: MEDICARE

## 2017-10-02 PROCEDURE — G0300 HHS/HOSPICE OF LPN EA 15 MIN: HCPCS

## 2017-10-02 PROCEDURE — 3331090001 HH PPS REVENUE CREDIT

## 2017-10-02 PROCEDURE — 3331090002 HH PPS REVENUE DEBIT

## 2017-10-03 ENCOUNTER — HOME CARE VISIT (OUTPATIENT)
Dept: SCHEDULING | Facility: HOME HEALTH | Age: 77
End: 2017-10-03
Payer: MEDICARE

## 2017-10-03 VITALS
SYSTOLIC BLOOD PRESSURE: 132 MMHG | HEART RATE: 68 BPM | DIASTOLIC BLOOD PRESSURE: 70 MMHG | OXYGEN SATURATION: 99 % | TEMPERATURE: 97.9 F | RESPIRATION RATE: 16 BRPM

## 2017-10-03 PROCEDURE — 3331090001 HH PPS REVENUE CREDIT

## 2017-10-03 PROCEDURE — G0151 HHCP-SERV OF PT,EA 15 MIN: HCPCS

## 2017-10-03 PROCEDURE — 3331090002 HH PPS REVENUE DEBIT

## 2017-10-04 ENCOUNTER — HOME CARE VISIT (OUTPATIENT)
Dept: SCHEDULING | Facility: HOME HEALTH | Age: 77
End: 2017-10-04
Payer: MEDICARE

## 2017-10-04 VITALS
DIASTOLIC BLOOD PRESSURE: 80 MMHG | HEART RATE: 83 BPM | TEMPERATURE: 98 F | SYSTOLIC BLOOD PRESSURE: 150 MMHG | OXYGEN SATURATION: 98 % | RESPIRATION RATE: 16 BRPM

## 2017-10-04 PROCEDURE — 3331090001 HH PPS REVENUE CREDIT

## 2017-10-04 PROCEDURE — 3331090002 HH PPS REVENUE DEBIT

## 2017-10-04 PROCEDURE — G0300 HHS/HOSPICE OF LPN EA 15 MIN: HCPCS

## 2017-10-05 ENCOUNTER — HOME CARE VISIT (OUTPATIENT)
Dept: HOME HEALTH SERVICES | Facility: HOME HEALTH | Age: 77
End: 2017-10-05
Payer: MEDICARE

## 2017-10-05 PROCEDURE — G0151 HHCP-SERV OF PT,EA 15 MIN: HCPCS

## 2017-10-05 PROCEDURE — 3331090001 HH PPS REVENUE CREDIT

## 2017-10-05 PROCEDURE — 3331090002 HH PPS REVENUE DEBIT

## 2017-10-06 ENCOUNTER — HOME CARE VISIT (OUTPATIENT)
Dept: SCHEDULING | Facility: HOME HEALTH | Age: 77
End: 2017-10-06
Payer: MEDICARE

## 2017-10-06 VITALS
DIASTOLIC BLOOD PRESSURE: 64 MMHG | SYSTOLIC BLOOD PRESSURE: 110 MMHG | RESPIRATION RATE: 18 BRPM | OXYGEN SATURATION: 98 % | TEMPERATURE: 98.2 F | HEART RATE: 72 BPM

## 2017-10-06 VITALS
OXYGEN SATURATION: 97 % | TEMPERATURE: 97.5 F | DIASTOLIC BLOOD PRESSURE: 88 MMHG | HEART RATE: 68 BPM | SYSTOLIC BLOOD PRESSURE: 170 MMHG

## 2017-10-06 PROCEDURE — 3331090002 HH PPS REVENUE DEBIT

## 2017-10-06 PROCEDURE — 3331090001 HH PPS REVENUE CREDIT

## 2017-10-06 PROCEDURE — G0300 HHS/HOSPICE OF LPN EA 15 MIN: HCPCS

## 2017-10-07 PROCEDURE — 3331090002 HH PPS REVENUE DEBIT

## 2017-10-07 PROCEDURE — 3331090001 HH PPS REVENUE CREDIT

## 2017-10-08 PROCEDURE — 3331090001 HH PPS REVENUE CREDIT

## 2017-10-08 PROCEDURE — 3331090002 HH PPS REVENUE DEBIT

## 2017-10-09 ENCOUNTER — HOME CARE VISIT (OUTPATIENT)
Dept: SCHEDULING | Facility: HOME HEALTH | Age: 77
End: 2017-10-09
Payer: MEDICARE

## 2017-10-09 VITALS
TEMPERATURE: 99.1 F | SYSTOLIC BLOOD PRESSURE: 120 MMHG | HEART RATE: 76 BPM | DIASTOLIC BLOOD PRESSURE: 70 MMHG | OXYGEN SATURATION: 98 % | RESPIRATION RATE: 18 BRPM

## 2017-10-09 PROCEDURE — 3331090002 HH PPS REVENUE DEBIT

## 2017-10-09 PROCEDURE — G0300 HHS/HOSPICE OF LPN EA 15 MIN: HCPCS

## 2017-10-09 PROCEDURE — 3331090001 HH PPS REVENUE CREDIT

## 2017-10-10 ENCOUNTER — HOSPITAL ENCOUNTER (INPATIENT)
Age: 77
LOS: 10 days | Discharge: NURSING, REHAB OR LONG-TERM CARE | DRG: 240 | End: 2017-10-20
Attending: SURGERY | Admitting: SURGERY
Payer: MEDICARE

## 2017-10-10 ENCOUNTER — PATIENT OUTREACH (OUTPATIENT)
Dept: INTERNAL MEDICINE CLINIC | Age: 77
End: 2017-10-10

## 2017-10-10 PROBLEM — M86.9 OSTEOMYELITIS (HCC): Status: ACTIVE | Noted: 2017-10-10

## 2017-10-10 LAB
ALBUMIN SERPL-MCNC: 2.7 G/DL (ref 3.5–5)
ALBUMIN/GLOB SERPL: 0.6 {RATIO} (ref 1.1–2.2)
ALP SERPL-CCNC: 77 U/L (ref 45–117)
ALT SERPL-CCNC: 36 U/L (ref 12–78)
ANION GAP SERPL CALC-SCNC: 9 MMOL/L (ref 5–15)
AST SERPL-CCNC: 28 U/L (ref 15–37)
BASOPHILS # BLD: 0 K/UL (ref 0–0.1)
BASOPHILS NFR BLD: 0 % (ref 0–1)
BILIRUB SERPL-MCNC: 0.3 MG/DL (ref 0.2–1)
BUN SERPL-MCNC: 29 MG/DL (ref 6–20)
BUN/CREAT SERPL: 19 (ref 12–20)
CALCIUM SERPL-MCNC: 8.9 MG/DL (ref 8.5–10.1)
CHLORIDE SERPL-SCNC: 104 MMOL/L (ref 97–108)
CO2 SERPL-SCNC: 24 MMOL/L (ref 21–32)
CREAT SERPL-MCNC: 1.51 MG/DL (ref 0.7–1.3)
CRP SERPL-MCNC: 15 MG/DL (ref 0–0.6)
EOSINOPHIL # BLD: 0.1 K/UL (ref 0–0.4)
EOSINOPHIL NFR BLD: 1 % (ref 0–7)
ERYTHROCYTE [DISTWIDTH] IN BLOOD BY AUTOMATED COUNT: 13.4 % (ref 11.5–14.5)
ERYTHROCYTE [SEDIMENTATION RATE] IN BLOOD: 120 MM/HR (ref 0–20)
EST. AVERAGE GLUCOSE BLD GHB EST-MCNC: 177 MG/DL
GLOBULIN SER CALC-MCNC: 4.7 G/DL (ref 2–4)
GLUCOSE BLD STRIP.AUTO-MCNC: 154 MG/DL (ref 65–100)
GLUCOSE BLD STRIP.AUTO-MCNC: 177 MG/DL (ref 65–100)
GLUCOSE SERPL-MCNC: 150 MG/DL (ref 65–100)
HBA1C MFR BLD: 7.8 % (ref 4.2–6.3)
HCT VFR BLD AUTO: 29.4 % (ref 36.6–50.3)
HGB BLD-MCNC: 9.9 G/DL (ref 12.1–17)
LYMPHOCYTES # BLD: 1.5 K/UL (ref 0.8–3.5)
LYMPHOCYTES NFR BLD: 14 % (ref 12–49)
MAGNESIUM SERPL-MCNC: 2.1 MG/DL (ref 1.6–2.4)
MCH RBC QN AUTO: 28.1 PG (ref 26–34)
MCHC RBC AUTO-ENTMCNC: 33.7 G/DL (ref 30–36.5)
MCV RBC AUTO: 83.5 FL (ref 80–99)
MONOCYTES # BLD: 1.2 K/UL (ref 0–1)
MONOCYTES NFR BLD: 12 % (ref 5–13)
NEUTS SEG # BLD: 7.8 K/UL (ref 1.8–8)
NEUTS SEG NFR BLD: 73 % (ref 32–75)
PLATELET # BLD AUTO: 280 K/UL (ref 150–400)
POTASSIUM SERPL-SCNC: 4.9 MMOL/L (ref 3.5–5.1)
PROT SERPL-MCNC: 7.4 G/DL (ref 6.4–8.2)
RBC # BLD AUTO: 3.52 M/UL (ref 4.1–5.7)
SERVICE CMNT-IMP: ABNORMAL
SERVICE CMNT-IMP: ABNORMAL
SODIUM SERPL-SCNC: 137 MMOL/L (ref 136–145)
WBC # BLD AUTO: 10.5 K/UL (ref 4.1–11.1)

## 2017-10-10 PROCEDURE — 74011636637 HC RX REV CODE- 636/637: Performed by: NURSE PRACTITIONER

## 2017-10-10 PROCEDURE — 85025 COMPLETE CBC W/AUTO DIFF WBC: CPT | Performed by: NURSE PRACTITIONER

## 2017-10-10 PROCEDURE — 65270000015 HC RM PRIVATE ONCOLOGY

## 2017-10-10 PROCEDURE — 86140 C-REACTIVE PROTEIN: CPT | Performed by: NURSE PRACTITIONER

## 2017-10-10 PROCEDURE — 74011000258 HC RX REV CODE- 258: Performed by: NURSE PRACTITIONER

## 2017-10-10 PROCEDURE — 74011250636 HC RX REV CODE- 250/636: Performed by: SURGERY

## 2017-10-10 PROCEDURE — 3331090001 HH PPS REVENUE CREDIT

## 2017-10-10 PROCEDURE — 80053 COMPREHEN METABOLIC PANEL: CPT | Performed by: NURSE PRACTITIONER

## 2017-10-10 PROCEDURE — 82962 GLUCOSE BLOOD TEST: CPT

## 2017-10-10 PROCEDURE — 74011000258 HC RX REV CODE- 258: Performed by: SURGERY

## 2017-10-10 PROCEDURE — 74011250637 HC RX REV CODE- 250/637: Performed by: NURSE PRACTITIONER

## 2017-10-10 PROCEDURE — 3331090002 HH PPS REVENUE DEBIT

## 2017-10-10 PROCEDURE — 36415 COLL VENOUS BLD VENIPUNCTURE: CPT | Performed by: NURSE PRACTITIONER

## 2017-10-10 PROCEDURE — 85652 RBC SED RATE AUTOMATED: CPT | Performed by: NURSE PRACTITIONER

## 2017-10-10 PROCEDURE — 83735 ASSAY OF MAGNESIUM: CPT | Performed by: NURSE PRACTITIONER

## 2017-10-10 PROCEDURE — 83036 HEMOGLOBIN GLYCOSYLATED A1C: CPT | Performed by: NURSE PRACTITIONER

## 2017-10-10 RX ORDER — VANCOMYCIN HYDROCHLORIDE
1250 EVERY 12 HOURS
Status: DISCONTINUED | OUTPATIENT
Start: 2017-10-11 | End: 2017-10-12

## 2017-10-10 RX ORDER — DEXTROSE 50 % IN WATER (D50W) INTRAVENOUS SYRINGE
12.5-25 AS NEEDED
Status: DISCONTINUED | OUTPATIENT
Start: 2017-10-10 | End: 2017-10-20 | Stop reason: HOSPADM

## 2017-10-10 RX ORDER — GUAIFENESIN 100 MG/5ML
81 LIQUID (ML) ORAL DAILY
Status: DISCONTINUED | OUTPATIENT
Start: 2017-10-11 | End: 2017-10-20 | Stop reason: HOSPADM

## 2017-10-10 RX ORDER — SODIUM CHLORIDE 450 MG/100ML
25 INJECTION, SOLUTION INTRAVENOUS CONTINUOUS
Status: DISCONTINUED | OUTPATIENT
Start: 2017-10-10 | End: 2017-10-13

## 2017-10-10 RX ORDER — INSULIN GLARGINE 100 [IU]/ML
18 INJECTION, SOLUTION SUBCUTANEOUS DAILY
Status: DISCONTINUED | OUTPATIENT
Start: 2017-10-11 | End: 2017-10-11

## 2017-10-10 RX ORDER — MAGNESIUM SULFATE 100 %
4 CRYSTALS MISCELLANEOUS AS NEEDED
Status: DISCONTINUED | OUTPATIENT
Start: 2017-10-10 | End: 2017-10-20 | Stop reason: HOSPADM

## 2017-10-10 RX ORDER — INSULIN LISPRO 100 [IU]/ML
2 INJECTION, SOLUTION INTRAVENOUS; SUBCUTANEOUS
Status: DISCONTINUED | OUTPATIENT
Start: 2017-10-10 | End: 2017-10-11

## 2017-10-10 RX ORDER — INSULIN LISPRO 100 [IU]/ML
INJECTION, SOLUTION INTRAVENOUS; SUBCUTANEOUS
Status: DISCONTINUED | OUTPATIENT
Start: 2017-10-10 | End: 2017-10-11

## 2017-10-10 RX ORDER — METOPROLOL TARTRATE 25 MG/1
25 TABLET, FILM COATED ORAL 2 TIMES DAILY
Status: DISCONTINUED | OUTPATIENT
Start: 2017-10-10 | End: 2017-10-20 | Stop reason: HOSPADM

## 2017-10-10 RX ORDER — FACIAL-BODY WIPES
10 EACH TOPICAL DAILY PRN
Status: DISCONTINUED | OUTPATIENT
Start: 2017-10-10 | End: 2017-10-20 | Stop reason: HOSPADM

## 2017-10-10 RX ORDER — LANOLIN ALCOHOL/MO/W.PET/CERES
1 CREAM (GRAM) TOPICAL
Status: DISCONTINUED | OUTPATIENT
Start: 2017-10-11 | End: 2017-10-20 | Stop reason: HOSPADM

## 2017-10-10 RX ADMIN — INSULIN LISPRO 2 UNITS: 100 INJECTION, SOLUTION INTRAVENOUS; SUBCUTANEOUS at 19:03

## 2017-10-10 RX ADMIN — INSULIN LISPRO 2 UNITS: 100 INJECTION, SOLUTION INTRAVENOUS; SUBCUTANEOUS at 16:29

## 2017-10-10 RX ADMIN — CEFEPIME HYDROCHLORIDE 2 G: 2 INJECTION, POWDER, FOR SOLUTION INTRAVENOUS at 16:00

## 2017-10-10 RX ADMIN — VANCOMYCIN HYDROCHLORIDE 3000 MG: 10 INJECTION, POWDER, LYOPHILIZED, FOR SOLUTION INTRAVENOUS at 17:06

## 2017-10-10 RX ADMIN — METOPROLOL TARTRATE 25 MG: 25 TABLET ORAL at 18:24

## 2017-10-10 RX ADMIN — SODIUM CHLORIDE 50 ML/HR: 450 INJECTION, SOLUTION INTRAVENOUS at 16:00

## 2017-10-10 NOTE — IP AVS SNAPSHOT
[address] [facility] 
[phone] Patient: Madina Cole MRN: [id number] FDP:[id number] You are allergic to the following No active allergies Immunizations Administered for This Admission Name Date Influenza Vaccine (Quad) PF 10/11/2017 Recent Documentation Height Weight BMI Smoking Status 2.057 m 113.3 kg 26.77 kg/m2 Former Smoker Emergency Contacts Name Discharge Info Relation Home Work Mobile Shola Engel CAREGIVER [3] Spouse [3] [phone]    
 Maria Elena STEEL  Spouse [3] [phone] Desirae Silva  Daughter [21]   [phone] About your hospitalization You were admitted on:  October 10, 2017 You last received care in the:  \[facility]\"" 2 GENERAL SURGERY You were discharged on:  October 20, 2017 Unit phone number:  [phone] Why you were hospitalized Your primary diagnosis was:  Not on File Your diagnoses also included:  Osteomyelitis (Hcc) Providers Seen During Your Hospitalizations Provider Role Specialty Primary office phone Amberly Vincent MD Attending Provider Vascular Surgery [phone] Your Primary Care Physician (PCP) Primary Care Physician Office Phone Office Fax [facility] [phone] Follow-up Information Follow up With Details Comments Contact Info [address]  This is your post-acute care provider [address] [facility] 
[phone] Piedad Renner III, MD In 3 weeks  [address] [facility] 
[phone] Amberly Vincent MD In 2 weeks  [address] [facility] 
[phone] Eugenio Rincon MD   [facility] [address] [facility] 
[phone] Current Discharge Medication List  
  
 START taking these medications Dose & Instructions Dispensing Information Comments Morning Noon Evening Bedtime  
 insulin glargine 100 unit/mL injection Commonly known as:  LANTUS Your last dose was: Your next dose is:    
   
   
 14 units sq daily Quantity:  1 Vial  
Refills:  0  
     
   
   
   
  
 insulin lispro 100 unit/mL injection Commonly known as:  HUMALOG Your last dose was: Your next dose is:    
   
   
 8 units sq 3 times daily acmeals Quantity:  1 Vial  
Refills:  0  
     
   
   
   
  
 oxyCODONE-acetaminophen 5-325 mg per tablet Commonly known as:  PERCOCET Your last dose was: Your next dose is:    
   
   
 Dose:  1-2 Tab Take 1-2 Tabs by mouth every four (4) hours as needed. Max Daily Amount: 12 Tabs. Quantity:  30 Tab Refills:  0  
     
   
   
   
  
 senna 8.6 mg tablet Commonly known as:  Max Quach Your last dose was: Your next dose is:    
   
   
 Dose:  2 Tab Take 2 Tabs by mouth nightly. Quantity:  60 Tab Refills:  0 CONTINUE these medications which have NOT CHANGED Dose & Instructions Dispensing Information Comments Morning Noon Evening Bedtime  
 aspirin 81 mg tablet Your last dose was: Your next dose is:    
   
   
 Dose:  81 mg Take 81 mg by mouth. Refills:  0  
     
   
   
   
  
 ferrous gluconate 324 mg (37.5 mg iron) tablet Your last dose was: Your next dose is:    
   
   
 Dose:  324 mg Take 1 Tab by mouth three (3) times daily (with meals). Quantity:  90 Tab Refills:  0  
     
   
   
   
  
 lisinopril 40 mg tablet Commonly known as:  [place] Escort Your last dose was: Your next dose is:    
   
   
 Dose:  40 mg Take 1 Tab by mouth daily. Quantity:  90 Tab Refills:  3  
     
   
   
   
  
 metoprolol tartrate 25 mg tablet Commonly known as:  LOPRESSOR  
   
 Your last dose was: Your next dose is: TAKE ONE TABLET BY MOUTH TWICE DAILY Quantity:  180 Tab Refills:  3  
     
   
   
   
  
 simvastatin 20 mg tablet Commonly known as:  ZOCOR Your last dose was: Your next dose is: TAKE ONE TABLET BY MOUTH NIGHTLY. Quantity:  90 Tab Refills:  3 STOP taking these medications ALEVE 220 mg Cap Generic drug:  naproxen sodium NovoLIN 70/30 100 unit/mL (70-30) injection Generic drug:  insulin NPH/insulin regular Where to Get Your Medications Information on where to get these meds will be given to you by the nurse or doctor. ! Ask your nurse or doctor about these medications  
  insulin glargine 100 unit/mL injection  
 insulin lispro 100 unit/mL injection  
 oxyCODONE-acetaminophen 5-325 mg per tablet  
 senna 8.6 mg tablet Discharge Instructions None Discharge Orders None [facility] Announcement We are excited to announce that we are making your provider's discharge notes available to you in [facility]. You will see these notes when they are completed and signed by the physician that discharged you from your recent hospital stay. If you have any questions or concerns about any information you see in [facility], please call the Health Information Department where you were seen or reach out to your Primary Care Provider for more information about your plan of care. Introducing hospitals & HEALTH SERVICES! Leigh Contreras introduces [facility] patient portal. Now you can access parts of your medical record, email your doctor's office, and request medication refills online. 1. In your internet browser, go to [email or web address] 2. Click on the First Time User? Click Here link in the Sign In box. You will see the New Member Sign Up page. 3. Enter your [facility] Access Code exactly as it appears below.  You will not need to use this code after youve completed the sign-up process. If you do not sign up before the expiration date, you must request a new code. · [facility] Access Code: [email or web address] Expires: 12/6/2017 11:20 AM 
 
4. Enter the last four digits of your Social Security Number (xxxx) and Date of Birth (mm/dd/yyyy) as indicated and click Submit. You will be taken to the next sign-up page. 5. Create a [facility] ID. This will be your [facility] login ID and cannot be changed, so think of one that is secure and easy to remember. 6. Create a [facility] password. You can change your password at any time. 7. Enter your Password Reset Question and Answer. This can be used at a later time if you forget your password. 8. Enter your e-mail address. You will receive e-mail notification when new information is available in [address] 9. Click Sign Up. You can now view and download portions of your medical record. 10. Click the Download Summary menu link to download a portable copy of your medical information. If you have questions, please visit the Frequently Asked Questions section of the [facility] website. Remember, [facility] is NOT to be used for urgent needs. For medical emergencies, dial 911. Now available from your iPhone and Android! General Information Please provide this summary of care documentation to your next provider. Patient Signature:  ____________________________________________________________ Date:  ____________________________________________________________  
  
Luly Georges Provider Signature:  ____________________________________________________________ Date:  ____________________________________________________________

## 2017-10-10 NOTE — H&P
Vascular Surgery History & Physical     Subjective:      Jannie Li is a 68 y.o. AAM who is well known to our service. He has been follow by our practice since early September when he was admitted to the hospital by his PCP Dr. Fabian Lange for a RLE DM foot wound. During that admission he was treated for PAD w athrectomy and angioplasty of his TPT on 9/13/17 followed by I&D of the wound and amputation of the 3rd-5th digits. MRI at the time confirmed osteomylitis. He has been followed weekly in our clinic for his wound and it has slowly been deteriorating. He presented to the clinic today for routine surveillance and was noted to have significant necrosis of the skin in the wound bed on the lateral aspect with chad, foul smelling pus draining from the same area. His HR and RR were normal. Patient previous culture grew provendcia stuartii, proteus, and pseudomonas. He was treated w IV cefepime and Vancomycin during admission and outpatient w Augmentin. He was directly admitted for IV antibx and repeat I & D later this week.            Past Medical History:   Diagnosis Date    CAD (coronary artery disease)       Palpitations    Insulin Dependent Diabetes ([facility] [address]      Hypertension      DM foot wound of the RLE        PAD   CKD III baseline creatinine 1.24.               Past Surgical History:   Procedure Laterality Date     bilateral cataract surgery     surgery to left foot ulcer,   PICC right arm  Athrectomy and angioplasty of the TPT 9/13/17  I and D w 3rd-5th digit amputation of the RLE 09/15/2017            Family History   Problem Relation Age of Onset    Cancer Father      No Known Problems Mother             Social History   Substance Use Topics    Smoking status: Former Smoker    Smokeless tobacco: Never Used    Alcohol use No               Prior to Admission medications    Medication Sig Start Date End Date Taking?  Authorizing Provider   aspirin 81 mg tablet Take 81 mg by mouth.     Yes Tab Lau MD   ferrous gluconate 324 mg (37.5 mg iron) tablet Take 1 Tab by mouth three (3) times daily (with meals). 9/21/17     Marlen Tapia MD   naproxen sodium (ALEVE) 220 mg cap Take 220 mg by mouth two (2) times daily as needed (Pain).       Historical Provider   insulin NPH/insulin regular (NOVOLIN 70/30) 100 unit/mL (70-30) injection 18 units before meals breakfast and dinner 8/3/17     Marlen Tapia MD   ACCU-CHEK CLAUDINE PLUS TEST STRP strip TEST THREE TIMES DAILY 8/2/17     Marlen Tapia MD   metoprolol tartrate (LOPRESSOR) 25 mg tablet TAKE ONE TABLET BY MOUTH TWICE DAILY 5/29/17     Marlen Tapia MD   lisinopril (PRINIVIL, ZESTRIL) 40 mg tablet Take 1 Tab by mouth daily. 5/29/17     Marlen Tapia MD   simvastatin (ZOCOR) 20 mg tablet TAKE ONE TABLET BY MOUTH NIGHTLY. 5/22/17     Marlen Tapia MD   Insulin Syringe-Needle U-100 1 mL 31 gauge x 5/16 syrg USE ONE  TWICE DAILY 2/2/17     Marlen Tapia MD   glucose blood VI test strips (ACCU-CHEK CLAUDINE PLUS TEST STRP) strip Test three times daily 3/31/16     Marlen Tapia MD   glucose blood VI test strips (ACCU-CHEK CLAUDINE) strip Use to test blood sugar twice daily. DX.E11.9 3/10/16     Marlen Tapia MD   meloxicam (MOBIC) 15 mg tablet Take 1 Tab by mouth daily. Patient not taking: Reported on 10/9/2017 11/13/15     Marlen Tapia MD   Insulin Needles, Disposable, (NOVOFINE 30) 30 x 1/3 \" Inject 5 times a day 5/3/15     Marlen Tapia MD      No Known Allergies      Review of Systems:  Review of Systems   Constitutional: Negative for activity change, chills, fatigue and fever. HENT: Negative. Eyes: Negative. Respiratory: Negative for cough and shortness of breath. Cardiovascular: Negative for chest pain. Gastrointestinal: Negative for nausea and vomiting. Endocrine: Negative. Genitourinary: Negative. Musculoskeletal: Negative. Skin: Positive for color change and wound. Allergic/Immunologic: Negative. Neurological: Negative. Hematological: Negative. Psychiatric/Behavioral: Negative.          Objective:      No data found.        Physical Exam:   Physical Exam   Constitutional: He is oriented to person, place, and time. He appears well-developed and well-nourished. HENT:   Head: Normocephalic and atraumatic. Eyes: Pupils are equal, round, and reactive to light. Neck: Normal range of motion. Neck supple. Cardiovascular: Normal rate and regular rhythm. Pulmonary/Chest: Effort normal and breath sounds normal.   Abdominal: Soft. Bowel sounds are normal.   Musculoskeletal: Normal range of motion. Neurological: He is alert and oriented to person, place, and time. Skin:        Psychiatric: Judgment and thought content normal.         Data Review:    Recent Results    No results found for this or any previous visit (from the past 24 hour(s)).       Results from [place] encounter on 09/07/17   XR FOOT RT MIN 3 V    Narrative EXAM:  XR FOOT RT MIN 3 V    INDICATION:   Assess bony integrity-----questionable suggestive features of  osteomyelitis third fourth fifth digits. COMPARISON:  None. FINDINGS:  Three views of the right foot demonstrate degenerative change in the  1st interphalangeal joint. No visible cortical disruption in the 4th or 5th  digits. Plantar heel spur. Minimal degenerative change in the midfoot. Minimal  soft tissue swelling about the ankle           Impression IMPRESSION:    1. No visible cortical disruption in the 4th or 5th digits. If there is clinical  concern for osteomyelitis, MRI is the imaging study of choice. 2. Degenerative appearing change in the 1st interphalangeal joint.                Assessment/Plan:      Principal Problems:                        RLE DM foot wound infection w osteomylitis  CBC, ESR, and CRP have been ordered. Initiated IV Ceftazidine and IV Vancomycin. Repeat cx sent from our office today.   Plan for surgical debridement on Thursday. Continue wet to dry dressing changes in the interim.       Active Problems                        IDDM II  Initiate home dose of NPH at 2/3 of his home dose and titrate as needed. Ordered POC achs, SSI, and A1c. Consulted PCP Dr. Julien Hinds                            PAD  Continue home medication ASA.                              CKD III w baseline creatinine of 1.2  CMP ordered.                           CAD  Continue ASA, BBlocker, and statin.       VTE prophylaxis:  LMWH x1 dose in the am then hold for procedure on Thursday.       Disposition:   Home w HH > 3-4 days.       Signed By: Wendi Valle NP      October 10, 2017

## 2017-10-10 NOTE — PROGRESS NOTES
Bedside shift change report given to [facility] RN  (oncoming nurse) by Ladonna Coello RN  (offgoing nurse). Report included the following information SBAR and MAR.

## 2017-10-10 NOTE — PROGRESS NOTES
Original order for humalong 75/25 mix, 12 units twice daily  This order is replaced with equivalent dose of lantus (18 units daily) and humalog (2 units three times daily with meals) per pharmacy protocol    Kane Castro, JEFFERYD

## 2017-10-10 NOTE — PROGRESS NOTES
Primary Nurse Tre Nguyen RN and Pamela Rico RN performed a dual skin assessment on this patient Impairment noted- see wound doc flow sheet    Right foot wound.      Lobo score is 19

## 2017-10-10 NOTE — PROGRESS NOTES
Pharmacy Automatic Renal Dosing Protocol - Antimicrobials    Indication for Antimicrobials: Surgical Site Infection: s/p right toe amputations 9/15    Current Regimen of Each Antimicrobial (Start Day & Day of Therapy):  Vancomycin; start 10/10; Day #1  Cefepime 1gm IV q8h; start 10/10; Day #1    Goal Vancomycin Level (if needed): 15-20 for now  Level(s) Ordered (if needed): n/a  Measured / Extrapolated Vancomycin Levels (if drawn): n/a   / n/a    Significant Cultures: n/a    CAPD, Hemodialysis or Renal Replacement Therapy: n/a   Paralysis, amputations, malnutrition: n/a    Creatinine   Date/Time Value Ref Range Status   2017 04:03 AM 1.24 0.70 - 1.30 MG/DL Final       No results for input(s): CREA, BUN, WBC in the last 72 hours. Temp (24hrs), Av.1 °F (37.3 °C), Min:99.1 °F (37.3 °C), Max:99.1 °F (37.3 °C)    Creatinine Clearance (Creatinine Clearance (ml/min)): 69 (based on 81 in; 116.1kg on 10/3; Cr 1.24 on )    Impression/Plan:   - Direct admission; pending H&P  - Pending labs & weight; will use previous results to calculate doses  - Vancomycin 3gm IV x1, then 1.25gm IV q12h for Trough Range 15-20 for now; may decrease pending more information  - Change Cefepime to 2gm IV q8h; infuse over 180 min; 1st dose over 30 min  - Daily [facility]       Pharmacy will follow daily and adjust medications as appropriate for renal function and/or serum levels.     Thank you,  Jett Qiu, [facility]     Renal Dosing Tables on Pharmweb

## 2017-10-10 NOTE — IP AVS SNAPSHOT
[address] [facility] 
[phone] Patient: Claribel Mcqueen MRN: [id number] GIU:[birth year] Current Discharge Medication List  
  
START taking these medications Dose & Instructions Dispensing Information Comments Morning Noon Evening Bedtime  
 insulin glargine 100 unit/mL injection Commonly known as:  LANTUS Your last dose was: Your next dose is:    
   
   
 14 units sq daily Quantity:  1 Vial  
Refills:  0  
     
   
   
   
  
 insulin lispro 100 unit/mL injection Commonly known as:  HUMALOG Your last dose was: Your next dose is:    
   
   
 8 units sq 3 times daily acmeals Quantity:  1 Vial  
Refills:  0  
     
   
   
   
  
 oxyCODONE-acetaminophen 5-325 mg per tablet Commonly known as:  PERCOCET Your last dose was: Your next dose is:    
   
   
 Dose:  1-2 Tab Take 1-2 Tabs by mouth every four (4) hours as needed. Max Daily Amount: 12 Tabs. Quantity:  30 Tab Refills:  0  
     
   
   
   
  
 senna 8.6 mg tablet Commonly known as:  Max Quach Your last dose was: Your next dose is:    
   
   
 Dose:  2 Tab Take 2 Tabs by mouth nightly. Quantity:  60 Tab Refills:  0 CONTINUE these medications which have NOT CHANGED Dose & Instructions Dispensing Information Comments Morning Noon Evening Bedtime  
 aspirin 81 mg tablet Your last dose was: Your next dose is:    
   
   
 Dose:  81 mg Take 81 mg by mouth. Refills:  0  
     
   
   
   
  
 ferrous gluconate 324 mg (37.5 mg iron) tablet Your last dose was: Your next dose is:    
   
   
 Dose:  324 mg Take 1 Tab by mouth three (3) times daily (with meals). Quantity:  90 Tab Refills:  0  
     
   
   
   
  
 lisinopril 40 mg tablet Commonly known as:  Jeet Stout Your last dose was: Your next dose is:    
   
   
 Dose:  40 mg Take 1 Tab by mouth daily. Quantity:  90 Tab Refills:  3  
     
   
   
   
  
 metoprolol tartrate 25 mg tablet Commonly known as:  LOPRESSOR Your last dose was: Your next dose is: TAKE ONE TABLET BY MOUTH TWICE DAILY Quantity:  180 Tab Refills:  3  
     
   
   
   
  
 simvastatin 20 mg tablet Commonly known as:  ZOCOR Your last dose was: Your next dose is: TAKE ONE TABLET BY MOUTH NIGHTLY. Quantity:  90 Tab Refills:  3 STOP taking these medications ALEVE 220 mg Cap Generic drug:  naproxen sodium NovoLIN 70/30 100 unit/mL (70-30) injection Generic drug:  insulin NPH/insulin regular Where to Get Your Medications Information on where to get these meds will be given to you by the nurse or doctor. ! Ask your nurse or doctor about these medications  
  insulin glargine 100 unit/mL injection  
 insulin lispro 100 unit/mL injection  
 oxyCODONE-acetaminophen 5-325 mg per tablet  
 senna 8.6 mg tablet

## 2017-10-11 ENCOUNTER — HOME CARE VISIT (OUTPATIENT)
Dept: HOME HEALTH SERVICES | Facility: HOME HEALTH | Age: 77
End: 2017-10-11
Payer: MEDICARE

## 2017-10-11 ENCOUNTER — ANESTHESIA EVENT (OUTPATIENT)
Dept: SURGERY | Age: 77
DRG: 240 | End: 2017-10-11
Payer: MEDICARE

## 2017-10-11 LAB
ANION GAP SERPL CALC-SCNC: 8 MMOL/L (ref 5–15)
BASOPHILS # BLD: 0 K/UL (ref 0–0.1)
BASOPHILS NFR BLD: 0 % (ref 0–1)
BUN SERPL-MCNC: 21 MG/DL (ref 6–20)
BUN/CREAT SERPL: 18 (ref 12–20)
CALCIUM SERPL-MCNC: 8.8 MG/DL (ref 8.5–10.1)
CHLORIDE SERPL-SCNC: 106 MMOL/L (ref 97–108)
CO2 SERPL-SCNC: 22 MMOL/L (ref 21–32)
CREAT SERPL-MCNC: 1.2 MG/DL (ref 0.7–1.3)
EOSINOPHIL # BLD: 0.1 K/UL (ref 0–0.4)
EOSINOPHIL NFR BLD: 1 % (ref 0–7)
ERYTHROCYTE [DISTWIDTH] IN BLOOD BY AUTOMATED COUNT: 13.4 % (ref 11.5–14.5)
GLUCOSE BLD STRIP.AUTO-MCNC: 128 MG/DL (ref 65–100)
GLUCOSE BLD STRIP.AUTO-MCNC: 133 MG/DL (ref 65–100)
GLUCOSE BLD STRIP.AUTO-MCNC: 189 MG/DL (ref 65–100)
GLUCOSE BLD STRIP.AUTO-MCNC: 217 MG/DL (ref 65–100)
GLUCOSE SERPL-MCNC: 149 MG/DL (ref 65–100)
HCT VFR BLD AUTO: 27.2 % (ref 36.6–50.3)
HGB BLD-MCNC: 8.9 G/DL (ref 12.1–17)
LYMPHOCYTES # BLD: 1.6 K/UL (ref 0.8–3.5)
LYMPHOCYTES NFR BLD: 18 % (ref 12–49)
MCH RBC QN AUTO: 27 PG (ref 26–34)
MCHC RBC AUTO-ENTMCNC: 32.7 G/DL (ref 30–36.5)
MCV RBC AUTO: 82.4 FL (ref 80–99)
MONOCYTES # BLD: 1.1 K/UL (ref 0–1)
MONOCYTES NFR BLD: 12 % (ref 5–13)
NEUTS SEG # BLD: 6.3 K/UL (ref 1.8–8)
NEUTS SEG NFR BLD: 69 % (ref 32–75)
PLATELET # BLD AUTO: 276 K/UL (ref 150–400)
POTASSIUM SERPL-SCNC: 4.5 MMOL/L (ref 3.5–5.1)
RBC # BLD AUTO: 3.3 M/UL (ref 4.1–5.7)
SERVICE CMNT-IMP: ABNORMAL
SODIUM SERPL-SCNC: 136 MMOL/L (ref 136–145)
WBC # BLD AUTO: 9.1 K/UL (ref 4.1–11.1)

## 2017-10-11 PROCEDURE — 74011250636 HC RX REV CODE- 250/636: Performed by: SURGERY

## 2017-10-11 PROCEDURE — 3331090002 HH PPS REVENUE DEBIT

## 2017-10-11 PROCEDURE — 74011000258 HC RX REV CODE- 258: Performed by: SURGERY

## 2017-10-11 PROCEDURE — 85025 COMPLETE CBC W/AUTO DIFF WBC: CPT | Performed by: NURSE PRACTITIONER

## 2017-10-11 PROCEDURE — 36415 COLL VENOUS BLD VENIPUNCTURE: CPT | Performed by: NURSE PRACTITIONER

## 2017-10-11 PROCEDURE — 90471 IMMUNIZATION ADMIN: CPT

## 2017-10-11 PROCEDURE — 74011636637 HC RX REV CODE- 636/637: Performed by: NURSE PRACTITIONER

## 2017-10-11 PROCEDURE — 82962 GLUCOSE BLOOD TEST: CPT

## 2017-10-11 PROCEDURE — 74011636637 HC RX REV CODE- 636/637: Performed by: INTERNAL MEDICINE

## 2017-10-11 PROCEDURE — 65270000015 HC RM PRIVATE ONCOLOGY

## 2017-10-11 PROCEDURE — 3331090001 HH PPS REVENUE CREDIT

## 2017-10-11 PROCEDURE — 74011250637 HC RX REV CODE- 250/637: Performed by: NURSE PRACTITIONER

## 2017-10-11 PROCEDURE — 90686 IIV4 VACC NO PRSV 0.5 ML IM: CPT | Performed by: SURGERY

## 2017-10-11 PROCEDURE — 80048 BASIC METABOLIC PNL TOTAL CA: CPT | Performed by: NURSE PRACTITIONER

## 2017-10-11 RX ORDER — INSULIN GLARGINE 100 [IU]/ML
18 INJECTION, SOLUTION SUBCUTANEOUS ONCE
Status: COMPLETED | OUTPATIENT
Start: 2017-10-11 | End: 2017-10-11

## 2017-10-11 RX ORDER — INSULIN LISPRO 100 [IU]/ML
INJECTION, SOLUTION INTRAVENOUS; SUBCUTANEOUS
Status: DISCONTINUED | OUTPATIENT
Start: 2017-10-11 | End: 2017-10-12

## 2017-10-11 RX ORDER — INSULIN GLARGINE 100 [IU]/ML
9 INJECTION, SOLUTION SUBCUTANEOUS DAILY
Status: DISCONTINUED | OUTPATIENT
Start: 2017-10-12 | End: 2017-10-13

## 2017-10-11 RX ADMIN — CEFEPIME HYDROCHLORIDE 2 G: 2 INJECTION, POWDER, FOR SOLUTION INTRAVENOUS at 23:58

## 2017-10-11 RX ADMIN — FERROUS SULFATE TAB 325 MG (65 MG ELEMENTAL FE) 325 MG: 325 (65 FE) TAB at 10:24

## 2017-10-11 RX ADMIN — CEFEPIME HYDROCHLORIDE 2 G: 2 INJECTION, POWDER, FOR SOLUTION INTRAVENOUS at 10:24

## 2017-10-11 RX ADMIN — INSULIN LISPRO 3 UNITS: 100 INJECTION, SOLUTION INTRAVENOUS; SUBCUTANEOUS at 11:57

## 2017-10-11 RX ADMIN — METOPROLOL TARTRATE 25 MG: 25 TABLET ORAL at 10:24

## 2017-10-11 RX ADMIN — METOPROLOL TARTRATE 25 MG: 25 TABLET ORAL at 17:13

## 2017-10-11 RX ADMIN — VANCOMYCIN HYDROCHLORIDE 1250 MG: 10 INJECTION, POWDER, LYOPHILIZED, FOR SOLUTION INTRAVENOUS at 11:57

## 2017-10-11 RX ADMIN — ASPIRIN 81 MG 81 MG: 81 TABLET ORAL at 10:24

## 2017-10-11 RX ADMIN — CEFEPIME HYDROCHLORIDE 2 G: 2 INJECTION, POWDER, FOR SOLUTION INTRAVENOUS at 00:41

## 2017-10-11 RX ADMIN — INSULIN GLARGINE 18 UNITS: 100 INJECTION, SOLUTION SUBCUTANEOUS at 10:24

## 2017-10-11 RX ADMIN — VANCOMYCIN HYDROCHLORIDE 1250 MG: 10 INJECTION, POWDER, LYOPHILIZED, FOR SOLUTION INTRAVENOUS at 20:25

## 2017-10-11 RX ADMIN — CEFEPIME HYDROCHLORIDE 2 G: 2 INJECTION, POWDER, FOR SOLUTION INTRAVENOUS at 17:13

## 2017-10-11 RX ADMIN — INFLUENZA VIRUS VACCINE 0.5 ML: 15; 15; 15; 15 SUSPENSION INTRAMUSCULAR at 10:25

## 2017-10-11 RX ADMIN — INSULIN LISPRO 2 UNITS: 100 INJECTION, SOLUTION INTRAVENOUS; SUBCUTANEOUS at 07:30

## 2017-10-11 NOTE — PROGRESS NOTES
Vascular Surgery Progress Note  Laura Cannon ACNP-BC  10/11/2017 8:07 AM       Subjective:     Inessa Mcguire a 68 y.o. AAM who is well known to our service. Justin Jorgensen has been follow by our practice since early September when he was admitted to the hospital by his PCP Dr. Vipin Blount for a RLE DM foot wound.  During that admission he was treated for PAD w athrectomy and angioplasty of his TPT on 9/13/17 followed by I&D of the wound and amputation of the 3rd-5th digits.  MRI at the time confirmed Yue Edwards has been followed weekly in our clinic for his wound and it has slowly been deteriorating. Justin Jorgensen presented to the clinic today for routine surveillance and was noted to have significant necrosis of the skin in the wound bed on the lateral aspect with chad, foul smelling pus draining from the same area.  His HR and RR were normal. Patient's previous culture grew provendcia stuartii, proteus, and pseudomonas.  He was treated w IV cefepime and Vancomycin during admission and outpatient w Augmentin.  He was directly admitted for IV antibx and repeat I & D in the am.      No complaints overnight. Low grade fever overnight. WBC, HR, and RR are normal.      Nursing Data:     Patient Vitals for the past 24 hrs:   BP Temp Pulse Resp SpO2   10/10/17 2124 129/49 99.9 °F (37.7 °C) 75 18 99 %   10/10/17 1600 162/73 99.3 °F (37.4 °C) 83 18 -   10/10/17 1513 140/53 99.1 °F (37.3 °C) 73 20 99 %     ---------------------------------------------------------------------------------------------------------    Intake/Output Summary (Last 24 hours) at 10/11/17 0858  Last data filed at 10/11/17 3853   Gross per 24 hour   Intake              240 ml   Output             1150 ml   Net             -910 ml       Exam:     Physical Exam   Constitutional: He is oriented to person, place, and time. He appears well-developed and well-nourished. HENT:   Head: Normocephalic and atraumatic.    Eyes: EOM are normal. Pupils are equal, round, and reactive to light. Neck: Normal range of motion. Neck supple. Cardiovascular: Normal rate and regular rhythm. 2+ RLE edema and 1+ LLE edema    Pulmonary/Chest: Effort normal and breath sounds normal.   Abdominal: Soft. Bowel sounds are normal.   Musculoskeletal: Normal range of motion. Neurological: He is alert and oriented to person, place, and time. Skin:        Psychiatric: His behavior is normal. Thought content normal.       Lab Review:     . Recent Results (from the past 24 hour(s))   C REACTIVE PROTEIN, QT    Collection Time: 10/10/17  3:24 PM   Result Value Ref Range    C-Reactive protein 15.00 (H) 0.00 - 0.60 mg/dL   CBC WITH AUTOMATED DIFF    Collection Time: 10/10/17  3:24 PM   Result Value Ref Range    WBC 10.5 4.1 - 11.1 K/uL    RBC 3.52 (L) 4.10 - 5.70 M/uL    HGB 9.9 (L) 12.1 - 17.0 g/dL    HCT 29.4 (L) 36.6 - 50.3 %    MCV 83.5 80.0 - 99.0 FL    MCH 28.1 26.0 - 34.0 PG    MCHC 33.7 30.0 - 36.5 g/dL    RDW 13.4 11.5 - 14.5 %    PLATELET 574 776 - 319 K/uL    NEUTROPHILS 73 32 - 75 %    LYMPHOCYTES 14 12 - 49 %    MONOCYTES 12 5 - 13 %    EOSINOPHILS 1 0 - 7 %    BASOPHILS 0 0 - 1 %    ABS. NEUTROPHILS 7.8 1.8 - 8.0 K/UL    ABS. LYMPHOCYTES 1.5 0.8 - 3.5 K/UL    ABS. MONOCYTES 1.2 (H) 0.0 - 1.0 K/UL    ABS. EOSINOPHILS 0.1 0.0 - 0.4 K/UL    ABS.  BASOPHILS 0.0 0.0 - 0.1 K/UL   HEMOGLOBIN A1C WITH EAG    Collection Time: 10/10/17  3:24 PM   Result Value Ref Range    Hemoglobin A1c 7.8 (H) 4.2 - 6.3 %    Est. average glucose 177 mg/dL   MAGNESIUM    Collection Time: 10/10/17  3:24 PM   Result Value Ref Range    Magnesium 2.1 1.6 - 2.4 mg/dL   METABOLIC PANEL, COMPREHENSIVE    Collection Time: 10/10/17  3:24 PM   Result Value Ref Range    Sodium 137 136 - 145 mmol/L    Potassium 4.9 3.5 - 5.1 mmol/L    Chloride 104 97 - 108 mmol/L    CO2 24 21 - 32 mmol/L    Anion gap 9 5 - 15 mmol/L    Glucose 150 (H) 65 - 100 mg/dL    BUN 29 (H) 6 - 20 MG/DL    Creatinine 1.51 (H) 0.70 - 1.30 MG/DL BUN/Creatinine ratio 19 12 - 20      GFR est AA 55 (L) >60 ml/min/1.73m2    GFR est non-AA 45 (L) >60 ml/min/1.73m2    Calcium 8.9 8.5 - 10.1 MG/DL    Bilirubin, total 0.3 0.2 - 1.0 MG/DL    ALT (SGPT) 36 12 - 78 U/L    AST (SGOT) 28 15 - 37 U/L    Alk. phosphatase 77 45 - 117 U/L    Protein, total 7.4 6.4 - 8.2 g/dL    Albumin 2.7 (L) 3.5 - 5.0 g/dL    Globulin 4.7 (H) 2.0 - 4.0 g/dL    A-G Ratio 0.6 (L) 1.1 - 2.2     SED RATE (ESR)    Collection Time: 10/10/17  3:24 PM   Result Value Ref Range    Sed rate, automated 120 (H) 0 - 20 mm/hr   GLUCOSE, POC    Collection Time: 10/10/17  4:23 PM   Result Value Ref Range    Glucose (POC) 154 (H) 65 - 100 mg/dL    Performed by [facility]     GLUCOSE, POC    Collection Time: 10/10/17 11:10 PM   Result Value Ref Range    Glucose (POC) 177 (H) 65 - 100 mg/dL    Performed by [facility]    CBC WITH AUTOMATED DIFF    Collection Time: 10/11/17  4:23 AM   Result Value Ref Range    WBC 9.1 4.1 - 11.1 K/uL    RBC 3.30 (L) 4.10 - 5.70 M/uL    HGB 8.9 (L) 12.1 - 17.0 g/dL    HCT 27.2 (L) 36.6 - 50.3 %    MCV 82.4 80.0 - 99.0 FL    MCH 27.0 26.0 - 34.0 PG    MCHC 32.7 30.0 - 36.5 g/dL    RDW 13.4 11.5 - 14.5 %    PLATELET 101 104 - 106 K/uL    NEUTROPHILS 69 32 - 75 %    LYMPHOCYTES 18 12 - 49 %    MONOCYTES 12 5 - 13 %    EOSINOPHILS 1 0 - 7 %    BASOPHILS 0 0 - 1 %    ABS. NEUTROPHILS 6.3 1.8 - 8.0 K/UL    ABS. LYMPHOCYTES 1.6 0.8 - 3.5 K/UL    ABS. MONOCYTES 1.1 (H) 0.0 - 1.0 K/UL    ABS. EOSINOPHILS 0.1 0.0 - 0.4 K/UL    ABS.  BASOPHILS 0.0 0.0 - 0.1 K/UL   METABOLIC PANEL, BASIC    Collection Time: 10/11/17  4:23 AM   Result Value Ref Range    Sodium 136 136 - 145 mmol/L    Potassium 4.5 3.5 - 5.1 mmol/L    Chloride 106 97 - 108 mmol/L    CO2 22 21 - 32 mmol/L    Anion gap 8 5 - 15 mmol/L    Glucose 149 (H) 65 - 100 mg/dL    BUN 21 (H) 6 - 20 MG/DL    Creatinine 1.20 0.70 - 1.30 MG/DL    BUN/Creatinine ratio 18 12 - 20      GFR est AA >60 >60 ml/min/1.73m2    GFR est non-AA 59 (L) >60 ml/min/1.73m2    Calcium 8.8 8.5 - 10.1 MG/DL       XR Results (most recent):    Results from Hospital Encounter encounter on 09/07/17   XR FOOT RT MIN 3 V   Narrative EXAM:  XR FOOT RT MIN 3 V    INDICATION:   Assess bony integrity-----questionable suggestive features of  osteomyelitis third fourth fifth digits. COMPARISON:  None. FINDINGS:  Three views of the right foot demonstrate degenerative change in the  1st interphalangeal joint. No visible cortical disruption in the 4th or 5th  digits. Plantar heel spur. Minimal degenerative change in the midfoot. Minimal  soft tissue swelling about the ankle         Impression IMPRESSION:    1. No visible cortical disruption in the 4th or 5th digits. If there is clinical  concern for osteomyelitis, MRI is the imaging study of choice. 2. Degenerative appearing change in the 1st interphalangeal joint. 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 CRP 15, WBC nl. Patient did not meet SIRs criteria on arrival.   Repeat wound cx pending from our office from 10/10/17. Continue IV Cefepime and IV Vancomycin. Plan for surgical debridement in the am.  Continue wet to dry dressing changes in the interim.        Active Problems:                        IDDM II  A1c 7.8  Well controlled this am.  NPO at midnight for procedure in the am.  Decrease lantus dose by 1/2 for the am dose tomorrow. Continue POC achs and SSI. Consulted PCP Dr. Shaila Lin                        NHX  Continue home medication ASA.                                        CKD III w baseline creatinine of 1.2  Mildly elevated on arrival yesterday. Now improved this am w minimal IVFs. Today at baseline. Continue to monitor.                  Anemia  H&H decreased from previous presentation. Anemia to be evaluated by Dr. Dino Curiel per his note including ferritin, iron profile, and protein electrophoresis.                           CAD  Continue ASA, BBlocker, and statin.        VTE prophylaxis:  Held today for procedure in the am.       Disposition:   Home w  3-4 days.

## 2017-10-11 NOTE — PROGRESS NOTES
Pharmacy Clarification of the Prior to Admission Medication Regimen Retrospective to the Admission Medication Reconciliation    The patient was interviewed regarding clarification of the prior to admission medication regimen. He was questioned regarding use of any other inhalers, topical products, over the counter medications, herbal medications, vitamin products or ophthalmic/nasal/otic medication use. Information Obtained From: Patient, MD Notes, RxQuery    Recommendations/Findings: The following amendments were made to the patient's active medication list on file at [facility]:     1) Additions: none    2) Removals:    Meloxicam (MOBIC) 15 mg tablet  o Pt has no recollection of the medication and per [facility], the last recorded fill was in     3) Changes:   Insulin NPH/Regular (NOVOLIN 70/30) 100 unit/mL (Old regimen: 18 units before breakfast and dinner /New regimen: 20 units before breakfast and dinner)    4) Pertinent Pharmacy Findings:   Pt recently finished a course of Augmentin for his foot infection   He hasn't used any naproxen since the beginning of September due to being in the hospital off-and-on since then. Before that, he only used it a if he \"had a strenuous day like when I mow the lawn\"   Pt is concerned that he isn't getting enough insulin in-patient because his BG after \"only some soup\" was int he 180s     PTA medication list was corrected to the following:     Prior to Admission Medications   Prescriptions Last Dose Informant Patient Reported? Taking?   aspirin 81 mg tablet 10/10/2017 at morning Self Yes Yes   Sig: Take 81 mg by mouth. ferrous gluconate 324 mg (37.5 mg iron) tablet 10/10/2017 at morning Self No Yes   Sig: Take 1 Tab by mouth three (3) times daily (with meals). insulin NPH/insulin regular (NOVOLIN 70/30) 100 unit/mL (70-30) injection 10/10/2017 at morning Self Yes Yes   Si Units by SubCUTAneous route two (2) times daily (with meals).  Before breakfast and dinner Indications: type 2 diabetes mellitus   lisinopril (PRINIVIL, ZESTRIL) 40 mg tablet 10/10/2017 at morning Self No Yes   Sig: Take 1 Tab by mouth daily. metoprolol tartrate (LOPRESSOR) 25 mg tablet 10/10/2017 at morning Self No Yes   Sig: TAKE ONE TABLET BY MOUTH TWICE DAILY   naproxen sodium (ALEVE) 220 mg cap 9/1/2017 at Unknown Self Yes Yes   Sig: Take 220 mg by mouth two (2) times daily as needed (Pain). simvastatin (ZOCOR) 20 mg tablet 10/9/2017 at evening Self No Yes   Sig: TAKE ONE TABLET BY MOUTH NIGHTLY.       Facility-Administered Medications: None          Thank you,  Dax Kern

## 2017-10-11 NOTE — PROGRESS NOTES
General Daily Progress Note    Admit Date: 10/10/2017    Subjective:     Patient has no complaint. Current Facility-Administered Medications   Medication Dose Route Frequency    insulin lispro (HUMALOG) injection   SubCUTAneous TIDAC    insulin glargine (LANTUS) injection 18 Units  18 Units SubCUTAneous ONCE    [START ON 10/12/2017] insulin glargine (LANTUS) injection 9 Units  9 Units SubCUTAneous DAILY    0.45% sodium chloride infusion  50 mL/hr IntraVENous CONTINUOUS    dextrose (D50W) injection syrg 12.5-25 g  12.5-25 g IntraVENous PRN    glucagon (GLUCAGEN) injection 1 mg  1 mg IntraMUSCular PRN    glucose chewable tablet 16 g  4 Tab Oral PRN    vancomycin (VANCOCIN) 1250 mg in  ml infusion  1,250 mg IntraVENous Q12H    bisacodyl (DULCOLAX) suppository 10 mg  10 mg Rectal DAILY PRN    ferrous sulfate tablet 325 mg  1 Tab Oral DAILY WITH BREAKFAST    aspirin chewable tablet 81 mg  81 mg Oral DAILY    metoprolol tartrate (LOPRESSOR) tablet 25 mg  25 mg Oral BID    influenza vaccine 2017-18 (3 yrs+)(PF) (FLUZONE QUAD/FLUARIX QUAD) injection 0.5 mL  0.5 mL IntraMUSCular PRIOR TO DISCHARGE    cefepime (MAXIPIME) 2 g in 0.9% sodium chloride (MBP/ADV) 100 mL  2 g IntraVENous Q8H        Review of Systems  A comprehensive review of systems was negative.     Objective:     Patient Vitals for the past 24 hrs:   BP Temp Pulse Resp SpO2   10/11/17 0814 136/53 98.6 °F (37 °C) 80 18 100 %   10/10/17 2124 129/49 99.9 °F (37.7 °C) 75 18 99 %   10/10/17 1600 162/73 99.3 °F (37.4 °C) 83 18 -   10/10/17 1513 140/53 99.1 °F (37.3 °C) 73 20 99 %        10/09 1901 - 10/11 0700  In: 240 [P.O.:240]  Out: 1150 [Urine:1150]    Physical Exam:   Visit Vitals    /53 (BP 1 Location: Right arm, BP Patient Position: At rest)    Pulse 80    Temp 98.6 °F (37 °C)    Resp 18    SpO2 100%     General appearance: alert, cooperative, no distress, appears stated age  Neck: supple, symmetrical, trachea midline, no adenopathy, thyroid: not enlarged, symmetric, no tenderness/mass/nodules, no carotid bruit and no JVD  Lungs: clear to auscultation bilaterally  Heart: regular rate and rhythm, S1, S2 normal, no murmur, click, rub or gallop  Abdomen: soft, non-tender. Bowel sounds normal. No masses,  no organomegaly  Extremities: Bandaged left foot        Data Review   Recent Results (from the past 24 hour(s))   C REACTIVE PROTEIN, QT    Collection Time: 10/10/17  3:24 PM   Result Value Ref Range    C-Reactive protein 15.00 (H) 0.00 - 0.60 mg/dL   CBC WITH AUTOMATED DIFF    Collection Time: 10/10/17  3:24 PM   Result Value Ref Range    WBC 10.5 4.1 - 11.1 K/uL    RBC 3.52 (L) 4.10 - 5.70 M/uL    HGB 9.9 (L) 12.1 - 17.0 g/dL    HCT 29.4 (L) 36.6 - 50.3 %    MCV 83.5 80.0 - 99.0 FL    MCH 28.1 26.0 - 34.0 PG    MCHC 33.7 30.0 - 36.5 g/dL    RDW 13.4 11.5 - 14.5 %    PLATELET 886 043 - 049 K/uL    NEUTROPHILS 73 32 - 75 %    LYMPHOCYTES 14 12 - 49 %    MONOCYTES 12 5 - 13 %    EOSINOPHILS 1 0 - 7 %    BASOPHILS 0 0 - 1 %    ABS. NEUTROPHILS 7.8 1.8 - 8.0 K/UL    ABS. LYMPHOCYTES 1.5 0.8 - 3.5 K/UL    ABS. MONOCYTES 1.2 (H) 0.0 - 1.0 K/UL    ABS. EOSINOPHILS 0.1 0.0 - 0.4 K/UL    ABS.  BASOPHILS 0.0 0.0 - 0.1 K/UL   HEMOGLOBIN A1C WITH EAG    Collection Time: 10/10/17  3:24 PM   Result Value Ref Range    Hemoglobin A1c 7.8 (H) 4.2 - 6.3 %    Est. average glucose 177 mg/dL   MAGNESIUM    Collection Time: 10/10/17  3:24 PM   Result Value Ref Range    Magnesium 2.1 1.6 - 2.4 mg/dL   METABOLIC PANEL, COMPREHENSIVE    Collection Time: 10/10/17  3:24 PM   Result Value Ref Range    Sodium 137 136 - 145 mmol/L    Potassium 4.9 3.5 - 5.1 mmol/L    Chloride 104 97 - 108 mmol/L    CO2 24 21 - 32 mmol/L    Anion gap 9 5 - 15 mmol/L    Glucose 150 (H) 65 - 100 mg/dL    BUN 29 (H) 6 - 20 MG/DL    Creatinine 1.51 (H) 0.70 - 1.30 MG/DL    BUN/Creatinine ratio 19 12 - 20      GFR est AA 55 (L) >60 ml/min/1.73m2    GFR est non-AA 45 (L) >60 ml/min/1.73m2 Calcium 8.9 8.5 - 10.1 MG/DL    Bilirubin, total 0.3 0.2 - 1.0 MG/DL    ALT (SGPT) 36 12 - 78 U/L    AST (SGOT) 28 15 - 37 U/L    Alk. phosphatase 77 45 - 117 U/L    Protein, total 7.4 6.4 - 8.2 g/dL    Albumin 2.7 (L) 3.5 - 5.0 g/dL    Globulin 4.7 (H) 2.0 - 4.0 g/dL    A-G Ratio 0.6 (L) 1.1 - 2.2     SED RATE (ESR)    Collection Time: 10/10/17  3:24 PM   Result Value Ref Range    Sed rate, automated 120 (H) 0 - 20 mm/hr   GLUCOSE, POC    Collection Time: 10/10/17  4:23 PM   Result Value Ref Range    Glucose (POC) 154 (H) 65 - 100 mg/dL    Performed by [facility]     GLUCOSE, POC    Collection Time: 10/10/17 11:10 PM   Result Value Ref Range    Glucose (POC) 177 (H) 65 - 100 mg/dL    Performed by [facility]    CBC WITH AUTOMATED DIFF    Collection Time: 10/11/17  4:23 AM   Result Value Ref Range    WBC 9.1 4.1 - 11.1 K/uL    RBC 3.30 (L) 4.10 - 5.70 M/uL    HGB 8.9 (L) 12.1 - 17.0 g/dL    HCT 27.2 (L) 36.6 - 50.3 %    MCV 82.4 80.0 - 99.0 FL    MCH 27.0 26.0 - 34.0 PG    MCHC 32.7 30.0 - 36.5 g/dL    RDW 13.4 11.5 - 14.5 %    PLATELET 804 250 - 880 K/uL    NEUTROPHILS 69 32 - 75 %    LYMPHOCYTES 18 12 - 49 %    MONOCYTES 12 5 - 13 %    EOSINOPHILS 1 0 - 7 %    BASOPHILS 0 0 - 1 %    ABS. NEUTROPHILS 6.3 1.8 - 8.0 K/UL    ABS. LYMPHOCYTES 1.6 0.8 - 3.5 K/UL    ABS. MONOCYTES 1.1 (H) 0.0 - 1.0 K/UL    ABS. EOSINOPHILS 0.1 0.0 - 0.4 K/UL    ABS.  BASOPHILS 0.0 0.0 - 0.1 K/UL   METABOLIC PANEL, BASIC    Collection Time: 10/11/17  4:23 AM   Result Value Ref Range    Sodium 136 136 - 145 mmol/L    Potassium 4.5 3.5 - 5.1 mmol/L    Chloride 106 97 - 108 mmol/L    CO2 22 21 - 32 mmol/L    Anion gap 8 5 - 15 mmol/L    Glucose 149 (H) 65 - 100 mg/dL    BUN 21 (H) 6 - 20 MG/DL    Creatinine 1.20 0.70 - 1.30 MG/DL    BUN/Creatinine ratio 18 12 - 20      GFR est AA >60 >60 ml/min/1.73m2    GFR est non-AA 59 (L) >60 ml/min/1.73m2    Calcium 8.8 8.5 - 10.1 MG/DL           Assessment:     Active Problems:    Osteomyelitis ([facility] (10/10/2017)        Plan:     1. Further debridement necessary in view of mild necrosis noted. Vascular status remains a bit precarious. 2.  Continue diabetic control. 3.  We will also have anemia evaluated.

## 2017-10-11 NOTE — PROGRESS NOTES
[facility] Discharge Follow-Up      Date/Time:  2017  5:34 PM    Patient listed on discharge [facility]) report on 2017. Patient discharged from [facility] for [address] w/ Fat Layer exposed:  Osteomyelitis  -2017. RRAT score: Medium Risk            16       Total Score        3 Has Seen PCP in Last 6 Months (Yes=3, No=0)    4 IP Visits Last 12 Months (1-3=4, 4=9, >4=11)    9 Charlson Comorbidity Score (Age + Comorbid Conditions)        Criteria that do not apply:    . Living with Significant Other. Assisted Living. LTAC. SNF. or   Rehab    Patient Length of Stay (>5 days = 3)    Pt. Coverage (Medicare=5 , Medicaid, or Self-Pay=4)     Moderate    Medical History:     Past Medical History:   Diagnosis Date    CAD (coronary artery disease)     palpitations    Diabetes ([address]     Hypertension     Other ill-defined conditions     left foot ulcer       Nurse Navigator(NN) contacted the patient by telephone to perform post [facility] IP discharge assessment. Verified  and address with patient as identifiers. Provided introduction to self, and explanation of the Nurses Navigator role. Diet:   Patient reports: Diabetic Diet & Renal   Diet    Activity:    Patient reports: mostly moving around the house    Medication:   Performed medication reconciliation with patient, and patient verbalizes understanding of administration of home medications. There were no barriers to obtaining medications identified at this time. Support system:  patient and spouse    Discharge Instructions :  Reviewed discharge instructions with patient. Patient verbalizes understanding of discharge instructions and follow-up care. Red Flags:   Foot Pain/Swelling/Fever    Labs Reviewed:  Recent Labs      10/11/17   0423  10/10/17   1524   WBC  9.1  10.5   HGB  8.9*  9.9*   HCT  27.2*  29.4*   PLT  276  280     Recent Labs      10/11/17   0423  10/10/17   1524   NA  136  137   K  4.5 4.9   CL  106  104   CO2  22  24   GLU  149*  150*   BUN  21*  29*   CREA  1.20  1.51*   CA  8.8  8.9   MG   --   2.1   ALB   --   2.7*   SGOT   --   28   ALT   --   36     Advance Care Planning:   Patient was offered the opportunity to discuss advance care planning:  yes     Does patient have an Advance Directive:  no   If no, did you provide information on C[facility] Connections? yes     PCP/Specialist follow up: Patient scheduled to follow up with Marlen Tapia MD on 9/28/2017 (patient stated he needed to see surgeon before coming to PCP. atient given an opportunity to ask questions. No other clinical/social/functional needs noted. The patient agrees to contact the PCP office for questions related to their healthcare. The patient expressed thanks, offered no additional questions and ended the call. Case management Impression/ plan:  Coordinate pain management plan for patient.-severe neuropathy.

## 2017-10-11 NOTE — PROGRESS NOTES
NNTOCIP  [facility] 10/10/2017-Inpatient  RLE DM foot wound infection w/ osteomylitis f/u    Patient in for office-visit. Patient w/ wound vac. Ambulating w/ cane.        Medical History:     Past Medical History:   Diagnosis Date    CAD (coronary artery disease)     palpitations    Diabetes ([facility]     Hypertension     Other ill-defined conditions     left foot ulcer     Nurse Navigator(NN) contacted the patient during office visit  to perform f/u post [facility] IP discharge assessment. Diet:   Patient reports: Diabetic Diet. NN encouraged patient to have plentiful fluid intake. Activity:  Patient reports: mostly moving around the house with cane. C/o pain on ambulating. Medication:  Medication reconciliation done by PCP during office visit. Patient verbalizes understanding of administration of home medications. Patient presents w/ foot boot. Patient verbalized help from [place] with wound vac. NN walked patient out of building to assess gait. There were no barriers to obtaining medications identified at this time. Patient verbalizes understanding of discharge follow-up care and f/u visit. Patient verbalized understanding of medications as ordered. Advance Care Planning:   Patient was offered the opportunity to discuss advance care planning:  yes     Does patient have an Advance Directive:  no   If no, did you provide information on Caring Connections? yes     PCP/Specialist follow up: Patient scheduled to follow up with Laura Sanford MD on today 9/28/2017. Reviewed red flags with patient, and patient verbalizes understanding. Patient given an opportunity to ask questions. No other clinical/social/functional needs noted. The patient agrees to contact the PCP office for questions related to their healthcare. The patient expressed thanks, offered no additional questions and ended the call. Case management Impression/ plan:  Completes daily skin checks in wearing boot and wound vac.

## 2017-10-11 NOTE — PROGRESS NOTES
NNTOCIP  [facility] 10/10/2017-present:  RLE DM foot wound infection w osteomylitis      Patient remains admitted. Chart review done for High Risk Patient Conference Call presentation.       ED [facility] Surg Admission Note:  He has been followed weekly in our clinic for his wound and it has slowly been deteriorating. Lor Naylor presented to the clinic today for routine surveillance and was noted to have significant necrosis of the skin in the wound bed on the lateral aspect with chad, foul smelling pus draining from the same area.  His HR and RR were normal. Patient previous culture grew provendcia stuartii, proteus, and pseudomonas.  He was treated w IV cefepime and Vancomycin during admission and outpatient w Augmentin.    He was directly admitted for IV antibx and repeat I & D later this week.

## 2017-10-11 NOTE — PROGRESS NOTES
Pt. Was a direct admit by Dr. Esteban Choi, for osteomyelitis of rt. Foot, and possible debridement. He was started on IV antbx. Pt. Has hx. Of Athrectomy and angioplasty of TPT 9/13/17. He had a I&D with 3-5 digital amptutation on 9/15/17. Also, hx. Of HTN, PAD, CAD, DM-2, CKD-3. 9/17/17--wound was slowly deteriorating. Pt. Has purulent, foul smelling, drainage with necrotic tissue and palpable bone in wound. Pt. Lives with his wife and she transports him. He has had HH in the past. CM will follow and assist , as needed. Areatha Langhorne HOGCV,[id number],[phone]--[phone]    Care Management Interventions  PCP Verified by CM: Yes  Mode of Transport at Discharge:  Other (see comment) (family)  Transition of Care Consult (CM Consult): Discharge Planning, [phone]: No  Discharge Durable Medical Equipment:  (has cane, RW, and glucometer)  Health Maintenance Reviewed: Yes  Physical Therapy Consult: No  Occupational Therapy Consult: No  Speech Therapy Consult: No  Current Support Network:  (lives with Celestino rivas with 7 steps- [place] on [address]  Confirm Follow Up Transport: Family

## 2017-10-11 NOTE — PROGRESS NOTES
Oncology Nursing Communication Tool      6:59 PM  10/11/2017     Bedside and Verbal shift change report given to [facility], RN (incoming nurse) by Nina Yusuf RN (outgoing nurse) on Chelsea Flood a 68 y.o. male who was admitted on 10/10/2017  2:49 PM. Report included the following information SBAR, Kardex, Accordion, Recent Results and Med Rec Status. Significant changes during shift: Flu shot, consent for surgery, blood sugars trending WNL      Issues for physician to address: Surgical debridement, discharge disposition            Code Status: Prior     Infections: No current active infections     Allergies: Review of patient's allergies indicates no known allergies. Current diet: DIET DIABETIC CONSISTENT CARB Regular  DIET NPO       Pain Controlled [x] yes [] no   Bowel Movement [] yes [x] no   Last Bowel Movement (date) 10/9/17            Vital Signs:   Patient Vitals for the past 12 hrs:   Temp Pulse Resp BP SpO2   10/11/17 1457 99.3 °F (37.4 °C) 62 18 124/52 100 %   10/11/17 0814 98.6 °F (37 °C) 80 18 136/53 100 %      Intake & Output:     Intake/Output Summary (Last 24 hours) at 10/11/17 1859  Last data filed at 10/11/17 0429   Gross per 24 hour   Intake                0 ml   Output              950 ml   Net             -950 ml      Laboratory Results:     Recent Results (from the past 12 hour(s))   GLUCOSE, POC    Collection Time: 10/11/17  8:17 AM   Result Value Ref Range    Glucose (POC) 189 (H) 65 - 100 mg/dL    Performed by Ya Tomas, POC    Collection Time: 10/11/17 11:25 AM   Result Value Ref Range    Glucose (POC) 217 (H) 65 - 100 mg/dL    Performed by Ya Tomas, POC    Collection Time: 10/11/17  4:19 PM   Result Value Ref Range    Glucose (POC) 128 (H) 65 - 100 mg/dL    Performed by Alonso Gaytan               Opportunity for questions and clarifications were given to the incoming nurse.  Patient's bed is in low position, side rails x2, door open PRN, call bell within reach and patient not in distress.       Juwan Mead RN

## 2017-10-12 ENCOUNTER — ANESTHESIA (OUTPATIENT)
Dept: SURGERY | Age: 77
DRG: 240 | End: 2017-10-12
Payer: MEDICARE

## 2017-10-12 LAB
ANION GAP SERPL CALC-SCNC: 8 MMOL/L (ref 5–15)
BUN SERPL-MCNC: 17 MG/DL (ref 6–20)
BUN/CREAT SERPL: 15 (ref 12–20)
CALCIUM SERPL-MCNC: 8.7 MG/DL (ref 8.5–10.1)
CHLORIDE SERPL-SCNC: 105 MMOL/L (ref 97–108)
CO2 SERPL-SCNC: 23 MMOL/L (ref 21–32)
CREAT SERPL-MCNC: 1.12 MG/DL (ref 0.7–1.3)
DATE LAST DOSE: ABNORMAL
ERYTHROCYTE [DISTWIDTH] IN BLOOD BY AUTOMATED COUNT: 13.3 % (ref 11.5–14.5)
FERRITIN SERPL-MCNC: 440 NG/ML (ref 26–388)
FOLATE SERPL-MCNC: 11.2 NG/ML (ref 5–21)
GLUCOSE BLD STRIP.AUTO-MCNC: 100 MG/DL (ref 65–100)
GLUCOSE BLD STRIP.AUTO-MCNC: 115 MG/DL (ref 65–100)
GLUCOSE BLD STRIP.AUTO-MCNC: 120 MG/DL (ref 65–100)
GLUCOSE BLD STRIP.AUTO-MCNC: 186 MG/DL (ref 65–100)
GLUCOSE BLD STRIP.AUTO-MCNC: 59 MG/DL (ref 65–100)
GLUCOSE BLD STRIP.AUTO-MCNC: 80 MG/DL (ref 65–100)
GLUCOSE BLD STRIP.AUTO-MCNC: 88 MG/DL (ref 65–100)
GLUCOSE SERPL-MCNC: 103 MG/DL (ref 65–100)
HAPTOGLOB SERPL-MCNC: 439 MG/DL (ref 30–200)
HCT VFR BLD AUTO: 26 % (ref 36.6–50.3)
HGB BLD-MCNC: 8.6 G/DL (ref 12.1–17)
IRON SATN MFR SERPL: 13 % (ref 20–50)
IRON SERPL-MCNC: 23 UG/DL (ref 35–150)
LDH SERPL L TO P-CCNC: 166 U/L (ref 85–241)
MCH RBC QN AUTO: 27.2 PG (ref 26–34)
MCHC RBC AUTO-ENTMCNC: 33.1 G/DL (ref 30–36.5)
MCV RBC AUTO: 82.3 FL (ref 80–99)
PLATELET # BLD AUTO: 251 K/UL (ref 150–400)
POTASSIUM SERPL-SCNC: 4.3 MMOL/L (ref 3.5–5.1)
RBC # BLD AUTO: 3.16 M/UL (ref 4.1–5.7)
REPORTED DOSE,DOSE: ABNORMAL UNITS
REPORTED DOSE/TIME,TMG: ABNORMAL
RETICS/RBC NFR AUTO: 1 % (ref 0.7–2.1)
SERVICE CMNT-IMP: ABNORMAL
SERVICE CMNT-IMP: NORMAL
SODIUM SERPL-SCNC: 136 MMOL/L (ref 136–145)
TIBC SERPL-MCNC: 184 UG/DL (ref 250–450)
VANCOMYCIN TROUGH SERPL-MCNC: 15.7 UG/ML (ref 5–10)
VIT B12 SERPL-MCNC: 1561 PG/ML (ref 211–911)
WBC # BLD AUTO: 8.8 K/UL (ref 4.1–11.1)

## 2017-10-12 PROCEDURE — 77030006835 HC BLD SAW SAG STRY -B: Performed by: SURGERY

## 2017-10-12 PROCEDURE — 74011250636 HC RX REV CODE- 250/636: Performed by: ANESTHESIOLOGY

## 2017-10-12 PROCEDURE — 77030026438 HC STYL ET INTUB CARD -A: Performed by: NURSE ANESTHETIST, CERTIFIED REGISTERED

## 2017-10-12 PROCEDURE — 74011250636 HC RX REV CODE- 250/636

## 2017-10-12 PROCEDURE — 74011250637 HC RX REV CODE- 250/637: Performed by: NURSE PRACTITIONER

## 2017-10-12 PROCEDURE — 77030019908 HC STETH ESOPH SIMS -A: Performed by: NURSE ANESTHETIST, CERTIFIED REGISTERED

## 2017-10-12 PROCEDURE — 74011636637 HC RX REV CODE- 636/637: Performed by: NURSE PRACTITIONER

## 2017-10-12 PROCEDURE — 74011000258 HC RX REV CODE- 258: Performed by: SURGERY

## 2017-10-12 PROCEDURE — 83615 LACTATE (LD) (LDH) ENZYME: CPT | Performed by: INTERNAL MEDICINE

## 2017-10-12 PROCEDURE — 74011636637 HC RX REV CODE- 636/637: Performed by: INTERNAL MEDICINE

## 2017-10-12 PROCEDURE — 74011000250 HC RX REV CODE- 250

## 2017-10-12 PROCEDURE — 74011000250 HC RX REV CODE- 250: Performed by: ANESTHESIOLOGY

## 2017-10-12 PROCEDURE — 74011000258 HC RX REV CODE- 258: Performed by: NURSE PRACTITIONER

## 2017-10-12 PROCEDURE — 76010000138 HC OR TIME 0.5 TO 1 HR: Performed by: SURGERY

## 2017-10-12 PROCEDURE — 36415 COLL VENOUS BLD VENIPUNCTURE: CPT | Performed by: NURSE PRACTITIONER

## 2017-10-12 PROCEDURE — 74011000250 HC RX REV CODE- 250: Performed by: SURGERY

## 2017-10-12 PROCEDURE — 83883 ASSAY NEPHELOMETRY NOT SPEC: CPT | Performed by: INTERNAL MEDICINE

## 2017-10-12 PROCEDURE — 80048 BASIC METABOLIC PNL TOTAL CA: CPT | Performed by: NURSE PRACTITIONER

## 2017-10-12 PROCEDURE — 77030018836 HC SOL IRR NACL ICUM -A: Performed by: SURGERY

## 2017-10-12 PROCEDURE — 82746 ASSAY OF FOLIC ACID SERUM: CPT | Performed by: INTERNAL MEDICINE

## 2017-10-12 PROCEDURE — 65270000029 HC RM PRIVATE

## 2017-10-12 PROCEDURE — 83540 ASSAY OF IRON: CPT | Performed by: INTERNAL MEDICINE

## 2017-10-12 PROCEDURE — 82962 GLUCOSE BLOOD TEST: CPT

## 2017-10-12 PROCEDURE — 77030008684 HC TU ET CUF COVD -B: Performed by: NURSE ANESTHETIST, CERTIFIED REGISTERED

## 2017-10-12 PROCEDURE — 74011000272 HC RX REV CODE- 272: Performed by: SURGERY

## 2017-10-12 PROCEDURE — 85027 COMPLETE CBC AUTOMATED: CPT | Performed by: NURSE PRACTITIONER

## 2017-10-12 PROCEDURE — 76060000032 HC ANESTHESIA 0.5 TO 1 HR: Performed by: SURGERY

## 2017-10-12 PROCEDURE — 3331090002 HH PPS REVENUE DEBIT

## 2017-10-12 PROCEDURE — 0Y6M0Z8 DETACHMENT AT RIGHT FOOT, COMPLETE 5TH RAY, OPEN APPROACH: ICD-10-PCS | Performed by: SURGERY

## 2017-10-12 PROCEDURE — 77030011640 HC PAD GRND REM COVD -A: Performed by: SURGERY

## 2017-10-12 PROCEDURE — 82607 VITAMIN B-12: CPT | Performed by: INTERNAL MEDICINE

## 2017-10-12 PROCEDURE — 85045 AUTOMATED RETICULOCYTE COUNT: CPT | Performed by: INTERNAL MEDICINE

## 2017-10-12 PROCEDURE — 82728 ASSAY OF FERRITIN: CPT | Performed by: INTERNAL MEDICINE

## 2017-10-12 PROCEDURE — 84155 ASSAY OF PROTEIN SERUM: CPT | Performed by: INTERNAL MEDICINE

## 2017-10-12 PROCEDURE — 76210000006 HC OR PH I REC 0.5 TO 1 HR: Performed by: SURGERY

## 2017-10-12 PROCEDURE — 0JBQ0ZZ EXCISION OF RIGHT FOOT SUBCUTANEOUS TISSUE AND FASCIA, OPEN APPROACH: ICD-10-PCS | Performed by: SURGERY

## 2017-10-12 PROCEDURE — 83010 ASSAY OF HAPTOGLOBIN QUANT: CPT | Performed by: INTERNAL MEDICINE

## 2017-10-12 PROCEDURE — 80202 ASSAY OF VANCOMYCIN: CPT | Performed by: SURGERY

## 2017-10-12 PROCEDURE — 3331090001 HH PPS REVENUE CREDIT

## 2017-10-12 PROCEDURE — 74011250636 HC RX REV CODE- 250/636: Performed by: SURGERY

## 2017-10-12 PROCEDURE — 82784 ASSAY IGA/IGD/IGG/IGM EACH: CPT | Performed by: INTERNAL MEDICINE

## 2017-10-12 RX ORDER — SODIUM CHLORIDE, SODIUM LACTATE, POTASSIUM CHLORIDE, CALCIUM CHLORIDE 600; 310; 30; 20 MG/100ML; MG/100ML; MG/100ML; MG/100ML
25 INJECTION, SOLUTION INTRAVENOUS CONTINUOUS
Status: DISCONTINUED | OUTPATIENT
Start: 2017-10-12 | End: 2017-10-12 | Stop reason: HOSPADM

## 2017-10-12 RX ORDER — VANCOMYCIN HYDROCHLORIDE
1250 EVERY 12 HOURS
Status: DISCONTINUED | OUTPATIENT
Start: 2017-10-12 | End: 2017-10-20 | Stop reason: HOSPADM

## 2017-10-12 RX ORDER — ONDANSETRON 2 MG/ML
INJECTION INTRAMUSCULAR; INTRAVENOUS AS NEEDED
Status: DISCONTINUED | OUTPATIENT
Start: 2017-10-12 | End: 2017-10-12 | Stop reason: HOSPADM

## 2017-10-12 RX ORDER — SODIUM CHLORIDE 450 MG/100ML
INJECTION, SOLUTION INTRAVENOUS
Status: DISCONTINUED | OUTPATIENT
Start: 2017-10-12 | End: 2017-10-12 | Stop reason: HOSPADM

## 2017-10-12 RX ORDER — SODIUM CHLORIDE, SODIUM LACTATE, POTASSIUM CHLORIDE, CALCIUM CHLORIDE 600; 310; 30; 20 MG/100ML; MG/100ML; MG/100ML; MG/100ML
INJECTION, SOLUTION INTRAVENOUS
Status: DISCONTINUED | OUTPATIENT
Start: 2017-10-12 | End: 2017-10-12 | Stop reason: HOSPADM

## 2017-10-12 RX ORDER — ONDANSETRON 2 MG/ML
4 INJECTION INTRAMUSCULAR; INTRAVENOUS
Status: DISCONTINUED | OUTPATIENT
Start: 2017-10-12 | End: 2017-10-20 | Stop reason: HOSPADM

## 2017-10-12 RX ORDER — FENTANYL CITRATE 50 UG/ML
50 INJECTION, SOLUTION INTRAMUSCULAR; INTRAVENOUS AS NEEDED
Status: DISCONTINUED | OUTPATIENT
Start: 2017-10-12 | End: 2017-10-12 | Stop reason: HOSPADM

## 2017-10-12 RX ORDER — HYDROMORPHONE HYDROCHLORIDE 1 MG/ML
.2-.5 INJECTION, SOLUTION INTRAMUSCULAR; INTRAVENOUS; SUBCUTANEOUS
Status: DISCONTINUED | OUTPATIENT
Start: 2017-10-12 | End: 2017-10-12 | Stop reason: HOSPADM

## 2017-10-12 RX ORDER — MORPHINE SULFATE 2 MG/ML
4 INJECTION, SOLUTION INTRAMUSCULAR; INTRAVENOUS
Status: DISCONTINUED | OUTPATIENT
Start: 2017-10-12 | End: 2017-10-17

## 2017-10-12 RX ORDER — PHENYLEPHRINE HCL IN 0.9% NACL 0.4MG/10ML
SYRINGE (ML) INTRAVENOUS AS NEEDED
Status: DISCONTINUED | OUTPATIENT
Start: 2017-10-12 | End: 2017-10-12 | Stop reason: HOSPADM

## 2017-10-12 RX ORDER — FENTANYL CITRATE 50 UG/ML
25 INJECTION, SOLUTION INTRAMUSCULAR; INTRAVENOUS
Status: DISCONTINUED | OUTPATIENT
Start: 2017-10-12 | End: 2017-10-12 | Stop reason: HOSPADM

## 2017-10-12 RX ORDER — INSULIN LISPRO 100 [IU]/ML
8 INJECTION, SOLUTION INTRAVENOUS; SUBCUTANEOUS
Status: DISCONTINUED | OUTPATIENT
Start: 2017-10-12 | End: 2017-10-20 | Stop reason: HOSPADM

## 2017-10-12 RX ORDER — FENTANYL CITRATE 50 UG/ML
INJECTION, SOLUTION INTRAMUSCULAR; INTRAVENOUS AS NEEDED
Status: DISCONTINUED | OUTPATIENT
Start: 2017-10-12 | End: 2017-10-12 | Stop reason: HOSPADM

## 2017-10-12 RX ORDER — MIDAZOLAM HYDROCHLORIDE 1 MG/ML
1 INJECTION, SOLUTION INTRAMUSCULAR; INTRAVENOUS AS NEEDED
Status: DISCONTINUED | OUTPATIENT
Start: 2017-10-12 | End: 2017-10-12 | Stop reason: HOSPADM

## 2017-10-12 RX ORDER — OXYCODONE AND ACETAMINOPHEN 5; 325 MG/1; MG/1
1-2 TABLET ORAL
Status: DISCONTINUED | OUTPATIENT
Start: 2017-10-12 | End: 2017-10-13 | Stop reason: SDUPTHER

## 2017-10-12 RX ORDER — DIPHENHYDRAMINE HYDROCHLORIDE 50 MG/ML
12.5 INJECTION, SOLUTION INTRAMUSCULAR; INTRAVENOUS AS NEEDED
Status: DISCONTINUED | OUTPATIENT
Start: 2017-10-12 | End: 2017-10-12 | Stop reason: HOSPADM

## 2017-10-12 RX ORDER — SUCCINYLCHOLINE CHLORIDE 20 MG/ML
INJECTION INTRAMUSCULAR; INTRAVENOUS AS NEEDED
Status: DISCONTINUED | OUTPATIENT
Start: 2017-10-12 | End: 2017-10-12 | Stop reason: HOSPADM

## 2017-10-12 RX ORDER — PROPOFOL 10 MG/ML
INJECTION, EMULSION INTRAVENOUS AS NEEDED
Status: DISCONTINUED | OUTPATIENT
Start: 2017-10-12 | End: 2017-10-12 | Stop reason: HOSPADM

## 2017-10-12 RX ORDER — ONDANSETRON 2 MG/ML
4 INJECTION INTRAMUSCULAR; INTRAVENOUS AS NEEDED
Status: DISCONTINUED | OUTPATIENT
Start: 2017-10-12 | End: 2017-10-12 | Stop reason: HOSPADM

## 2017-10-12 RX ORDER — LIDOCAINE HYDROCHLORIDE 20 MG/ML
INJECTION, SOLUTION EPIDURAL; INFILTRATION; INTRACAUDAL; PERINEURAL AS NEEDED
Status: DISCONTINUED | OUTPATIENT
Start: 2017-10-12 | End: 2017-10-12 | Stop reason: HOSPADM

## 2017-10-12 RX ORDER — MORPHINE SULFATE 2 MG/ML
2 INJECTION, SOLUTION INTRAMUSCULAR; INTRAVENOUS
Status: DISCONTINUED | OUTPATIENT
Start: 2017-10-12 | End: 2017-10-20 | Stop reason: HOSPADM

## 2017-10-12 RX ORDER — LIDOCAINE HYDROCHLORIDE 10 MG/ML
0.1 INJECTION, SOLUTION EPIDURAL; INFILTRATION; INTRACAUDAL; PERINEURAL AS NEEDED
Status: DISCONTINUED | OUTPATIENT
Start: 2017-10-12 | End: 2017-10-12 | Stop reason: HOSPADM

## 2017-10-12 RX ADMIN — METOPROLOL TARTRATE 25 MG: 25 TABLET ORAL at 17:26

## 2017-10-12 RX ADMIN — SODIUM CHLORIDE, SODIUM LACTATE, POTASSIUM CHLORIDE, AND CALCIUM CHLORIDE 25 ML/HR: 600; 310; 30; 20 INJECTION, SOLUTION INTRAVENOUS at 13:18

## 2017-10-12 RX ADMIN — Medication 80 MCG: at 14:15

## 2017-10-12 RX ADMIN — CEFEPIME HYDROCHLORIDE 2 G: 2 INJECTION, POWDER, FOR SOLUTION INTRAVENOUS at 08:39

## 2017-10-12 RX ADMIN — LIDOCAINE HYDROCHLORIDE 60 MG: 20 INJECTION, SOLUTION EPIDURAL; INFILTRATION; INTRACAUDAL; PERINEURAL at 14:00

## 2017-10-12 RX ADMIN — MORPHINE SULFATE 2 MG: 2 INJECTION, SOLUTION INTRAMUSCULAR; INTRAVENOUS at 21:31

## 2017-10-12 RX ADMIN — INSULIN GLARGINE 9 UNITS: 100 INJECTION, SOLUTION SUBCUTANEOUS at 08:44

## 2017-10-12 RX ADMIN — VANCOMYCIN HYDROCHLORIDE 1250 MG: 10 INJECTION, POWDER, LYOPHILIZED, FOR SOLUTION INTRAVENOUS at 23:15

## 2017-10-12 RX ADMIN — FENTANYL CITRATE 50 MCG: 50 INJECTION, SOLUTION INTRAMUSCULAR; INTRAVENOUS at 13:51

## 2017-10-12 RX ADMIN — SODIUM CHLORIDE 50 ML/HR: 450 INJECTION, SOLUTION INTRAVENOUS at 00:01

## 2017-10-12 RX ADMIN — CEFEPIME HYDROCHLORIDE 2 G: 2 INJECTION, POWDER, FOR SOLUTION INTRAVENOUS at 17:25

## 2017-10-12 RX ADMIN — INSULIN LISPRO 8 UNITS: 100 INJECTION, SOLUTION INTRAVENOUS; SUBCUTANEOUS at 08:44

## 2017-10-12 RX ADMIN — PROPOFOL 50 MG: 10 INJECTION, EMULSION INTRAVENOUS at 13:55

## 2017-10-12 RX ADMIN — DEXTROSE MONOHYDRATE 250 ML: 10 INJECTION, SOLUTION INTRAVENOUS at 13:35

## 2017-10-12 RX ADMIN — FENTANYL CITRATE 50 MCG: 50 INJECTION, SOLUTION INTRAMUSCULAR; INTRAVENOUS at 14:00

## 2017-10-12 RX ADMIN — SODIUM CHLORIDE, SODIUM LACTATE, POTASSIUM CHLORIDE, CALCIUM CHLORIDE: 600; 310; 30; 20 INJECTION, SOLUTION INTRAVENOUS at 13:47

## 2017-10-12 RX ADMIN — PROPOFOL 160 MG: 10 INJECTION, EMULSION INTRAVENOUS at 14:00

## 2017-10-12 RX ADMIN — SODIUM CHLORIDE: 450 INJECTION, SOLUTION INTRAVENOUS at 13:47

## 2017-10-12 RX ADMIN — ONDANSETRON 4 MG: 2 INJECTION INTRAMUSCULAR; INTRAVENOUS at 14:29

## 2017-10-12 RX ADMIN — Medication 80 MCG: at 14:17

## 2017-10-12 RX ADMIN — METOPROLOL TARTRATE 25 MG: 25 TABLET ORAL at 08:43

## 2017-10-12 RX ADMIN — SUCCINYLCHOLINE CHLORIDE 140 MG: 20 INJECTION INTRAMUSCULAR; INTRAVENOUS at 14:00

## 2017-10-12 RX ADMIN — Medication 80 MCG: at 14:19

## 2017-10-12 RX ADMIN — VANCOMYCIN HYDROCHLORIDE 1250 MG: 10 INJECTION, POWDER, LYOPHILIZED, FOR SOLUTION INTRAVENOUS at 11:31

## 2017-10-12 RX ADMIN — Medication 80 MCG: at 14:22

## 2017-10-12 RX ADMIN — Medication 80 MCG: at 14:13

## 2017-10-12 NOTE — ANESTHESIA POSTPROCEDURE EVALUATION
Post-Anesthesia Evaluation and Assessment    Patient: Emiliana Mosley MRN: [id number]  SSN: [id number]    YOB: 1940  Age: 68 y.o. Sex: male       Cardiovascular Function/Vital Signs  Visit Vitals    /61    Pulse 65    Temp 36.4 °C (97.6 °F)    Resp 17    Wt 112 kg (246 lb 14.6 oz)    SpO2 100%    BMI 26.46 kg/m2       Patient is status post general anesthesia for Procedure(s):  RIGHT FOOT DEBRIDEMENT . Nausea/Vomiting: None    Postoperative hydration reviewed and adequate. Pain:  Pain Scale 1: Numeric (0 - 10) (10/12/17 1515)  Pain Intensity 1: 1 (10/12/17 1515)   Managed    Neurological Status:   Neuro (WDL): Within Defined Limits (10/12/17 1445)  Neuro  LLE Motor Response: Purposeful (10/12/17 0743)  RLE Motor Response: Purposeful (10/12/17 0743)   At baseline    Mental Status and Level of Consciousness: Arousable    Pulmonary Status:   O2 Device: Room air (10/12/17 1515)   Adequate oxygenation and airway patent    Complications related to anesthesia: None    Post-anesthesia assessment completed.  No concerns    Signed By: Luis Daniel Guy MD     October 12, 2017

## 2017-10-12 NOTE — PERIOP NOTES
Handoff Report from Operating Room to PACU    Report received from Matty Roman RN  and Whitley Dhillon CRNA  regarding Matthew Guillen. Surgeon(s): Naheed Ambrose MD  And Procedure(s) (LRB):  RIGHT FOOT DEBRIDEMENT  (Right)  confirmed   with dressings discussed. Anesthesia type, drugs, patient history, complications, estimated blood loss, vital signs, intake and output, and last pain medication were reviewed.

## 2017-10-12 NOTE — PERIOP NOTES
[address] [facility] [address] Brito Smoke notified glucose of 59. Pt states feels fine. Orders received.

## 2017-10-12 NOTE — ROUTINE PROCESS
Bedside and Verbal shift change report given to Mandy Reis RN (oncoming nurse) by Rosalba Llanes RN (offgoing nurse). Report included the following information SBAR, Kardex, Intake/Output, MAR, Recent Results and Med Rec Status.

## 2017-10-12 NOTE — PROGRESS NOTES
General Daily Progress Note    Admit Date: 10/10/2017    Subjective:     Patient has no complaint . Current Facility-Administered Medications   Medication Dose Route Frequency    insulin lispro (HUMALOG) injection   SubCUTAneous TIDAC    insulin glargine (LANTUS) injection 9 Units  9 Units SubCUTAneous DAILY    0.45% sodium chloride infusion  50 mL/hr IntraVENous CONTINUOUS    dextrose (D50W) injection syrg 12.5-25 g  12.5-25 g IntraVENous PRN    glucagon (GLUCAGEN) injection 1 mg  1 mg IntraMUSCular PRN    glucose chewable tablet 16 g  4 Tab Oral PRN    vancomycin (VANCOCIN) 1250 mg in  ml infusion  1,250 mg IntraVENous Q12H    bisacodyl (DULCOLAX) suppository 10 mg  10 mg Rectal DAILY PRN    ferrous sulfate tablet 325 mg  1 Tab Oral DAILY WITH BREAKFAST    aspirin chewable tablet 81 mg  81 mg Oral DAILY    metoprolol tartrate (LOPRESSOR) tablet 25 mg  25 mg Oral BID    cefepime (MAXIPIME) 2 g in 0.9% sodium chloride (MBP/ADV) 100 mL  2 g IntraVENous Q8H        Review of Systems  A comprehensive review of systems was negative.     Objective:     Patient Vitals for the past 24 hrs:   BP Temp Pulse Resp SpO2 Weight   10/11/17 2059 128/51 98.5 °F (36.9 °C) 61 20 95 % -   10/11/17 1457 124/52 99.3 °F (37.4 °C) 62 18 100 % -   10/11/17 1237 - - - - - 246 lb 14.6 oz (112 kg)   10/11/17 0814 136/53 98.6 °F (37 °C) 80 18 100 % -        10/10 1901 - 10/12 0700  In: -   Out: 7568 [Urine:2350]    Physical Exam:   Visit Vitals    /51 (BP 1 Location: Right arm, BP Patient Position: At rest)    Pulse 61    Temp 98.5 °F (36.9 °C)    Resp 20    Wt 246 lb 14.6 oz (112 kg)    SpO2 95%    BMI 26.46 kg/m2     General appearance: alert, cooperative, no distress, appears stated age  Neck: supple, symmetrical, trachea midline, no adenopathy, thyroid: not enlarged, symmetric, no tenderness/mass/nodules, no carotid bruit and no JVD  Lungs: clear to auscultation bilaterally  Heart: regular rate and rhythm, S1, S2 normal, no murmur, click, rub or gallop  Abdomen: soft, non-tender.  Bowel sounds normal. No masses,  no organomegaly  Extremities: edema , bandaged left foot        Data Review   Recent Results (from the past 24 hour(s))   GLUCOSE, POC    Collection Time: 10/11/17  8:17 AM   Result Value Ref Range    Glucose (POC) 189 (H) 65 - 100 mg/dL    Performed by [email or web address]    GLUCOSE, POC    Collection Time: 10/11/17 11:25 AM   Result Value Ref Range    Glucose (POC) 217 (H) 65 - 100 mg/dL    Performed by [email or web address]    GLUCOSE, POC    Collection Time: 10/11/17  4:19 PM   Result Value Ref Range    Glucose (POC) 128 (H) 65 - 100 mg/dL    Performed by Marcelina Walsh)    GLUCOSE, POC    Collection Time: 10/11/17  9:47 PM   Result Value Ref Range    Glucose (POC) 133 (H) 65 - 100 mg/dL    Performed by Marcelina Walsh)    CBC W/O DIFF    Collection Time: 10/12/17  4:08 AM   Result Value Ref Range    WBC 8.8 4.1 - 11.1 K/uL    RBC 3.16 (L) 4.10 - 5.70 M/uL    HGB 8.6 (L) 12.1 - 17.0 g/dL    HCT 26.0 (L) 36.6 - 50.3 %    MCV 82.3 80.0 - 99.0 FL    MCH 27.2 26.0 - 34.0 PG    MCHC 33.1 30.0 - 36.5 g/dL    RDW 13.3 11.5 - 14.5 %    PLATELET 154 501 - 236 K/uL   METABOLIC PANEL, BASIC    Collection Time: 10/12/17  4:08 AM   Result Value Ref Range    Sodium 136 136 - 145 mmol/L    Potassium 4.3 3.5 - 5.1 mmol/L    Chloride 105 97 - 108 mmol/L    CO2 23 21 - 32 mmol/L    Anion gap 8 5 - 15 mmol/L    Glucose 103 (H) 65 - 100 mg/dL    BUN 17 6 - 20 MG/DL    Creatinine 1.12 0.70 - 1.30 MG/DL    BUN/Creatinine ratio 15 12 - 20      GFR est AA >60 >60 ml/min/1.73m2    GFR est non-AA >60 >60 ml/min/1.73m2    Calcium 8.7 8.5 - 10.1 MG/DL   IRON PROFILE    Collection Time: 10/12/17  4:08 AM   Result Value Ref Range    Iron 23 (L) 35 - 150 ug/dL    TIBC 184 (L) 250 - 450 ug/dL    Iron % saturation 13 (L) 20 - 50 %   FERRITIN    Collection Time: 10/12/17  4:08 AM   Result Value Ref Range    Ferritin 440 (H) 26 - 388 NG/ML GLUCOSE, POC    Collection Time: 10/12/17  7:39 AM   Result Value Ref Range    Glucose (POC) 120 (H) 65 - 100 mg/dL    Performed by Yolanda Ramirez            Assessment:     Active Problems:    Osteomyelitis ([facility] [address] (10/10/2017)        Plan:     1.  OR today for debridement. 2.  Continue diabetic control.

## 2017-10-12 NOTE — PERIOP NOTES
TRANSFER - IN REPORT:    Verbal report received from Aria, [address] [facility] on [address]  being received from [facility] 4856 for ordered procedure      Report consisted of patients Situation, Background, Assessment and   Recommendations(SBAR). Information from the following report(s) SBAR, Kardex, Intake/Output, MAR and Recent Results was reviewed with the receiving nurse. Opportunity for questions and clarification was provided. Assessment completed upon patients arrival to unit and care assumed.

## 2017-10-12 NOTE — BRIEF OP NOTE
BRIEF OPERATIVE NOTE    Date of Procedure: 10/12/2017   Preoperative Diagnosis: OSTEOMYELITIS RIGHT FOOT  Postoperative Diagnosis: OSTEOMYELITIS RIGHT FOOT    Procedure(s):  RIGHT FOOT DEBRIDEMENT   Surgeon(s) and Role:     * Charley Delgadillo MD - Primary         Assistant Staff:       Surgical Staff:  Circ-1: Kasandra Barrett RN  Scrub Tech-1: Leah Coronel  Scrub Tech-Relief: Mekhi Scott  Scrub RN-1: Vickie Granados  Scrub RN-Relief: Vickie Granados  Event Time In   Incision Start 1404   Incision Close 1429     Anesthesia: General   Estimated Blood Loss: 50 cc  Specimens:   ID Type Source Tests Collected by Time Destination   1 : right foot debridings Other Foot, Right  Charley Delgadillo MD 10/12/2017 1416 Discarded      Findings: infected necrotic soft tissue extending back to base of 5th metatarsal. Remainder of 5th metatarsal resected. All necrotic soft tissue removed. Wound is 15x7 cm. Will see how he does with wound care and Abx. High risk for progression to BKA.   Complications: None  Implants: * No implants in log *

## 2017-10-12 NOTE — PERIOP NOTES
TRANSFER - OUT REPORT:    Verbal report given to Lani Capone RN (name) on Reese Mulligan  being transferred to [facility](unit) for routine post - op       Report consisted of patients Situation, Background, Assessment and   Recommendations(SBAR). Information from the following report(s) SBAR, Kardex, OR Summary, Intake/Output, MAR and Recent Results was reviewed with the receiving nurse. Opportunity for questions and clarification was provided.       Patient transported with:   [employer]

## 2017-10-12 NOTE — PROGRESS NOTES
General Surgery End of Shift Nursing Note    Bedside shift change report given to CLAIRE ALMODOVAR [facility] (oncoming nurse) by Ryanne Prieto (offgoing nurse). Report included the following information SBAR, Kardex, Procedure Summary, Intake/Output, MAR and Recent Results. Shift worked:   1900-0730   Significant changes during shift:    0   Non-emergent issues for physician to address:   0     Number times ambulated in hallway past shift: 0    Number of times OOB to chair past shift: 0    Pain Management:  Current medication: Morphine, Percocet  Patient states pain is manageable on current pain medication: YES    GI:    Current diet:  DIET DIABETIC CONSISTENT CARB Regular    Tolerating current diet: YES  Passing flatus: YES  Last Bowel Movement: yesterday   Appearance: wnl    Respiratory:    Incentive Spirometer at bedside: YES  Patient instructed on use: YES    Patient Safety:    Falls Score: 2  Bed Alarm On? No  Sitter?  Not applicable    Uziel Ortega

## 2017-10-12 NOTE — PROGRESS NOTES
Pharmacy Automatic Renal Dosing Protocol - Antimicrobials    Indication for Antimicrobials: Surgical Site Infection: s/p right toe amputations 9/15, concern for OM    Current Regimen of Each Antimicrobial (Start Day & Day of Therapy):  Vancomycin 1.25gm IV q12h after 3gm load; start 10/10; Day #3  Cefepime 2gm IV q8h; start 10/10; Day #3    Goal Vancomycin Level (if needed): 15-20   Level(s) Ordered (if needed): 10/12  Measured / Extrapolated Vancomycin Levels (if drawn): 15.7   / 17.4    Significant Cultures: n/a  : MRI Foot: ? Osteomyelitis  9/15: Foot Wound: P.mirabilis; E.faecalis D; P.aeruginosa; Anaerobic GPC (FINAL)       CAPD, Hemodialysis or Renal Replacement Therapy: n/a   Paralysis, amputations, malnutrition: n/a    Creatinine   Date/Time Value Ref Range Status   10/12/2017 04:08 AM 1.12 0.70 - 1.30 MG/DL Final       Recent Labs      10/12/17   0408  10/11/17   0423  10/10/17   1524   CREA  1.12  1.20  1.51*   BUN  17  21*  29*   WBC  8.8  9.1  10.5     Temp (24hrs), Av.3 °F (36.8 °C), Min:97.5 °F (36.4 °C), Max:99.3 °F (37.4 °C)    Creatinine Clearance (Creatinine Clearance (ml/min)): 77    Impression/Plan:   - Vancomycin Trough 17.4 is within the desired range (15-20) to treat Osteomyelitis  - Continue Vancomycin 1.25gm IV q12h  - Continue Cefepime 2gm IV q8h  - In OR for Right Foot I/D  - Weight 112 kg  - Daily BMP       Pharmacy will follow daily and adjust medications as appropriate for renal function and/or serum levels.     Thank you,  Shabnam Cain, [facility]     Renal Dosing Tables on [employer]

## 2017-10-12 NOTE — ANESTHESIA PREPROCEDURE EVALUATION
Anesthetic History   No history of anesthetic complications            Review of Systems / Medical History  Patient summary reviewed, nursing notes reviewed and pertinent labs reviewed    Pulmonary                Comments: Former smoker   Neuro/Psych   Within defined limits           Cardiovascular    Hypertension: poorly controlled          PAD and hyperlipidemia  Pertinent negatives: No CAD  Exercise tolerance: >4 METS  Comments: Palpitations     GI/Hepatic/Renal         Renal disease: CRI       Endo/Other    Diabetes: poorly controlled, type 2, using insulin    Arthritis and anemia     Other Findings   Comments: Charcot foot secondary to DMII         Physical Exam    Airway  Mallampati: I  TM Distance: 4 - 6 cm  Neck ROM: normal range of motion   Mouth opening: Normal     Cardiovascular  Regular rate and rhythm,  S1 and S2 normal,  no murmur, click, rub, or gallop             Dental  No notable dental hx       Pulmonary  Breath sounds clear to auscultation               Abdominal  GI exam deferred       Other Findings            Anesthetic Plan    ASA: 2  Anesthesia type: general    Monitoring Plan: BIS      Induction: Intravenous  Anesthetic plan and risks discussed with: Patient

## 2017-10-12 NOTE — CONSULTS
[address]   0 [facility]       Name:  Ever Gillespie   MR#:  [id number]   :  1940   Account #:  [de-identified]    Date of Consultation:  10/12/2017   Date of Adm:  10/10/2017       REASON FOR CONSULTATION: Anemia. REFERRING PHYSICIAN: Dr. Dmitriy Arzate. HISTORY OF PRESENT ILLNESS: The patient is a 49-year-old   gentleman who we are asked to see for anemia. He is currently   admitted to the hospital for diabetic foot. He evidently is going to the   OR today to have that worked on. We are asked to see him for   anemia. Other than his foot, he is doing well with no complaints. PAST MEDICAL HISTORY: Significant for diabetes, hypertension,   some chronic kidney disease, peripheral artery disease, coronary   artery disease. FAMILY HISTORY: Father had some type of cancer. He does not   know what type. SOCIAL HISTORY: He is . He has 4 daughters, 2   granddaughters. He is a former smoker. He quit over 30 years ago. He   drinks a beer about once every 6 months. MEDICATIONS: He is on:   1. Aspirin. 2. Cefepime. 3. Iron. 4. Insulin. 5. Lopressor. 6. Vancomycin. ALLERGIES: NO KNOWN DRUG ALLERGIES. REVIEW OF SYSTEMS   A 12-point systems was done and negative except for as above. PHYSICAL EXAMINATION   VITAL SIGNS: Temperature 97.9, pulse 75, blood pressure 170/62,   respirations 18, saturating 99% on room air. GENERAL: Lying in bed in no acute distress. HEENT: Normocephalic, atraumatic. Extraocular muscles intact. Oropharynx clear, without lesions. NECK: Supple. No cervical, supraclavicular, axillary lymphadenopathy   appreciated. CARDIOVASCULAR: Regular rhythm. LUNGS: Clear to auscultation bilaterally. ABDOMEN: Normoactive bowel sounds, soft, nontender,   nondistended. No hepatosplenomegaly. EXTREMITIES: No clubbing, cyanosis or edema.    NEUROLOGIC: Nonfocal.    LABORATORY VALUES: White blood cell count 8.8, hemoglobin 8.6,   platelets 491. Chemistry: Sodium 136, potassium 4.3, chloride 105,   BUN 17, creatinine 1. 12. Total bilirubin 0.3, ALT 36, AST 28, alkaline   phosphatase 77, ferritin is 440, iron percent saturation 13. Iron 23,   TIBC 184. IMPRESSION AND PLAN: The patient is a 70-year-old gentleman who   we are asked to see for anemia. He is currently in the hospital having   his diabetic foot worked on. In terms of his anemia, his iron studies   seem to show anemia of chronic disease. May be due to some of   his chronic renal insufficiency. I am going to check B12, folate. We will   check for any signs of hemolysis. Check SPEP with immunofixation,   serum free light chain ratio. We will continue to follow along with you   and make further recommendations as needed.         MD RODGER Torres / Anup   D:  10/12/2017   09:06   T:  10/12/2017   09:25   Job #:  [id number]

## 2017-10-12 NOTE — PROGRESS NOTES
Oncology Nursing Communication Tool      7:44 AM  10/12/2017     Bedside shift change report given to Felicitas RN (incoming nurse) by Oracio Gage RN (outgoing nurse) on Nicole Sanabria a 68 y.o. male who was admitted on 10/10/2017  2:49 PM. Report included the following information SBAR, Kardex and MAR. Significant changes during shift: none      Issues for physician to address: none          Code Status: Prior     Infections: No current active infections     Allergies: Review of patient's allergies indicates no known allergies.      Current diet: DIET NPO       Pain Controlled [x] yes [] no   Bowel Movement [x] yes [] no   Last Bowel Movement (date) 10/12            Vital Signs:   Patient Vitals for the past 12 hrs:   Temp Pulse Resp BP SpO2   10/11/17 2059 98.5 °F (36.9 °C) 61 20 128/51 95 %      Intake & Output:     Intake/Output Summary (Last 24 hours) at 10/12/17 0744  Last data filed at 10/12/17 0252   Gross per 24 hour   Intake                0 ml   Output             1400 ml   Net            -1400 ml      Laboratory Results:     Recent Results (from the past 12 hour(s))   GLUCOSE, POC    Collection Time: 10/11/17  9:47 PM   Result Value Ref Range    Glucose (POC) 133 (H) 65 - 100 mg/dL    Performed by Belynda Hodgkins)    CBC W/O DIFF    Collection Time: 10/12/17  4:08 AM   Result Value Ref Range    WBC 8.8 4.1 - 11.1 K/uL    RBC 3.16 (L) 4.10 - 5.70 M/uL    HGB 8.6 (L) 12.1 - 17.0 g/dL    HCT 26.0 (L) 36.6 - 50.3 %    MCV 82.3 80.0 - 99.0 FL    MCH 27.2 26.0 - 34.0 PG    MCHC 33.1 30.0 - 36.5 g/dL    RDW 13.3 11.5 - 14.5 %    PLATELET 141 197 - 720 K/uL   METABOLIC PANEL, BASIC    Collection Time: 10/12/17  4:08 AM   Result Value Ref Range    Sodium 136 136 - 145 mmol/L    Potassium 4.3 3.5 - 5.1 mmol/L    Chloride 105 97 - 108 mmol/L    CO2 23 21 - 32 mmol/L    Anion gap 8 5 - 15 mmol/L    Glucose 103 (H) 65 - 100 mg/dL    BUN 17 6 - 20 MG/DL    Creatinine 1.12 0.70 - 1.30 MG/DL BUN/Creatinine ratio 15 12 - 20      GFR est AA >60 >60 ml/min/1.73m2    GFR est non-AA >60 >60 ml/min/1.73m2    Calcium 8.7 8.5 - 10.1 MG/DL   IRON PROFILE    Collection Time: 10/12/17  4:08 AM   Result Value Ref Range    Iron 23 (L) 35 - 150 ug/dL    TIBC 184 (L) 250 - 450 ug/dL    Iron % saturation 13 (L) 20 - 50 %   FERRITIN    Collection Time: 10/12/17  4:08 AM   Result Value Ref Range    Ferritin 440 (H) 26 - 388 NG/ML              Opportunity for questions and clarifications were given to the incoming nurse. Patient's bed is in low position, side rails x2, door open PRN, call bell within reach and patient not in distress.       Noris Mackenzie, [place]

## 2017-10-12 NOTE — PROGRESS NOTES
Visited by Brenda Concepcion Partner on 10/12/17.     EVERT Toney, [address] [facility], [address] [place] [address] Road Paging Service  [phone]

## 2017-10-13 LAB
ALBUMIN SERPL ELPH-MCNC: 2.3 G/DL (ref 2.9–4.4)
ALBUMIN/GLOB SERPL: 0.7 {RATIO} (ref 0.7–1.7)
ALPHA1 GLOB SERPL ELPH-MCNC: 0.4 G/DL (ref 0–0.4)
ALPHA2 GLOB SERPL ELPH-MCNC: 0.9 G/DL (ref 0.4–1)
ANION GAP SERPL CALC-SCNC: 8 MMOL/L (ref 5–15)
B-GLOBULIN SERPL ELPH-MCNC: 0.9 G/DL (ref 0.7–1.3)
BUN SERPL-MCNC: 15 MG/DL (ref 6–20)
BUN/CREAT SERPL: 13 (ref 12–20)
CALCIUM SERPL-MCNC: 8.5 MG/DL (ref 8.5–10.1)
CHLORIDE SERPL-SCNC: 103 MMOL/L (ref 97–108)
CO2 SERPL-SCNC: 23 MMOL/L (ref 21–32)
CREAT SERPL-MCNC: 1.19 MG/DL (ref 0.7–1.3)
ERYTHROCYTE [DISTWIDTH] IN BLOOD BY AUTOMATED COUNT: 13.3 % (ref 11.5–14.5)
GAMMA GLOB SERPL ELPH-MCNC: 1.3 G/DL (ref 0.4–1.8)
GLOBULIN SER CALC-MCNC: 3.5 G/DL (ref 2.2–3.9)
GLUCOSE BLD STRIP.AUTO-MCNC: 117 MG/DL (ref 65–100)
GLUCOSE BLD STRIP.AUTO-MCNC: 120 MG/DL (ref 65–100)
GLUCOSE BLD STRIP.AUTO-MCNC: 160 MG/DL (ref 65–100)
GLUCOSE BLD STRIP.AUTO-MCNC: 185 MG/DL (ref 65–100)
GLUCOSE SERPL-MCNC: 162 MG/DL (ref 65–100)
HCT VFR BLD AUTO: 26 % (ref 36.6–50.3)
HGB BLD-MCNC: 8.5 G/DL (ref 12.1–17)
IGA SERPL-MCNC: 142 MG/DL (ref 61–437)
IGG SERPL-MCNC: 1507 MG/DL (ref 700–1600)
IGM SERPL-MCNC: 50 MG/DL (ref 15–143)
KAPPA LC FREE SER-MCNC: 67.7 MG/L (ref 3.3–19.4)
KAPPA LC FREE/LAMBDA FREE SER: 2.05 {RATIO} (ref 0.26–1.65)
LAMBDA LC FREE SERPL-MCNC: 33.1 MG/L (ref 5.7–26.3)
M PROTEIN SERPL ELPH-MCNC: 0.6 G/DL
MCH RBC QN AUTO: 26.9 PG (ref 26–34)
MCHC RBC AUTO-ENTMCNC: 32.7 G/DL (ref 30–36.5)
MCV RBC AUTO: 82.3 FL (ref 80–99)
PLATELET # BLD AUTO: 263 K/UL (ref 150–400)
POTASSIUM SERPL-SCNC: 4.3 MMOL/L (ref 3.5–5.1)
PROT PATTERN SERPL IFE-IMP: NORMAL
PROT SERPL-MCNC: 5.8 G/DL (ref 6–8.5)
RBC # BLD AUTO: 3.16 M/UL (ref 4.1–5.7)
SERVICE CMNT-IMP: ABNORMAL
SODIUM SERPL-SCNC: 134 MMOL/L (ref 136–145)
WBC # BLD AUTO: 7.4 K/UL (ref 4.1–11.1)

## 2017-10-13 PROCEDURE — 36415 COLL VENOUS BLD VENIPUNCTURE: CPT | Performed by: SURGERY

## 2017-10-13 PROCEDURE — 74011000258 HC RX REV CODE- 258: Performed by: SURGERY

## 2017-10-13 PROCEDURE — 65270000029 HC RM PRIVATE

## 2017-10-13 PROCEDURE — 3331090002 HH PPS REVENUE DEBIT

## 2017-10-13 PROCEDURE — 74011636637 HC RX REV CODE- 636/637: Performed by: NURSE PRACTITIONER

## 2017-10-13 PROCEDURE — 74011636637 HC RX REV CODE- 636/637: Performed by: INTERNAL MEDICINE

## 2017-10-13 PROCEDURE — 74011250636 HC RX REV CODE- 250/636: Performed by: NURSE PRACTITIONER

## 2017-10-13 PROCEDURE — 74011250636 HC RX REV CODE- 250/636: Performed by: SURGERY

## 2017-10-13 PROCEDURE — 80048 BASIC METABOLIC PNL TOTAL CA: CPT | Performed by: SURGERY

## 2017-10-13 PROCEDURE — 85027 COMPLETE CBC AUTOMATED: CPT | Performed by: SURGERY

## 2017-10-13 PROCEDURE — 77030009526 HC GEL CARSYN MDII -A

## 2017-10-13 PROCEDURE — 77030018836 HC SOL IRR NACL ICUM -A

## 2017-10-13 PROCEDURE — 74011250637 HC RX REV CODE- 250/637: Performed by: NURSE PRACTITIONER

## 2017-10-13 PROCEDURE — 3331090001 HH PPS REVENUE CREDIT

## 2017-10-13 PROCEDURE — 82962 GLUCOSE BLOOD TEST: CPT

## 2017-10-13 PROCEDURE — 74011000258 HC RX REV CODE- 258: Performed by: NURSE PRACTITIONER

## 2017-10-13 RX ORDER — INSULIN GLARGINE 100 [IU]/ML
14 INJECTION, SOLUTION SUBCUTANEOUS DAILY
Status: DISCONTINUED | OUTPATIENT
Start: 2017-10-14 | End: 2017-10-17

## 2017-10-13 RX ORDER — OXYCODONE HYDROCHLORIDE 5 MG/1
5 TABLET ORAL
Status: DISCONTINUED | OUTPATIENT
Start: 2017-10-13 | End: 2017-10-19

## 2017-10-13 RX ORDER — SODIUM CHLORIDE 9 MG/ML
25 INJECTION, SOLUTION INTRAVENOUS CONTINUOUS
Status: DISCONTINUED | OUTPATIENT
Start: 2017-10-13 | End: 2017-10-17

## 2017-10-13 RX ADMIN — VANCOMYCIN HYDROCHLORIDE 1250 MG: 10 INJECTION, POWDER, LYOPHILIZED, FOR SOLUTION INTRAVENOUS at 12:08

## 2017-10-13 RX ADMIN — METOPROLOL TARTRATE 25 MG: 25 TABLET ORAL at 17:12

## 2017-10-13 RX ADMIN — CEFEPIME HYDROCHLORIDE 2 G: 2 INJECTION, POWDER, FOR SOLUTION INTRAVENOUS at 17:13

## 2017-10-13 RX ADMIN — METOPROLOL TARTRATE 25 MG: 25 TABLET ORAL at 08:13

## 2017-10-13 RX ADMIN — INSULIN GLARGINE 9 UNITS: 100 INJECTION, SOLUTION SUBCUTANEOUS at 08:15

## 2017-10-13 RX ADMIN — MORPHINE SULFATE 2 MG: 2 INJECTION, SOLUTION INTRAMUSCULAR; INTRAVENOUS at 13:16

## 2017-10-13 RX ADMIN — INSULIN LISPRO 8 UNITS: 100 INJECTION, SOLUTION INTRAVENOUS; SUBCUTANEOUS at 17:12

## 2017-10-13 RX ADMIN — CEFEPIME HYDROCHLORIDE 2 G: 2 INJECTION, POWDER, FOR SOLUTION INTRAVENOUS at 00:59

## 2017-10-13 RX ADMIN — INSULIN LISPRO 8 UNITS: 100 INJECTION, SOLUTION INTRAVENOUS; SUBCUTANEOUS at 08:17

## 2017-10-13 RX ADMIN — SODIUM CHLORIDE 25 ML/HR: 900 INJECTION, SOLUTION INTRAVENOUS at 10:09

## 2017-10-13 RX ADMIN — ASPIRIN 81 MG 81 MG: 81 TABLET ORAL at 08:12

## 2017-10-13 RX ADMIN — SODIUM CHLORIDE 50 ML/HR: 450 INJECTION, SOLUTION INTRAVENOUS at 08:17

## 2017-10-13 RX ADMIN — CEFEPIME HYDROCHLORIDE 2 G: 2 INJECTION, POWDER, FOR SOLUTION INTRAVENOUS at 08:14

## 2017-10-13 RX ADMIN — FERROUS SULFATE TAB 325 MG (65 MG ELEMENTAL FE) 325 MG: 325 (65 FE) TAB at 08:14

## 2017-10-13 RX ADMIN — VANCOMYCIN HYDROCHLORIDE 1250 MG: 10 INJECTION, POWDER, LYOPHILIZED, FOR SOLUTION INTRAVENOUS at 22:48

## 2017-10-13 NOTE — ROUTINE PROCESS
General Surgery End of Shift Nursing Note    Bedside shift change report given to *** (oncoming nurse) by Luisa Johnson RN (offgoing nurse). Report included the following information SBAR. Shift worked:   7a-7p   Significant changes during shift:    Wound care complete. In addition dressing reinforced due to bleeding from dressing change. Non-emergent issues for physician to address:   Stool softener     Number times ambulated in hallway past shift: 2    Number of times OOB to chair past shift: 2    Pain Management:  Current medication: roxycodone   Patient states pain is manageable on current pain medication: YES    GI:    Current diet:  DIET DIABETIC CONSISTENT CARB Regular    Tolerating current diet: YES  Passing flatus: YES  Last Bowel Movement:    Appearance:     Respiratory:    Incentive Spirometer at bedside: YES  Patient instructed on use: YES    Patient Safety:    Falls Score: 1  Bed Alarm On? No  Sitter?  No    Iain Amezcua, RN

## 2017-10-13 NOTE — PROGRESS NOTES
Hematology Oncology Progress Note    Follow up for: anemia    Chart notes reviewed since last visit.     Case discussed with following:    Patient complains of the following:No complaints    Additional concerns noted by the staff:     Patient Vitals for the past 24 hrs:   BP Temp Pulse Resp SpO2   10/13/17 0808 - 98.5 °F (36.9 °C) 73 18 99 %   10/13/17 0330 150/70 98 °F (36.7 °C) 88 18 99 %   10/12/17 1900 148/74 98.6 °F (37 °C) 84 18 99 %   10/12/17 1852 146/70 98.5 °F (36.9 °C) - 16 100 %   10/12/17 1835 159/66 - 82 16 100 %   10/12/17 1800 159/62 - 77 - 99 %   10/12/17 1736 151/77 - 74 - 100 %   10/12/17 1650 168/81 - 69 - 99 %   10/12/17 1618 150/67 98.3 °F (36.8 °C) 68 18 97 %   10/12/17 1530 133/61 97.6 °F (36.4 °C) 65 17 100 %   10/12/17 1515 120/56 97.6 °F (36.4 °C) 65 25 99 %   10/12/17 1500 102/48 - 64 22 100 %   10/12/17 1455 118/55 - 66 19 100 %   10/12/17 1450 113/54 - 65 13 99 %   10/12/17 1449 123/55 97.5 °F (36.4 °C) 66 15 100 %   10/12/17 1445 123/55 - - - -   10/12/17 1304 169/57 98.4 °F (36.9 °C) 75 18 96 %   10/12/17 1130 154/62 98.2 °F (36.8 °C) 71 18 100 %       Review of Systems:  12 point ROS done and negative except as above    Physical Examination:  Gen NAD  CV reg  Lungs clear  abd benign    Labs:  Recent Results (from the past 24 hour(s))   LD    Collection Time: 10/12/17 10:06 AM   Result Value Ref Range     85 - 241 U/L   RETICULOCYTE COUNT    Collection Time: 10/12/17 10:06 AM   Result Value Ref Range    Reticulocyte count 1.0 0.7 - 2.1 %   HAPTOGLOBIN    Collection Time: 10/12/17 10:06 AM   Result Value Ref Range    Haptoglobin 439 (H) 30 - 200 mg/dL   IMMUNOELECTROPHORESIS [facility].)    Collection Time: 10/12/17 10:06 AM   Result Value Ref Range    Immunofixation, serum PENDING     Immunoglobulin G, Qt. 1507 700 - 1600 mg/dL    Immunoglobulin A, Qt. 142 61 - 437 mg/dL    Immunoglobulin M, Qt. 50 15 - 143 mg/dL   Janes Rear    Collection Time: 10/12/17 10:06 AM   Result Value Ref Range    Vancomycin,trough 15.7 (H) 5.0 - 10.0 ug/mL    Reported dose date: NOT PROVIDED      Reported dose time: NOT PROVIDED      Reported dose: NOT PROVIDED UNITS   GLUCOSE, POC    Collection Time: 10/12/17 11:04 AM   Result Value Ref Range    Glucose (POC) 80 65 - 100 mg/dL    Performed by Lanie Barraza    GLUCOSE, POC    Collection Time: 10/12/17  1:22 PM   Result Value Ref Range    Glucose (POC) 59 (L) 65 - 100 mg/dL    Performed by Sandrita Gentile    GLUCOSE, POC    Collection Time: 10/12/17  2:11 PM   Result Value Ref Range    Glucose (POC) 115 (H) 65 - 100 mg/dL    Performed by Cari Marquez (anes)    GLUCOSE, POC    Collection Time: 10/12/17  2:48 PM   Result Value Ref Range    Glucose (POC) 100 65 - 100 mg/dL    Performed by Cari Marquez (anes)    GLUCOSE, POC    Collection Time: 10/12/17  4:30 PM   Result Value Ref Range    Glucose (POC) 88 65 - 100 mg/dL    Performed by Raj Thompson (PCT)    GLUCOSE, POC    Collection Time: 10/12/17  8:48 PM   Result Value Ref Range    Glucose (POC) 186 (H) 65 - 100 mg/dL    Performed by Jacey Soto (PCT)    CBC W/O DIFF    Collection Time: 10/13/17  4:00 AM   Result Value Ref Range    WBC 7.4 4.1 - 11.1 K/uL    RBC 3.16 (L) 4.10 - 5.70 M/uL    HGB 8.5 (L) 12.1 - 17.0 g/dL    HCT 26.0 (L) 36.6 - 50.3 %    MCV 82.3 80.0 - 99.0 FL    MCH 26.9 26.0 - 34.0 PG    MCHC 32.7 30.0 - 36.5 g/dL    RDW 13.3 11.5 - 14.5 %    PLATELET 462 217 - 852 K/uL   METABOLIC PANEL, BASIC    Collection Time: 10/13/17  4:00 AM   Result Value Ref Range    Sodium 134 (L) 136 - 145 mmol/L    Potassium 4.3 3.5 - 5.1 mmol/L    Chloride 103 97 - 108 mmol/L    CO2 23 21 - 32 mmol/L    Anion gap 8 5 - 15 mmol/L    Glucose 162 (H) 65 - 100 mg/dL    BUN 15 6 - 20 MG/DL    Creatinine 1.19 0.70 - 1.30 MG/DL    BUN/Creatinine ratio 13 12 - 20      GFR est AA >60 >60 ml/min/1.73m2    GFR est non-AA 59 (L) >60 ml/min/1.73m2    Calcium 8.5 8.5 - 10.1 MG/DL   GLUCOSE, POC    Collection Time: 10/13/17  7:57 AM   Result Value Ref Range    Glucose (POC) 185 (H) 65 - 100 mg/dL    Performed by Jefe Singh (PCT)      Assessment and Plan:   Anemia  -HBG stable  -So far w/u negative, still waiting for monoclonal gammopathy labs  -He should f/u with me in the office a few weeks after discharge    Osteomyelitis  -plan per surgery    Please call over the weekend with any questions. If patient still here on Monday will have Dr. Eduardo Hwang f/u.

## 2017-10-13 NOTE — PROGRESS NOTES
General Daily Progress Note    Admit Date: 10/10/2017    Subjective:     Patient has no complaint. Current Facility-Administered Medications   Medication Dose Route Frequency    [START ON 10/14/2017] insulin glargine (LANTUS) injection 14 Units  14 Units SubCUTAneous DAILY    insulin lispro (HUMALOG) injection 8 Units  8 Units SubCUTAneous TIDAC    vancomycin (VANCOCIN) 1250 mg in  ml infusion  1,250 mg IntraVENous Q12H    morphine injection 4 mg  4 mg IntraVENous Q3H PRN    morphine injection 2 mg  2 mg IntraVENous Q3H PRN    oxyCODONE-acetaminophen (PERCOCET) 5-325 mg per tablet 1-2 Tab  1-2 Tab Oral Q4H PRN    ondansetron (ZOFRAN) injection 4 mg  4 mg IntraVENous Q6H PRN    0.45% sodium chloride infusion  25 mL/hr IntraVENous CONTINUOUS    dextrose (D50W) injection syrg 12.5-25 g  12.5-25 g IntraVENous PRN    glucagon (GLUCAGEN) injection 1 mg  1 mg IntraMUSCular PRN    glucose chewable tablet 16 g  4 Tab Oral PRN    bisacodyl (DULCOLAX) suppository 10 mg  10 mg Rectal DAILY PRN    ferrous sulfate tablet 325 mg  1 Tab Oral DAILY WITH BREAKFAST    aspirin chewable tablet 81 mg  81 mg Oral DAILY    metoprolol tartrate (LOPRESSOR) tablet 25 mg  25 mg Oral BID    cefepime (MAXIPIME) 2 g in 0.9% sodium chloride (MBP/ADV) 100 mL  2 g IntraVENous Q8H        Review of Systems  A comprehensive review of systems was negative.     Objective:     Patient Vitals for the past 24 hrs:   BP Temp Pulse Resp SpO2   10/13/17 0808 - 98.5 °F (36.9 °C) 73 18 99 %   10/13/17 0330 150/70 98 °F (36.7 °C) 88 18 99 %   10/12/17 1900 148/74 98.6 °F (37 °C) 84 18 99 %   10/12/17 1852 146/70 98.5 °F (36.9 °C) - 16 100 %   10/12/17 1835 159/66 - 82 16 100 %   10/12/17 1800 159/62 - 77 - 99 %   10/12/17 1736 151/77 - 74 - 100 %   10/12/17 1650 168/81 - 69 - 99 %   10/12/17 1618 150/67 98.3 °F (36.8 °C) 68 18 97 %   10/12/17 1530 133/61 97.6 °F (36.4 °C) 65 17 100 %   10/12/17 1515 120/56 97.6 °F (36.4 °C) 65 25 99 % 10/12/17 1500 102/48 - 64 22 100 %   10/12/17 1455 118/55 - 66 19 100 %   10/12/17 1450 113/54 - 65 13 99 %   10/12/17 1449 123/55 97.5 °F (36.4 °C) 66 15 100 %   10/12/17 1445 123/55 - - - -   10/12/17 1304 169/57 98.4 °F (36.9 °C) 75 18 96 %   10/12/17 1130 154/62 98.2 °F (36.8 °C) 71 18 100 %        10/11 1901 - 10/13 0700  In: 990 [P.O.:240; I.V.:750]  Out: 2590 [Urine:2580]    Physical Exam:   Visit Vitals    /70 (BP 1 Location: Left arm, BP Patient Position: At rest)    Pulse 73    Temp 98.5 °F (36.9 °C)    Resp 18    Wt 246 lb 14.6 oz (112 kg)    SpO2 99%    BMI 26.46 kg/m2     General appearance: alert, cooperative, no distress, appears stated age  Neck: supple, symmetrical, trachea midline, no adenopathy, thyroid: not enlarged, symmetric, no tenderness/mass/nodules, no carotid bruit and no JVD  Lungs: clear to auscultation bilaterally  Heart: regular rate and rhythm, S1, S2 normal, no murmur, click, rub or gallop  Abdomen: soft, non-tender.  Bowel sounds normal. No masses,  no organomegaly  Extremities: edema trace, right foot bandaged        Data Review   Recent Results (from the past 24 hour(s))   VITAMIN B12    Collection Time: 10/12/17  9:15 AM   Result Value Ref Range    Vitamin B12 1561 (H) 211 - 911 pg/mL   FOLATE    Collection Time: 10/12/17  9:15 AM   Result Value Ref Range    Folate 11.2 5.0 - 21.0 ng/mL   LD    Collection Time: 10/12/17 10:06 AM   Result Value Ref Range     85 - 241 U/L   RETICULOCYTE COUNT    Collection Time: 10/12/17 10:06 AM   Result Value Ref Range    Reticulocyte count 1.0 0.7 - 2.1 %   HAPTOGLOBIN    Collection Time: 10/12/17 10:06 AM   Result Value Ref Range    Haptoglobin 439 (H) 30 - 200 mg/dL   IMMUNOELECTROPHORESIS [facility]    Collection Time: 10/12/17 10:06 AM   Result Value Ref Range    Immunofixation, serum PENDING     Immunoglobulin G, Qt. 1507 700 - 1600 mg/dL    Immunoglobulin A, Qt. 142 61 - 437 mg/dL    Immunoglobulin M, Qt. 50 15 - 143 mg/dL   Delia Solano    Collection Time: 10/12/17 10:06 AM   Result Value Ref Range    Vancomycin,trough 15.7 (H) 5.0 - 10.0 ug/mL    Reported dose date: NOT PROVIDED      Reported dose time: NOT PROVIDED      Reported dose: NOT PROVIDED UNITS   GLUCOSE, POC    Collection Time: 10/12/17 11:04 AM   Result Value Ref Range    Glucose (POC) 80 65 - 100 mg/dL    Performed by Jacquie Murillo    GLUCOSE, POC    Collection Time: 10/12/17  1:22 PM   Result Value Ref Range    Glucose (POC) 59 (L) 65 - 100 mg/dL    Performed by Jenn Qwiteole    GLUCOSE, POC    Collection Time: 10/12/17  2:11 PM   Result Value Ref Range    Glucose (POC) 115 (H) 65 - 100 mg/dL    Performed by Sis Goode ([facility])    GLUCOSE, POC    Collection Time: 10/12/17  2:48 PM   Result Value Ref Range    Glucose (POC) 100 65 - 100 mg/dL    Performed by Sis Goode ([facility])    GLUCOSE, POC    Collection Time: 10/12/17  4:30 PM   Result Value Ref Range    Glucose (POC) 88 65 - 100 mg/dL    Performed by Daniel Yadav (PCT)    GLUCOSE, POC    Collection Time: 10/12/17  8:48 PM   Result Value Ref Range    Glucose (POC) 186 (H) 65 - 100 mg/dL    Performed by Rosales Reynoso (PCT)    CBC W/O DIFF    Collection Time: 10/13/17  4:00 AM   Result Value Ref Range    WBC 7.4 4.1 - 11.1 K/uL    RBC 3.16 (L) 4.10 - 5.70 M/uL    HGB 8.5 (L) 12.1 - 17.0 g/dL    HCT 26.0 (L) 36.6 - 50.3 %    MCV 82.3 80.0 - 99.0 FL    MCH 26.9 26.0 - 34.0 PG    MCHC 32.7 30.0 - 36.5 g/dL    RDW 13.3 11.5 - 14.5 %    PLATELET 347 651 - 019 K/uL   METABOLIC PANEL, BASIC    Collection Time: 10/13/17  4:00 AM   Result Value Ref Range    Sodium 134 (L) 136 - 145 mmol/L    Potassium 4.3 3.5 - 5.1 mmol/L    Chloride 103 97 - 108 mmol/L    CO2 23 21 - 32 mmol/L    Anion gap 8 5 - 15 mmol/L    Glucose 162 (H) 65 - 100 mg/dL    BUN 15 6 - 20 MG/DL    Creatinine 1.19 0.70 - 1.30 MG/DL    BUN/Creatinine ratio 13 12 - 20      GFR est AA >60 >60 ml/min/1.73m2    GFR est non-AA 59 (L) >60 ml/min/1.73m2    Calcium 8.5 8.5 - 10.1 MG/DL   GLUCOSE, POC    Collection Time: 10/13/17  7:57 AM   Result Value Ref Range    Glucose (POC) 185 (H) 65 - 100 mg/dL    Performed by Desiree Avalos (PCT)            Assessment:     Active Problems:    Osteomyelitis ([facility] [address] (10/10/2017)        Plan:     1. Continue postop care including parenteral antibiotics. 2.  Continue diabetic control. 3.  BP acceptable. 4.  Will mobilize per recommendation of vascular surgery.

## 2017-10-13 NOTE — DIABETES MGMT
DTC Progress Note    Recommendations/ Comments: If appropriate, please consider discontinuing meal time insulin at this time. Note Lantus increased for am hyperglycemia    Chart reviewed on [facility] Marroquin. Patient is a 68 y.o. male with known diabetes on Novolin 70/30 insulin 20 units BID at home. A1c:   Lab Results   Component Value Date/Time    Hemoglobin A1c 7.8 10/10/2017 03:24 PM    Hemoglobin A1c 8.8 09/08/2017 12:07 AM       Recent Glucose Results: Lab Results   Component Value Date/Time     (H) 10/13/2017 04:00 AM    GLUCPOC 185 (H) 10/13/2017 07:57 AM    GLUCPOC 186 (H) 10/12/2017 08:48 PM    GLUCPOC 88 10/12/2017 04:30 PM        Lab Results   Component Value Date/Time    Creatinine 1.19 10/13/2017 04:00 AM     Estimated Creatinine Clearance: 72.3 mL/min (based on Cr of 1.19). Active Orders   Diet    DIET DIABETIC CONSISTENT CARB Regular        PO intake: Patient Vitals for the past 72 hrs:   % Diet Eaten   10/13/17 0919 75 %   10/10/17 1800 100 %         Will continue to follow as needed. Thank you.   Herbert Johnson, 66 [address], [address] [place] [address]  Office:  [phone]

## 2017-10-13 NOTE — PROGRESS NOTES
Vascular Surgery Progress Note  Gabbi Flynn ACNP-BC  10/13/2017 8:07 AM       Subjective:     Rodger Lester a 68 y.o. AAM who is well known to our service. [facility] has been follow by our practice since early September when he was admitted to the hospital by his PCP Dr. Julien Hinds for a RLE DM foot wound.  During that admission he was treated for PAD w athrectomy and angioplasty of his TPT on 9/13/17 followed by I&D of the wound and amputation of the 3rd-5th digits.  MRI at the time confirmed osteomylitis. [facility] has been followed weekly in our clinic for his wound and it has slowly been deteriorating. [facility] was noted to have significant necrosis of the skin. Patient was admitted for IV antibx on 10/10/2017 and is now status post repeat I &D with resection of the remainder of the 5th metatarsal head on 10/12/2017. Patient's previous culture grew provendcia stuartii, proteus, and pseudomonas.  He was treated w IV cefepime and Vancomycin during admission and outpatient w Augmentin.  Repeat wound cx was drawn in the office on 10/10/2017 and currently show mixed skin lb and light growth. Formal result continues to be pending. No complaints overnight. Afebrile. WBC, HR, and RR are normal.  Denies pain.       Nursing Data:     Patient Vitals for the past 24 hrs:   BP Temp Pulse Resp SpO2   10/13/17 0808 - 98.5 °F (36.9 °C) 73 18 99 %   10/13/17 0330 150/70 98 °F (36.7 °C) 88 18 99 %   10/12/17 1900 148/74 98.6 °F (37 °C) 84 18 99 %   10/12/17 1852 146/70 98.5 °F (36.9 °C) - 16 100 %   10/12/17 1835 159/66 - 82 16 100 %   10/12/17 1800 159/62 - 77 - 99 %   10/12/17 1736 151/77 - 74 - 100 %   10/12/17 1650 168/81 - 69 - 99 %   10/12/17 1618 150/67 98.3 °F (36.8 °C) 68 18 97 %   10/12/17 1530 133/61 97.6 °F (36.4 °C) 65 17 100 %   10/12/17 1515 120/56 97.6 °F (36.4 °C) 65 25 99 %   10/12/17 1500 102/48 - 64 22 100 %   10/12/17 1455 118/55 - 66 19 100 %   10/12/17 1450 113/54 - 65 13 99 %   10/12/17 1449 123/55 97.5 °F (36.4 °C) 66 15 100 %   10/12/17 1445 123/55 - - - -   10/12/17 1304 169/57 98.4 °F (36.9 °C) 75 18 96 %   10/12/17 1130 154/62 98.2 °F (36.8 °C) 71 18 100 %     ---------------------------------------------------------------------------------------------------------    Intake/Output Summary (Last 24 hours) at 10/13/17 0949  Last data filed at 10/13/17 0919   Gross per 24 hour   Intake             1170 ml   Output             1190 ml   Net              -20 ml       Exam:     Physical Exam   Constitutional: He is oriented to person, place, and time. He appears well-developed and well-nourished. HENT:   Head: Normocephalic and atraumatic. Eyes: EOM are normal. Pupils are equal, round, and reactive to light. Neck: Normal range of motion. Neck supple. Cardiovascular: Normal rate and regular rhythm. 1+ RLE edema and trace LLE edema    Pulmonary/Chest: Effort normal and breath sounds normal.   Abdominal: Soft. Bowel sounds are normal.   Musculoskeletal: Normal range of motion. Neurological: He is alert and oriented to person, place, and time. Skin:        Psychiatric: His behavior is normal. Thought content normal.       Lab Review:     .   Recent Results (from the past 24 hour(s))   LD    Collection Time: 10/12/17 10:06 AM   Result Value Ref Range     85 - 241 U/L   RETICULOCYTE COUNT    Collection Time: 10/12/17 10:06 AM   Result Value Ref Range    Reticulocyte count 1.0 0.7 - 2.1 %   HAPTOGLOBIN    Collection Time: 10/12/17 10:06 AM   Result Value Ref Range    Haptoglobin 439 (H) 30 - 200 mg/dL   IMMUNOELECTROPHORESIS [facility]    Collection Time: 10/12/17 10:06 AM   Result Value Ref Range    Immunofixation, serum PENDING     Immunoglobulin G, Qt. 1507 700 - 1600 mg/dL    Immunoglobulin A, Qt. 142 61 - 437 mg/dL    Immunoglobulin M, Qt. 50 15 - 143 mg/dL   VANCOMYCIN, TROUGH    Collection Time: 10/12/17 10:06 AM   Result Value Ref Range    Vancomycin,trough 15.7 (H) 5.0 - 10.0 ug/mL Reported dose date: NOT PROVIDED      Reported dose time: NOT PROVIDED      Reported dose: NOT PROVIDED UNITS   GLUCOSE, POC    Collection Time: 10/12/17 11:04 AM   Result Value Ref Range    Glucose (POC) 80 65 - 100 mg/dL    Performed by Nolvia Kirk    GLUCOSE, POC    Collection Time: 10/12/17  1:22 PM   Result Value Ref Range    Glucose (POC) 59 (L) 65 - 100 mg/dL    Performed by Gely Marsh    GLUCOSE, POC    Collection Time: 10/12/17  2:11 PM   Result Value Ref Range    Glucose (POC) 115 (H) 65 - 100 mg/dL    Performed by Shabbir Samuel (anes)    GLUCOSE, POC    Collection Time: 10/12/17  2:48 PM   Result Value Ref Range    Glucose (POC) 100 65 - 100 mg/dL    Performed by Shabbir Samuel (anes)    GLUCOSE, POC    Collection Time: 10/12/17  4:30 PM   Result Value Ref Range    Glucose (POC) 88 65 - 100 mg/dL    Performed by Tamie Bolton (PCT)    GLUCOSE, POC    Collection Time: 10/12/17  8:48 PM   Result Value Ref Range    Glucose (POC) 186 (H) 65 - 100 mg/dL    Performed by Alex Berry (PCT)    CBC W/O DIFF    Collection Time: 10/13/17  4:00 AM   Result Value Ref Range    WBC 7.4 4.1 - 11.1 K/uL    RBC 3.16 (L) 4.10 - 5.70 M/uL    HGB 8.5 (L) 12.1 - 17.0 g/dL    HCT 26.0 (L) 36.6 - 50.3 %    MCV 82.3 80.0 - 99.0 FL    MCH 26.9 26.0 - 34.0 PG    MCHC 32.7 30.0 - 36.5 g/dL    RDW 13.3 11.5 - 14.5 %    PLATELET 495 444 - 526 K/uL   METABOLIC PANEL, BASIC    Collection Time: 10/13/17  4:00 AM   Result Value Ref Range    Sodium 134 (L) 136 - 145 mmol/L    Potassium 4.3 3.5 - 5.1 mmol/L    Chloride 103 97 - 108 mmol/L    CO2 23 21 - 32 mmol/L    Anion gap 8 5 - 15 mmol/L    Glucose 162 (H) 65 - 100 mg/dL    BUN 15 6 - 20 MG/DL    Creatinine 1.19 0.70 - 1.30 MG/DL    BUN/Creatinine ratio 13 12 - 20      GFR est AA >60 >60 ml/min/1.73m2    GFR est non-AA 59 (L) >60 ml/min/1.73m2    Calcium 8.5 8.5 - 10.1 MG/DL   GLUCOSE, POC    Collection Time: 10/13/17  7:57 AM   Result Value Ref Range    Glucose (POC) 185 (H) 65 - 100 mg/dL    Performed by Jefe Singh (PCT)           Assessment/Plan:      Principal Problems:                        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 Patient did not meet SIRs criteria on arrival.   Repeat wound cx from our office from 10/10/17: mixed skin lb and light growth. POD 1 from I & D and resection of remaining 5th metatarsal head. Continue IV Cefepime and IV Vancomycin. Daily dressing changes. High risk for progression to BKA. Active Problems:                        IDDM II  A1c 7.8  Hypoglycemia late yesterday while NPO after receiving 1/2 of this scheduled dose. Mild hyperglycemia this am.  Lantus dose increased to 14 units daily per Dr. Loretta Mc. 2/3 of home dose conversion from NPH is 18 units daily. Continue to use less than his home dose. Continue POC achs and SSI. Hyponatremia   Likely psuedohyponatremia from mild hyperglycemia. Frederich Tere IVF to normal saline.                            PAD  Continue home medication ASA.                                        CKD III w baseline creatinine of 1.2  Mildly elevated on arriva. Now improved this am w minimal IVFs to within normal limits. Tolerating IV antibx well. Continue to monitor while on antibx. Anemia of chronic disease            Iron deficiency   H&H decreased from previous presentation. Anemia to be evaluated by Dr. Christopher Driver (hematology) after consult by Dr. Loretta Mc (PCP).                           CAD  Continue ASA, BBlocker, and statin.        VTE prophylaxis:  ASA. SCDs contraindicated secondary to severe PAD.     Disposition:   Home w  2-3 days.

## 2017-10-14 LAB
ANION GAP SERPL CALC-SCNC: 7 MMOL/L (ref 5–15)
BUN SERPL-MCNC: 16 MG/DL (ref 6–20)
BUN/CREAT SERPL: 15 (ref 12–20)
CALCIUM SERPL-MCNC: 8.5 MG/DL (ref 8.5–10.1)
CHLORIDE SERPL-SCNC: 105 MMOL/L (ref 97–108)
CO2 SERPL-SCNC: 24 MMOL/L (ref 21–32)
CREAT SERPL-MCNC: 1.08 MG/DL (ref 0.7–1.3)
ERYTHROCYTE [DISTWIDTH] IN BLOOD BY AUTOMATED COUNT: 13.4 % (ref 11.5–14.5)
GLUCOSE BLD STRIP.AUTO-MCNC: 107 MG/DL (ref 65–100)
GLUCOSE BLD STRIP.AUTO-MCNC: 128 MG/DL (ref 65–100)
GLUCOSE BLD STRIP.AUTO-MCNC: 147 MG/DL (ref 65–100)
GLUCOSE BLD STRIP.AUTO-MCNC: 172 MG/DL (ref 65–100)
GLUCOSE SERPL-MCNC: 114 MG/DL (ref 65–100)
HCT VFR BLD AUTO: 24.1 % (ref 36.6–50.3)
HGB BLD-MCNC: 8.1 G/DL (ref 12.1–17)
MCH RBC QN AUTO: 27.8 PG (ref 26–34)
MCHC RBC AUTO-ENTMCNC: 33.6 G/DL (ref 30–36.5)
MCV RBC AUTO: 82.8 FL (ref 80–99)
PLATELET # BLD AUTO: 255 K/UL (ref 150–400)
POTASSIUM SERPL-SCNC: 4.2 MMOL/L (ref 3.5–5.1)
RBC # BLD AUTO: 2.91 M/UL (ref 4.1–5.7)
SERVICE CMNT-IMP: ABNORMAL
SODIUM SERPL-SCNC: 136 MMOL/L (ref 136–145)
WBC # BLD AUTO: 7.2 K/UL (ref 4.1–11.1)

## 2017-10-14 PROCEDURE — 36415 COLL VENOUS BLD VENIPUNCTURE: CPT | Performed by: NURSE PRACTITIONER

## 2017-10-14 PROCEDURE — 80048 BASIC METABOLIC PNL TOTAL CA: CPT | Performed by: NURSE PRACTITIONER

## 2017-10-14 PROCEDURE — 74011000258 HC RX REV CODE- 258: Performed by: SURGERY

## 2017-10-14 PROCEDURE — 77030009526 HC GEL CARSYN MDII -A

## 2017-10-14 PROCEDURE — 74011250637 HC RX REV CODE- 250/637: Performed by: NURSE PRACTITIONER

## 2017-10-14 PROCEDURE — 74011250636 HC RX REV CODE- 250/636: Performed by: SURGERY

## 2017-10-14 PROCEDURE — 74011636637 HC RX REV CODE- 636/637: Performed by: INTERNAL MEDICINE

## 2017-10-14 PROCEDURE — 85027 COMPLETE CBC AUTOMATED: CPT | Performed by: NURSE PRACTITIONER

## 2017-10-14 PROCEDURE — 82962 GLUCOSE BLOOD TEST: CPT

## 2017-10-14 PROCEDURE — 3331090002 HH PPS REVENUE DEBIT

## 2017-10-14 PROCEDURE — 3331090001 HH PPS REVENUE CREDIT

## 2017-10-14 PROCEDURE — 65270000029 HC RM PRIVATE

## 2017-10-14 RX ADMIN — VANCOMYCIN HYDROCHLORIDE 1250 MG: 10 INJECTION, POWDER, LYOPHILIZED, FOR SOLUTION INTRAVENOUS at 23:21

## 2017-10-14 RX ADMIN — INSULIN LISPRO 8 UNITS: 100 INJECTION, SOLUTION INTRAVENOUS; SUBCUTANEOUS at 16:43

## 2017-10-14 RX ADMIN — METOPROLOL TARTRATE 25 MG: 25 TABLET ORAL at 10:01

## 2017-10-14 RX ADMIN — METOPROLOL TARTRATE 25 MG: 25 TABLET ORAL at 16:43

## 2017-10-14 RX ADMIN — VANCOMYCIN HYDROCHLORIDE 1250 MG: 10 INJECTION, POWDER, LYOPHILIZED, FOR SOLUTION INTRAVENOUS at 11:35

## 2017-10-14 RX ADMIN — ASPIRIN 81 MG 81 MG: 81 TABLET ORAL at 10:01

## 2017-10-14 RX ADMIN — FERROUS SULFATE TAB 325 MG (65 MG ELEMENTAL FE) 325 MG: 325 (65 FE) TAB at 10:01

## 2017-10-14 RX ADMIN — CEFEPIME HYDROCHLORIDE 2 G: 2 INJECTION, POWDER, FOR SOLUTION INTRAVENOUS at 10:01

## 2017-10-14 RX ADMIN — INSULIN GLARGINE 14 UNITS: 100 INJECTION, SOLUTION SUBCUTANEOUS at 10:00

## 2017-10-14 RX ADMIN — CEFEPIME HYDROCHLORIDE 2 G: 2 INJECTION, POWDER, FOR SOLUTION INTRAVENOUS at 16:43

## 2017-10-14 RX ADMIN — INSULIN LISPRO 8 UNITS: 100 INJECTION, SOLUTION INTRAVENOUS; SUBCUTANEOUS at 11:35

## 2017-10-14 RX ADMIN — CEFEPIME HYDROCHLORIDE 2 G: 2 INJECTION, POWDER, FOR SOLUTION INTRAVENOUS at 00:20

## 2017-10-14 RX ADMIN — OXYCODONE HYDROCHLORIDE 5 MG: 5 TABLET ORAL at 00:37

## 2017-10-14 NOTE — PROGRESS NOTES
Patient Active Problem List   Diagnosis Code    DM (diabetes mellitus), type 2, uncontrolled, periph vascular complic ([facility] V46.56, L23.50    Charcot foot due to diabetes mellitus ([facility] E11.610    Essential hypertension I10    Dyslipidemia E78.5    Leg ulcer ([facility] L97.909    Venous insufficiency I87.2    Synovitis of knee M65.9    Primary osteoarthritis of both knees M17.0    Foot ulcer with fat layer exposed ([facility] L97.502    Peripheral neuropathy G62.9    Osteomyelitis ([facility] M86.9     No Known Allergies    Current Facility-Administered Medications:     insulin glargine (LANTUS) injection 14 Units, 14 Units, SubCUTAneous, DAILY, Jim Steward MD    0.9% sodium chloride infusion, 25 mL/hr, IntraVENous, CONTINUOUS, Carisa Velarde NP, Last Rate: 25 mL/hr at 10/13/17 1009, 25 mL/hr at 10/13/17 1009    oxyCODONE IR (ROXICODONE) tablet 5 mg, 5 mg, Oral, Q4H PRN, Carsia Velarde NP, 5 mg at 10/14/17 0037    insulin lispro (HUMALOG) injection 8 Units, 8 Units, SubCUTAneous, TIDAC, Jim Steward MD, 8 Units at 10/13/17 1712    vancomycin (VANCOCIN) 1250 mg in  ml infusion, 1,250 mg, IntraVENous, Q12H, Andrews Landa MD, Last Rate: 166.7 mL/hr at 10/13/17 2248, 1,250 mg at 10/13/17 2248    morphine injection 4 mg, 4 mg, IntraVENous, Q3H PRN, Andrews Landa MD    morphine injection 2 mg, 2 mg, IntraVENous, Q3H PRN, Andrews Landa MD, 2 mg at 10/13/17 1316    [facility] injection 4 mg, 4 mg, IntraVENous, Q6H PRN, Andrews Landa MD    dextrose (D50W) injection syrg 12.5-25 g, 12.5-25 g, IntraVENous, PRN, Carisa Velarde NP    glucagon (GLUCAGEN) injection 1 mg, 1 mg, IntraMUSCular, PRN, Carisa Velarde, NP    glucose chewable tablet 16 g, 4 Tab, Oral, PRN, Carisa Velarde, NP    bisacodyl (DULCOLAX) suppository 10 mg, 10 mg, Rectal, DAILY PRN, Carisa Velarde, NP    ferrous sulfate tablet 325 mg, 1 Tab, Oral, DAILY WITH BREAKFAST, Jill P Quynh Walters NP, 325 mg at 10/13/17 8400    aspirin chewable tablet 81 mg, 81 mg, Oral, DAILY, Carisa Velarde NP, 81 mg at 10/13/17 5142    metoprolol tartrate (LOPRESSOR) tablet 25 mg, 25 mg, Oral, BID, Carisa Velarde, NP, 25 mg at 10/13/17 1712    cefepime (MAXIPIME) 2 g in 0.9% sodium chloride (MBP/ADV) 100 mL, 2 g, IntraVENous, Q8H, Andrews Landa MD, Last Rate: 33.3 mL/hr at 10/14/17 0020, 2 g at 10/14/17 0020    Patient Vitals for the past 24 hrs:   Temp Pulse Resp BP SpO2   10/14/17 0330 98.4 °F (36.9 °C) 62 14 139/69 98 %   10/13/17 1930 98.9 °F (37.2 °C) 65 16 156/72 100 %   10/13/17 1439 99.5 °F (37.5 °C) 67 16 141/69 98 %   10/13/17 1128 96.7 °F (35.9 °C) 71 18 136/71 97 %   10/13/17 0808 98.5 °F (36.9 °C) 73 18 144/81 99 %     Recent Results (from the past 24 hour(s))   GLUCOSE, POC    Collection Time: 10/13/17  7:57 AM   Result Value Ref Range    Glucose (POC) 185 (H) 65 - 100 mg/dL    Performed by Catalina Garcia (PCT)    GLUCOSE, POC    Collection Time: 10/13/17 11:59 AM   Result Value Ref Range    Glucose (POC) 120 (H) 65 - 100 mg/dL    Performed by Catalina Garcia (PCT)    GLUCOSE, POC    Collection Time: 10/13/17  4:21 PM   Result Value Ref Range    Glucose (POC) 160 (H) 65 - 100 mg/dL    Performed by Oleg Caro    GLUCOSE, POC    Collection Time: 10/13/17  9:12 PM   Result Value Ref Range    Glucose (POC) 117 (H) 65 - 100 mg/dL    Performed by Onofre Salcedo    CBC W/O DIFF    Collection Time: 10/14/17  3:30 AM   Result Value Ref Range    WBC 7.2 4.1 - 11.1 K/uL    RBC 2.91 (L) 4.10 - 5.70 M/uL    HGB 8.1 (L) 12.1 - 17.0 g/dL    HCT 24.1 (L) 36.6 - 50.3 %    MCV 82.8 80.0 - 99.0 FL    MCH 27.8 26.0 - 34.0 PG    MCHC 33.6 30.0 - 36.5 g/dL    RDW 13.4 11.5 - 14.5 %    PLATELET 164 658 - 507 K/uL   METABOLIC PANEL, BASIC    Collection Time: 10/14/17  3:30 AM   Result Value Ref Range    Sodium 136 136 - 145 mmol/L    Potassium 4.2 3.5 - 5.1 mmol/L    Chloride 105 97 - 108 mmol/L    CO2 24 21 - 32 mmol/L    Anion gap 7 5 - 15 mmol/L    Glucose 114 (H) 65 - 100 mg/dL    BUN 16 6 - 20 MG/DL    Creatinine 1.08 0.70 - 1.30 MG/DL    BUN/Creatinine ratio 15 12 - 20      GFR est AA >60 >60 ml/min/1.73m2    GFR est non-AA >60 >60 ml/min/1.73m2    Calcium 8.5 8.5 - 10.1 MG/DL     Exam: alert, vocal      DM: BS mostly < 160, high 185  PAD: understands and accepts that he may lose his limb.     Continue current regimen

## 2017-10-14 NOTE — ROUTINE PROCESS
General Surgery End of Shift Nursing Note    Bedside shift change report given to Lakesha Dixonayanshahid 694 (oncoming nurse) by Kyra Pack, RN and Jessica Nguyen RN (offgoing nurse). Report included the following information SBAR, Kardex, Intake/Output and MAR. Shift worked:   7 PM to 7 AM   Significant changes during shift:    PT took one Roxycodine during the night shift. PT stated: \"I don't like to take a lot of medicine. \"  PT has maintained a pain level of 2/10. Non-emergent issues for physician to address:   N/A     Number times ambulated in hallway past shift: 0    Number of times OOB to chair past shift: 0    Pain Management:  Current medication: Roxycodine  Patient states pain is manageable on current pain medication: YES    GI:    Current diet:  DIET DIABETIC CONSISTENT CARB Regular    Tolerating current diet: YES  Passing flatus: YES  Last Bowel Movement: yesterday   Appearance: formed    Respiratory:    Incentive Spirometer at bedside: YES  Patient instructed on use: YES    Patient Safety:    Falls Score: 2  Bed Alarm On? No  Sitter?  No    Babita Joyner

## 2017-10-15 LAB
ANION GAP SERPL CALC-SCNC: 7 MMOL/L (ref 5–15)
BUN SERPL-MCNC: 17 MG/DL (ref 6–20)
BUN/CREAT SERPL: 17 (ref 12–20)
CALCIUM SERPL-MCNC: 8.6 MG/DL (ref 8.5–10.1)
CHLORIDE SERPL-SCNC: 104 MMOL/L (ref 97–108)
CO2 SERPL-SCNC: 24 MMOL/L (ref 21–32)
CREAT SERPL-MCNC: 1 MG/DL (ref 0.7–1.3)
ERYTHROCYTE [DISTWIDTH] IN BLOOD BY AUTOMATED COUNT: 13.3 % (ref 11.5–14.5)
GLUCOSE BLD STRIP.AUTO-MCNC: 108 MG/DL (ref 65–100)
GLUCOSE BLD STRIP.AUTO-MCNC: 112 MG/DL (ref 65–100)
GLUCOSE BLD STRIP.AUTO-MCNC: 117 MG/DL (ref 65–100)
GLUCOSE BLD STRIP.AUTO-MCNC: 127 MG/DL (ref 65–100)
GLUCOSE SERPL-MCNC: 111 MG/DL (ref 65–100)
HCT VFR BLD AUTO: 25.2 % (ref 36.6–50.3)
HGB BLD-MCNC: 8.6 G/DL (ref 12.1–17)
MCH RBC QN AUTO: 28.2 PG (ref 26–34)
MCHC RBC AUTO-ENTMCNC: 34.1 G/DL (ref 30–36.5)
MCV RBC AUTO: 82.6 FL (ref 80–99)
PLATELET # BLD AUTO: 254 K/UL (ref 150–400)
POTASSIUM SERPL-SCNC: 4.1 MMOL/L (ref 3.5–5.1)
RBC # BLD AUTO: 3.05 M/UL (ref 4.1–5.7)
SERVICE CMNT-IMP: ABNORMAL
SODIUM SERPL-SCNC: 135 MMOL/L (ref 136–145)
WBC # BLD AUTO: 6.6 K/UL (ref 4.1–11.1)

## 2017-10-15 PROCEDURE — 74011636637 HC RX REV CODE- 636/637: Performed by: INTERNAL MEDICINE

## 2017-10-15 PROCEDURE — 3331090002 HH PPS REVENUE DEBIT

## 2017-10-15 PROCEDURE — 85027 COMPLETE CBC AUTOMATED: CPT | Performed by: NURSE PRACTITIONER

## 2017-10-15 PROCEDURE — 74011250636 HC RX REV CODE- 250/636: Performed by: SURGERY

## 2017-10-15 PROCEDURE — 82962 GLUCOSE BLOOD TEST: CPT

## 2017-10-15 PROCEDURE — 65270000029 HC RM PRIVATE

## 2017-10-15 PROCEDURE — 80048 BASIC METABOLIC PNL TOTAL CA: CPT | Performed by: NURSE PRACTITIONER

## 2017-10-15 PROCEDURE — 36415 COLL VENOUS BLD VENIPUNCTURE: CPT | Performed by: NURSE PRACTITIONER

## 2017-10-15 PROCEDURE — 74011250637 HC RX REV CODE- 250/637: Performed by: NURSE PRACTITIONER

## 2017-10-15 PROCEDURE — 74011000258 HC RX REV CODE- 258: Performed by: SURGERY

## 2017-10-15 PROCEDURE — 3331090001 HH PPS REVENUE CREDIT

## 2017-10-15 RX ADMIN — INSULIN LISPRO 8 UNITS: 100 INJECTION, SOLUTION INTRAVENOUS; SUBCUTANEOUS at 16:55

## 2017-10-15 RX ADMIN — METOPROLOL TARTRATE 25 MG: 25 TABLET ORAL at 16:55

## 2017-10-15 RX ADMIN — CEFEPIME HYDROCHLORIDE 2 G: 2 INJECTION, POWDER, FOR SOLUTION INTRAVENOUS at 16:55

## 2017-10-15 RX ADMIN — BISACODYL 10 MG: 10 SUPPOSITORY RECTAL at 08:42

## 2017-10-15 RX ADMIN — VANCOMYCIN HYDROCHLORIDE 1250 MG: 10 INJECTION, POWDER, LYOPHILIZED, FOR SOLUTION INTRAVENOUS at 11:36

## 2017-10-15 RX ADMIN — METOPROLOL TARTRATE 25 MG: 25 TABLET ORAL at 08:42

## 2017-10-15 RX ADMIN — CEFEPIME HYDROCHLORIDE 2 G: 2 INJECTION, POWDER, FOR SOLUTION INTRAVENOUS at 08:42

## 2017-10-15 RX ADMIN — FERROUS SULFATE TAB 325 MG (65 MG ELEMENTAL FE) 325 MG: 325 (65 FE) TAB at 08:42

## 2017-10-15 RX ADMIN — INSULIN LISPRO 8 UNITS: 100 INJECTION, SOLUTION INTRAVENOUS; SUBCUTANEOUS at 08:42

## 2017-10-15 RX ADMIN — INSULIN GLARGINE 14 UNITS: 100 INJECTION, SOLUTION SUBCUTANEOUS at 09:00

## 2017-10-15 RX ADMIN — ASPIRIN 81 MG 81 MG: 81 TABLET ORAL at 08:42

## 2017-10-15 RX ADMIN — CEFEPIME HYDROCHLORIDE 2 G: 2 INJECTION, POWDER, FOR SOLUTION INTRAVENOUS at 00:55

## 2017-10-15 RX ADMIN — VANCOMYCIN HYDROCHLORIDE 1250 MG: 10 INJECTION, POWDER, LYOPHILIZED, FOR SOLUTION INTRAVENOUS at 22:09

## 2017-10-15 NOTE — PROGRESS NOTES
General Surgery End of Shift Nursing Note    Bedside shift change report given to [address] (oncoming nurse) by Patricia Ross RN   (offgoing nurse). Report included the following information SBAR, Kardex, OR Summary, Intake/Output, MAR and Recent Results. Shift worked:   7p-7a   Significant changes during shift:    none   Non-emergent issues for physician to address:   none     Number times ambulated in hallway past shift: 0    Number of times OOB to chair past shift: 0    Pain Management:  Current medication: pt not using pain medication    Patient states pain is manageable on current pain medication: YES    GI:    Current diet:  DIET DIABETIC CONSISTENT CARB Regular    Tolerating current diet: YES  Passing flatus: YES      Respiratory:    Incentive Spirometer at bedside: YES  Patient instructed on use: YES    Patient Safety:    Falls Score: 2  Bed Alarm On? No  Sitter?  No    Hudson Eubanks RN

## 2017-10-15 NOTE — PROGRESS NOTES
Vascular    No new c/o  Foot bandaged but still has odor  I suspect he is headed for BKA  I will look at wound and give final opinion in AM  He would also like a 2nd opinion  I will talk to Dr Delilah Dominguez to arrange  Cont wound care and Abx

## 2017-10-15 NOTE — PROGRESS NOTES
Patient Active Problem List   Diagnosis Code    DM (diabetes mellitus), type 2, uncontrolled, periph vascular complic ([facility] D47.08, F19.61    Charcot foot due to diabetes mellitus ([facility] E11.610    Essential hypertension I10    Dyslipidemia E78.5    Leg ulcer ([facility] L97.909    Venous insufficiency I87.2    Synovitis of knee M65.9    Primary osteoarthritis of both knees M17.0    Foot ulcer with fat layer exposed ([facility] L97.502    Peripheral neuropathy G62.9    Osteomyelitis ([facility] M86.9     No Known Allergies    Current Facility-Administered Medications:     insulin glargine (LANTUS) injection 14 Units, 14 Units, SubCUTAneous, DAILY, Aldo Chauhan MD, 14 Units at 10/14/17 1000    0.9% sodium chloride infusion, 25 mL/hr, IntraVENous, CONTINUOUS, Raiza Kwok NP, Last Rate: 25 mL/hr at 10/13/17 1009, 25 mL/hr at 10/13/17 1009    oxyCODONE IR (ROXICODONE) tablet 5 mg, 5 mg, Oral, Q4H PRN, Raiza Kwok NP, 5 mg at 10/14/17 0037    insulin lispro (HUMALOG) injection 8 Units, 8 Units, SubCUTAneous, TIDAC, Aldo Chauhan MD, 8 Units at 10/14/17 1643    vancomycin (VANCOCIN) 1250 mg in  ml infusion, 1,250 mg, IntraVENous, Q12H, Kike Carty MD, Last Rate: 166.7 mL/hr at 10/14/17 2321, 1,250 mg at 10/14/17 2321    morphine injection 4 mg, 4 mg, IntraVENous, Q3H PRN, Kike Carty MD    morphine injection 2 mg, 2 mg, IntraVENous, Q3H PRN, Kike Carty MD, 2 mg at 10/13/17 1316    [facility] injection 4 mg, 4 mg, IntraVENous, Q6H PRN, Kike Carty MD    dextrose (D50W) injection syrg 12.5-25 g, 12.5-25 g, IntraVENous, PRN, Raiza Kwok NP    glucagon (GLUCAGEN) injection 1 mg, 1 mg, IntraMUSCular, PRN, Raiza Kwok NP    glucose chewable tablet 16 g, 4 Tab, Oral, PRN, Raiza Kwok NP    bisacodyl (DULCOLAX) suppository 10 mg, 10 mg, Rectal, DAILY PRN, Raiza Kwok NP    ferrous sulfate tablet 325 mg, 1 Tab, Oral, DAILY WITH BREAKFAST, Naif Flynn NP, 325 mg at 10/14/17 1001    aspirin chewable tablet 81 mg, 81 mg, Oral, DAILY, Naif Flynn NP, 81 mg at 10/14/17 1001    metoprolol tartrate (LOPRESSOR) tablet 25 mg, 25 mg, Oral, BID, Naif Flynn NP, 25 mg at 10/14/17 1643    cefepime (MAXIPIME) 2 g in 0.9% sodium chloride (MBP/ADV) 100 mL, 2 g, IntraVENous, Q8H, Naheed Ambrose MD, Last Rate: 33.3 mL/hr at 10/15/17 0055, 2 g at 10/15/17 0055  Patient Vitals for the past 24 hrs:   Temp Pulse Resp BP SpO2   10/15/17 0355 98 °F (36.7 °C) 64 16 139/56 99 %   10/14/17 2304 99.5 °F (37.5 °C) 72 16 155/80 100 %   10/14/17 1952 99.1 °F (37.3 °C) 63 16 136/60 100 %   10/14/17 1620 98.5 °F (36.9 °C) 87 14 166/72 99 %   10/14/17 1122 98.6 °F (37 °C) 87 12 169/85 100 %   10/14/17 0955 98.7 °F (37.1 °C) 82 12 159/82 99 %     Recent Results (from the past 24 hour(s))   GLUCOSE, POC    Collection Time: 10/14/17  7:40 AM   Result Value Ref Range    Glucose (POC) 128 (H) 65 - 100 mg/dL    Performed by Edita Galarza (PCT)    GLUCOSE, POC    Collection Time: 10/14/17 11:05 AM   Result Value Ref Range    Glucose (POC) 172 (H) 65 - 100 mg/dL    Performed by Ajay 62, POC    Collection Time: 10/14/17  4:29 PM   Result Value Ref Range    Glucose (POC) 147 (H) 65 - 100 mg/dL    Performed by Duong HUANG(CON)    GLUCOSE, POC    Collection Time: 10/14/17  8:25 PM   Result Value Ref Range    Glucose (POC) 107 (H) 65 - 100 mg/dL    Performed by MARY HUANG(CON)    CBC W/O DIFF    Collection Time: 10/15/17  3:59 AM   Result Value Ref Range    WBC 6.6 4.1 - 11.1 K/uL    RBC 3.05 (L) 4.10 - 5.70 M/uL    HGB 8.6 (L) 12.1 - 17.0 g/dL    HCT 25.2 (L) 36.6 - 50.3 %    MCV 82.6 80.0 - 99.0 FL    MCH 28.2 26.0 - 34.0 PG    MCHC 34.1 30.0 - 36.5 g/dL    RDW 13.3 11.5 - 14.5 %    PLATELET 914 748 - 274 K/uL   METABOLIC PANEL, BASIC    Collection Time: 10/15/17  3:59 AM   Result Value Ref Range    Sodium 135 (L) 136 - 145 mmol/L    Potassium 4.1 3.5 - 5.1 mmol/L    Chloride 104 97 - 108 mmol/L    CO2 24 21 - 32 mmol/L    Anion gap 7 5 - 15 mmol/L    Glucose 111 (H) 65 - 100 mg/dL    BUN 17 6 - 20 MG/DL    Creatinine 1.00 0.70 - 1.30 MG/DL    BUN/Creatinine ratio 17 12 - 20      GFR est AA >60 >60 ml/min/1.73m2    GFR est non-AA >60 >60 ml/min/1.73m2    Calcium 8.6 8.5 - 10.1 MG/DL     Alert  Lungs clear  Heart RRR    PAD\DM2    BS good. No changes.

## 2017-10-16 LAB
DATE LAST DOSE: NO GROWTH
GLUCOSE BLD STRIP.AUTO-MCNC: 131 MG/DL (ref 65–100)
GLUCOSE BLD STRIP.AUTO-MCNC: 134 MG/DL (ref 65–100)
GLUCOSE BLD STRIP.AUTO-MCNC: 139 MG/DL (ref 65–100)
GLUCOSE BLD STRIP.AUTO-MCNC: 76 MG/DL (ref 65–100)
GLUCOSE BLD STRIP.AUTO-MCNC: 76 MG/DL (ref 65–100)
GLUCOSE BLD STRIP.AUTO-MCNC: 95 MG/DL (ref 65–100)
REPORTED DOSE,DOSE: NO GROWTH UNITS
REPORTED DOSE/TIME,TMG: NO GROWTH
SERVICE CMNT-IMP: ABNORMAL
SERVICE CMNT-IMP: NORMAL
VANCOMYCIN TROUGH SERPL-MCNC: 18.4 UG/ML (ref 5–10)

## 2017-10-16 PROCEDURE — 74011250637 HC RX REV CODE- 250/637: Performed by: NURSE PRACTITIONER

## 2017-10-16 PROCEDURE — 65270000029 HC RM PRIVATE

## 2017-10-16 PROCEDURE — 74011250636 HC RX REV CODE- 250/636: Performed by: SURGERY

## 2017-10-16 PROCEDURE — 3331090001 HH PPS REVENUE CREDIT

## 2017-10-16 PROCEDURE — 74011000258 HC RX REV CODE- 258: Performed by: SURGERY

## 2017-10-16 PROCEDURE — 3331090002 HH PPS REVENUE DEBIT

## 2017-10-16 PROCEDURE — 82962 GLUCOSE BLOOD TEST: CPT

## 2017-10-16 PROCEDURE — 74011636637 HC RX REV CODE- 636/637: Performed by: INTERNAL MEDICINE

## 2017-10-16 PROCEDURE — 80202 ASSAY OF VANCOMYCIN: CPT | Performed by: SURGERY

## 2017-10-16 PROCEDURE — 36415 COLL VENOUS BLD VENIPUNCTURE: CPT | Performed by: SURGERY

## 2017-10-16 RX ADMIN — METOPROLOL TARTRATE 25 MG: 25 TABLET ORAL at 18:08

## 2017-10-16 RX ADMIN — CEFEPIME HYDROCHLORIDE 2 G: 2 INJECTION, POWDER, FOR SOLUTION INTRAVENOUS at 00:25

## 2017-10-16 RX ADMIN — INSULIN GLARGINE 14 UNITS: 100 INJECTION, SOLUTION SUBCUTANEOUS at 09:00

## 2017-10-16 RX ADMIN — VANCOMYCIN HYDROCHLORIDE 1250 MG: 10 INJECTION, POWDER, LYOPHILIZED, FOR SOLUTION INTRAVENOUS at 11:49

## 2017-10-16 RX ADMIN — FERROUS SULFATE TAB 325 MG (65 MG ELEMENTAL FE) 325 MG: 325 (65 FE) TAB at 09:06

## 2017-10-16 RX ADMIN — INSULIN LISPRO 8 UNITS: 100 INJECTION, SOLUTION INTRAVENOUS; SUBCUTANEOUS at 09:04

## 2017-10-16 RX ADMIN — METOPROLOL TARTRATE 25 MG: 25 TABLET ORAL at 09:06

## 2017-10-16 RX ADMIN — CEFEPIME HYDROCHLORIDE 2 G: 2 INJECTION, POWDER, FOR SOLUTION INTRAVENOUS at 09:04

## 2017-10-16 RX ADMIN — CEFEPIME HYDROCHLORIDE 2 G: 2 INJECTION, POWDER, FOR SOLUTION INTRAVENOUS at 16:18

## 2017-10-16 RX ADMIN — VANCOMYCIN HYDROCHLORIDE 1250 MG: 10 INJECTION, POWDER, LYOPHILIZED, FOR SOLUTION INTRAVENOUS at 23:25

## 2017-10-16 RX ADMIN — INSULIN LISPRO 8 UNITS: 100 INJECTION, SOLUTION INTRAVENOUS; SUBCUTANEOUS at 11:48

## 2017-10-16 NOTE — PROGRESS NOTES
HYPOGLYCEMIC EPISODE DOCUMENTATION    Patient with hypoglycemic episode(s) at 1630 (time) on 10/16. BG value(s) pre-treatment 68  Was patient symptomatic?  [] yes, [x] no  Patient was treated with the following rescue medications/treatments: [] D50                [] Glucose tablets                [] Glucagon                [x] 4oz juice                [] 6oz reg soda                [] 8oz low fat milk  BG value post-treatment: 76 @ 1649 and 95 @ 1710    Once BG treated and value greater than 80mg/dl, pt was provided with the following:  [] snack  [x] meal

## 2017-10-16 NOTE — PROGRESS NOTES
General Surgery End of Shift Nursing Note    Bedside shift change report given to Jarod Matos and Yanelis Olsen (oncoming nurse) by Donna Calabrese (offgoing nurse). Report included the following information SBAR, Kardex, MAR and Recent Results. Shift worked:   7a-7p     Significant changes during shift:    Pt agreeable to BKA. No c/o pain   Non-emergent issues for physician to address:   Do you want asa held in preperation for surgery?          Pain Management:  Current medication: none  Patient states pain is manageable on current pain medication: YES    GI:    Current diet:  DIET DIABETIC CONSISTENT CARB Regular    Tolerating current diet: YES  Passing flatus: YES  Last Bowel Movement: yesterday       Adriana He

## 2017-10-16 NOTE — PROGRESS NOTES
General Surgery End of Shift Nursing Note    Bedside shift change report given to Fatoumata Amezcua RN (oncoming nurse) by Barrie Lesch RN (offgoing nurse). Report included the following information SBAR, Kardex, OR Summary, Intake/Output, MAR and Recent Results. Shift worked:   7p-7a   Significant changes during shift:    Pt aware of odor coming from wound   Non-emergent issues for physician to address:   none     Number times ambulated in hallway past shift: 0    Number of times OOB to chair past shift: 0    Pain Management:  Current medication: pt not asking for medication  Patient states pain is manageable on current pain medication: YES    GI:    Current diet:  DIET DIABETIC CONSISTENT CARB Regular    Tolerating current diet: YES  Passing flatus: YES  Last Bowel Movement: yesterday   Appearance: large formed after suppository    Respiratory:    Incentive Spirometer at bedside: YES  Patient instructed on use: YES    Patient Safety:    Falls Score: 1  Bed Alarm On? No  Sitter?  No    Michael Kim RN

## 2017-10-16 NOTE — PROGRESS NOTES
Surgery  -  Brief Consult Note    I reviewed the patient's medical record including images from prior angiogram.    I inspected the patient's right foot wound. I explained to the patient that I think that chances of healing of the wound to the point of achieving long term weight bearing is not good and would be a prolonged process. Chances of healing a below knee amputation is good, although certainly not 520 %.     Rosamaria Davey MD

## 2017-10-16 NOTE — PROGRESS NOTES
Vascular Surgery Progress Note  Scooby Whitehead ACNP-BC  10/16/2017 8:07 AM       Subjective:     Jairo De Oliveira a 68 y.o. AAM who is well known to our service. Magdiel Nevarez has been follow by our practice since early September when he was admitted to the hospital by his PCP Dr. Arnold Ruiz for a RLE DM foot wound.  During that admission he was treated for PAD w athrectomy and angioplasty of his TPT on 9/13/17 followed by I&D of the wound and amputation of the 3rd-5th digits.  MRI at the time confirmed osteomylitis. Magdiel Nevarez has been followed weekly in our clinic for his wound and it has slowly been deteriorating. Magdiel Nevarez was noted to have significant necrosis of the skin. Patient was admitted for IV antibx on 10/10/2017 and is now status post repeat I &D with resection of the remainder of the 5th metatarsal head on 10/12/2017. Patient's previous culture grew provendcia stuartii, proteus, and pseudomonas.  He was treated w IV cefepime and Vancomycin during admission and outpatient w Augmentin.  Repeat wound cx was drawn in the office on 10/10/2017 and currently show mixed skin lb and light growth. Formal result continues to be pending. Despite aggressive intervention. Patient's ability to heal the wound properly and have a usable foot remain low. After discussion over the weekend of possible BKA he as requested a second opinion. Dr. Yousuf Blount was consulted. No complaints overnight. Afebrile. WBC, HR, and RR are normal.  Denies pain.       Nursing Data:     Patient Vitals for the past 24 hrs:   BP Temp Pulse Resp SpO2   10/16/17 1502 145/62 98.6 °F (37 °C) 66 18 100 %   10/16/17 1109 122/62 98.7 °F (37.1 °C) 67 18 99 %   10/16/17 0849 172/77 98.8 °F (37.1 °C) 68 18 98 %   10/16/17 0341 147/73 98.6 °F (37 °C) 95 18 100 %   10/15/17 2338 143/69 98.7 °F (37.1 °C) 61 18 98 %   10/15/17 2045 148/53 98.6 °F (37 °C) 61 16 96 %   10/15/17 1655 176/79 98.6 °F (37 °C) 68 - 100 % ---------------------------------------------------------------------------------------------------------    Intake/Output Summary (Last 24 hours) at 10/16/17 1515  Last data filed at 10/16/17 1153   Gross per 24 hour   Intake          2677.92 ml   Output             1925 ml   Net           752.92 ml       Exam:     Physical Exam   Constitutional: He is oriented to person, place, and time. He appears well-developed and well-nourished. HENT:   Head: Normocephalic and atraumatic. Eyes: EOM are normal. Pupils are equal, round, and reactive to light. Neck: Normal range of motion. Neck supple. Cardiovascular: Normal rate and regular rhythm. 1+ RLE edema and trace LLE edema    Pulmonary/Chest: Effort normal and breath sounds normal.   Abdominal: Soft. Bowel sounds are normal.   Musculoskeletal: Normal range of motion. Neurological: He is alert and oriented to person, place, and time. Skin:        Psychiatric: His behavior is normal. Thought content normal.       Lab Review:     .   Recent Results (from the past 24 hour(s))   GLUCOSE, POC    Collection Time: 10/15/17  4:05 PM   Result Value Ref Range    Glucose (POC) 112 (H) 65 - 100 mg/dL    Performed by Ever Soto (PCT)    GLUCOSE, POC    Collection Time: 10/15/17 10:20 PM   Result Value Ref Range    Glucose (POC) 108 (H) 65 - 100 mg/dL    Performed by Adron Cogan, POC    Collection Time: 10/16/17  7:28 AM   Result Value Ref Range    Glucose (POC) 134 (H) 65 - 100 mg/dL    Performed by West Caro (PCT)    Cande Neil, TROUGH    Collection Time: 10/16/17 11:13 AM   Result Value Ref Range    Vancomycin,trough 18.4 (H) 5.0 - 10.0 ug/mL    Reported dose date: NO GROWTH      Reported dose time: NO GROWTH      Reported dose: NO GROWTH UNITS   GLUCOSE, POC    Collection Time: 10/16/17 11:14 AM   Result Value Ref Range    Glucose (POC) 139 (H) 65 - 100 mg/dL    Performed by West Caro (PCT)           Assessment/Plan: Principal Problems:                        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 WBC nl. Patient did not meet SIRs criteria on arrival.   Repeat wound cx from our office from 10/10/17: mixed skin lb and light growth. POD 4 from I & D and resection of remaining 5th metatarsal head. Continue IV Cefepime and IV Vancomycin. Daily dressing changes. After evaluation and recommendation from Dr. Yoshi Grove patient wishes to proceed w BKA. We appreciate the input from Dr. Yoshi Grove. Active Problems:                        IDDM II  A1c 7.8  Blood glucose well controlled on current dose of lantus and bolus lispro w meals. Continue POC achs. Hyponatremia   Improved.                           PAD  Continue home medication ASA.                                        CKD III w baseline creatinine of 1.2  Mildly elevated on arrival.  Now improved w minimal IVFs to within normal limits. Tolerating IV antibx well. Continue to monitor while on antibx. Anemia of chronic disease            Iron deficiency   Iron 13. Ferritin 440. %sat 13. TIBC 184. Folate 11.2  B12 1561  H&H decreased from previous presentation. Anemia to be evaluated by Dr. Lala Mcbride (hematology) after consult by Dr. Micky Everett (PCP). Patient initiated on ferrous sulfate.                              CAD  Continue ASA, BBlocker, and statin.        VTE prophylaxis:  ASA. SCDs contraindicated secondary to severe PAD.     Disposition:   TBD after BKA.

## 2017-10-16 NOTE — PROGRESS NOTES
Vascular    Patient seen earlier today  Foot wound examined this AM  80-90% red healthy tissue  10-20% gray or tan nonviable fat  Long talk w/ patient this AM  He is unlikely to successfully heal this wound  I think he will ultimately progress to BKA  Patient requested 2nd opinion  Dr Lucas Perez appears to agree  I will f/u later today to talk w/ patient about his decision

## 2017-10-16 NOTE — PROGRESS NOTES
Pharmacy Automatic Renal Dosing Protocol - Antimicrobials    Indication for Antimicrobials: Surgical Site Infection: s/p right toe amputations 9/15, concern for OM    Current Regimen of Each Antimicrobial (Start Day & Day of Therapy):  Vancomycin 1.25gm IV q12h; start 10/10; Day #7  Cefepime 2gm IV q8h; start 10/10; Day #7    Goal Vancomycin Level (if needed): 15-20     Measured / Extrapolated Vancomycin Levels (if drawn):   10/12 = 15.7   / 17.4   10/16 = 18.4 / 19.5    Significant Cultures:   : MRI Foot: Early Osteomyelitis is difficult to exclude   9/15: Foot Wound: P.mirabilis; E.faecalis D; P.aeruginosa; Anaerobic GPC (FINAL)    CAPD, Hemodialysis or Renal Replacement Therapy: n/a   Paralysis, amputations, malnutrition: n/a    Recent Labs      10/15/17   0359  10/14/17   0330   CREA  1.00  1.08   BUN  17  16   WBC  6.6  7.2     Temp (24hrs), Av.7 °F (37.1 °C), Min:98.6 °F (37 °C), Max:98.8 °F (37.1 °C)    Creatinine Clearance (Creatinine Clearance (ml/min)): 86    Impression/Plan:   - Vancomycin trough is therapeutic. Cont current dose. - Cefepime dosed appropriately for renal fxn. Pharmacy will follow daily and adjust medications as appropriate for renal function and/or serum levels.     Thank you,  Kevin Edwards PHARMD     Renal Dosing Tables on [employer]

## 2017-10-16 NOTE — CONSULTS
Surgery Consult    Subjective:      Akhil Morgan is a 68 y.o. male on Dr Lainey Chery'; service. I have been asked to give a second opinion regarding his foot wound. The patient has history of diabetes and PAD. He had atherectomy and angioplasty of tibioperoneal trunk disease on 9/13/2017 and had extensive debridement of his foot. He has had deterioration of the wound and had further debridement on 10/12/2017. He is receiving antibiotics and wound care. He has no pain in the foot. He has no anginal chest pain or dyspnea.     Past Medical History:   Diagnosis Date    CAD (coronary artery disease)     palpitations    Diabetes ([facility]     Hypertension     Other ill-defined conditions(799.89)     left foot ulcer     Past Surgical History:   Procedure Laterality Date    HX HEENT      bilateral cataract surgery    HX OTHER SURGICAL      surgery to left foot ulcer, PICC right arm      Family History   Problem Relation Age of Onset    Cancer Father     No Known Problems Mother      Social History     Social History    Marital status:      Spouse name: N/A    Number of children: 3    Years of education: 12     Occupational History    employed-----formerly retired      Social History Main Topics    Smoking status: Former Smoker    Smokeless tobacco: Never Used    Alcohol use No    Drug use: No    Sexual activity: Not Asked     Other Topics Concern    None     Social History Narrative      Current Facility-Administered Medications   Medication Dose Route Frequency Provider Last Rate Last Dose    Vancomycin trough prior to 11:00 dose   1 Each Other ONCE Miguelito Alves MD        insulin glargine (LANTUS) injection 14 Units  14 Units SubCUTAneous DAILY Rachna Mike MD   14 Units at 10/16/17 0900    0.9% sodium chloride infusion  25 mL/hr IntraVENous CONTINUOUS Heather Atkins NP 25 mL/hr at 10/13/17 1009 25 mL/hr at 10/13/17 1009    oxyCODONE IR (ROXICODONE) tablet 5 mg  5 mg Oral Q4H PRN Avie Channel, NP   5 mg at 10/14/17 0037    insulin lispro (HUMALOG) injection 8 Units  8 Units SubCUTAneous TIDAC Yanet Velarde MD   8 Units at 10/16/17 1626    vancomycin (VANCOCIN) 1250 mg in  ml infusion  1,250 mg IntraVENous Q12H Lachelle Sinclair .7 mL/hr at 10/16/17 1149 1,250 mg at 10/16/17 1149    morphine injection 4 mg  4 mg IntraVENous Q3H PRN Lachelle Sinclair MD        morphine injection 2 mg  2 mg IntraVENous Q3H PRN Lachelle Sinclair MD   2 mg at 10/13/17 1316    ondansetron (ZOFRAN) injection 4 mg  4 mg IntraVENous Q6H PRN Lachelle Sinclair MD        dextrose (D50W) injection syrg 12.5-25 g  12.5-25 g IntraVENous PRN Avie Channel, NP        glucagon (GLUCAGEN) injection 1 mg  1 mg IntraMUSCular PRN Avie Channel, NP        glucose chewable tablet 16 g  4 Tab Oral PRN Avie Channel, NP        bisacodyl (DULCOLAX) suppository 10 mg  10 mg Rectal DAILY PRN Avie Channel, NP   10 mg at 10/15/17 2596    ferrous sulfate tablet 325 mg  1 Tab Oral DAILY WITH BREAKFAST Avie Channel, NP   325 mg at 10/16/17 1616    aspirin chewable tablet 81 mg  81 mg Oral DAILY Avie Channel, NP   Stopped at 10/16/17 0900    metoprolol tartrate (LOPRESSOR) tablet 25 mg  25 mg Oral BID Avie Channel, NP   25 mg at 10/16/17 0906    cefepime (MAXIPIME) 2 g in 0.9% sodium chloride (MBP/ADV) 100 mL  2 g IntraVENous Q8H Lachelle Sinclair MD 33.3 mL/hr at 10/16/17 1618 2 g at 10/16/17 1618        No Known Allergies    Review of Systems:  Pertinent items are noted in the History of Present Illness.     Objective:      Patient Vitals for the past 8 hrs:   BP Temp Pulse Resp SpO2   10/16/17 1502 145/62 98.6 °F (37 °C) 66 18 100 %   10/16/17 1109 122/62 98.7 °F (37.1 °C) 67 18 99 %   10/16/17 0849 172/77 98.8 °F (37.1 °C) 68 18 98 %       Temp (24hrs), Av.7 °F (37.1 °C), Min:98.6 °F (37 °C), Max:98.8 °F (37.1 °C)      Physical Exam:  Exam of the right foot reveals a large open wound on the lateral foot with granulation on about 50 % of the surface. Particularly proximally there is undermining proceeding under the plantar skin with necrosis seen. Assessment:     Hospital Problems  Date Reviewed: 10/11/2017          Codes Class Noted POA    O[facility]omyel[facility] ([facility] ICD-10-CM: M86.9  ICD-9-CM: 730.20  10/10/2017 Unknown            PAD  Diabetes Mellitus  Non healing surgical wound. Plan:     I explained to the patient that I think that chances of healing of the wound to the point of achieving long term weight bearing is not good and would be a prolonged process. Chances of healing a below knee amputation is good, although certainly not 550 %.   I would not favor a prolonged attempt at trying to achieve healing of the wound.       Signed By: Marisabel Edge MD     October 16, 2017

## 2017-10-16 NOTE — PROGRESS NOTES
General Daily Progress Note    Admit Date: 10/10/2017    Subjective:     Patient has no complaint. Current Facility-Administered Medications   Medication Dose Route Frequency    Vancomycin trough prior to 11:00 dose   1 Each Other ONCE    insulin glargine (LANTUS) injection 14 Units  14 Units SubCUTAneous DAILY    0.9% sodium chloride infusion  25 mL/hr IntraVENous CONTINUOUS    oxyCODONE IR (ROXICODONE) tablet 5 mg  5 mg Oral Q4H PRN    insulin lispro (HUMALOG) injection 8 Units  8 Units SubCUTAneous TIDAC    vancomycin (VANCOCIN) 1250 mg in  ml infusion  1,250 mg IntraVENous Q12H    morphine injection 4 mg  4 mg IntraVENous Q3H PRN    morphine injection 2 mg  2 mg IntraVENous Q3H PRN    ondansetron (ZOFRAN) injection 4 mg  4 mg IntraVENous Q6H PRN    dextrose (D50W) injection syrg 12.5-25 g  12.5-25 g IntraVENous PRN    glucagon (GLUCAGEN) injection 1 mg  1 mg IntraMUSCular PRN    glucose chewable tablet 16 g  4 Tab Oral PRN    bisacodyl (DULCOLAX) suppository 10 mg  10 mg Rectal DAILY PRN    ferrous sulfate tablet 325 mg  1 Tab Oral DAILY WITH BREAKFAST    aspirin chewable tablet 81 mg  81 mg Oral DAILY    metoprolol tartrate (LOPRESSOR) tablet 25 mg  25 mg Oral BID    cefepime (MAXIPIME) 2 g in 0.9% sodium chloride (MBP/ADV) 100 mL  2 g IntraVENous Q8H        Review of Systems  A comprehensive review of systems was negative. Objective:     Patient Vitals for the past 24 hrs:   BP Temp Pulse Resp SpO2   10/16/17 0341 147/73 98.6 °F (37 °C) 95 18 100 %   10/15/17 2338 143/69 98.7 °F (37.1 °C) 61 18 98 %   10/15/17 2045 148/53 98.6 °F (37 °C) 61 16 96 %   10/15/17 1655 176/79 98.6 °F (37 °C) 68 - 100 %   10/15/17 1135 170/89 98.6 °F (37 °C) 66 16 100 %        10/14 1901 - 10/16 0700  In: 3465.8 [P.O.:1680;  I.V.:1785.8]  Out: 3550 [Urine:3550]    Physical Exam:   Visit Vitals    /73    Pulse 95    Temp 98.6 °F (37 °C)    Resp 18    Wt 249 lb 11.2 oz (113.3 kg)    SpO2 100%  BMI 26.76 kg/m2     General appearance: alert, cooperative, no distress, appears stated age  Neck: supple, symmetrical, trachea midline, no adenopathy, thyroid: not enlarged, symmetric, no tenderness/mass/nodules, no carotid bruit and no JVD  Lungs: clear to auscultation bilaterally  Heart: regular rate and rhythm, S1, S2 normal, no murmur, click, rub or gallop  Abdomen: soft, non-tender. Bowel sounds normal. No masses,  no organomegaly  Extremities: edema , right foot bandaged        Data Review   Recent Results (from the past 24 hour(s))   GLUCOSE, POC    Collection Time: 10/15/17 11:17 AM   Result Value Ref Range    Glucose (POC) 117 (H) 65 - 100 mg/dL    Performed by Bijan Rico (PCT)    GLUCOSE, POC    Collection Time: 10/15/17  4:05 PM   Result Value Ref Range    Glucose (POC) 112 (H) 65 - 100 mg/dL    Performed by Bijan Rico (PCT)    GLUCOSE, POC    Collection Time: 10/15/17 10:20 PM   Result Value Ref Range    Glucose (POC) 108 (H) 65 - 100 mg/dL    Performed by Jl Collins    GLUCOSE, POC    Collection Time: 10/16/17  7:28 AM   Result Value Ref Range    Glucose (POC) 134 (H) 65 - 100 mg/dL    Performed by Cedrick Szymanski (PCT)            Assessment:     Active Problems:    Osteomyelitis ([address] (10/10/2017)        Plan:     1. Note made of of all comments. Await final decision. 2.  Continue diabetic control.

## 2017-10-17 ENCOUNTER — ANESTHESIA EVENT (OUTPATIENT)
Dept: SURGERY | Age: 77
DRG: 240 | End: 2017-10-17
Payer: MEDICARE

## 2017-10-17 ENCOUNTER — ANESTHESIA (OUTPATIENT)
Dept: SURGERY | Age: 77
DRG: 240 | End: 2017-10-17
Payer: MEDICARE

## 2017-10-17 LAB
ABO + RH BLD: NORMAL
ANION GAP SERPL CALC-SCNC: 7 MMOL/L (ref 5–15)
BLOOD GROUP ANTIBODIES SERPL: NORMAL
BUN SERPL-MCNC: 15 MG/DL (ref 6–20)
BUN/CREAT SERPL: 15 (ref 12–20)
CALCIUM SERPL-MCNC: 8.8 MG/DL (ref 8.5–10.1)
CHLORIDE SERPL-SCNC: 105 MMOL/L (ref 97–108)
CO2 SERPL-SCNC: 26 MMOL/L (ref 21–32)
CREAT SERPL-MCNC: 1.03 MG/DL (ref 0.7–1.3)
GLUCOSE BLD STRIP.AUTO-MCNC: 120 MG/DL (ref 65–100)
GLUCOSE BLD STRIP.AUTO-MCNC: 123 MG/DL (ref 65–100)
GLUCOSE BLD STRIP.AUTO-MCNC: 219 MG/DL (ref 65–100)
GLUCOSE BLD STRIP.AUTO-MCNC: 252 MG/DL (ref 65–100)
GLUCOSE SERPL-MCNC: 110 MG/DL (ref 65–100)
HGB BLD-MCNC: 8.8 G/DL (ref 12.1–17)
POTASSIUM SERPL-SCNC: 4.1 MMOL/L (ref 3.5–5.1)
SERVICE CMNT-IMP: ABNORMAL
SODIUM SERPL-SCNC: 138 MMOL/L (ref 136–145)
SPECIMEN EXP DATE BLD: NORMAL

## 2017-10-17 PROCEDURE — 77030008463 HC STPLR SKN PROX J&J -B: Performed by: SURGERY

## 2017-10-17 PROCEDURE — 77030020061 HC IV BLD WRMR ADMIN SET 3M -B: Performed by: NURSE ANESTHETIST, CERTIFIED REGISTERED

## 2017-10-17 PROCEDURE — 77030011640 HC PAD GRND REM COVD -A: Performed by: SURGERY

## 2017-10-17 PROCEDURE — 80048 BASIC METABOLIC PNL TOTAL CA: CPT | Performed by: NURSE PRACTITIONER

## 2017-10-17 PROCEDURE — 65270000029 HC RM PRIVATE

## 2017-10-17 PROCEDURE — 74011636637 HC RX REV CODE- 636/637: Performed by: INTERNAL MEDICINE

## 2017-10-17 PROCEDURE — 74011250636 HC RX REV CODE- 250/636

## 2017-10-17 PROCEDURE — 74011250636 HC RX REV CODE- 250/636: Performed by: SURGERY

## 2017-10-17 PROCEDURE — 77030031139 HC SUT VCRL2 J&J -A: Performed by: SURGERY

## 2017-10-17 PROCEDURE — 77030026438 HC STYL ET INTUB CARD -A: Performed by: NURSE ANESTHETIST, CERTIFIED REGISTERED

## 2017-10-17 PROCEDURE — 88307 TISSUE EXAM BY PATHOLOGIST: CPT | Performed by: SURGERY

## 2017-10-17 PROCEDURE — 74011000250 HC RX REV CODE- 250: Performed by: SURGERY

## 2017-10-17 PROCEDURE — 77030008684 HC TU ET CUF COVD -B: Performed by: NURSE ANESTHETIST, CERTIFIED REGISTERED

## 2017-10-17 PROCEDURE — 77030013567 HC DRN WND RESERV BARD -A: Performed by: SURGERY

## 2017-10-17 PROCEDURE — 36415 COLL VENOUS BLD VENIPUNCTURE: CPT | Performed by: NURSE PRACTITIONER

## 2017-10-17 PROCEDURE — 85018 HEMOGLOBIN: CPT

## 2017-10-17 PROCEDURE — 86900 BLOOD TYPING SEROLOGIC ABO: CPT | Performed by: ANESTHESIOLOGY

## 2017-10-17 PROCEDURE — 76010000131 HC OR TIME 2 TO 2.5 HR: Performed by: SURGERY

## 2017-10-17 PROCEDURE — 3331090001 HH PPS REVENUE CREDIT

## 2017-10-17 PROCEDURE — 74011000250 HC RX REV CODE- 250

## 2017-10-17 PROCEDURE — 74011250636 HC RX REV CODE- 250/636: Performed by: ANESTHESIOLOGY

## 2017-10-17 PROCEDURE — 82962 GLUCOSE BLOOD TEST: CPT

## 2017-10-17 PROCEDURE — L1830 KO IMMOB CANVAS LONG PRE OTS: HCPCS | Performed by: SURGERY

## 2017-10-17 PROCEDURE — 76060000035 HC ANESTHESIA 2 TO 2.5 HR: Performed by: SURGERY

## 2017-10-17 PROCEDURE — 74011000258 HC RX REV CODE- 258: Performed by: SURGERY

## 2017-10-17 PROCEDURE — 77030006835 HC BLD SAW SAG STRY -B: Performed by: SURGERY

## 2017-10-17 PROCEDURE — 77030002996 HC SUT SLK J&J -A: Performed by: SURGERY

## 2017-10-17 PROCEDURE — 77030000032 HC CUF TRNQT ZIMM -B: Performed by: SURGERY

## 2017-10-17 PROCEDURE — 76210000017 HC OR PH I REC 1.5 TO 2 HR: Performed by: SURGERY

## 2017-10-17 PROCEDURE — 3331090002 HH PPS REVENUE DEBIT

## 2017-10-17 PROCEDURE — 74011250637 HC RX REV CODE- 250/637: Performed by: NURSE PRACTITIONER

## 2017-10-17 PROCEDURE — 77030011884 HC TAPE CST PLSTR BSNM -A: Performed by: SURGERY

## 2017-10-17 PROCEDURE — 77030012406 HC DRN WND PENRS BARD -A: Performed by: SURGERY

## 2017-10-17 PROCEDURE — 0Y6H0Z2 DETACHMENT AT RIGHT LOWER LEG, MID, OPEN APPROACH: ICD-10-PCS | Performed by: SURGERY

## 2017-10-17 PROCEDURE — 88311 DECALCIFY TISSUE: CPT | Performed by: SURGERY

## 2017-10-17 PROCEDURE — P9045 ALBUMIN (HUMAN), 5%, 250 ML: HCPCS

## 2017-10-17 PROCEDURE — 74011000272 HC RX REV CODE- 272: Performed by: SURGERY

## 2017-10-17 RX ORDER — MORPHINE SULFATE 10 MG/ML
2 INJECTION, SOLUTION INTRAMUSCULAR; INTRAVENOUS
Status: DISCONTINUED | OUTPATIENT
Start: 2017-10-17 | End: 2017-10-17 | Stop reason: HOSPADM

## 2017-10-17 RX ORDER — SODIUM CHLORIDE, SODIUM LACTATE, POTASSIUM CHLORIDE, CALCIUM CHLORIDE 600; 310; 30; 20 MG/100ML; MG/100ML; MG/100ML; MG/100ML
25 INJECTION, SOLUTION INTRAVENOUS CONTINUOUS
Status: DISCONTINUED | OUTPATIENT
Start: 2017-10-17 | End: 2017-10-17 | Stop reason: HOSPADM

## 2017-10-17 RX ORDER — DEXAMETHASONE SODIUM PHOSPHATE 4 MG/ML
INJECTION, SOLUTION INTRA-ARTICULAR; INTRALESIONAL; INTRAMUSCULAR; INTRAVENOUS; SOFT TISSUE AS NEEDED
Status: DISCONTINUED | OUTPATIENT
Start: 2017-10-17 | End: 2017-10-17 | Stop reason: HOSPADM

## 2017-10-17 RX ORDER — ONDANSETRON 2 MG/ML
INJECTION INTRAMUSCULAR; INTRAVENOUS AS NEEDED
Status: DISCONTINUED | OUTPATIENT
Start: 2017-10-17 | End: 2017-10-17 | Stop reason: HOSPADM

## 2017-10-17 RX ORDER — FENTANYL CITRATE 50 UG/ML
INJECTION, SOLUTION INTRAMUSCULAR; INTRAVENOUS AS NEEDED
Status: DISCONTINUED | OUTPATIENT
Start: 2017-10-17 | End: 2017-10-17 | Stop reason: HOSPADM

## 2017-10-17 RX ORDER — MIDAZOLAM HYDROCHLORIDE 1 MG/ML
0.5 INJECTION, SOLUTION INTRAMUSCULAR; INTRAVENOUS
Status: DISCONTINUED | OUTPATIENT
Start: 2017-10-17 | End: 2017-10-17 | Stop reason: HOSPADM

## 2017-10-17 RX ORDER — DIPHENHYDRAMINE HYDROCHLORIDE 50 MG/ML
12.5 INJECTION, SOLUTION INTRAMUSCULAR; INTRAVENOUS AS NEEDED
Status: DISCONTINUED | OUTPATIENT
Start: 2017-10-17 | End: 2017-10-17 | Stop reason: HOSPADM

## 2017-10-17 RX ORDER — HYDROMORPHONE HCL IN 0.9% NACL 15 MG/30ML
PATIENT CONTROLLED ANALGESIA VIAL INTRAVENOUS
Status: DISPENSED
Start: 2017-10-17 | End: 2017-10-18

## 2017-10-17 RX ORDER — FENTANYL CITRATE 50 UG/ML
25 INJECTION, SOLUTION INTRAMUSCULAR; INTRAVENOUS
Status: DISCONTINUED | OUTPATIENT
Start: 2017-10-17 | End: 2017-10-17 | Stop reason: HOSPADM

## 2017-10-17 RX ORDER — MORPHINE SULFATE IN 0.9 % NACL 150MG/30ML
PATIENT CONTROLLED ANALGESIA SYRINGE INTRAVENOUS
Status: DISPENSED
Start: 2017-10-17 | End: 2017-10-18

## 2017-10-17 RX ORDER — HYDROMORPHONE HYDROCHLORIDE 1 MG/ML
.2-.5 INJECTION, SOLUTION INTRAMUSCULAR; INTRAVENOUS; SUBCUTANEOUS
Status: DISCONTINUED | OUTPATIENT
Start: 2017-10-17 | End: 2017-10-17 | Stop reason: HOSPADM

## 2017-10-17 RX ORDER — ALBUMIN HUMAN 50 G/1000ML
SOLUTION INTRAVENOUS AS NEEDED
Status: DISCONTINUED | OUTPATIENT
Start: 2017-10-17 | End: 2017-10-17 | Stop reason: HOSPADM

## 2017-10-17 RX ORDER — FENTANYL CITRATE 50 UG/ML
50 INJECTION, SOLUTION INTRAMUSCULAR; INTRAVENOUS AS NEEDED
Status: DISCONTINUED | OUTPATIENT
Start: 2017-10-17 | End: 2017-10-17 | Stop reason: HOSPADM

## 2017-10-17 RX ORDER — GLYCOPYRROLATE 0.2 MG/ML
INJECTION INTRAMUSCULAR; INTRAVENOUS AS NEEDED
Status: DISCONTINUED | OUTPATIENT
Start: 2017-10-17 | End: 2017-10-17 | Stop reason: HOSPADM

## 2017-10-17 RX ORDER — INSULIN GLARGINE 100 [IU]/ML
14 INJECTION, SOLUTION SUBCUTANEOUS DAILY
Status: DISCONTINUED | OUTPATIENT
Start: 2017-10-18 | End: 2017-10-20 | Stop reason: HOSPADM

## 2017-10-17 RX ORDER — ROCURONIUM BROMIDE 10 MG/ML
INJECTION, SOLUTION INTRAVENOUS AS NEEDED
Status: DISCONTINUED | OUTPATIENT
Start: 2017-10-17 | End: 2017-10-17 | Stop reason: HOSPADM

## 2017-10-17 RX ORDER — PHENYLEPHRINE HCL IN 0.9% NACL 0.4MG/10ML
SYRINGE (ML) INTRAVENOUS AS NEEDED
Status: DISCONTINUED | OUTPATIENT
Start: 2017-10-17 | End: 2017-10-17 | Stop reason: HOSPADM

## 2017-10-17 RX ORDER — FENTANYL CITRATE 50 UG/ML
INJECTION, SOLUTION INTRAMUSCULAR; INTRAVENOUS
Status: DISPENSED
Start: 2017-10-17 | End: 2017-10-18

## 2017-10-17 RX ORDER — HYDROMORPHONE HYDROCHLORIDE 2 MG/ML
INJECTION, SOLUTION INTRAMUSCULAR; INTRAVENOUS; SUBCUTANEOUS AS NEEDED
Status: DISCONTINUED | OUTPATIENT
Start: 2017-10-17 | End: 2017-10-17 | Stop reason: HOSPADM

## 2017-10-17 RX ORDER — SUCCINYLCHOLINE CHLORIDE 20 MG/ML
INJECTION INTRAMUSCULAR; INTRAVENOUS AS NEEDED
Status: DISCONTINUED | OUTPATIENT
Start: 2017-10-17 | End: 2017-10-17 | Stop reason: HOSPADM

## 2017-10-17 RX ORDER — LIDOCAINE HYDROCHLORIDE 10 MG/ML
0.1 INJECTION, SOLUTION EPIDURAL; INFILTRATION; INTRACAUDAL; PERINEURAL AS NEEDED
Status: DISCONTINUED | OUTPATIENT
Start: 2017-10-17 | End: 2017-10-17 | Stop reason: HOSPADM

## 2017-10-17 RX ORDER — PROPOFOL 10 MG/ML
INJECTION, EMULSION INTRAVENOUS AS NEEDED
Status: DISCONTINUED | OUTPATIENT
Start: 2017-10-17 | End: 2017-10-17 | Stop reason: HOSPADM

## 2017-10-17 RX ORDER — SODIUM CHLORIDE 0.9 % (FLUSH) 0.9 %
5-10 SYRINGE (ML) INJECTION AS NEEDED
Status: DISCONTINUED | OUTPATIENT
Start: 2017-10-17 | End: 2017-10-17 | Stop reason: HOSPADM

## 2017-10-17 RX ORDER — LIDOCAINE HYDROCHLORIDE 20 MG/ML
INJECTION, SOLUTION EPIDURAL; INFILTRATION; INTRACAUDAL; PERINEURAL AS NEEDED
Status: DISCONTINUED | OUTPATIENT
Start: 2017-10-17 | End: 2017-10-17 | Stop reason: HOSPADM

## 2017-10-17 RX ORDER — SODIUM CHLORIDE 9 MG/ML
50 INJECTION, SOLUTION INTRAVENOUS CONTINUOUS
Status: DISCONTINUED | OUTPATIENT
Start: 2017-10-17 | End: 2017-10-19

## 2017-10-17 RX ORDER — ACETAMINOPHEN 10 MG/ML
INJECTION, SOLUTION INTRAVENOUS AS NEEDED
Status: DISCONTINUED | OUTPATIENT
Start: 2017-10-17 | End: 2017-10-17 | Stop reason: HOSPADM

## 2017-10-17 RX ORDER — MORPHINE SULFATE IN 0.9 % NACL 150MG/30ML
PATIENT CONTROLLED ANALGESIA SYRINGE INTRAVENOUS CONTINUOUS
Status: DISCONTINUED | OUTPATIENT
Start: 2017-10-17 | End: 2017-10-19

## 2017-10-17 RX ADMIN — METOPROLOL TARTRATE 25 MG: 25 TABLET ORAL at 07:53

## 2017-10-17 RX ADMIN — INSULIN GLARGINE 14 UNITS: 100 INJECTION, SOLUTION SUBCUTANEOUS at 07:53

## 2017-10-17 RX ADMIN — CEFEPIME HYDROCHLORIDE 2 G: 2 INJECTION, POWDER, FOR SOLUTION INTRAVENOUS at 02:09

## 2017-10-17 RX ADMIN — SODIUM CHLORIDE 50 ML/HR: 900 INJECTION, SOLUTION INTRAVENOUS at 12:54

## 2017-10-17 RX ADMIN — ROCURONIUM BROMIDE 5 MG: 10 INJECTION, SOLUTION INTRAVENOUS at 09:45

## 2017-10-17 RX ADMIN — PROPOFOL 80 MG: 10 INJECTION, EMULSION INTRAVENOUS at 11:33

## 2017-10-17 RX ADMIN — ACETAMINOPHEN 1000 MG: 10 INJECTION, SOLUTION INTRAVENOUS at 09:53

## 2017-10-17 RX ADMIN — Medication: at 13:00

## 2017-10-17 RX ADMIN — CEFEPIME HYDROCHLORIDE 2 G: 2 INJECTION, POWDER, FOR SOLUTION INTRAVENOUS at 16:43

## 2017-10-17 RX ADMIN — PROPOFOL 20 MG: 10 INJECTION, EMULSION INTRAVENOUS at 10:16

## 2017-10-17 RX ADMIN — SODIUM CHLORIDE, POTASSIUM CHLORIDE, SODIUM LACTATE AND CALCIUM CHLORIDE: 600; 310; 30; 20 INJECTION, SOLUTION INTRAVENOUS at 09:28

## 2017-10-17 RX ADMIN — FENTANYL CITRATE 25 MCG: 50 INJECTION, SOLUTION INTRAMUSCULAR; INTRAVENOUS at 12:44

## 2017-10-17 RX ADMIN — Medication 40 MCG: at 09:51

## 2017-10-17 RX ADMIN — FENTANYL CITRATE 25 MCG: 50 INJECTION, SOLUTION INTRAMUSCULAR; INTRAVENOUS at 12:25

## 2017-10-17 RX ADMIN — HYDROMORPHONE HYDROCHLORIDE 0.1 MG: 2 INJECTION, SOLUTION INTRAMUSCULAR; INTRAVENOUS; SUBCUTANEOUS at 11:59

## 2017-10-17 RX ADMIN — LIDOCAINE HYDROCHLORIDE 100 MG: 20 INJECTION, SOLUTION EPIDURAL; INFILTRATION; INTRACAUDAL; PERINEURAL at 09:45

## 2017-10-17 RX ADMIN — Medication 40 MCG: at 10:07

## 2017-10-17 RX ADMIN — ALBUMIN HUMAN 250 ML: 50 SOLUTION INTRAVENOUS at 10:07

## 2017-10-17 RX ADMIN — DEXAMETHASONE SODIUM PHOSPHATE 6 MG: 4 INJECTION, SOLUTION INTRA-ARTICULAR; INTRALESIONAL; INTRAMUSCULAR; INTRAVENOUS; SOFT TISSUE at 10:00

## 2017-10-17 RX ADMIN — SODIUM CHLORIDE, POTASSIUM CHLORIDE, SODIUM LACTATE AND CALCIUM CHLORIDE: 600; 310; 30; 20 INJECTION, SOLUTION INTRAVENOUS at 11:19

## 2017-10-17 RX ADMIN — ONDANSETRON 4 MG: 2 INJECTION INTRAMUSCULAR; INTRAVENOUS at 10:21

## 2017-10-17 RX ADMIN — VANCOMYCIN HYDROCHLORIDE 1250 MG: 10 INJECTION, POWDER, LYOPHILIZED, FOR SOLUTION INTRAVENOUS at 23:08

## 2017-10-17 RX ADMIN — HYDROMORPHONE HYDROCHLORIDE 0.1 MG: 2 INJECTION, SOLUTION INTRAMUSCULAR; INTRAVENOUS; SUBCUTANEOUS at 10:21

## 2017-10-17 RX ADMIN — INSULIN LISPRO 8 UNITS: 100 INJECTION, SOLUTION INTRAVENOUS; SUBCUTANEOUS at 17:20

## 2017-10-17 RX ADMIN — HYDROMORPHONE HYDROCHLORIDE 0.2 MG: 2 INJECTION, SOLUTION INTRAMUSCULAR; INTRAVENOUS; SUBCUTANEOUS at 12:06

## 2017-10-17 RX ADMIN — BACITRACIN: 50000 INJECTION, POWDER, FOR SOLUTION INTRAMUSCULAR at 11:00

## 2017-10-17 RX ADMIN — METOPROLOL TARTRATE 25 MG: 25 TABLET ORAL at 17:20

## 2017-10-17 RX ADMIN — VANCOMYCIN HYDROCHLORIDE 1250 MG: 10 INJECTION, POWDER, LYOPHILIZED, FOR SOLUTION INTRAVENOUS at 14:38

## 2017-10-17 RX ADMIN — FENTANYL CITRATE 75 MCG: 50 INJECTION, SOLUTION INTRAMUSCULAR; INTRAVENOUS at 10:05

## 2017-10-17 RX ADMIN — PROPOFOL 100 MG: 10 INJECTION, EMULSION INTRAVENOUS at 09:45

## 2017-10-17 RX ADMIN — FENTANYL CITRATE 25 MCG: 50 INJECTION, SOLUTION INTRAMUSCULAR; INTRAVENOUS at 12:32

## 2017-10-17 RX ADMIN — FENTANYL CITRATE 25 MCG: 50 INJECTION, SOLUTION INTRAMUSCULAR; INTRAVENOUS at 09:45

## 2017-10-17 RX ADMIN — ROCURONIUM BROMIDE 15 MG: 10 INJECTION, SOLUTION INTRAVENOUS at 09:50

## 2017-10-17 RX ADMIN — FENTANYL CITRATE 25 MCG: 50 INJECTION, SOLUTION INTRAMUSCULAR; INTRAVENOUS at 12:16

## 2017-10-17 RX ADMIN — SUCCINYLCHOLINE CHLORIDE 150 MG: 20 INJECTION INTRAMUSCULAR; INTRAVENOUS at 09:45

## 2017-10-17 RX ADMIN — GLYCOPYRROLATE 0.1 MG: 0.2 INJECTION INTRAMUSCULAR; INTRAVENOUS at 09:46

## 2017-10-17 RX ADMIN — GLYCOPYRROLATE 0.1 MG: 0.2 INJECTION INTRAMUSCULAR; INTRAVENOUS at 09:45

## 2017-10-17 RX ADMIN — GLYCOPYRROLATE 0.1 MG: 0.2 INJECTION INTRAMUSCULAR; INTRAVENOUS at 09:59

## 2017-10-17 RX ADMIN — ONDANSETRON 4 MG: 2 INJECTION INTRAMUSCULAR; INTRAVENOUS at 12:16

## 2017-10-17 NOTE — PROGRESS NOTES
General Surgery End of Shift Nursing Note    Bedside shift change report given to Sudhir Mcdowell (oncoming nurse) by Broderick Gutierrez (offgoing nurse). Report included the following information SBAR, Kardex and MAR. 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 Has not yet voided postop-repeated requests to wait until his family leaves.     Non-emergent issues for physician to address:   none       Pain Management:  Current medication: pca  Patient states pain is manageable on current pain medication: YES    GI:    Current diet:  DIET DIABETIC CONSISTENT CARB Regular    Tolerating current diet: YES  Passing flatus: NO  Last Bowel Movement: several days ago      Zaid Garcia

## 2017-10-17 NOTE — PROGRESS NOTES
CM submitted referral to [facility] for potential admission. Pt needs to be evaluated by PT/OT before [facility] can make a decision. Care Management Interventions  PCP Verified by CM: Yes  Mode of Transport at Discharge:  Other (see comment) ([facility] transport)  Transition of Care Consult (CM Consult): Discharge Planning, Other [facility])  MyChart Signup: No  Discharge Durable Medical Equipment:  (has cane, RW, and glucometer)  Health Maintenance Reviewed: Yes  Physical Therapy Consult: No  Occupational Therapy Consult: No  Speech Therapy Consult: No  Current Support Network:  (lives with Kenyatta Card story house with 7 steps- BR on first fl.)  Confirm Follow Up Transport: Family  Freedom of Choice Offered: Yes (inpatient rehab)  Discharge Location  Discharge Placement: Rehab hospital/unit acute    MIGUE Hernandez, [address] [place] [address]   [phone]

## 2017-10-17 NOTE — PROCEDURES
Evan Montalvo, [address]   PERIPHERAL ANGIOPLASTY       Name:  Heath Banks   MR#:  [id number]   :  1940   Account #:  [de-identified]        Date of Adm:  2017       SURGEON: Ramiro Arredondo MD    PREOPERATIVE DIAGNOSIS: Peripheral arterial disease with   gangrene. POSTOPERATIVE DIAGNOSIS: Peripheral arterial disease with   gangrene. PROCEDURES   1. Right lower extremity arteriogram.   2. Atherectomy and angioplasty of right popliteal artery and   tibioperoneal trunk. ESTIMATED BLOOD LOSS: Minimal.    SPECIMENS REMOVED: None. ANESTHESIA:  Fentanyl and Versed. SEDATION: Conscious sedation. DRAINS: None. COMPLICATIONS: None. INDICATIONS: The patient is a 59-year-old diabetic male with   gangrene of the fifth toe and lateral aspect of the right forefoot as well   as the fourth and fifth toes. Noninvasive studies suggest significant   peripheral arterial disease. He has not had any prior diagnostic   arteriogram. He presents for angiography with possible intervention. DESCRIPTION OF PROCEDURE: After informed consent was   obtained, the patient was taken to the cath lab and EKG, vital signs   and pulse oximetry were monitored throughout and were stable. The   left groin was prepped and draped as a sterile field. The patient was   sedated with intravenous fentanyl and Versed. Under ultrasound   guidance, the left common femoral artery was cannulated and a 5-  [place] sheath was placed. Due to mild renal insufficiency, the right leg   was selected, crossing the aortic bifurcation and the right external iliac   artery was selectively catheterized. A right lower extremity arteriogram   was performed. This showed a normal external iliac, common femoral,   profunda and mild diffuse superficial femoral artery stenosis. The   proximal popliteal artery was normal with mild atherosclerosis.  There   appeared to be severe disease in the distal popliteal artery and lower   leg. The patient was systemically heparinized and a 6-[place]ian   Destination sheath was placed with its tip in the right common femoral   artery. The proximal popliteal artery was selectively catheterized and   selective lower leg angiograms were done. There was 95% to 99%   stenosis in the distal popliteal artery, appearing to extend through the   tibioperoneal trunk. The proximal to mid anterior tibial artery was   occluded and the origin was not clear. The posterior tibial artery was   occluded throughout the entire lower leg. It was therefore difficult to   determine where the popliteal artery ended and the tibioperoneal trunk   began because there was only peroneal runoff throughout the lower   leg. The peroneal artery itself was normal. There was a very large   collateral connecting to the distal posterior tibial artery at the level of   the ankle and the posterior tibial artery into the foot was normal,   including the medial and lateral plantar arteries. There was very late   reconstitution of the distal anterior tibial artery and dorsalis pedis   artery. The collateral connecting the distal peroneal and distal posterior   arteries was the same size as the peroneal and posterior tibial arteries. Therefore, it seemed that effectively treating the distal popliteal and   tibioperoneal trunk stenoses would provide in-line flow to the foot. The   stenoses were crossed with a Quick-Cross catheter and [employer]   wire. An angiogram was done with a catheter in the peroneal to   confirm proper location. The Crissie Pel catheter was removed over a   Viper wire. An atherectomy was performed on the two tandem 95% to   99% stenoses in the distal popliteal or tibioperoneal trunk. This was   done with a Diamondback 1.25 atherectomy catheter which was used   on low, medium and high speeds, making several passes on each   speed.  The area was then angioplastied with a 4 x 4 balloon. Completion angiogram revealed an excellent result with no residual   stenosis and no evidence of distal embolization. The catheters and   wires were removed and the left femoral access site was closed with a   Perclose device with good hemostasis. The patient was transferred to   the recovery area people. He will require foot debridement and   probable amputation of the third, fourth and fifth toes and metatarsal   heads.         Nacho Rail (Redding Fee) MD Kameron Reynoso / Nelly Kirk   D:  10/17/2017   03:19   T:  10/17/2017   04:08   Job #:  [id number]

## 2017-10-17 NOTE — PROGRESS NOTES
PT note:    Orders received and acknowledged. Chart reviewed and noted patient underwent R BKA this date. Will follow up for PT evaluation tomorrow.      Merna Armas, PT, DPT

## 2017-10-17 NOTE — PERIOP NOTES
TRANSFER - OUT REPORT:    Verbal report given to Jameel fernandez RN on Emiliana Mosley  being transferred to General Surgery Unit for routine post - op. Report consisted of patients Situation, Background, Assessment and   Recommendations(SBAR). Information from the following report(s) SBAR, Kardex, Procedure Summary, Intake/Output, MAR, Accordion and Recent Results was reviewed with the receiving nurse. Opportunity for questions and clarification was provided.       Patient transported with:   [facility]

## 2017-10-17 NOTE — PERIOP NOTES
SBAR report received form Cristel WHITE prior to arriving to [facility]. Dressing to right foot intact  with some old drainage. Patient denies pain, numbness or tingling to foot. 8377 POC hgb done per Dr. Prem Kaur order. Type and screen delivered to lab.

## 2017-10-17 NOTE — BRIEF OP NOTE
BRIEF OPERATIVE NOTE    Date of Procedure: 10/17/2017   Preoperative Diagnosis: Gangrene Right Foot  Postoperative Diagnosis: Gangrene Right Foot    Procedure(s):  AMPUTATION KNEE(BKA) Right  Surgeon(s) and Role:     * Pao Moe MD - Primary         Assistant Staff:       Surgical Staff:  Circ-1: Winnie Marin  Scrub Tech-1: Stella Huffman  Surg Asst-1: Justin Irene RN  Event Time In   Incision Start 1009   Incision Close 1144     Anesthesia: General   Estimated Blood Loss: 400 cc  Specimens:   ID Type Source Tests Collected by Time Destination   1 : Right below the knee amputation Fresh Leg, Right  Pao Moe MD 10/17/2017 1016 Pathology      Findings: Rt BKA. #10 ROCCO placed. Would cont IV Abx  Complications: None  Implants: * No implants in log *

## 2017-10-17 NOTE — PROGRESS NOTES
General Daily Progress Note    Admit Date: 10/10/2017    Subjective:     Patient has no complaint and is ready for surgery. Current Facility-Administered Medications   Medication Dose Route Frequency    insulin glargine (LANTUS) injection 14 Units  14 Units SubCUTAneous DAILY    0.9% sodium chloride infusion  25 mL/hr IntraVENous CONTINUOUS    oxyCODONE IR (ROXICODONE) tablet 5 mg  5 mg Oral Q4H PRN    insulin lispro (HUMALOG) injection 8 Units  8 Units SubCUTAneous TIDAC    vancomycin (VANCOCIN) 1250 mg in  ml infusion  1,250 mg IntraVENous Q12H    morphine injection 4 mg  4 mg IntraVENous Q3H PRN    morphine injection 2 mg  2 mg IntraVENous Q3H PRN    ondansetron (ZOFRAN) injection 4 mg  4 mg IntraVENous Q6H PRN    dextrose (D50W) injection syrg 12.5-25 g  12.5-25 g IntraVENous PRN    glucagon (GLUCAGEN) injection 1 mg  1 mg IntraMUSCular PRN    glucose chewable tablet 16 g  4 Tab Oral PRN    bisacodyl (DULCOLAX) suppository 10 mg  10 mg Rectal DAILY PRN    ferrous sulfate tablet 325 mg  1 Tab Oral DAILY WITH BREAKFAST    aspirin chewable tablet 81 mg  81 mg Oral DAILY    metoprolol tartrate (LOPRESSOR) tablet 25 mg  25 mg Oral BID    cefepime (MAXIPIME) 2 g in 0.9% sodium chloride (MBP/ADV) 100 mL  2 g IntraVENous Q8H        Review of Systems  A comprehensive review of systems was negative. Objective:     Patient Vitals for the past 24 hrs:   BP Temp Pulse Resp SpO2   10/17/17 0743 163/78 98.3 °F (36.8 °C) 64 16 99 %   10/17/17 0022 165/79 98.3 °F (36.8 °C) 62 16 98 %   10/16/17 2127 180/80 98.6 °F (37 °C) 60 16 99 %   10/16/17 1502 145/62 98.6 °F (37 °C) 66 18 100 %   10/16/17 1109 122/62 98.7 °F (37.1 °C) 67 18 99 %   10/16/17 0849 172/77 98.8 °F (37.1 °C) 68 18 98 %        10/15 1901 - 10/17 0700  In: 3783.3 [P.O.:1870;  I.V.:1913.3]  Out: 0691 [Urine:3425]    Physical Exam:   Visit Vitals    /78 (BP 1 Location: Left arm, BP Patient Position: At rest)    Pulse 64    Temp 98.3 °F (36.8 °C)    Resp 16    Wt 249 lb 11.2 oz (113.3 kg)    SpO2 99%    BMI 26.76 kg/m2     General appearance: alert, cooperative, no distress, appears stated age  Neck: supple, symmetrical, trachea midline, no adenopathy, thyroid: not enlarged, symmetric, no tenderness/mass/nodules, no carotid bruit and no JVD  Lungs: clear to auscultation bilaterally  Heart: regular rate and rhythm, S1, S2 normal, no murmur, click, rub or gallop  Abdomen: soft, non-tender.  Bowel sounds normal. No masses,  no organomegaly  Extremities: extremities normal, atraumatic, no cyanosis or edema        Data Review   Recent Results (from the past 24 hour(s))   VANCOMYCIN, TROUGH    Collection Time: 10/16/17 11:13 AM   Result Value Ref Range    Vancomycin,trough 18.4 (H) 5.0 - 10.0 ug/mL    Reported dose date: NO GROWTH      Reported dose time: NO GROWTH      Reported dose: NO GROWTH UNITS   GLUCOSE, POC    Collection Time: 10/16/17 11:14 AM   Result Value Ref Range    Glucose (POC) 139 (H) 65 - 100 mg/dL    Performed by Raj Thompson (PCT)    GLUCOSE, POC    Collection Time: 10/16/17  4:30 PM   Result Value Ref Range    Glucose (POC) 76 65 - 100 mg/dL    Performed by Regina Wall    GLUCOSE, POC    Collection Time: 10/16/17  4:49 PM   Result Value Ref Range    Glucose (POC) 76 65 - 100 mg/dL    Performed by Regina Wall    GLUCOSE, POC    Collection Time: 10/16/17  5:10 PM   Result Value Ref Range    Glucose (POC) 95 65 - 100 mg/dL    Performed by Regina Wall    GLUCOSE, POC    Collection Time: 10/16/17  8:35 PM   Result Value Ref Range    Glucose (POC) 131 (H) 65 - 100 mg/dL    Performed by Leesa Kohler (PCT)    METABOLIC PANEL, BASIC    Collection Time: 10/17/17  3:33 AM   Result Value Ref Range    Sodium 138 136 - 145 mmol/L    Potassium 4.1 3.5 - 5.1 mmol/L    Chloride 105 97 - 108 mmol/L    CO2 26 21 - 32 mmol/L    Anion gap 7 5 - 15 mmol/L    Glucose 110 (H) 65 - 100 mg/dL    BUN 15 6 - 20 MG/DL    Creatinine 1. 03 0.70 - 1.30 MG/DL    BUN/Creatinine ratio 15 12 - 20      GFR est AA >60 >60 ml/min/1.73m2    GFR est non-AA >60 >60 ml/min/1.73m2    Calcium 8.8 8.5 - 10.1 MG/DL   GLUCOSE, POC    Collection Time: 10/17/17  7:46 AM   Result Value Ref Range    Glucose (POC) 120 (H) 65 - 100 mg/dL    Performed by Get Hilliard            Assessment:     Active Problems:    Osteomyelitis ([address] (10/10/2017)        Plan:     1. Patient is medically stable for planned surgical procedure. 2.  Continue diabetic control.

## 2017-10-17 NOTE — ANESTHESIA PREPROCEDURE EVALUATION
Anesthetic History               Review of Systems / Medical History  Patient summary reviewed, nursing notes reviewed and pertinent labs reviewed    Pulmonary                Comments: Former smoker   Neuro/Psych   Within defined limits          Comments: Peripheral neuropathy Cardiovascular    Hypertension: poorly controlled          PAD and hyperlipidemia  Pertinent negatives: No CAD  Exercise tolerance: >4 METS  Comments: Palpitations     GI/Hepatic/Renal  Within defined limits              Endo/Other    Diabetes: poorly controlled, type 2, using insulin    Arthritis and anemia     Other Findings   Comments: Gangrene Right Foot  Charcot foot secondary to DM  Venous insufficiency  Hx Osteomyelitis  Hearing loss         Physical Exam    Airway  Mallampati: II  TM Distance: 4 - 6 cm  Neck ROM: normal range of motion   Mouth opening: Normal     Cardiovascular  Regular rate and rhythm,  S1 and S2 normal,  no murmur, click, rub, or gallop             Dental    Dentition: Upper partial plate and Lower partial plate     Pulmonary  Breath sounds clear to auscultation               Abdominal  GI exam deferred       Other Findings            Anesthetic Plan    ASA: 3  Anesthesia type: general    Monitoring Plan: BIS      Induction: Intravenous  Anesthetic plan and risks discussed with: Patient

## 2017-10-17 NOTE — PROGRESS NOTES
General Surgery End of Shift Nursing Note    Bedside shift change report given to Sonido Vogt RN (oncoming nurse) by Tracy Rojo RN (offgoing nurse). Report included the following information SBAR, Kardex, OR Summary, Intake/Output, MAR and Recent Results. Shift worked:   7p-7a   Significant changes during shift:    NPO since MN, pt told that he will have surgery today @ 9am   Non-emergent issues for physician to address:   none     Number times ambulated in hallway past shift: 0    Number of times OOB to chair past shift: 0    Pain Management:  Current medication: none  Patient states pain is manageable on current pain medication: YES    GI:    Current diet:  DIET NPO    Tolerating current diet: YES  Passing flatus: YES  Last Bowel Movement: Sunday   Appearance:     Respiratory:    Incentive Spirometer at bedside: YES  Patient instructed on use: YES    Patient Safety:    Falls Score: 2  Bed Alarm On? No  Sitter?  No    Jesusita Smiley RN

## 2017-10-17 NOTE — PROGRESS NOTES
Noted Dr. Grace Snyder order to hold Lantus entered at [address] [facility]. Lantus was administered at 0753 with Metoprolol. Humalog was held.

## 2017-10-17 NOTE — OP NOTES
[address]   OP NOTE       Name:  Wandy Sandoval   MR#:  [id number]   :  1940   Account #:  [de-identified]    Surgery Date:  10/17/2017   Date of Adm:  10/10/2017       PREOPERATIVE DIAGNOSIS: Peripheral arterial disease, with   nonhealing infected wound of the right foot. POSTOPERATIVE DIAGNOSIS: Peripheral arterial disease, with   nonhealing infected wound of the right foot. PROCEDURES PERFORMED: Right below-knee amputation. SURGEON: Tyrese Honeycuttch) Alejandro Parry MD    ANESTHESIA: General.    ESTIMATED BLOOD LOSS: 400 mL. SPECIMENS REMOVED: Right leg. DRAINS: A #10 Pawel-Jiang. COMPLICATIONS: None. INDICATIONS: The patient is a 49-year-old diabetic male with   peripheral arterial disease, who had a gangrenous wound of the right   foot, which has been debrided, with amputation of the 3rd, 4th and 5th   toes and metatarsal heads, and then removal of the remainder of the   5th metatarsal. He has been treated with IV antibiotics, but is deemed   to have a non-salvageable foot. The patient presents for below-knee   amputation. DESCRIPTION OF PROCEDURE: After informed consent was   obtained, the patient was maintained on his broad-spectrum   intravenous antibiotics and taken to the operating room. After   establishing adequate general anesthesia, the right lower leg was   prepped and draped as a sterile field, with an impervious stockinette   on the foot. A nonsterile tourniquet had been placed on the thigh. The   leg was exsanguinated with an Esmarch, and the tourniquet was   inflated to 300 mmHg pressure. A standard posterior flap below-knee   amputation incision was made, and carried down to the fascia with   sharp dissection. A few superficial veins were ligated between silk ties. The anterior compartment muscles were divided with electrocautery.    The anterior tibial artery and vein were ligated with heavy silk ties. The   fascia was incised along the course of the incision. The tibia and fibula   were exposed, and the periosteum was scored with electrocautery and   elevated proximally. The fibula was transected with a bone cutter   proximal to the level of planned division of the tibia. Next, the tibia was   transected with a power saw, taking care to bevel the anterior edge. The neurovascular bundle was ligated with 0 silk ties. The tourniquet   was deflated. There were a large number of diffuse points of small   arterial bleeding due to the large number of collaterals that had formed   due to his distal lower leg arterial occlusive disease. These were   controlled with many figure-of-eight silk sutures and some 0 Vicryl   figure-of-eight sutures. After final hemostasis was obtained, the   anterior edge of the tibia was rounded off with a bone rasp. The stump   was irrigated copiously with antibiotic irrigation. There was no surgical   bleeding, but there was significant diffuse oozing. The posterior flap   fascia was approximated to the anterior fascia with interrupted 0 Vicryl   suture, and a #10 Pawel-Jiang drain was placed in the deep space   and brought out through a separate stab incision on the lateral aspect   of the lower leg. The subcutaneous tissue was closed with interrupted   3-0 Vicryl suture, and the skin with staples. A nonadherent dressing   was applied to the staple line, a bulky dry dressing was applied to the   remainder of the stump, and a knee immobilizer was applied to prevent   flexion contracture. The patient was extubated and transferred to the   PACU in stable condition. All counts were correct.         Krystle Etienne (Jose L Johnson) MD Tracey Nagel / Jerson Wisdom   D:  10/17/2017   12:26   T:  10/17/2017   13:06   Job #:  [id number]

## 2017-10-17 NOTE — OP NOTES
[address], [address]   OP NOTE       Name:  Az Camara   MR#:  [id number]   :  1940   Account #:  [de-identified]    Surgery Date:  09/15/2017   Date of Adm:  2017       DATE FO SURGERY: 09/15/2017      SURGEON: Giovanna Rodriguez MD.      PREOPERATIVE DIAGNOSIS: Gangrene of the right foot. POSTOPERATIVE DIAGNOSIS: Gangrene of the right foot. PROCEDURES PERFORMED: Amputation of the right third, fourth   and fifth toes with resection of third, fourth and fifth metatarsal heads. ANESTHESIA: General.    ESTIMATED BLOOD LOSS: 150 mL. SPECIMENS REMOVED: Right third, fourth and fifth toes and   metatarsal heads and aerobic and anaerobic wound cultures. DRAINS: None. COMPLICATIONS: None. INDICATIONS: The patient is a 77-year-old diabetic with peripheral   arterial disease who has gangrene of the third, fourth and fifth toes and   the lateral aspect of the right forefoot. He presents for debridement and   possible toe amputation and metatarsal head resection. DESCRIPTION OF PROCEDURE: After informed consent was   obtained, the patient was maintained on his current broad-spectrum   intravenous antibiotics. He was taken to the operating room and after   induction of adequate general anesthesia, the right foot was prepped   and draped as a sterile field. An incision was made around the necrotic   skin on the lateral aspect of the right forefoot proximal to the   metatarsophalangeal joint. This incision was extended medially and   the soft tissue and an area of apparent necrosis at the base of the third   and fourth toes was examined and was not viable. The incision was   extended proximally to an area of viable soft tissue and the third, fourth   and fifth toes, including the metatarsal heads were resected as one   complete specimen.  Dissection was carried down to the distal   metatarsal shafts of the third, fourth and fifth metatarsals and a clean   tissue plane and the periosteum was elevated. The bones were   transected with a bone cutter. Specimen was removed. Hemostasis   was obtained with electrocautery and there was good perfusion   throughout the wound. There was no evidence of remaining nonviable   tissue or gross infection. After obtaining adequate hemostasis, two   separate 2-0 nylon vertical mattress sutures were placed in the lateral   aspect of the wound, simply to reduce the wound volume. The medial   aspect was left open, both due to the contaminated nature of the   wound and the lack of tissue for coverage. The wound was packed   with a wet-to-dry dressing. On the back table, cultures were taken from   the necrotic area of the foot for both aerobic and anaerobic organisms. The patient was extubated and transferred to the PACU in stable   condition. All counts were correct.         Chyrl Hamman (Madhavi Rodriguez) Danile Dorsey MD       / [facility], [facility].   D:  10/17/2017   03:25   T:  10/17/2017   03:51   Job #:  [id number]

## 2017-10-17 NOTE — ANESTHESIA POSTPROCEDURE EVALUATION
Post-Anesthesia Evaluation and Assessment    Patient: Lorraine Marquez MRN: [id number]  SSN: [id number]    YOB: 1940  Age: 68 y.o. Sex: male       Cardiovascular Function/Vital Signs  Visit Vitals    /67 (BP 1 Location: Right arm, BP Patient Position: At rest)    Pulse 70    Temp 37.3 °C (99.2 °F)    Resp 14    Wt 113.3 kg (249 lb 11.2 oz)    SpO2 100%    BMI 26.76 kg/m2       Patient is status post general anesthesia for Procedure(s):  AMPUTATION KNEE(BKA) Right. Nausea/Vomiting: None    Postoperative hydration reviewed and adequate. Pain:  Pain Scale 1: Numeric (0 - 10) (10/17/17 1315)  Pain Intensity 1: 5 (10/17/17 1315)   Managed    Neurological Status:   Neuro (WDL): Exceptions to WDL (10/17/17 1210)  Neuro  Neurologic State: Drowsy (10/17/17 1210)  Orientation Level: Oriented X4 (10/17/17 1210)  Cognition: Decreased attention/concentration; Follows commands;Poor safety awareness (10/17/17 1210)  Speech: Delayed responses;Clear (10/17/17 1210)  Assessment R Pupil: Round (10/16/17 2132)  LUE Motor Response: Purposeful (10/17/17 1210)  LLE Motor Response: Purposeful (10/17/17 1210)  RUE Motor Response: Purposeful (10/17/17 1210)  RLE Motor Response: Other(comment) (BKA; patient has Cal Pool in place) (10/17/17 1210)   At baseline    Mental Status and Level of Consciousness: Arousable    Pulmonary Status:   O2 Device: Room air (10/17/17 1315)   Adequate oxygenation and airway patent    Complications related to anesthesia: None    Post-anesthesia assessment completed.  No concerns    Signed By: Gauri Nichols MD     October 17, 2017

## 2017-10-17 NOTE — PERIOP NOTES
Handoff Report from Operating Room to PACU    Report received from Jone Dawson RN and Claudette Child CRNA regarding Kailey Sauceda. Surgeon(s): Andrews Landa MD  And Procedure(s) (LRB):  AMPUTATION KNEE(BKA) Right (Right)  confirmed   with allergies, drains and dressings discussed. Anesthesia type, drugs, patient history, complications, estimated blood loss, vital signs, intake and output, and last pain medication, lines, reversal medications and temperature were reviewed.

## 2017-10-18 LAB
ANION GAP SERPL CALC-SCNC: 8 MMOL/L (ref 5–15)
BUN SERPL-MCNC: 17 MG/DL (ref 6–20)
BUN/CREAT SERPL: 15 (ref 12–20)
CALCIUM SERPL-MCNC: 8.2 MG/DL (ref 8.5–10.1)
CHLORIDE SERPL-SCNC: 105 MMOL/L (ref 97–108)
CO2 SERPL-SCNC: 24 MMOL/L (ref 21–32)
CREAT SERPL-MCNC: 1.17 MG/DL (ref 0.7–1.3)
ERYTHROCYTE [DISTWIDTH] IN BLOOD BY AUTOMATED COUNT: 13.5 % (ref 11.5–14.5)
GLUCOSE BLD STRIP.AUTO-MCNC: 124 MG/DL (ref 65–100)
GLUCOSE BLD STRIP.AUTO-MCNC: 126 MG/DL (ref 65–100)
GLUCOSE BLD STRIP.AUTO-MCNC: 159 MG/DL (ref 65–100)
GLUCOSE BLD STRIP.AUTO-MCNC: 96 MG/DL (ref 65–100)
GLUCOSE SERPL-MCNC: 154 MG/DL (ref 65–100)
HCT VFR BLD AUTO: 23.8 % (ref 36.6–50.3)
HGB BLD-MCNC: 7.7 G/DL (ref 12.1–17)
MCH RBC QN AUTO: 26.6 PG (ref 26–34)
MCHC RBC AUTO-ENTMCNC: 32.4 G/DL (ref 30–36.5)
MCV RBC AUTO: 82.1 FL (ref 80–99)
PLATELET # BLD AUTO: 297 K/UL (ref 150–400)
POTASSIUM SERPL-SCNC: 4.2 MMOL/L (ref 3.5–5.1)
RBC # BLD AUTO: 2.9 M/UL (ref 4.1–5.7)
SERVICE CMNT-IMP: ABNORMAL
SERVICE CMNT-IMP: NORMAL
SODIUM SERPL-SCNC: 137 MMOL/L (ref 136–145)
WBC # BLD AUTO: 9.7 K/UL (ref 4.1–11.1)

## 2017-10-18 PROCEDURE — 82962 GLUCOSE BLOOD TEST: CPT

## 2017-10-18 PROCEDURE — 97162 PT EVAL MOD COMPLEX 30 MIN: CPT

## 2017-10-18 PROCEDURE — G8979 MOBILITY GOAL STATUS: HCPCS

## 2017-10-18 PROCEDURE — 85027 COMPLETE CBC AUTOMATED: CPT | Performed by: SURGERY

## 2017-10-18 PROCEDURE — 36415 COLL VENOUS BLD VENIPUNCTURE: CPT | Performed by: SURGERY

## 2017-10-18 PROCEDURE — 97165 OT EVAL LOW COMPLEX 30 MIN: CPT

## 2017-10-18 PROCEDURE — 3331090002 HH PPS REVENUE DEBIT

## 2017-10-18 PROCEDURE — 74011636637 HC RX REV CODE- 636/637: Performed by: INTERNAL MEDICINE

## 2017-10-18 PROCEDURE — 3331090001 HH PPS REVENUE CREDIT

## 2017-10-18 PROCEDURE — 74011250636 HC RX REV CODE- 250/636: Performed by: SURGERY

## 2017-10-18 PROCEDURE — 65270000029 HC RM PRIVATE

## 2017-10-18 PROCEDURE — 97530 THERAPEUTIC ACTIVITIES: CPT

## 2017-10-18 PROCEDURE — 97535 SELF CARE MNGMENT TRAINING: CPT

## 2017-10-18 PROCEDURE — 80048 BASIC METABOLIC PNL TOTAL CA: CPT | Performed by: SURGERY

## 2017-10-18 PROCEDURE — G8978 MOBILITY CURRENT STATUS: HCPCS

## 2017-10-18 PROCEDURE — 74011000258 HC RX REV CODE- 258: Performed by: SURGERY

## 2017-10-18 PROCEDURE — 74011250637 HC RX REV CODE- 250/637: Performed by: NURSE PRACTITIONER

## 2017-10-18 PROCEDURE — G8987 SELF CARE CURRENT STATUS: HCPCS

## 2017-10-18 RX ORDER — SENNOSIDES 8.6 MG/1
2 TABLET ORAL
Status: DISCONTINUED | OUTPATIENT
Start: 2017-10-18 | End: 2017-10-20 | Stop reason: HOSPADM

## 2017-10-18 RX ADMIN — ASPIRIN 81 MG 81 MG: 81 TABLET ORAL at 07:56

## 2017-10-18 RX ADMIN — INSULIN LISPRO 8 UNITS: 100 INJECTION, SOLUTION INTRAVENOUS; SUBCUTANEOUS at 17:58

## 2017-10-18 RX ADMIN — INSULIN LISPRO 8 UNITS: 100 INJECTION, SOLUTION INTRAVENOUS; SUBCUTANEOUS at 11:45

## 2017-10-18 RX ADMIN — CEFEPIME HYDROCHLORIDE 2 G: 2 INJECTION, POWDER, FOR SOLUTION INTRAVENOUS at 07:55

## 2017-10-18 RX ADMIN — METOPROLOL TARTRATE 25 MG: 25 TABLET ORAL at 07:59

## 2017-10-18 RX ADMIN — VANCOMYCIN HYDROCHLORIDE 1250 MG: 10 INJECTION, POWDER, LYOPHILIZED, FOR SOLUTION INTRAVENOUS at 12:48

## 2017-10-18 RX ADMIN — FERROUS SULFATE TAB 325 MG (65 MG ELEMENTAL FE) 325 MG: 325 (65 FE) TAB at 07:56

## 2017-10-18 RX ADMIN — CEFEPIME HYDROCHLORIDE 2 G: 2 INJECTION, POWDER, FOR SOLUTION INTRAVENOUS at 00:50

## 2017-10-18 RX ADMIN — INSULIN LISPRO 8 UNITS: 100 INJECTION, SOLUTION INTRAVENOUS; SUBCUTANEOUS at 07:54

## 2017-10-18 RX ADMIN — INSULIN GLARGINE 14 UNITS: 100 INJECTION, SOLUTION SUBCUTANEOUS at 07:54

## 2017-10-18 RX ADMIN — CEFEPIME HYDROCHLORIDE 2 G: 2 INJECTION, POWDER, FOR SOLUTION INTRAVENOUS at 16:45

## 2017-10-18 RX ADMIN — METOPROLOL TARTRATE 25 MG: 25 TABLET ORAL at 17:58

## 2017-10-18 NOTE — PROGRESS NOTES
Problem: Self Care Deficits Care Plan (Adult)  Goal: *Acute Goals and Plan of Care (Insert Text)  Occupational Therapy Goals  Initiated 10/18/2017  1. Patient will perform grooming with supervision/set-up within 7 day(s). 2.  Patient will perform bathing with moderate assistance  within 7 day(s). 3.  Patient will perform lower body dressing with moderate assistance  within 7 day(s). 4.  Patient will perform toilet transfers with moderate assistance  within 7 day(s). 5.  Patient will perform all aspects of toileting with minimal assistance/contact guard assist within 7 day(s). 6.  Patient will participate in upper extremity therapeutic exercise/activities with independence for 10 minutes within 7 day(s). 7.  Patient will utilize energy conservation techniques during functional activities with verbal cues within 7 day(s). Occupational Therapy EVALUATION  Patient: Chelsea Flood (62 y.o. male)  Date: 10/18/2017  Primary Diagnosis: Osteomylitis  Osteomyelitis (HCC)  OSTEOMYELITIS RIGHT FOOT  Gangrene Right Foot  Procedure(s) (LRB):  AMPUTATION KNEE(BKA) Right (Right) 1 Day Post-Op   Precautions:   Fall    ASSESSMENT :  Based on the objective data described below, the patient presents with generalized weakness, decreased endurance, strength, functional mobility, and ADLs. Pt was living with family and independent in all areas prior and did not use AD. Pt was cleared for eval and pt was supine in bed and VSS. Pt was supervision for bed mobility, and was able to sit on EOb with no assist.  Pt was independent with scooting to EOB and mod to max assist of 2 to come to stand. Pt was nervous to reach for walker and was min to mod  assist of 2 for transfers to recliner. Pt did have a drop in BP after the transfers and elevated pts legs and his BP increased and was stable. Pt has functional range and strength in BUE and is very motivated to get back to his normal life.   Recommend that pt have further therapy at discharge at in pt. Patient will benefit from skilled intervention to address the above impairments. Patients rehabilitation potential is considered to be Good  Factors which may influence rehabilitation potential include:   [x]             None noted  []             Mental ability/status  []             Medical condition  []             Home/family situation and support systems  []             Safety awareness  []             Pain tolerance/management  []             Other:      PLAN :  Recommendations and Planned Interventions:  [x]               Self Care Training                  []        Therapeutic Activities  [x]               Functional Mobility Training    []        Cognitive Retraining  [x]               Therapeutic Exercises           [x]        Endurance Activities  []               Balance Training                   []        Neuromuscular Re-Education  []               Visual/Perceptual Training     [x]   Home Safety Training  [x]               Patient Education                 [x]        Family Training/Education  []               Other (comment):    Frequency/Duration: Patient will be followed by occupational therapy 5 times a week to address goals. Discharge Recommendations: Inpatient Rehab  Further Equipment Recommendations for Discharge: tbd     SUBJECTIVE:   Patient stated I just want to get back to my life.     OBJECTIVE DATA SUMMARY:   HISTORY:   Past Medical History:   Diagnosis Date    CAD (coronary artery disease)     palpitations    Diabetes ([facility] [address]     Hypertension     Other ill-defined conditions(799.89)     left foot ulcer     Past Surgical History:   Procedure Laterality Date    HX HEENT      bilateral cataract surgery    HX ORTHOPAEDIC      \"2 surgeries on right foot\"    HX OTHER SURGICAL      surgery to left foot ulcer, PICC right arm       Prior Level of Function/Home Situation: pt lives with family and was independent in all areas prior and did not use AD.  Expanded or extensive additional review of patient history:     Home Situation  Home Environment: Private residence  # Steps to Enter: 9  Rails to Enter: Yes  Hand Rails : Right  One/Two Story Residence: Two story, live on 1st floor  # of Interior Steps: 20  Height of Each Step (in): 6 inches  Interior Rails: Right  Lift Chair Available: No  Living Alone: No  Support Systems: Family member(s)  Patient Expects to be Discharged to[de-identified] Private residence  Current DME Used/Available at Home: None  [x]  Right hand dominant   []  Left hand dominant    EXAMINATION OF PERFORMANCE DEFICITS:  Cognitive/Behavioral Status:           Perception: Appears intact  Perseveration: No perseveration noted  Safety/Judgement: Awareness of environment; Fall prevention    Skin: in good health    Edema: none noted    Hearing: Auditory  Auditory Impairment: Hard of hearing, bilateral    Vision/Perceptual:                 intact                    Range of Motion:    AROM: Within functional limits  PROM: Within functional limits                      Strength:    Strength: Generally decreased, functional                Coordination:  Coordination: Within functional limits  Fine Motor Skills-Upper: Left Intact; Right Intact    Gross Motor Skills-Upper: Left Intact; Right Intact    Tone & Sensation:    Tone: Normal  Sensation: Intact                      Balance:  Sitting: Intact  Standing: Impaired  Standing - Static: Fair  Standing - Dynamic : Fair    Functional Mobility and Transfers for ADLs:  Bed Mobility:  Rolling: Stand-by asssistance  Supine to Sit: Stand-by asssistance    Transfers:  Sit to Stand:  Moderate assistance;Maximum assistance;Assist x2  Stand to Sit: Moderate assistance;Maximum assistance;Assist x2  Bed to Chair: Moderate assistance;Assist x2    ADL Assessment:  Feeding: Independent    Oral Facial Hygiene/Grooming: Setup    Bathing: Maximum assistance;Minimum assistance    Upper Body Dressing: Minimum assistance;Contact guard assistance    Lower Body Dressing: Maximum assistance;Minimum assistance         Barthel Index:    Bathin  Bladder: 10  Bowels: 10  Groomin  Dressin  Feeding: 10  Mobility: 0  Stairs: 0  Toilet Use: 5  Transfer (Bed to Chair and Back): 5  Total: 45       Barthel and G-code impairment scale:  Percentage of impairment CH  0% CI  1-19% CJ  20-39% CK  40-59% CL  60-79% CM  80-99% CN  100%   Barthel Score 0-100 100 99-80 79-60 59-40 20-39 1-19   0   Barthel Score 0-20 20 17-19 13-16 9-12 5-8 1-4 0      The Barthel ADL Index: Guidelines  1. The index should be used as a record of what a patient does, not as a record of what a patient could do. 2. The main aim is to establish degree of independence from any help, physical or verbal, however minor and for whatever reason. 3. The need for supervision renders the patient not independent. 4. A patient's performance should be established using the best available evidence. Asking the patient, friends/relatives and nurses are the usual sources, but direct observation and common sense are also important. However direct testing is not needed. 5. Usually the patient's performance over the preceding 24-48 hours is important, but occasionally longer periods will be relevant. 6. Middle categories imply that the patient supplies over 50 per cent of the effort. 7. Use of aids to be independent is allowed. Link ., Barthel, D.W. (3173). Functional evaluation: the Barthel Index. [facility] (14)2. ALYSHA Montes, Celso Collins., Sam Castillo., Michael, [address] [place] [address] (). Measuring the change indisability after inpatient rehabilitation; comparison of the responsiveness of the Barthel Index and Functional [place] Measure. Journal of Neurology, Neurosurgery, and Psychiatry, 66(4), 341-772. Maryann Smith, N.J.A, NICHOLAS Foley, & Miroslava Mota MKobiA. (2004.) Assessment of post-stroke quality of life in cost-effectiveness studies:  The usefulness of the Barthel Index and the EuroQoL-5D. Quality of Life Research, 13, 415-56              Cognitive Retraining  Safety/Judgement: Awareness of environment; Fall prevention         Functional Measure:      G codes: In compliance with CMSs Claims Based Outcome Reporting, the following G-code set was chosen for this patient based on their primary functional limitation being treated: The outcome measure chosen to determine the severity of the functional limitation was the Barthel with a score of 45/100 which was correlated with the impairment scale. ? Self Care:     - CURRENT STATUS: CK - 40%-59% impaired, limited or restricted    - GOAL STATUS: CJ - 20%-39% impaired, limited or restricted    - D/C STATUS:  ---------------To be determined---------------     Occupational Therapy Evaluation Charge Determination   History Examination Decision-Making   HIGH Complexity : Extensive review of history including physical, cognitive and psychosocial history  LOW Complexity : 1-3 performance deficits relating to physical, cognitive , or psychosocial skils that result in activity limitations and / or participation restrictions  MEDIUM Complexity : Patient may present with comorbidities that affect occupational performnce. Miniml to moderate modification of tasks or assistance (eg, physical or verbal ) with assesment(s) is necessary to enable patient to complete evaluation       Based on the above components, the patient evaluation is determined to be of the following complexity level: LOW            Activity Tolerance:   vss  Please refer to the flowsheet for vital signs taken during this treatment.   After treatment:   [x] Patient left in no apparent distress sitting up in chair  [] Patient left in no apparent distress in bed  [x] Call bell left within reach  [x] Nursing notified  [x] Caregiver present  [] Bed alarm activated    COMMUNICATION/EDUCATION:   The patients plan of care was discussed with: Physical Therapist and Registered Nurse. [x] Home safety education was provided and the patient/caregiver indicated understanding. [x] Patient/family have participated as able in goal setting and plan of care. [x] Patient/family agree to work toward stated goals and plan of care. [] Patient understands intent and goals of therapy, but is neutral about his/her participation. [] Patient is unable to participate in goal setting and plan of care. This patients plan of care is appropriate for delegation to [facility].     Thank you for this referral.  Shelby Francisco OT  Time Calculation: 41 mins

## 2017-10-18 NOTE — PROGRESS NOTES
Nutrition Services      Nutrition Screen:  Wt Readings from Last 10 Encounters:   10/14/17 113.3 kg (249 lb 11.2 oz)   09/28/17 116.1 kg (255 lb 14.4 oz)   09/12/17 114.8 kg (253 lb 1.4 oz)   09/07/17 118.3 kg (260 lb 14.4 oz)   09/08/17 117.9 kg (260 lb)   05/15/17 120.7 kg (266 lb)   02/06/17 122.2 kg (269 lb 8 oz)   06/02/16 121.1 kg (267 lb)   04/11/16 120.2 kg (265 lb)   02/29/16 123.4 kg (272 lb)     Body mass index is 26.76 kg/(m^2). Supplements: Glucerna                        __x___ ordered ______  declined. __ __  Pt is nutritionally stable at this time, will rescreen in 7 days. __ __    Pt is at nutritional risk and will be rescreened in  days. __x __  Pt is at moderate or high nutritional risk, will refer to RD for assessment.        Judge Palacio  Dietetic Technician, Registered

## 2017-10-18 NOTE — PROGRESS NOTES
Vascular Surgery Progress Note  Jim Wesley ACNP-BC  10/18/2017 8:07 AM       Subjective:     Nevaeh Lopez a 68 y.o. AAM who is well known to our service. Jez Bowers has been follow by our practice since early September when he was admitted to the hospital by his PCP Dr. Leni Hardin for a RLE DM foot wound.  During that admission he was treated for PAD w athrectomy and angioplasty of his TPT on 9/13/17 followed by I&D of the wound and amputation of the 3rd-5th digits.  MRI at the time confirmed osteomylitis. Jez Bowers has been followed weekly in our clinic for his wound and it has slowly been deteriorating. eJz Bowers was noted to have significant necrosis of the skin. Patient was admitted for IV antibx on 10/10/2017 and is now status post repeat I &D with resection of the remainder of the 5th metatarsal head on 10/12/2017. This resulted in and extensive wound to the RLE that was likely going to be months to heal if it healed. After prognosis of the wound was discussed with the patient and he obtained a secondary opinion from Dr. Aleksandar Alonzo. He decided to proceed with BKA as he wanted to get on with his life. He underwent a BKA on 10/17/2017. He continues on IV antibx. No complaints overnight. Afebrile. WBC, HR, and RR are normal.  Pain is controlled.       Nursing Data:     Patient Vitals for the past 24 hrs:   BP Temp Pulse Resp SpO2   10/18/17 1206 134/55 - 74 18 98 %   10/18/17 0905 136/57 - 72 - 99 %   10/18/17 0900 111/56 - 77 - 98 %   10/18/17 0857 (!) 89/51 - 82 - -   10/18/17 0842 145/67 - 78 16 -   10/17/17 2306 125/61 98.5 °F (36.9 °C) 64 16 95 %   10/17/17 1930 130/60 98 °F (36.7 °C) 66 16 -   10/17/17 1539 151/83 98.1 °F (36.7 °C) 73 16 100 %   10/17/17 1442 158/80 - 76 16 99 %   10/17/17 1416 144/76 - 75 14 100 %     ---------------------------------------------------------------------------------------------------------    Intake/Output Summary (Last 24 hours) at 10/18/17 3643  Last data filed at 10/18/17 1252   Gross per 24 hour   Intake          3015.83 ml   Output             1920 ml   Net          1095.83 ml       Exam:     Physical Exam   Constitutional: He is oriented to person, place, and time. He appears well-developed and well-nourished. Patient OOB to chair this am.    HENT:   Head: Normocephalic and atraumatic. Eyes: EOM are normal. Pupils are equal, round, and reactive to light. Neck: Normal range of motion. Neck supple. Cardiovascular: Normal rate and regular rhythm. Trace LLE edema    Pulmonary/Chest: Effort normal and breath sounds normal.   Abdominal: Soft. Bowel sounds are normal.   Musculoskeletal: Normal range of motion. Right BKA w bulky dressing that is C/D/I. Neurological: He is alert and oriented to person, place, and time. Skin:        Psychiatric: His behavior is normal. Thought content normal.       Lab Review:     .   Recent Results (from the past 24 hour(s))   GLUCOSE, POC    Collection Time: 10/17/17  4:42 PM   Result Value Ref Range    Glucose (POC) 219 (H) 65 - 100 mg/dL    Performed by Ayse Garcia    GLUCOSE, POC    Collection Time: 10/17/17  9:02 PM   Result Value Ref Range    Glucose (POC) 252 (H) 65 - 100 mg/dL    Performed by Jonah, BASIC    Collection Time: 10/18/17  6:30 AM   Result Value Ref Range    Sodium 137 136 - 145 mmol/L    Potassium 4.2 3.5 - 5.1 mmol/L    Chloride 105 97 - 108 mmol/L    CO2 24 21 - 32 mmol/L    Anion gap 8 5 - 15 mmol/L    Glucose 154 (H) 65 - 100 mg/dL    BUN 17 6 - 20 MG/DL    Creatinine 1.17 0.70 - 1.30 MG/DL    BUN/Creatinine ratio 15 12 - 20      GFR est AA >60 >60 ml/min/1.73m2    GFR est non-AA >60 >60 ml/min/1.73m2    Calcium 8.2 (L) 8.5 - 10.1 MG/DL   CBC W/O DIFF    Collection Time: 10/18/17  6:30 AM   Result Value Ref Range    WBC 9.7 4.1 - 11.1 K/uL    RBC 2.90 (L) 4.10 - 5.70 M/uL    HGB 7.7 (L) 12.1 - 17.0 g/dL    HCT 23.8 (L) 36.6 - 50.3 %    MCV 82.1 80.0 - 99.0 FL    MCH 26.6 26.0 - 34.0 PG    MCHC 32.4 30.0 - 36.5 g/dL    RDW 13.5 11.5 - 14.5 %    PLATELET 935 576 - 661 K/uL   GLUCOSE, POC    Collection Time: 10/18/17  7:17 AM   Result Value Ref Range    Glucose (POC) 159 (H) 65 - 100 mg/dL    Performed by Dayanara Aguilera (PCT)    GLUCOSE, POC    Collection Time: 10/18/17 11:40 AM   Result Value Ref Range    Glucose (POC) 126 (H) 65 - 100 mg/dL    Performed by Ayse Garcia           Assessment/Plan:      Principal Problems:                       RLE DM foot wound infection w osteomylitis            PAD  Patient did not meet SIRs criteria on arrival.   Repeat wound cx from our office from 10/10/17: mixed skin lb and light growth. POD 5 from I & D and resection of remaining 5th metatarsal head. POD 1 from right BKA. Continues on IV Cefepime and IV Vancomycin. Pain controlled w morphine PCA. Add BM regimen. H & H a bit low post procedure, but not at levels to transfuse. PT/OT evaluated today. Continue home medication ASA.      Active Problems:                        IDDM II  A1c 7.8  Post op hyperglycemia overnight now resolved. Blood glucose well controlled on current dose of lantus and bolus lispro w meals. Continue POC achs. Hyponatremia resolved. I            CKD III w baseline creatinine of 1.2  Mildly elevated on arrival.  Now improved w minimal IVFs to within normal limits. Tolerating IV antibx well. Continue to monitor while on antibx. Anemia of chronic disease            Iron deficiency   Iron 13. Ferritin 440. %sat 13. TIBC 184. Folate 11.2  B12 1561  H & H a bit low post procedure, but not at levels to transfuse. Anemia evaluated by Dr. Tiffani Hickman (hematology) after consult by Dr. Lisandra Reeves (PCP). Patient initiated on ferrous sulfate.                              CAD  Continue ASA, BBlocker, and statin.        VTE prophylaxis:  ASA. SCDs contraindicated secondary to severe PAD.         Disposition:   Inpatient rehab 2-3 days.

## 2017-10-18 NOTE — PROGRESS NOTES
Nutrition Services      Nutrition Screen:  Wt Readings from Last 10 Encounters:   10/14/17 113.3 kg (249 lb 11.2 oz)   09/28/17 116.1 kg (255 lb 14.4 oz)   09/12/17 114.8 kg (253 lb 1.4 oz)   09/07/17 118.3 kg (260 lb 14.4 oz)   09/08/17 117.9 kg (260 lb)   05/15/17 120.7 kg (266 lb)   02/06/17 122.2 kg (269 lb 8 oz)   06/02/16 121.1 kg (267 lb)   04/11/16 120.2 kg (265 lb)   02/29/16 123.4 kg (272 lb)     Body mass index is 26.76 kg/(m^2). Pt expressed understanding of his good BG control & diet/protein will assist in promoting healing. Supplements: Glucerna                        __x___ ordered ______  declined. __ __  Pt is nutritionally stable at this time, will rescreen in 7 days. __ __    Pt is at nutritional risk and will be rescreened in  days. __x __  Pt is at moderate or high nutritional risk, will refer to RD for assessment.        Judge Palacio  Dietetic Technician, Registered

## 2017-10-18 NOTE — PROGRESS NOTES
Problem: Mobility Impaired (Adult and Pediatric)  Goal: *Acute Goals and Plan of Care (Insert Text)  Physical Therapy Goals  Initiated 10/18/2017  1. Patient will move from supine to sit and sit to supine  in bed with independence within 7 day(s). 2.  Patient will transfer from bed to chair and chair to bed with modified independence using the least restrictive device within 7 day(s). 3.  Patient will perform sit to stand with minimal assistance/contact guard assist within 7 day(s). 4.  Patient will ambulate with minimal assistance/contact guard assist for 10 feet with the least restrictive device within 7 day(s). 5.  Patient will perform supine exercises independently to strengthen residual limb within 7 day(s). physical Therapy EVALUATION  Patient: Rosa Garcia (74 y.o. male)  Date: 10/18/2017  Primary Diagnosis: Osteomylitis  Osteomyelitis (HCC)  OSTEOMYELITIS RIGHT FOOT  Gangrene Right Foot  Procedure(s) (LRB):  AMPUTATION KNEE(BKA) Right (Right) 1 Day Post-Op   Precautions:   Fall    ASSESSMENT :  Based on the objective data described below, the patient presents with generalized weakness, decreased activity tolerance, impaired balance and altered gait. Pt was received in supine and cleared by nursing to mobilize. He was completely independent prior to BKA and not using any AD. Cleared by nursing to mobilize. Bed mobility was performed with SBA to come to EOB. Sit<>stand was performed with mod/max A x 2 to come to full stand with RW. Pivoted to the chair and noted increased sweating. He was returned to sitting and BP taken, significant drop which is noted in flow sheet. He was left sitting up in the chair and cued to perform ankle pump on the L, instructed on LE exercise for supine when he returns to bed. Recommending IP for intense rehab for pt to return to independent functional ability. Patient will benefit from skilled intervention to address the above impairments.   Patients rehabilitation potential is considered to be Good  Factors which may influence rehabilitation potential include:   [x]         None noted  []         Mental ability/status  []         Medical condition  []         Home/family situation and support systems  []         Safety awareness  []         Pain tolerance/management  []         Other:      PLAN :  Recommendations and Planned Interventions:  [x]           Bed Mobility Training             []    Neuromuscular Re-Education  [x]           Transfer Training                   []    Orthotic/Prosthetic Training  [x]           Gait Training                         []    Modalities  [x]           Therapeutic Exercises           []    Edema Management/Control  [x]           Therapeutic Activities            [x]    Patient and Family Training/Education  []           Other (comment):    Frequency/Duration: Patient will be followed by physical therapy  5 times a week to address goals. Discharge Recommendations: Inpatient Rehab  Further Equipment Recommendations for Discharge: TBD     SUBJECTIVE:   Patient stated so can I do too much.     OBJECTIVE DATA SUMMARY:   HISTORY:    Past Medical History:   Diagnosis Date    CAD (coronary artery disease)     palpitations    Diabetes ([facility] [address]     Hypertension     Other ill-defined conditions(799.89)     left foot ulcer     Past Surgical History:   Procedure Laterality Date    HX HEENT      bilateral cataract surgery    HX ORTHOPAEDIC      \"2 surgeries on right foot\"    HX OTHER SURGICAL      surgery to left foot ulcer, PICC right arm     Prior Level of Function/Home Situation: pt was independent and living with family, not using any AD  Personal factors and/or comorbidities impacting plan of care:     Home Situation  Home Environment: Private residence  # Steps to Enter: 9  Rails to Enter: Yes  Hand Rails : Right  One/Two Story Residence: Two story, live on 1st floor  # of Interior Steps: 20  Height of Each Step (in): 6 inches  [employer] Rails: Right  Lift Chair Available: No  Living Alone: No  Support Systems: Family member(s)  Patient Expects to be Discharged to[de-identified] Private residence  Current DME Used/Available at Home: None    EXAMINATION/PRESENTATION/DECISION MAKING:   Critical Behavior:  Neurologic State: Alert  Orientation Level: Oriented X4  Cognition: Follows commands     Hearing: Auditory  Auditory Impairment: Hard of hearing, bilateral  Skin:  Residual limb in brace  Edema: WDL  Range Of Motion:  AROM: Within functional limits           PROM: Within functional limits           Strength:    Strength: Generally decreased, functional                    Tone & Sensation:   Tone: Normal              Sensation: Intact               Coordination:  Coordination: Within functional limits  Vision:      Functional Mobility:  Bed Mobility:  Rolling: Stand-by asssistance  Supine to Sit: Stand-by asssistance        Transfers:  Sit to Stand: Moderate assistance;Maximum assistance;Assist x2  Stand to Sit: Moderate assistance;Maximum assistance;Assist x2        Bed to Chair: Moderate assistance;Assist x2              Balance:   Sitting: Intact  Standing: Impaired  Standing - Static: Fair  Standing - Dynamic : Fair  Ambulation/Gait Training:   SPT to the chair with mod A and RW, some hops in place                       Therapeutic Exercises: Ankle pumps    Functional Measure:  Barthel Index:    Bathin  Bladder: 10  Bowels: 10  Groomin  Dressin  Feeding: 10  Mobility: 0  Stairs: 0  Toilet Use: 5  Transfer (Bed to Chair and Back): 5  Total: 45       Barthel and G-code impairment scale:  Percentage of impairment CH  0% CI  1-19% CJ  20-39% CK  40-59% CL  60-79% CM  80-99% CN  100%   Barthel Score 0-100 100 99-80 79-60 59-40 20-39 1-19   0   Barthel Score 0-20 20 17-19 13-16 9-12 5-8 1-4 0      The Barthel ADL Index: Guidelines  1. The index should be used as a record of what a patient does, not as a record of what a patient could do.   2. The main aim is to establish degree of independence from any help, physical or verbal, however minor and for whatever reason. 3. The need for supervision renders the patient not independent. 4. A patient's performance should be established using the best available evidence. Asking the patient, friends/relatives and nurses are the usual sources, but direct observation and common sense are also important. However direct testing is not needed. 5. Usually the patient's performance over the preceding 24-48 hours is important, but occasionally longer periods will be relevant. 6. Middle categories imply that the patient supplies over 50 per cent of the effort. 7. Use of aids to be independent is allowed. Monalisa Kenny., Barthel, D.W. (0328). Functional evaluation: the Barthel Index. [facility] (14)2. ALYSHA Olson Ace, Jacinto White., Mely Barros., [place], [address] (1999). Measuring the change indisability after inpatient rehabilitation; comparison of the responsiveness of the Barthel Index and Functional [place] Measure. Journal of Neurology, Neurosurgery, and Psychiatry, 66(4), 669-215. Delaney Dumont, N.J.A, NICHOLAS Foley, & Cuong Louie M.A. (2004.) Assessment of post-stroke quality of life in cost-effectiveness studies: The usefulness of the Barthel Index and the EuroQoL-5D. Quality of Life Research, 13, 270-30         G codes: In compliance with CMSs Claims Based Outcome Reporting, the following G-code set was chosen for this patient based on their primary functional limitation being treated: The outcome measure chosen to determine the severity of the functional limitation was the barthel with a score of 45/100 which was correlated with the impairment scale.     ? Mobility - Walking and Moving Around:     - CURRENT STATUS: CK - 40%-59% impaired, limited or restricted    - GOAL STATUS: CJ - 20%-39% impaired, limited or restricted    - D/C STATUS:  ---------------To be determined---------------      Physical Therapy Evaluation Charge Determination   History Examination Presentation Decision-Making   HIGH Complexity :3+ comorbidities / personal factors will impact the outcome/ POC  MEDIUM Complexity : 3 Standardized tests and measures addressing body structure, function, activity limitation and / or participation in recreation  MEDIUM Complexity : Evolving with changing characteristics  MEDIUM Complexity : FOTO score of 26-74      Based on the above components, the patient evaluation is determined to be of the following complexity level: MEDIUM    Pain:                    Activity Tolerance:   Vasovagal   Please refer to the flowsheet for vital signs taken during this treatment. After treatment:   [x]         Patient left in no apparent distress sitting up in chair  []         Patient left in no apparent distress in bed  [x]         Call bell left within reach  [x]         Nursing notified  []         Caregiver present  []         Bed alarm activated    COMMUNICATION/EDUCATION:   The patients plan of care was discussed with: Occupational Therapist and Registered Nurse. [x]         Fall prevention education was provided and the patient/caregiver indicated understanding. []         Patient/family have participated as able in goal setting and plan of care. [x]         Patient/family agree to work toward stated goals and plan of care. []         Patient understands intent and goals of therapy, but is neutral about his/her participation. []         Patient is unable to participate in goal setting and plan of care.     Thank you for this referral.  Karely Roman, PT, DPT   Time Calculation: 40 mins

## 2017-10-18 NOTE — PROGRESS NOTES
General Surgery End of Shift Nursing Note    Bedside shift change report given to Selena (oncoming nurse) by Adi Buck (offgoing nurse). Report included the following information SBAR, Kardex, OR Summary, Procedure Summary, Intake/Output, MAR and Recent Results. Shift worked:   1900-0730   Significant changes during shift:       Non-emergent issues for physician to address:        Number times ambulated in hallway past shift: 0    Number of times OOB to chair past shift: 0    Pain Management:  Current medication: morphine pca  Patient states pain is manageable on current pain medication: YES    GI:    Current diet:  DIET DIABETIC CONSISTENT CARB Regular    Tolerating current diet: YES  Passing flatus: NO  Last Bowel Movement: several days ago   Appearance: unk    Respiratory:    Incentive Spirometer at bedside: YES  Patient instructed on use: YES    Patient Safety:    Falls Score: 2  Bed Alarm On? Not applicable  Sitter?  Not applicable    Seferino Stephenson

## 2017-10-18 NOTE — PROGRESS NOTES
General Daily Progress Note    Admit Date: 10/10/2017    Subjective:     Patient has no complaint. Current Facility-Administered Medications   Medication Dose Route Frequency    insulin glargine (LANTUS) injection 14 Units  14 Units SubCUTAneous DAILY    0.9% sodium chloride infusion  50 mL/hr IntraVENous CONTINUOUS    morphine  mg / 30 mL   IntraVENous CONTINUOUS    oxyCODONE IR (ROXICODONE) tablet 5 mg  5 mg Oral Q4H PRN    insulin lispro (HUMALOG) injection 8 Units  8 Units SubCUTAneous TIDAC    vancomycin (VANCOCIN) 1250 mg in  ml infusion  1,250 mg IntraVENous Q12H    morphine injection 2 mg  2 mg IntraVENous Q3H PRN    ondansetron (ZOFRAN) injection 4 mg  4 mg IntraVENous Q6H PRN    dextrose (D50W) injection syrg 12.5-25 g  12.5-25 g IntraVENous PRN    glucagon (GLUCAGEN) injection 1 mg  1 mg IntraMUSCular PRN    glucose chewable tablet 16 g  4 Tab Oral PRN    bisacodyl (DULCOLAX) suppository 10 mg  10 mg Rectal DAILY PRN    ferrous sulfate tablet 325 mg  1 Tab Oral DAILY WITH BREAKFAST    aspirin chewable tablet 81 mg  81 mg Oral DAILY    metoprolol tartrate (LOPRESSOR) tablet 25 mg  25 mg Oral BID    cefepime (MAXIPIME) 2 g in 0.9% sodium chloride (MBP/ADV) 100 mL  2 g IntraVENous Q8H        Review of Systems  A comprehensive review of systems was negative.     Objective:     Patient Vitals for the past 24 hrs:   BP Temp Pulse Resp SpO2   10/17/17 2306 125/61 98.5 °F (36.9 °C) 64 16 95 %   10/17/17 1930 130/60 98 °F (36.7 °C) 66 16 -   10/17/17 1539 151/83 98.1 °F (36.7 °C) 73 16 100 %   10/17/17 1442 158/80 - 76 16 99 %   10/17/17 1416 144/76 - 75 14 100 %   10/17/17 1345 149/62 - 74 15 99 %   10/17/17 1330 144/63 - 69 15 98 %   10/17/17 1315 136/67 - 70 14 100 %   10/17/17 1300 - - 73 16 100 %   10/17/17 1255 121/75 - 70 15 98 %   10/17/17 1250 135/61 - 74 20 99 %   10/17/17 1235 (!) 149/26 - 77 24 99 %   10/17/17 1230 - - 78 15 100 %   10/17/17 1220 155/78 - 77 15 100 % 10/17/17 1215 153/56 - 80 18 100 %   10/17/17 1210 139/52 99.2 °F (37.3 °C) 80 15 100 %   10/17/17 1205 151/61 99.2 °F (37.3 °C) 84 13 100 %     10/18 0701 - 10/18 1900  In: -   Out: 700 [Urine:700]  10/16 1901 - 10/18 0700  In: 2697.5 [P.O.:700; I.V.:1997.5]  Out: 0 [Urine:2850; Drains:120]    Physical Exam:   Visit Vitals    /61 (BP 1 Location: Left arm, BP Patient Position: At rest)    Pulse 64    Temp 98.5 °F (36.9 °C)    Resp 16    Wt 249 lb 11.2 oz (113.3 kg)    SpO2 95%    BMI 26.76 kg/m2     General appearance: alert, cooperative, no distress, appears stated age  Neck: supple, symmetrical, trachea midline, no adenopathy, thyroid: not enlarged, symmetric, no tenderness/mass/nodules, no carotid bruit and no JVD  Lungs: clear to auscultation bilaterally  Heart: regular rate and rhythm, S1, S2 normal, no murmur, click, rub or gallop  Abdomen: soft, non-tender.  Bowel sounds normal. No masses,  no organomegaly  Extremities: extremities normal, atraumatic, no cyanosis or edema        Data Review   Recent Results (from the past 24 hour(s))   TYPE & SCREEN    Collection Time: 10/17/17  9:25 AM   Result Value Ref Range    Crossmatch Expiration 10/20/2017     ABO/Rh(D) Amina Yaz POSITIVE     Antibody screen NEG    POC HEMOGLOBIN    Collection Time: 10/17/17  9:28 AM   Result Value Ref Range    Hemoglobin (POC) 8.8 (L) 12.1 - 17.0     GLUCOSE, POC    Collection Time: 10/17/17 12:09 PM   Result Value Ref Range    Glucose (POC) 123 (H) 65 - 100 mg/dL    Performed by Sasha Hayden    GLUCOSE, POC    Collection Time: 10/17/17  4:42 PM   Result Value Ref Range    Glucose (POC) 219 (H) 65 - 100 mg/dL    Performed by Yakelin Drummond    GLUCOSE, POC    Collection Time: 10/17/17  9:02 PM   Result Value Ref Range    Glucose (POC) 252 (H) 65 - 100 mg/dL    Performed by Jonah, BASIC    Collection Time: 10/18/17  6:30 AM   Result Value Ref Range    Sodium 137 136 - 145 mmol/L    Potassium 4.2 3.5 - 5.1 mmol/L    Chloride 105 97 - 108 mmol/L    CO2 24 21 - 32 mmol/L    Anion gap 8 5 - 15 mmol/L    Glucose 154 (H) 65 - 100 mg/dL    BUN 17 6 - 20 MG/DL    Creatinine 1.17 0.70 - 1.30 MG/DL    BUN/Creatinine ratio 15 12 - 20      GFR est AA >60 >60 ml/min/1.73m2    GFR est non-AA >60 >60 ml/min/1.73m2    Calcium 8.2 (L) 8.5 - 10.1 MG/DL   CBC W/O DIFF    Collection Time: 10/18/17  6:30 AM   Result Value Ref Range    WBC 9.7 4.1 - 11.1 K/uL    RBC 2.90 (L) 4.10 - 5.70 M/uL    HGB 7.7 (L) 12.1 - 17.0 g/dL    HCT 23.8 (L) 36.6 - 50.3 %    MCV 82.1 80.0 - 99.0 FL    MCH 26.6 26.0 - 34.0 PG    MCHC 32.4 30.0 - 36.5 g/dL    RDW 13.5 11.5 - 14.5 %    PLATELET 607 937 - 918 K/uL   GLUCOSE, POC    Collection Time: 10/18/17  7:17 AM   Result Value Ref Range    Glucose (POC) 159 (H) 65 - 100 mg/dL    Performed by Dima Lux (PCT)            Assessment:     Active Problems:    Osteomyelitis ([facility] [address] (10/10/2017)        Plan:     1. Continue postoperative care from B-K amputation. 2.  Continue diabetic control. 3.  Will mobilize.

## 2017-10-18 NOTE — PROGRESS NOTES
Problem: Pressure Injury - Risk of  Goal: *Prevention of pressure ulcer  Pt educated on increased risk of pressure injury to LLE as he is keeping it elevated on a chiar. Pt verbalized understanding about importance of repositioning and letting heel be offloaded.

## 2017-10-18 NOTE — PROGRESS NOTES
General Surgery End of Shift Nursing Note    Bedside shift change report given to Adi Buck (oncoming nurse) by Selina (offgoing nurse). Report included the following information SBAR, Kardex and MAR.     Shift worked:   7a-7p   Significant changes during shift:   up with PT/OT   Non-emergent issues for physician to address:   none       Pain Management:  Current medication: pca  Patient states pain is manageable on current pain medication: YES    GI:    Current diet:  DIET DIABETIC CONSISTENT CARB Regular    Tolerating current diet: YES  Passing flatus: NO  Last Bowel Movement: several days ago      Susan Grace

## 2017-10-19 LAB
ANION GAP SERPL CALC-SCNC: 7 MMOL/L (ref 5–15)
BASOPHILS # BLD: 0 K/UL (ref 0–0.1)
BASOPHILS NFR BLD: 0 % (ref 0–1)
BUN SERPL-MCNC: 20 MG/DL (ref 6–20)
BUN/CREAT SERPL: 18 (ref 12–20)
CALCIUM SERPL-MCNC: 8.2 MG/DL (ref 8.5–10.1)
CHLORIDE SERPL-SCNC: 106 MMOL/L (ref 97–108)
CO2 SERPL-SCNC: 23 MMOL/L (ref 21–32)
CREAT SERPL-MCNC: 1.1 MG/DL (ref 0.7–1.3)
EOSINOPHIL # BLD: 0.1 K/UL (ref 0–0.4)
EOSINOPHIL NFR BLD: 1 % (ref 0–7)
ERYTHROCYTE [DISTWIDTH] IN BLOOD BY AUTOMATED COUNT: 13.8 % (ref 11.5–14.5)
GLUCOSE BLD STRIP.AUTO-MCNC: 115 MG/DL (ref 65–100)
GLUCOSE BLD STRIP.AUTO-MCNC: 151 MG/DL (ref 65–100)
GLUCOSE BLD STRIP.AUTO-MCNC: 154 MG/DL (ref 65–100)
GLUCOSE BLD STRIP.AUTO-MCNC: 188 MG/DL (ref 65–100)
GLUCOSE SERPL-MCNC: 138 MG/DL (ref 65–100)
HCT VFR BLD AUTO: 23.6 % (ref 36.6–50.3)
HGB BLD-MCNC: 7.6 G/DL (ref 12.1–17)
LYMPHOCYTES # BLD: 1.9 K/UL (ref 0.8–3.5)
LYMPHOCYTES NFR BLD: 22 % (ref 12–49)
MCH RBC QN AUTO: 26.9 PG (ref 26–34)
MCHC RBC AUTO-ENTMCNC: 32.2 G/DL (ref 30–36.5)
MCV RBC AUTO: 83.4 FL (ref 80–99)
MONOCYTES # BLD: 1 K/UL (ref 0–1)
MONOCYTES NFR BLD: 12 % (ref 5–13)
NEUTS SEG # BLD: 5.5 K/UL (ref 1.8–8)
NEUTS SEG NFR BLD: 65 % (ref 32–75)
PLATELET # BLD AUTO: 305 K/UL (ref 150–400)
POTASSIUM SERPL-SCNC: 4.1 MMOL/L (ref 3.5–5.1)
RBC # BLD AUTO: 2.83 M/UL (ref 4.1–5.7)
SERVICE CMNT-IMP: ABNORMAL
SODIUM SERPL-SCNC: 136 MMOL/L (ref 136–145)
WBC # BLD AUTO: 8.5 K/UL (ref 4.1–11.1)

## 2017-10-19 PROCEDURE — 80048 BASIC METABOLIC PNL TOTAL CA: CPT | Performed by: NURSE PRACTITIONER

## 2017-10-19 PROCEDURE — 65270000029 HC RM PRIVATE

## 2017-10-19 PROCEDURE — 3331090001 HH PPS REVENUE CREDIT

## 2017-10-19 PROCEDURE — 74011000258 HC RX REV CODE- 258: Performed by: SURGERY

## 2017-10-19 PROCEDURE — 82962 GLUCOSE BLOOD TEST: CPT

## 2017-10-19 PROCEDURE — 3331090002 HH PPS REVENUE DEBIT

## 2017-10-19 PROCEDURE — 74011250636 HC RX REV CODE- 250/636: Performed by: SURGERY

## 2017-10-19 PROCEDURE — 74011250637 HC RX REV CODE- 250/637: Performed by: NURSE PRACTITIONER

## 2017-10-19 PROCEDURE — 97116 GAIT TRAINING THERAPY: CPT

## 2017-10-19 PROCEDURE — 74011636637 HC RX REV CODE- 636/637: Performed by: INTERNAL MEDICINE

## 2017-10-19 PROCEDURE — 36415 COLL VENOUS BLD VENIPUNCTURE: CPT | Performed by: NURSE PRACTITIONER

## 2017-10-19 PROCEDURE — 85025 COMPLETE CBC W/AUTO DIFF WBC: CPT | Performed by: NURSE PRACTITIONER

## 2017-10-19 RX ORDER — OXYCODONE AND ACETAMINOPHEN 5; 325 MG/1; MG/1
1-2 TABLET ORAL
Status: DISCONTINUED | OUTPATIENT
Start: 2017-10-19 | End: 2017-10-20 | Stop reason: HOSPADM

## 2017-10-19 RX ORDER — MORPHINE SULFATE 4 MG/ML
4 INJECTION, SOLUTION INTRAMUSCULAR; INTRAVENOUS
Status: DISCONTINUED | OUTPATIENT
Start: 2017-10-19 | End: 2017-10-20 | Stop reason: HOSPADM

## 2017-10-19 RX ADMIN — VANCOMYCIN HYDROCHLORIDE 1250 MG: 10 INJECTION, POWDER, LYOPHILIZED, FOR SOLUTION INTRAVENOUS at 14:42

## 2017-10-19 RX ADMIN — CEFEPIME HYDROCHLORIDE 2 G: 2 INJECTION, POWDER, FOR SOLUTION INTRAVENOUS at 16:39

## 2017-10-19 RX ADMIN — SENNOSIDES 17.2 MG: 8.6 TABLET, FILM COATED ORAL at 21:26

## 2017-10-19 RX ADMIN — FERROUS SULFATE TAB 325 MG (65 MG ELEMENTAL FE) 325 MG: 325 (65 FE) TAB at 09:16

## 2017-10-19 RX ADMIN — INSULIN LISPRO 8 UNITS: 100 INJECTION, SOLUTION INTRAVENOUS; SUBCUTANEOUS at 14:36

## 2017-10-19 RX ADMIN — VANCOMYCIN HYDROCHLORIDE 1250 MG: 10 INJECTION, POWDER, LYOPHILIZED, FOR SOLUTION INTRAVENOUS at 22:54

## 2017-10-19 RX ADMIN — METOPROLOL TARTRATE 25 MG: 25 TABLET ORAL at 09:16

## 2017-10-19 RX ADMIN — CEFEPIME HYDROCHLORIDE 2 G: 2 INJECTION, POWDER, FOR SOLUTION INTRAVENOUS at 09:15

## 2017-10-19 RX ADMIN — INSULIN LISPRO 8 UNITS: 100 INJECTION, SOLUTION INTRAVENOUS; SUBCUTANEOUS at 09:17

## 2017-10-19 RX ADMIN — METOPROLOL TARTRATE 25 MG: 25 TABLET ORAL at 18:38

## 2017-10-19 RX ADMIN — ASPIRIN 81 MG 81 MG: 81 TABLET ORAL at 09:16

## 2017-10-19 RX ADMIN — VANCOMYCIN HYDROCHLORIDE 1250 MG: 10 INJECTION, POWDER, LYOPHILIZED, FOR SOLUTION INTRAVENOUS at 00:05

## 2017-10-19 RX ADMIN — INSULIN LISPRO 8 UNITS: 100 INJECTION, SOLUTION INTRAVENOUS; SUBCUTANEOUS at 18:38

## 2017-10-19 RX ADMIN — INSULIN GLARGINE 14 UNITS: 100 INJECTION, SOLUTION SUBCUTANEOUS at 09:17

## 2017-10-19 RX ADMIN — SENNOSIDES 17.2 MG: 8.6 TABLET, FILM COATED ORAL at 00:05

## 2017-10-19 NOTE — PROGRESS NOTES
General Surgery End of Shift Nursing Note    Bedside shift change report given to CHRISTOPHER Deluca(oncoming nurse) by Kaela Maier (offgoing nurse). Report included the following information SBAR, Kardex, Procedure Summary, Intake/Output, MAR and Recent Results. Shift worked: 7A-7P   Significant changes during shift:  NONE   Non-emergent issues for physician to address:   NONE     Number times ambulated in hallway past shift: 0    Number of times OOB to chair past shift: 1    Pain Management:  Current medication: morphine pca  Patient states pain is manageable on current pain medication: YES    GI:    Current diet:  DIET DIABETIC CONSISTENT CARB Regular  DIET NUTRITIONAL SUPPLEMENTS No; Breakfast, Lunch, Dinner; Glucerna Shake    Tolerating current diet: YES  Passing flatus: YES  Last Bowel Movement: several days ago   Appearance: wnl    Respiratory:    Incentive Spirometer at bedside: YES  Patient instructed on use: YES    Patient Safety:    Falls Score: 2  Bed Alarm On? No  Sitter?  Not applicable    Deneen Luong RN

## 2017-10-19 NOTE — PROGRESS NOTES
Vascular Surgery Progress Note  Randa Vaughn ACNP-BC  10/19/2017 8:07 AM       Subjective:     Fabiola Martines a 68 y.o. AAM who is well known to our service. [place] has been follow by our practice since early September when he was admitted to the hospital by his PCP Dr. Pepe Gottlieb for a RLE DM foot wound.  During that admission he was treated for PAD w athrectomy and angioplasty of his TPT on 9/13/17 followed by I&D of the wound and amputation of the 3rd-5th digits.  MRI at the time confirmed osteomylitis. [place] has been followed weekly in our clinic for his wound and it had slowly deteriorated. [place] was noted to have significant necrosis of the skin. Patient was admitted for IV antibx on 10/10/2017 and is now status post repeat I &D with resection of the remainder of the 5th metatarsal head on 10/12/2017. This resulted in and extensive wound to the RLE that was likely going to be months to heal; if it healed. After prognosis of the wound was discussed with the patient and he obtained a secondary opinion from Dr. Patsy Buck. He decided to proceed with BKA as he wanted to get on with his life. He underwent a BKA on 10/17/2017. He continues on IV antibx. No complaints overnight. Afebrile. WBC, HR, and RR are normal.  Pain is controlled with PCA.      Nursing Data:     Patient Vitals for the past 24 hrs:   BP Temp Pulse Resp SpO2   10/18/17 1930 140/70 98.7 °F (37.1 °C) 80 - -   10/18/17 1757 139/67 - 76 - -   10/18/17 1439 108/46 - 63 18 100 %   10/18/17 1206 134/55 - 74 18 98 %   10/18/17 0905 136/57 - 72 - 99 %   10/18/17 0900 111/56 - 77 - 98 %   10/18/17 0857 (!) 89/51 - 82 - -   10/18/17 0842 145/67 - 78 16 -     ---------------------------------------------------------------------------------------------------------    Intake/Output Summary (Last 24 hours) at 10/19/17 7715  Last data filed at 10/19/17 0500   Gross per 24 hour   Intake          1918.33 ml   Output             1700 ml   Net 218.33 ml       Exam:     Physical Exam   Constitutional: He is oriented to person, place, and time. He appears well-developed and well-nourished. Patient OOB to chair this am.    HENT:   Head: Normocephalic and atraumatic. Eyes: EOM are normal. Pupils are equal, round, and reactive to light. Neck: Normal range of motion. Neck supple. Cardiovascular: Normal rate and regular rhythm. Trace LLE edema    Pulmonary/Chest: Effort normal and breath sounds normal.   Abdominal: Soft. Bowel sounds are normal.   Musculoskeletal: Normal range of motion. Right BKA w bulky dressing that is C/D/I.  RLE in immobilizer. Neurological: He is alert and oriented to person, place, and time. Skin:        Psychiatric: His behavior is normal. Thought content normal.       Lab Review:     . Recent Results (from the past 24 hour(s))   GLUCOSE, POC    Collection Time: 10/18/17 11:40 AM   Result Value Ref Range    Glucose (POC) 126 (H) 65 - 100 mg/dL    Performed by [employer]    GLUCOSE, POC    Collection Time: 10/18/17  3:05 PM   Result Value Ref Range    Glucose (POC) 124 (H) 65 - 100 mg/dL    Performed by Gary Castorena (PCT)    GLUCOSE, POC    Collection Time: 10/18/17  8:53 PM   Result Value Ref Range    Glucose (POC) 96 65 - 100 mg/dL    Performed by Betito Ortiz (PCT)    CBC WITH AUTOMATED DIFF    Collection Time: 10/19/17  4:24 AM   Result Value Ref Range    WBC 8.5 4.1 - 11.1 K/uL    RBC 2.83 (L) 4.10 - 5.70 M/uL    HGB 7.6 (L) 12.1 - 17.0 g/dL    HCT 23.6 (L) 36.6 - 50.3 %    MCV 83.4 80.0 - 99.0 FL    MCH 26.9 26.0 - 34.0 PG    MCHC 32.2 30.0 - 36.5 g/dL    RDW 13.8 11.5 - 14.5 %    PLATELET 199 886 - 727 K/uL    NEUTROPHILS 65 32 - 75 %    LYMPHOCYTES 22 12 - 49 %    MONOCYTES 12 5 - 13 %    EOSINOPHILS 1 0 - 7 %    BASOPHILS 0 0 - 1 %    ABS. NEUTROPHILS 5.5 1.8 - 8.0 K/UL    ABS. LYMPHOCYTES 1.9 0.8 - 3.5 K/UL    ABS. MONOCYTES 1.0 0.0 - 1.0 K/UL    ABS. EOSINOPHILS 0.1 0.0 - 0.4 K/UL    ABS. BASOPHILS 0.0 0.0 - 0.1 K/UL   METABOLIC PANEL, BASIC    Collection Time: 10/19/17  4:24 AM   Result Value Ref Range    Sodium 136 136 - 145 mmol/L    Potassium 4.1 3.5 - 5.1 mmol/L    Chloride 106 97 - 108 mmol/L    CO2 23 21 - 32 mmol/L    Anion gap 7 5 - 15 mmol/L    Glucose 138 (H) 65 - 100 mg/dL    BUN 20 6 - 20 MG/DL    Creatinine 1.10 0.70 - 1.30 MG/DL    BUN/Creatinine ratio 18 12 - 20      GFR est AA >60 >60 ml/min/1.73m2    GFR est non-AA >60 >60 ml/min/1.73m2    Calcium 8.2 (L) 8.5 - 10.1 MG/DL   GLUCOSE, POC    Collection Time: 10/19/17  8:10 AM   Result Value Ref Range    Glucose (POC) 154 (H) 65 - 100 mg/dL    Performed by Hugh Juárez (PCT)      CORRECTED CALCIUM 9.24        Assessment/Plan:      Principal Problems:                       RLE DM foot wound infection w osteomylitis            PAD  Patient did not meet SIRs criteria on arrival.   Repeat wound cx from our office from 10/10/17: mixed skin lb and light growth. POD 7 from I & D and resection of remaining 5th metatarsal head. POD 2 from right BKA. Continues on IV Cefepime and IV Vancomycin. Pain controlled w morphine PCA. Continue BM regimen. H & H low post procedure, but stable and not at levels to transfuse. PT/OT. Consult inpatient rehab. Continue home medication ASA.      Active Problems:                        IDDM II  A1c 7.8  Mild hyperglycemia overnight now resolved. Blood glucose well controlled on current dose of lantus and bolus lispro w meals. Continue POC achs. Hyponatremia resolved. I            CKD III w baseline creatinine of 1.2  Mildly elevated on arrival.  Now improved w minimal IVFs to within normal limits. Tolerating IV antibx well. Continue to monitor while on antibx every 3 days while admitted and on antibx. Anemia of chronic disease            Iron deficiency   Iron 13. Ferritin 440. %sat 13. TIBC 184.  Folate 11.2  B12 1561  H & H low post procedure, but stable and not at levels to transfuse. Anemia evaluated by Dr. Isela Ortiz (hematology) after consult by Dr. Mendez Guerrero (PCP). Patient initiated on ferrous sulfate.                              CAD  Continue ASA, BBlocker, and statin.        VTE prophylaxis:  ASA. SCDs contraindicated secondary to severe PAD.     Disposition:   Inpatient rehab 1-2 days.

## 2017-10-19 NOTE — PROGRESS NOTES
General Daily Progress Note    Admit Date: 10/10/2017    Subjective:     Patient has no complaint. Current Facility-Administered Medications   Medication Dose Route Frequency    senna (SENOKOT) tablet 17.2 mg  2 Tab Oral QHS    insulin glargine (LANTUS) injection 14 Units  14 Units SubCUTAneous DAILY    0.9% sodium chloride infusion  50 mL/hr IntraVENous CONTINUOUS    morphine  mg / 30 mL   IntraVENous CONTINUOUS    oxyCODONE IR (ROXICODONE) tablet 5 mg  5 mg Oral Q4H PRN    insulin lispro (HUMALOG) injection 8 Units  8 Units SubCUTAneous TIDAC    vancomycin (VANCOCIN) 1250 mg in  ml infusion  1,250 mg IntraVENous Q12H    morphine injection 2 mg  2 mg IntraVENous Q3H PRN    ondansetron (ZOFRAN) injection 4 mg  4 mg IntraVENous Q6H PRN    dextrose (D50W) injection syrg 12.5-25 g  12.5-25 g IntraVENous PRN    glucagon (GLUCAGEN) injection 1 mg  1 mg IntraMUSCular PRN    glucose chewable tablet 16 g  4 Tab Oral PRN    bisacodyl (DULCOLAX) suppository 10 mg  10 mg Rectal DAILY PRN    ferrous sulfate tablet 325 mg  1 Tab Oral DAILY WITH BREAKFAST    aspirin chewable tablet 81 mg  81 mg Oral DAILY    metoprolol tartrate (LOPRESSOR) tablet 25 mg  25 mg Oral BID    cefepime (MAXIPIME) 2 g in 0.9% sodium chloride (MBP/ADV) 100 mL  2 g IntraVENous Q8H        Review of Systems  A comprehensive review of systems was negative.     Objective:     Patient Vitals for the past 24 hrs:   BP Temp Pulse Resp SpO2   10/18/17 1930 140/70 98.7 °F (37.1 °C) 80 - -   10/18/17 1757 139/67 - 76 - -   10/18/17 1439 108/46 - 63 18 100 %   10/18/17 1206 134/55 - 74 18 98 %        10/17 1901 - 10/19 0700  In: 1918.3 [P.O.:440; I.V.:1478.3]  Out: 3200 [Urine:3050; Drains:150]    Physical Exam:   Visit Vitals    /70    Pulse 80    Temp 98.7 °F (37.1 °C)    Resp 18    Wt 249 lb 11.2 oz (113.3 kg)    SpO2 100%    BMI 26.76 kg/m2     General appearance: alert, cooperative, no distress, appears stated age  Neck: supple, symmetrical, trachea midline, no adenopathy, thyroid: not enlarged, symmetric, no tenderness/mass/nodules, no carotid bruit and no JVD  Lungs: clear to auscultation bilaterally  Heart: regular rate and rhythm, S1, S2 normal, no murmur, click, rub or gallop  Abdomen: soft, non-tender. Bowel sounds normal. No masses,  no organomegaly  Extremities: edema , right BK amputation        Data Review   Recent Results (from the past 24 hour(s))   GLUCOSE, POC    Collection Time: 10/18/17 11:40 AM   Result Value Ref Range    Glucose (POC) 126 (H) 65 - 100 mg/dL    Performed by Estrella Joe    GLUCOSE, POC    Collection Time: 10/18/17  3:05 PM   Result Value Ref Range    Glucose (POC) 124 (H) 65 - 100 mg/dL    Performed by Kathleen Hansen (PCT)    GLUCOSE, POC    Collection Time: 10/18/17  8:53 PM   Result Value Ref Range    Glucose (POC) 96 65 - 100 mg/dL    Performed by Alex Berry (PCT)    CBC WITH AUTOMATED DIFF    Collection Time: 10/19/17  4:24 AM   Result Value Ref Range    WBC 8.5 4.1 - 11.1 K/uL    RBC 2.83 (L) 4.10 - 5.70 M/uL    HGB 7.6 (L) 12.1 - 17.0 g/dL    HCT 23.6 (L) 36.6 - 50.3 %    MCV 83.4 80.0 - 99.0 FL    MCH 26.9 26.0 - 34.0 PG    MCHC 32.2 30.0 - 36.5 g/dL    RDW 13.8 11.5 - 14.5 %    PLATELET 787 768 - 154 K/uL    NEUTROPHILS 65 32 - 75 %    LYMPHOCYTES 22 12 - 49 %    MONOCYTES 12 5 - 13 %    EOSINOPHILS 1 0 - 7 %    BASOPHILS 0 0 - 1 %    ABS. NEUTROPHILS 5.5 1.8 - 8.0 K/UL    ABS. LYMPHOCYTES 1.9 0.8 - 3.5 K/UL    ABS. MONOCYTES 1.0 0.0 - 1.0 K/UL    ABS. EOSINOPHILS 0.1 0.0 - 0.4 K/UL    ABS.  BASOPHILS 0.0 0.0 - 0.1 K/UL   METABOLIC PANEL, BASIC    Collection Time: 10/19/17  4:24 AM   Result Value Ref Range    Sodium 136 136 - 145 mmol/L    Potassium 4.1 3.5 - 5.1 mmol/L    Chloride 106 97 - 108 mmol/L    CO2 23 21 - 32 mmol/L    Anion gap 7 5 - 15 mmol/L    Glucose 138 (H) 65 - 100 mg/dL    BUN 20 6 - 20 MG/DL    Creatinine 1.10 0.70 - 1.30 MG/DL    BUN/Creatinine ratio 18 12 - 20      GFR est AA >60 >60 ml/min/1.73m2    GFR est non-AA >60 >60 ml/min/1.73m2    Calcium 8.2 (L) 8.5 - 10.1 MG/DL   GLUCOSE, POC    Collection Time: 10/19/17  8:10 AM   Result Value Ref Range    Glucose (POC) 154 (H) 65 - 100 mg/dL    Performed by Prabhjot Lucas (PCT)            Assessment:     Active Problems:    Osteomyelitis ([facility] [address] (10/10/2017)        Plan:     1. Continue rehabilitation course B-K amputation. 2.  Continue diabetic control.   3.  Anemia is most likely related to multiple phlebotomies but will have hematology evaluate to make sure nothing else exist.

## 2017-10-19 NOTE — PROGRESS NOTES
Initial Nutrition Assessment:    INTERVENTIONS/RECOMMENDATIONS:   · Meals/Snacks: General/healthful diet: Continue CCD. · Supplements: Commercial supplement: Continue Glucerna supplements. ASSESSMENT:   Chart reviewed, medically noted for osteomyelitis, s/p R BKA, DM. Visited patient at bedside, patient sitting up in chair and reports eating 100% of meals plus ONS. Patient tolerating diet well, understands importance of BG control and increased protein sources for wound healing. Noted no BM since 10/15 and hypoactive bowel sounds, patient on dulcolax daily. Will continue to monitor. Diet Order: Consistent carb  % Eaten:  Patient Vitals for the past 72 hrs:   % Diet Eaten   10/19/17 1050 100 %   10/18/17 1025 100 %   10/17/17 1851 100 %   10/17/17 1544 100 %   10/16/17 1808 100 %     Pertinent Medications: []Reviewed []Other: IVF, dulcolax, ferrous sulfate, lantus, humalog, zofran, vancomycin  Pertinent Labs: []Reviewed []Other: -154-96  Last BM:  10/15   []Active     []Hyperactive  [x]Hypoactive       [] Absent BS  Skin:    [] Intact   [x] Incision  [] Breakdown  [] Other:    Anthropometrics:   Height: 6' 9\" (205.7 cm) Weight: 113.3 kg (249 lb 12.5 oz)   IBW (%IBW):   ( ) UBW (%UBW):   (  %)   Last Weight Metrics:  Weight Loss Metrics 10/19/2017 9/28/2017 9/12/2017 9/7/2017 9/7/2017 9/7/2017 5/15/2017   Today's Wt 249 lb 12.5 oz 255 lb 14.4 oz 253 lb 1.4 oz - 260 lb 14.4 oz - 266 lb   BMI 26.77 kg/m2 27.42 kg/m2 - 27.12 kg/m2 - 27.96 kg/m2 28.5 kg/m2       BMI: Body mass index is 26.77 kg/(m^2). This BMI is indicative of:   []Underweight    []Normal    [x]Overweight    [] Obesity   [] Extreme Obesity (BMI>40)     Estimated Nutrition Needs (Based on):   2644 Kcals/day (BMR 2034x1. 3(AF)) , 113 g (-136 (1.0-1.2 g/kg bw)) Protein  Carbohydrate:  At Least 130 g/day  Fluids: 2600 mL/day (1ml/kcal)    NUTRITION DIAGNOSES:   Problem:  Increased nutrient needs (protein)      Etiology: related to wound healing Signs/Symptoms: as evidenced by R SAIDAA      NUTRITION INTERVENTIONS:  Meals/Snacks: General/healthful diet   Supplements: Commercial supplement              GOAL:   Continue consuming >75% of meals plus ONS in 5-7 days    LEARNING NEEDS (Diet, Food/Nutrient-Drug Interaction):    [x] None Identified   [] Identified and Education Provided/Documented   [] Identified and Pt declined/was not appropriate     Cultureal, Pentecostalism, OR Ethnic Dietary Needs:    [x] None Identified   [] Identified and Addressed     [x] Interdisciplinary Care Plan Reviewed/Documented    [x] Discharge Planning:   Consistent-carbohydrate diet. MONITORING /EVALUATION:      Food/Nutrient Intake Outcomes:  Total energy intake, Liquid meal replacement, Protein intake  Physical Signs/Symptoms Outcomes: Weight/weight change, Electrolyte and renal profile, Glucose profile, GI profile, GI    NUTRITION RISK:    [] High              [] Moderate           [x]  Low  []  Minimal/Uncompromised    PT SEEN FOR:    []  MD Consult: []Calorie Count      []Diabetic Diet Education        []Diet Education     []Electrolyte Management     []General Nutrition Management and Supplements     []Management of Tube Feeding     []TPN Recommendations    []  RN Referral:  []MST score >=2     []Enteral/Parenteral Nutrition PTA     []Pregnant: Gestational DM or Multigestation     []Pressure Ulcer/Wound Care needs        []  Low BMI  [x]  DTR Referral       Carol Nuñez V  Dietetic Intern

## 2017-10-19 NOTE — PROGRESS NOTES
Problem: Mobility Impaired (Adult and Pediatric)  Goal: *Acute Goals and Plan of Care (Insert Text)  Physical Therapy Goals  Initiated 10/18/2017  1. Patient will move from supine to sit and sit to supine  in bed with independence within 7 day(s). 2.  Patient will transfer from bed to chair and chair to bed with modified independence using the least restrictive device within 7 day(s). 3.  Patient will perform sit to stand with minimal assistance/contact guard assist within 7 day(s). 4.  Patient will ambulate with minimal assistance/contact guard assist for 10 feet with the least restrictive device within 7 day(s). 5.  Patient will perform supine exercises independently to strengthen residual limb within 7 day(s). physical Therapy TREATMENT  Patient: Reva Burns (89 y.o. male)  Date: 10/19/2017  Diagnosis: Osteomyelitis of RIGHT FOOT ([address] Gangrene Right Foot     Procedure(s) (LRB):  AMPUTATION KNEE(BKA) Right (Right) 2 Days Post-Op     Precautions: Fall  Chart, physical therapy assessment, plan of care and goals were reviewed. ASSESSMENT: pt does fair with gait, no gross LOB or SOB, fatigues quickly and needed one sitting break, does well with transfers, vc's for safety and proper RW use. Progression toward goals:  []      Improving appropriately and progressing toward goals  [x]      Improving slowly and progressing toward goals  []      Not making progress toward goals and plan of care will be adjusted     PLAN:  Patient continues to benefit from skilled intervention to address the above impairments. Continue treatment per established plan of care.   Discharge Recommendations:  Inpatient Rehab  Further Equipment Recommendations for Discharge:  bedside commode and very tall rolling walker     OBJECTIVE DATA SUMMARY:     \Critical Behavior:  Neurologic State: Alert  Orientation Level: Oriented X4  Cognition: Follows commands  Safety/Judgement: Awareness of environment, Fall prevention Functional Mobility Training:  Bed Mobility: pt sitting in chair on arrival    Transfers:  Sit to Stand: Moderate assistance;Assist x2; Additional time  Stand to Sit: Contact guard assistance  Interventions: Tactile cues; Verbal cues  Level of Assistance: Moderate assistance     Balance:  Sitting: Intact; Without support  Standing: Impaired; With support  Standing - Static: Fair;Constant support  Standing - Dynamic : Fair     Ambulation/Gait Training:  Distance (ft): 20 Feet (ft)  Assistive Device: Gait belt;Walker, rolling  Ambulation - Level of Assistance: Moderate assistance;Assist x2  Gait Abnormalities: Decreased step clearance; Step to gait  Right Side Weight Bearing: Non-weight bearing  Left Side Weight Bearing: Full  Base of Support: Narrowed  Stance:  (equal)  Speed/Consuelo: Slow  Step Length: Left shortened    Pain:  Pain Scale 1: Numeric (0 - 10)  Pain Intensity 1: 0    Activity Tolerance: poor    After treatment:   [x] Patient left in no apparent distress sitting up in chair  [] Patient left in no apparent distress in bed  [x] Call bell left within reach  [x] Nursing notified  [] Caregiver present  [] Bed alarm activated    COMMUNICATION/COLLABORATION:   The patients plan of care was discussed with: Registered Nurse    Christoph Pearson PTA   Time Calculation: 20 mins

## 2017-10-19 NOTE — PROGRESS NOTES
Pharmacy Recommendation: Vancomycin and Cefepime     If appropriate, please consider placing a stop date on antimicrobial therapy, Vancomycin and Cefepime, now that patient is S/P BKA 10/17/17.       Thank you,  JEFFERY OviedoD

## 2017-10-19 NOTE — PROGRESS NOTES
CM was advised pt's policy is notification only and he is jitendra to transfer to [facility] as early as today once PCA pump is discharged. Pt is unable to transfer to [facility] with PCS pump. CM left message with attending's office to request return call.      MIGUE Hernandez, [address] [place] [phone]

## 2017-10-19 NOTE — PROGRESS NOTES
General Surgery End of Shift Nursing Note    Bedside shift change report given to Emily (oncoming nurse) by Efren Unger (offgoing nurse). Report included the following information SBAR, Kardex, Procedure Summary, Intake/Output, MAR and Recent Results. Shift worked:   1900-0730   Significant changes during shift:       Non-emergent issues for physician to address:        Number times ambulated in hallway past shift: 0    Number of times OOB to chair past shift: 1    Pain Management:  Current medication: morphine pca  Patient states pain is manageable on current pain medication: YES    GI:    Current diet:  DIET DIABETIC CONSISTENT CARB Regular  DIET NUTRITIONAL SUPPLEMENTS No; Breakfast, Lunch, Dinner; Glucerna Shake    Tolerating current diet: YES  Passing flatus: YES  Last Bowel Movement: several days ago   Appearance: wnl    Respiratory:    Incentive Spirometer at bedside: YES  Patient instructed on use: YES    Patient Safety:    Falls Score: 2  Bed Alarm On? No  Sitter?  Not applicable    Anil Pina

## 2017-10-19 NOTE — PROGRESS NOTES
CM has been advised that pt has been accepted to [address] and [address] is awaiting pre-cert from pt's insurance [facility]).      MIGUE Jay, [address] [place] [address]   [phone]

## 2017-10-19 NOTE — PROGRESS NOTES
Interdisciplinary Rounds were completed on this patient. Rounds included nursing, clinical care leader, pharmacy, and case management. Patient was doing well without problems. Patient had the following concerns: none. Goals for the day will include: mobilize.      Anticipated discharge date: 10/20/2017 (awaiting auth from [place] for [facility])

## 2017-10-19 NOTE — PROGRESS NOTES
Hematology-Oncology Progress Note      Stefany James  1940  [id number]      10/19/2017  5:47 PM    Follow-up for: Anemia   [x] Chart notes since last visit reviewed   [x] Medications reviewed for allergies and interactions      Patient complains of the following: some pain in the right bka stump, but otherwise feels well      Subjective:     12 point ROS negative except as in HPI and above    Spoke with patient who notes the following:        Objective:     Patient Vitals for the past 24 hrs:   BP Temp Pulse Resp SpO2 Height Weight   10/19/17 1548 - - - - - 6' 9\" (2.057 m) 113.3 kg (249 lb 12.5 oz)   10/19/17 1439 148/77 98.7 °F (37.1 °C) 79 18 99 % - -   10/19/17 1126 134/48 98.6 °F (37 °C) 67 18 99 % - -   10/18/17 1930 140/70 98.7 °F (37.1 °C) 80 - - - -   10/18/17 1757 139/67 - 76 - - - -         PE: Al, or X 3  Neck -Supple , No JVD  CVS-S1,S2 normal  RS-xcta ky  Abdo-benign  Extre- rle- recent BKA - underdressing      Available labs reviewed:  Labs:    Recent Results (from the past 24 hour(s))   GLUCOSE, POC    Collection Time: 10/18/17  8:53 PM   Result Value Ref Range    Glucose (POC) 96 65 - 100 mg/dL    Performed by Tom Rubin (PCT)    CBC WITH AUTOMATED DIFF    Collection Time: 10/19/17  4:24 AM   Result Value Ref Range    WBC 8.5 4.1 - 11.1 K/uL    RBC 2.83 (L) 4.10 - 5.70 M/uL    HGB 7.6 (L) 12.1 - 17.0 g/dL    HCT 23.6 (L) 36.6 - 50.3 %    MCV 83.4 80.0 - 99.0 FL    MCH 26.9 26.0 - 34.0 PG    MCHC 32.2 30.0 - 36.5 g/dL    RDW 13.8 11.5 - 14.5 %    PLATELET 736 783 - 785 K/uL    NEUTROPHILS 65 32 - 75 %    LYMPHOCYTES 22 12 - 49 %    MONOCYTES 12 5 - 13 %    EOSINOPHILS 1 0 - 7 %    BASOPHILS 0 0 - 1 %    ABS. NEUTROPHILS 5.5 1.8 - 8.0 K/UL    ABS. LYMPHOCYTES 1.9 0.8 - 3.5 K/UL    ABS. MONOCYTES 1.0 0.0 - 1.0 K/UL    ABS. EOSINOPHILS 0.1 0.0 - 0.4 K/UL    ABS.  BASOPHILS 0.0 0.0 - 0.1 K/UL   METABOLIC PANEL, BASIC    Collection Time: 10/19/17  4:24 AM   Result Value Ref Range    Sodium 136 136 - 145 mmol/L    Potassium 4.1 3.5 - 5.1 mmol/L    Chloride 106 97 - 108 mmol/L    CO2 23 21 - 32 mmol/L    Anion gap 7 5 - 15 mmol/L    Glucose 138 (H) 65 - 100 mg/dL    BUN 20 6 - 20 MG/DL    Creatinine 1.10 0.70 - 1.30 MG/DL    BUN/Creatinine ratio 18 12 - 20      GFR est AA >60 >60 ml/min/1.73m2    GFR est non-AA >60 >60 ml/min/1.73m2    Calcium 8.2 (L) 8.5 - 10.1 MG/DL   GLUCOSE, POC    Collection Time: 10/19/17  8:10 AM   Result Value Ref Range    Glucose (POC) 154 (H) 65 - 100 mg/dL    Performed by Maryann [place] (PCT)    GLUCOSE, POC    Collection Time: 10/19/17 11:24 AM   Result Value Ref Range    Glucose (POC) 151 (H) 65 - 100 mg/dL    Performed by Maryann [place] (PCT)    GLUCOSE, POC    Collection Time: 10/19/17  4:10 PM   Result Value Ref Range    Glucose (POC) 188 (H) 65 - 100 mg/dL    Performed by Maryann Zelda (PCT)        Imaging:  CXR Results  (Last 48 hours)    None        CT Results  (Last 48 hours)    None            Assessment:       1. 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 10/17/2017- JEANE barriga  3. Anemia - sec to an of chronic disease/inflammation  4. Small igG kappa mgus     Plan:     Seen Oct 12, 2017 by Dr. Kristina Hanley in consult for anemia. His  Anemia is likely to be from his recent severe chronic non healing wound/infections and then recent surgery. All markers of inflammation are up and iron studies are c/w the same. He does have an igG kappa mgus which will need to be further evaluated as an outpatient and monitored. I doubt that this is the cause of his anemia. Would transfuse pRBCs to keep keep Hb above 7.0    Will need f/u with Dr. Kristina Hanley in 2-3 weeks if discharged. Following. Thanks!

## 2017-10-20 ENCOUNTER — HOSPITAL ENCOUNTER (OUTPATIENT)
Age: 77
Discharge: HOME | End: 2017-10-31
Attending: PHYSICAL MEDICINE & REHABILITATION | Admitting: PHYSICAL MEDICINE & REHABILITATION

## 2017-10-20 VITALS
OXYGEN SATURATION: 96 % | WEIGHT: 249.78 LBS | BODY MASS INDEX: 28.9 KG/M2 | SYSTOLIC BLOOD PRESSURE: 166 MMHG | TEMPERATURE: 98.1 F | RESPIRATION RATE: 16 BRPM | DIASTOLIC BLOOD PRESSURE: 80 MMHG | HEIGHT: 78 IN | HEART RATE: 75 BPM

## 2017-10-20 LAB
GLUCOSE BLD STRIP.AUTO-MCNC: 164 MG/DL (ref 65–100)
GLUCOSE BLD STRIP.AUTO-MCNC: 230 MG/DL (ref 65–100)
HCT VFR BLD AUTO: 22.5 % (ref 36.6–50.3)
HGB BLD-MCNC: 7.3 G/DL (ref 12.1–17)
SERVICE CMNT-IMP: ABNORMAL
SERVICE CMNT-IMP: ABNORMAL

## 2017-10-20 PROCEDURE — 74011250636 HC RX REV CODE- 250/636: Performed by: SURGERY

## 2017-10-20 PROCEDURE — 74011250637 HC RX REV CODE- 250/637: Performed by: NURSE PRACTITIONER

## 2017-10-20 PROCEDURE — 3331090001 HH PPS REVENUE CREDIT

## 2017-10-20 PROCEDURE — 36415 COLL VENOUS BLD VENIPUNCTURE: CPT | Performed by: NURSE PRACTITIONER

## 2017-10-20 PROCEDURE — 3331090002 HH PPS REVENUE DEBIT

## 2017-10-20 PROCEDURE — 85018 HEMOGLOBIN: CPT | Performed by: NURSE PRACTITIONER

## 2017-10-20 PROCEDURE — 82962 GLUCOSE BLOOD TEST: CPT

## 2017-10-20 PROCEDURE — 74011250637 HC RX REV CODE- 250/637: Performed by: SURGERY

## 2017-10-20 PROCEDURE — 74011636637 HC RX REV CODE- 636/637: Performed by: INTERNAL MEDICINE

## 2017-10-20 PROCEDURE — 74011000258 HC RX REV CODE- 258: Performed by: SURGERY

## 2017-10-20 PROCEDURE — 74011250637 HC RX REV CODE- 250/637: Performed by: PHYSICAL MEDICINE & REHABILITATION

## 2017-10-20 RX ORDER — GUAIFENESIN 100 MG/5ML
81 LIQUID (ML) ORAL DAILY
Status: DISCONTINUED | OUTPATIENT
Start: 2017-10-21 | End: 2017-10-31 | Stop reason: HOSPADM

## 2017-10-20 RX ORDER — OXYCODONE HYDROCHLORIDE 5 MG/1
5 TABLET ORAL
Status: DISCONTINUED | OUTPATIENT
Start: 2017-10-20 | End: 2017-10-31 | Stop reason: HOSPADM

## 2017-10-20 RX ORDER — SENNOSIDES 8.6 MG/1
2 TABLET ORAL
Qty: 60 TAB | Refills: 0 | Status: SHIPPED
Start: 2017-10-20 | End: 2019-07-05

## 2017-10-20 RX ORDER — INSULIN GLARGINE 100 [IU]/ML
INJECTION, SOLUTION SUBCUTANEOUS
Qty: 1 VIAL | Refills: 0 | Status: SHIPPED
Start: 2017-10-20 | End: 2017-11-04 | Stop reason: CLARIF

## 2017-10-20 RX ORDER — INSULIN GLARGINE 100 [IU]/ML
14 INJECTION, SOLUTION SUBCUTANEOUS DAILY
Status: DISCONTINUED | OUTPATIENT
Start: 2017-10-21 | End: 2017-10-31 | Stop reason: HOSPADM

## 2017-10-20 RX ORDER — LISINOPRIL 5 MG/1
10 TABLET ORAL DAILY
Status: DISCONTINUED | OUTPATIENT
Start: 2017-10-21 | End: 2017-10-22

## 2017-10-20 RX ORDER — ADHESIVE BANDAGE
30 BANDAGE TOPICAL DAILY PRN
Status: DISCONTINUED | OUTPATIENT
Start: 2017-10-20 | End: 2017-10-31 | Stop reason: HOSPADM

## 2017-10-20 RX ORDER — FACIAL-BODY WIPES
10 EACH TOPICAL DAILY PRN
Status: DISCONTINUED | OUTPATIENT
Start: 2017-10-20 | End: 2017-10-31 | Stop reason: HOSPADM

## 2017-10-20 RX ORDER — INSULIN LISPRO 100 [IU]/ML
8 INJECTION, SOLUTION INTRAVENOUS; SUBCUTANEOUS
Status: DISCONTINUED | OUTPATIENT
Start: 2017-10-21 | End: 2017-10-31 | Stop reason: HOSPADM

## 2017-10-20 RX ORDER — OXYCODONE HYDROCHLORIDE 5 MG/1
10 TABLET ORAL
Status: DISCONTINUED | OUTPATIENT
Start: 2017-10-20 | End: 2017-10-31 | Stop reason: HOSPADM

## 2017-10-20 RX ORDER — AMOXICILLIN 250 MG
1 CAPSULE ORAL 2 TIMES DAILY
Status: DISCONTINUED | OUTPATIENT
Start: 2017-10-20 | End: 2017-10-31 | Stop reason: HOSPADM

## 2017-10-20 RX ORDER — LANOLIN ALCOHOL/MO/W.PET/CERES
1 CREAM (GRAM) TOPICAL
Status: DISCONTINUED | OUTPATIENT
Start: 2017-10-21 | End: 2017-10-31 | Stop reason: HOSPADM

## 2017-10-20 RX ORDER — LANOLIN ALCOHOL/MO/W.PET/CERES
3 CREAM (GRAM) TOPICAL
Status: DISCONTINUED | OUTPATIENT
Start: 2017-10-20 | End: 2017-10-31 | Stop reason: HOSPADM

## 2017-10-20 RX ORDER — ACETAMINOPHEN 325 MG/1
650 TABLET ORAL
Status: DISCONTINUED | OUTPATIENT
Start: 2017-10-20 | End: 2017-10-31 | Stop reason: HOSPADM

## 2017-10-20 RX ORDER — METOPROLOL TARTRATE 25 MG/1
25 TABLET, FILM COATED ORAL 2 TIMES DAILY
Status: DISCONTINUED | OUTPATIENT
Start: 2017-10-20 | End: 2017-10-31 | Stop reason: HOSPADM

## 2017-10-20 RX ORDER — PRAVASTATIN SODIUM 40 MG/1
40 TABLET ORAL
Status: DISCONTINUED | OUTPATIENT
Start: 2017-10-20 | End: 2017-10-31 | Stop reason: HOSPADM

## 2017-10-20 RX ORDER — OXYCODONE AND ACETAMINOPHEN 5; 325 MG/1; MG/1
1-2 TABLET ORAL
Qty: 30 TAB | Refills: 0 | Status: SHIPPED
Start: 2017-10-20 | End: 2019-07-05

## 2017-10-20 RX ORDER — INSULIN LISPRO 100 [IU]/ML
INJECTION, SOLUTION INTRAVENOUS; SUBCUTANEOUS
Qty: 1 VIAL | Refills: 0 | Status: SHIPPED
Start: 2017-10-20 | End: 2019-07-05

## 2017-10-20 RX ADMIN — FERROUS SULFATE TAB 325 MG (65 MG ELEMENTAL FE) 325 MG: 325 (65 FE) TAB at 09:51

## 2017-10-20 RX ADMIN — METOPROLOL TARTRATE 25 MG: 25 TABLET ORAL at 09:52

## 2017-10-20 RX ADMIN — CEFEPIME HYDROCHLORIDE 2 G: 2 INJECTION, POWDER, FOR SOLUTION INTRAVENOUS at 00:52

## 2017-10-20 RX ADMIN — OXYCODONE HYDROCHLORIDE AND ACETAMINOPHEN 1 TABLET: 5; 325 TABLET ORAL at 01:27

## 2017-10-20 RX ADMIN — DOCUSATE SODIUM AND SENNOSIDES 1 TABLET: 8.6; 5 TABLET, FILM COATED ORAL at 22:33

## 2017-10-20 RX ADMIN — INSULIN LISPRO 8 UNITS: 100 INJECTION, SOLUTION INTRAVENOUS; SUBCUTANEOUS at 10:04

## 2017-10-20 RX ADMIN — INSULIN GLARGINE 14 UNITS: 100 INJECTION, SOLUTION SUBCUTANEOUS at 10:03

## 2017-10-20 RX ADMIN — INSULIN LISPRO 8 UNITS: 100 INJECTION, SOLUTION INTRAVENOUS; SUBCUTANEOUS at 16:39

## 2017-10-20 RX ADMIN — CEFEPIME HYDROCHLORIDE 2 G: 2 INJECTION, POWDER, FOR SOLUTION INTRAVENOUS at 09:55

## 2017-10-20 RX ADMIN — METOPROLOL TARTRATE 25 MG: 25 TABLET ORAL at 22:33

## 2017-10-20 RX ADMIN — VANCOMYCIN HYDROCHLORIDE 1250 MG: 10 INJECTION, POWDER, LYOPHILIZED, FOR SOLUTION INTRAVENOUS at 12:11

## 2017-10-20 RX ADMIN — CEFEPIME HYDROCHLORIDE 2 G: 2 INJECTION, POWDER, FOR SOLUTION INTRAVENOUS at 16:08

## 2017-10-20 RX ADMIN — INSULIN LISPRO 8 UNITS: 100 INJECTION, SOLUTION INTRAVENOUS; SUBCUTANEOUS at 12:26

## 2017-10-20 RX ADMIN — PRAVASTATIN SODIUM 40 MG: 40 TABLET ORAL at 22:33

## 2017-10-20 RX ADMIN — ASPIRIN 81 MG 81 MG: 81 TABLET ORAL at 09:52

## 2017-10-20 NOTE — PROGRESS NOTES
Bedside shift change report given to Malik Hardin (oncoming nurse) by Chin Louie RN (offgoing nurse). Report included the following information SBAR, Procedure Summary, Intake/Output and Recent Results.

## 2017-10-20 NOTE — DISCHARGE SUMMARY
Vascular Surgery Discharge Summary     Patient ID:  Marina Hinojosa  [id number]  79 y.o.  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 AAM who is well known to our service. Justin Jorgensen has been follow by our practice since early September when he was admitted to the hospital by his PCP Dr. Vipin Blount for a RLE DM foot wound.  During that admission he was treated for PAD w athrectomy and angioplasty of his TPT on 9/13/17 followed by I&D of the wound and amputation of the 3rd-5th digits.  MRI at the time confirmed osteomylitis. Justin Jorgensen has been followed weekly in our clinic for his wound and it had slowly deteriorated. Justin Jorgensen was noted to have significant necrosis of the skin. Patient was then admitted for IV antibx on 10/10/2017 and is now status post repeat I &D with resection of the remainder of the 5th metatarsal head on 10/12/2017. He did not meet SIRs criteria on arrival.  His wound cx from the office on day of presentation revealed faint mixed growth. Following the initial procedure this admission he had an extensive wound to the RLE that was likely going to be months to heal; if it healed. After prognosis of the wound was discussed with the patient and he obtained a secondary opinion from Dr. Ella Roth. He decided to proceed with BKA as he wanted to get on with his life. He underwent a BKA on 10/17/2017. He tolerated the procedure well. His drain has been removed.   He has completed a 10 day course of IV antibiotics. His pain is currently controlled. He was continued on his home medication ASA, BBlocker, and statin. His H&H has been low throughout his admission, but not at level to transfuse. He does have underlying iron deficiency. Hematology consulted (Dr Oscar Ellison) and he was resumed on his oral ferrous sulfate. 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 Cathi Cooper. His diabetes was well controlled during admission with lantus and bolus lispro PRN with meals. This was continued at discharge. His creatine was mildly elevated on arrival, but improved with minimal IVF and remained stable during his admission. Patient was evaluated by PT/OT and [facility]. They felt he was a candidate for inpatient rehab and he was accepted. He was transferred there on day of discharge.        Pertinent Results:   Recent Results (from the past 24 hour(s))   GLUCOSE, POC    Collection Time: 10/19/17 11:24 AM   Result Value Ref Range    Glucose (POC) 151 (H) 65 - 100 mg/dL    Performed by Chayo Crowder (PCT)    GLUCOSE, POC    Collection Time: 10/19/17  4:10 PM   Result Value Ref Range    Glucose (POC) 188 (H) 65 - 100 mg/dL    Performed by Chayo Crowder (PCT)    GLUCOSE, POC    Collection Time: 10/19/17  9:18 PM   Result Value Ref Range    Glucose (POC) 115 (H) 65 - 100 mg/dL    Performed by Marija Shepherd (PCT)    HGB & HCT    Collection Time: 10/20/17  3:52 AM   Result Value Ref Range    HGB 7.3 (L) 12.1 - 17.0 g/dL    HCT 22.5 (L) 36.6 - 50.3 %   GLUCOSE, POC    Collection Time: 10/20/17  7:31 AM   Result Value Ref Range    Glucose (POC) 164 (H) 65 - 100 mg/dL    Performed by Sergei Nunez*          Results from [place] encounter on 09/07/17   XR FOOT RT MIN 3 V   Narrative EXAM:  XR FOOT RT MIN 3 V    INDICATION:   Assess bony integrity-----questionable suggestive features of  osteomyelitis third fourth fifth digits. COMPARISON:  None. FINDINGS:  Three views of the right foot demonstrate degenerative change in the  1st interphalangeal joint. No visible cortical disruption in the 4th or 5th  digits. Plantar heel spur. Minimal degenerative change in the midfoot. Minimal  soft tissue swelling about the ankle         Impression IMPRESSION:    1. No visible cortical disruption in the 4th or 5th digits. If there is clinical  concern for osteomyelitis, MRI is the imaging study of choice. 2. Degenerative appearing change in the 1st interphalangeal joint. Patient Weight:   Last 3 Recorded Weights in this Encounter    10/11/17 1237 10/14/17 2325 10/19/17 1548   Weight: 112 kg (246 lb 14.6 oz) 113.3 kg (249 lb 11.2 oz) 113.3 kg (249 lb 12.5 oz)       Consults: Dr. Yudelka Zeng     Patient Condition at Discharge: good    Disposition: Inpatient Rehabilitation     Patient Instructions:   Current Discharge Medication List      START taking these medications    Details   insulin glargine (LANTUS) 100 unit/mL injection 14 units sq daily  Qty: 1 Vial, Refills: 0      insulin lispro (HUMALOG) 100 unit/mL injection 8 units sq 3 times daily acmeals  Qty: 1 Vial, Refills: 0      oxyCODONE-acetaminophen (PERCOCET) 5-325 mg per tablet Take 1-2 Tabs by mouth every four (4) hours as needed. Max Daily Amount: 12 Tabs. Qty: 30 Tab, Refills: 0      senna (SENOKOT) 8.6 mg tablet Take 2 Tabs by mouth nightly. Qty: 60 Tab, Refills: 0         CONTINUE these medications which have NOT CHANGED    Details   ferrous gluconate 324 mg (37.5 mg iron) tablet Take 1 Tab by mouth three (3) times daily (with meals). Qty: 90 Tab, Refills: 0      metoprolol tartrate (LOPRESSOR) 25 mg tablet TAKE ONE TABLET BY MOUTH TWICE DAILY  Qty: 180 Tab, Refills: 3    Associated Diagnoses: Essential hypertension      lisinopril (PRINIVIL, ZESTRIL) 40 mg tablet Take 1 Tab by mouth daily.   Qty: 90 Tab, Refills: 3    Associated Diagnoses: Essential hypertension simvastatin (ZOCOR) 20 mg tablet TAKE ONE TABLET BY MOUTH NIGHTLY. Qty: 90 Tab, Refills: 3    Associated Diagnoses: Dyslipidemia      aspirin 81 mg tablet Take 81 mg by mouth. STOP taking these medications       insulin NPH/insulin regular (NOVOLIN 70/30) 100 unit/mL (70-30) injection Comments:   Reason for Stopping:         naproxen sodium (ALEVE) 220 mg cap Comments:   Reason for Stopping:               Diet: Cardiac/Diabetic   Activity/Wound Care:  Dry dressing changes daily.       Follow-up Information     Follow up With Details Comments [facility]  This is your post-acute care provider [address] Lelo DELATORRE MD In 3 weeks  [address]  [address]  [address] [facility]      Christie Chauhan MD In 2 weeks  [address]  [address] [place] [address] [address] [facility]  [address]  [address] [place] [address]  [phone]            Signed:  Kyara Nichols NP  10/20/2017  11:10 AM

## 2017-10-20 NOTE — PROGRESS NOTES
Spiritual Care Assessment/Progress Notes    Pb Avalos [id number]  [id number]    1940  68 y.o.  male    Patient Telephone Number: [phone] (home)   Holiness Affiliation: Zoroastrian   Language: English   Extended Emergency Contact Information  Primary Emergency Contact: Gretel Souza  Address: [address], [address] Phone: [phone]  Relation: Spouse  Secondary Emergency Contact: [address]  Home Phone: [phone]  Relation: Spouse   Patient Active Problem List    Diagnosis Date Noted    Osteomyel[facility] [facility] 10/10/2017    Peripheral neuropathy 09/28/2017    Foot ulcer with fat layer exposed ([facility] [address] 09/07/2017    Synovitis of knee 03/19/2016    Primary osteoarthritis of both knees 03/19/2016    Leg ulcer ([facility] [address] 07/02/2015    Venous insufficiency 07/02/2015    Essential hypertension 05/03/2015    Dyslipidemia 05/03/2015    Charcot foot due to diabetes mellitus ([facility] [address] 08/19/2014    DM (diabetes mellitus), type 2, uncontrolled, periph vascular complic ([facility] [address] 21/50/1895        Date: 10/20/2017       Level of Holiness/Spiritual Activity:  []         Involved in hussein tradition/spiritual practice    []         Not involved in hussein tradition/spiritual practice  [x]         Spiritually oriented    []         Claims no spiritual orientation    []         seeking spiritual identity  []         Feels alienated from Sikh practice/tradition  []         Feels angry about Sikh practice/tradition  []         Spirituality/Sikh tradition a resource for coping at this time.   []         Not able to assess due to medical condition    Services Provided Today:  []         crisis intervention    []         reading Scriptures  [x]         spiritual assessment    []         prayer  []         empathic listening/emotional support  []         rites and rituals (cite in comments)  []         life review     []         Sikh support  []         theological development   []         advocacy  []         ethical dialog     []         blessing  []         bereavement support    []         support to family  []         anticipatory grief support   []         help with AMD  []         spiritual guidance    []         meditation      Spiritual Care Needs  []         Emotional Support  []         Spiritual/Presybeterian Care  []         Loss/Adjustment  []         Advocacy/Referral                /Ethics  [x]         No needs expressed at               this time  []         Other: (note in               comments)  [address]  []         Follow up visits with               pt/family  []         Provide materials  []         Schedule sacraments  []         Contact Community               Clergy  []         Follow up as needed  []         Other: (note in               comments)   Initial Spiritual Assessment in 2174 for LOS pt. Pt was sitting up and appeared to be in positive spirits. Pt indicated that he was being discharged and feeling positive about the process despite dealing with a recent leg amputation. Pt shared that he was well supported by his wife and four daughters. Pt shared how he has overcome adversity in the past, and voiced a sense of value and God given identity he embraces. Provided active listening and words of affirmation. Pronounced continued blessings. SHONDA Martinez. Mili Reyes

## 2017-10-20 NOTE — PROGRESS NOTES
Pt has been accepted to [facility]. CM received call from [place] with [facility] confirming acceptance. Nursing to call report to [phone]. [facility] would like to receive pt after lunch, around 1:00 p.m. Nursing staff made aware. CM to continue to offer discharge planning as needed. Care Management Interventions  PCP Verified by CM: Yes  Mode of Transport at Discharge:  Other (see comment) ([address] transport)  Transition of Care Consult (CM Consult): Discharge Planning, Other [facility])  MyChart Signup: No  Discharge Durable Medical Equipment:  (has cane, RW, and glucometer)  Health Maintenance Reviewed: Yes  Physical Therapy Consult: No  Occupational Therapy Consult: No  Speech Therapy Consult: No  Current Support Network:  (lives with [facility] with 7 steps- BR on first [address]  Confirm Follow Up Transport: Family  Freedom of Choice Offered: Yes (inpatient rehab)  Discharge Location  Discharge Placement: Rehab hospital/unit acute    MIGUE Tomas  [address]  [phone]

## 2017-10-20 NOTE — PROGRESS NOTES
General Daily Progress Note    Admit Date: 10/10/2017    Subjective:     Patient has no complaint. Current Facility-Administered Medications   Medication Dose Route Frequency    oxyCODONE-acetaminophen (PERCOCET) 5-325 mg per tablet 1-2 Tab  1-2 Tab Oral Q4H PRN    morphine injection 4 mg  4 mg IntraVENous Q3H PRN    senna (SENOKOT) tablet 17.2 mg  2 Tab Oral QHS    insulin glargine (LANTUS) injection 14 Units  14 Units SubCUTAneous DAILY    insulin lispro (HUMALOG) injection 8 Units  8 Units SubCUTAneous TIDAC    vancomycin (VANCOCIN) 1250 mg in  ml infusion  1,250 mg IntraVENous Q12H    morphine injection 2 mg  2 mg IntraVENous Q3H PRN    ondansetron (ZOFRAN) injection 4 mg  4 mg IntraVENous Q6H PRN    dextrose (D50W) injection syrg 12.5-25 g  12.5-25 g IntraVENous PRN    glucagon (GLUCAGEN) injection 1 mg  1 mg IntraMUSCular PRN    glucose chewable tablet 16 g  4 Tab Oral PRN    bisacodyl (DULCOLAX) suppository 10 mg  10 mg Rectal DAILY PRN    ferrous sulfate tablet 325 mg  1 Tab Oral DAILY WITH BREAKFAST    aspirin chewable tablet 81 mg  81 mg Oral DAILY    metoprolol tartrate (LOPRESSOR) tablet 25 mg  25 mg Oral BID    cefepime (MAXIPIME) 2 g in 0.9% sodium chloride (MBP/ADV) 100 mL  2 g IntraVENous Q8H        Review of Systems  A comprehensive review of systems was negative. Objective:     Patient Vitals for the past 24 hrs:   BP Temp Pulse Resp SpO2 Height Weight   10/20/17 0333 148/54 98.2 °F (36.8 °C) 93 16 97 % - -   10/19/17 2000 123/80 98.8 °F (37.1 °C) 70 18 98 % - -   10/19/17 1837 126/69 - 79 - - - -   10/19/17 1548 - - - - - 6' 9\" (2.057 m) 249 lb 12.5 oz (113.3 kg)   10/19/17 1439 148/77 98.7 °F (37.1 °C) 79 18 99 % - -   10/19/17 1126 134/48 98.6 °F (37 °C) 67 18 99 % - -     10/20 0701 - 10/20 1900  In: 360 [P.O.:360]  Out: 800 [Urine:800]  10/18 1901 - 10/20 0700  In: 1557 [P.O.:1740;  I.V.:100]  Out: 2600 [Urine:2500; Drains:100]    Physical Exam:   Visit Vitals    BP 148/54    Pulse 93    Temp 98.2 °F (36.8 °C)    Resp 16    Ht 6' 9\" (2.057 m)    Wt 249 lb 12.5 oz (113.3 kg)    SpO2 97%    BMI 26.77 kg/m2     General appearance: alert, cooperative, no distress, appears stated age  Neck: supple, symmetrical, trachea midline, no adenopathy, thyroid: not enlarged, symmetric, no tenderness/mass/nodules, no carotid bruit and no JVD  Lungs: clear to auscultation bilaterally  Heart: regular rate and rhythm, S1, S2 normal, no murmur, click, rub or gallop  Abdomen: soft, non-tender. Bowel sounds normal. No masses,  no organomegaly  Extremities: edema left leg, right BKA amputation        Data Review   Recent Results (from the past 24 hour(s))   GLUCOSE, POC    Collection Time: 10/19/17 11:24 AM   Result Value Ref Range    Glucose (POC) 151 (H) 65 - 100 mg/dL    Performed by Merlyn Arthur (PCT)    GLUCOSE, POC    Collection Time: 10/19/17  4:10 PM   Result Value Ref Range    Glucose (POC) 188 (H) 65 - 100 mg/dL    Performed by Merlyn Arthur (PCT)    GLUCOSE, POC    Collection Time: 10/19/17  9:18 PM   Result Value Ref Range    Glucose (POC) 115 (H) 65 - 100 mg/dL    Performed by Elaine Denney (PCT)    HGB & HCT    Collection Time: 10/20/17  3:52 AM   Result Value Ref Range    HGB 7.3 (L) 12.1 - 17.0 g/dL    HCT 22.5 (L) 36.6 - 50.3 %   GLUCOSE, POC    Collection Time: 10/20/17  7:31 AM   Result Value Ref Range    Glucose (POC) 164 (H) 65 - 100 mg/dL    Performed by Shantell Luevano*            Assessment:     Active Problems:    Osteomyelitis ([address] (10/10/2017)        Plan:     1. Rehab at [facility] today. 2.  Continue diabetic control.

## 2017-10-21 LAB
ANION GAP SERPL CALC-SCNC: 6 MMOL/L (ref 5–15)
APPEARANCE UR: CLEAR
BACTERIA URNS QL MICRO: NEGATIVE /HPF
BILIRUB UR QL: NEGATIVE
BUN SERPL-MCNC: 19 MG/DL (ref 6–20)
BUN/CREAT SERPL: 19 (ref 12–20)
CALCIUM SERPL-MCNC: 8.6 MG/DL (ref 8.5–10.1)
CHLORIDE SERPL-SCNC: 107 MMOL/L (ref 97–108)
CO2 SERPL-SCNC: 24 MMOL/L (ref 21–32)
COLOR UR: ABNORMAL
CREAT SERPL-MCNC: 1.02 MG/DL (ref 0.7–1.3)
EPITH CASTS URNS QL MICRO: ABNORMAL /LPF
ERYTHROCYTE [DISTWIDTH] IN BLOOD BY AUTOMATED COUNT: 13.8 % (ref 11.5–14.5)
GLUCOSE SERPL-MCNC: 177 MG/DL (ref 65–100)
GLUCOSE UR STRIP.AUTO-MCNC: NEGATIVE MG/DL
HCT VFR BLD AUTO: 21.5 % (ref 36.6–50.3)
HGB BLD-MCNC: 7 G/DL (ref 12.1–17)
HGB UR QL STRIP: ABNORMAL
HYALINE CASTS URNS QL MICRO: ABNORMAL /LPF (ref 0–5)
KETONES UR QL STRIP.AUTO: NEGATIVE MG/DL
LEUKOCYTE ESTERASE UR QL STRIP.AUTO: NEGATIVE
MAGNESIUM SERPL-MCNC: 1.8 MG/DL (ref 1.6–2.4)
MCH RBC QN AUTO: 26.7 PG (ref 26–34)
MCHC RBC AUTO-ENTMCNC: 32.6 G/DL (ref 30–36.5)
MCV RBC AUTO: 82.1 FL (ref 80–99)
NITRITE UR QL STRIP.AUTO: NEGATIVE
PH UR STRIP: 5.5 [PH] (ref 5–8)
PLATELET # BLD AUTO: 275 K/UL (ref 150–400)
POTASSIUM SERPL-SCNC: 4.5 MMOL/L (ref 3.5–5.1)
PROT UR STRIP-MCNC: 30 MG/DL
RBC # BLD AUTO: 2.62 M/UL (ref 4.1–5.7)
RBC #/AREA URNS HPF: ABNORMAL /HPF (ref 0–5)
SODIUM SERPL-SCNC: 137 MMOL/L (ref 136–145)
SP GR UR REFRACTOMETRY: 1.01 (ref 1–1.03)
UROBILINOGEN UR QL STRIP.AUTO: 0.2 EU/DL (ref 0.2–1)
WBC # BLD AUTO: 7.2 K/UL (ref 4.1–11.1)
WBC URNS QL MICRO: ABNORMAL /HPF (ref 0–4)

## 2017-10-21 PROCEDURE — 74011250637 HC RX REV CODE- 250/637: Performed by: PHYSICAL MEDICINE & REHABILITATION

## 2017-10-21 PROCEDURE — 74011636637 HC RX REV CODE- 636/637: Performed by: PHYSICAL MEDICINE & REHABILITATION

## 2017-10-21 PROCEDURE — 81001 URINALYSIS AUTO W/SCOPE: CPT | Performed by: PHYSICAL MEDICINE & REHABILITATION

## 2017-10-21 PROCEDURE — 83735 ASSAY OF MAGNESIUM: CPT | Performed by: PHYSICAL MEDICINE & REHABILITATION

## 2017-10-21 PROCEDURE — 80048 BASIC METABOLIC PNL TOTAL CA: CPT | Performed by: PHYSICAL MEDICINE & REHABILITATION

## 2017-10-21 PROCEDURE — 85027 COMPLETE CBC AUTOMATED: CPT | Performed by: PHYSICAL MEDICINE & REHABILITATION

## 2017-10-21 PROCEDURE — 3331090001 HH PPS REVENUE CREDIT

## 2017-10-21 PROCEDURE — 3331090002 HH PPS REVENUE DEBIT

## 2017-10-21 PROCEDURE — 36415 COLL VENOUS BLD VENIPUNCTURE: CPT | Performed by: PHYSICAL MEDICINE & REHABILITATION

## 2017-10-21 RX ORDER — PANTOPRAZOLE SODIUM 40 MG/1
40 TABLET, DELAYED RELEASE ORAL
Status: DISCONTINUED | OUTPATIENT
Start: 2017-10-22 | End: 2017-10-31 | Stop reason: HOSPADM

## 2017-10-21 RX ADMIN — METOPROLOL TARTRATE 25 MG: 25 TABLET ORAL at 09:33

## 2017-10-21 RX ADMIN — FERROUS SULFATE TAB 325 MG (65 MG ELEMENTAL FE) 325 MG: 325 (65 FE) TAB at 17:30

## 2017-10-21 RX ADMIN — FERROUS SULFATE TAB 325 MG (65 MG ELEMENTAL FE) 325 MG: 325 (65 FE) TAB at 12:31

## 2017-10-21 RX ADMIN — PRAVASTATIN SODIUM 40 MG: 40 TABLET ORAL at 21:33

## 2017-10-21 RX ADMIN — METOPROLOL TARTRATE 25 MG: 25 TABLET ORAL at 21:33

## 2017-10-21 RX ADMIN — INSULIN LISPRO 8 UNITS: 100 INJECTION, SOLUTION INTRAVENOUS; SUBCUTANEOUS at 12:31

## 2017-10-21 RX ADMIN — FERROUS SULFATE TAB 325 MG (65 MG ELEMENTAL FE) 325 MG: 325 (65 FE) TAB at 09:33

## 2017-10-21 RX ADMIN — ASPIRIN 81 MG 81 MG: 81 TABLET ORAL at 09:33

## 2017-10-21 RX ADMIN — INSULIN GLARGINE 14 UNITS: 100 INJECTION, SOLUTION SUBCUTANEOUS at 09:33

## 2017-10-21 RX ADMIN — INSULIN LISPRO 8 UNITS: 100 INJECTION, SOLUTION INTRAVENOUS; SUBCUTANEOUS at 17:30

## 2017-10-21 RX ADMIN — LISINOPRIL 10 MG: 5 TABLET ORAL at 09:33

## 2017-10-21 RX ADMIN — INSULIN LISPRO 8 UNITS: 100 INJECTION, SOLUTION INTRAVENOUS; SUBCUTANEOUS at 09:33

## 2017-10-22 PROCEDURE — 74011250637 HC RX REV CODE- 250/637: Performed by: PHYSICAL MEDICINE & REHABILITATION

## 2017-10-22 PROCEDURE — 3331090001 HH PPS REVENUE CREDIT

## 2017-10-22 PROCEDURE — 74011636637 HC RX REV CODE- 636/637: Performed by: PHYSICAL MEDICINE & REHABILITATION

## 2017-10-22 PROCEDURE — 3331090002 HH PPS REVENUE DEBIT

## 2017-10-22 RX ORDER — DEXTROSE 50 % IN WATER (D50W) INTRAVENOUS SYRINGE
25 AS NEEDED
Status: DISCONTINUED | OUTPATIENT
Start: 2017-10-22 | End: 2017-10-31 | Stop reason: HOSPADM

## 2017-10-22 RX ORDER — LISINOPRIL 20 MG/1
20 TABLET ORAL DAILY
Status: DISCONTINUED | OUTPATIENT
Start: 2017-10-23 | End: 2017-10-31 | Stop reason: HOSPADM

## 2017-10-22 RX ORDER — INSULIN LISPRO 100 [IU]/ML
INJECTION, SOLUTION INTRAVENOUS; SUBCUTANEOUS
Status: DISCONTINUED | OUTPATIENT
Start: 2017-10-22 | End: 2017-10-31 | Stop reason: HOSPADM

## 2017-10-22 RX ORDER — MAGNESIUM SULFATE 100 %
16 CRYSTALS MISCELLANEOUS AS NEEDED
Status: DISCONTINUED | OUTPATIENT
Start: 2017-10-22 | End: 2017-10-31 | Stop reason: HOSPADM

## 2017-10-22 RX ADMIN — INSULIN GLARGINE 14 UNITS: 100 INJECTION, SOLUTION SUBCUTANEOUS at 09:12

## 2017-10-22 RX ADMIN — INSULIN LISPRO 8 UNITS: 100 INJECTION, SOLUTION INTRAVENOUS; SUBCUTANEOUS at 09:12

## 2017-10-22 RX ADMIN — INSULIN LISPRO 8 UNITS: 100 INJECTION, SOLUTION INTRAVENOUS; SUBCUTANEOUS at 12:33

## 2017-10-22 RX ADMIN — ASPIRIN 81 MG 81 MG: 81 TABLET ORAL at 09:12

## 2017-10-22 RX ADMIN — METOPROLOL TARTRATE 25 MG: 25 TABLET ORAL at 09:12

## 2017-10-22 RX ADMIN — FERROUS SULFATE TAB 325 MG (65 MG ELEMENTAL FE) 325 MG: 325 (65 FE) TAB at 09:12

## 2017-10-22 RX ADMIN — DOCUSATE SODIUM AND SENNOSIDES 1 TABLET: 8.6; 5 TABLET, FILM COATED ORAL at 22:51

## 2017-10-22 RX ADMIN — INSULIN LISPRO 2 UNITS: 100 INJECTION, SOLUTION INTRAVENOUS; SUBCUTANEOUS at 17:14

## 2017-10-22 RX ADMIN — METOPROLOL TARTRATE 25 MG: 25 TABLET ORAL at 22:50

## 2017-10-22 RX ADMIN — INSULIN LISPRO 8 UNITS: 100 INJECTION, SOLUTION INTRAVENOUS; SUBCUTANEOUS at 17:13

## 2017-10-22 RX ADMIN — FERROUS SULFATE TAB 325 MG (65 MG ELEMENTAL FE) 325 MG: 325 (65 FE) TAB at 12:32

## 2017-10-22 RX ADMIN — FERROUS SULFATE TAB 325 MG (65 MG ELEMENTAL FE) 325 MG: 325 (65 FE) TAB at 17:13

## 2017-10-22 RX ADMIN — PRAVASTATIN SODIUM 40 MG: 40 TABLET ORAL at 22:51

## 2017-10-22 RX ADMIN — LISINOPRIL 10 MG: 5 TABLET ORAL at 09:12

## 2017-10-23 LAB
ERYTHROCYTE [DISTWIDTH] IN BLOOD BY AUTOMATED COUNT: 13.9 % (ref 11.5–14.5)
HCT VFR BLD AUTO: 22.6 % (ref 36.6–50.3)
HGB BLD-MCNC: 7.5 G/DL (ref 12.1–17)
MCH RBC QN AUTO: 27.5 PG (ref 26–34)
MCHC RBC AUTO-ENTMCNC: 33.2 G/DL (ref 30–36.5)
MCV RBC AUTO: 82.8 FL (ref 80–99)
PLATELET # BLD AUTO: 320 K/UL (ref 150–400)
RBC # BLD AUTO: 2.73 M/UL (ref 4.1–5.7)
WBC # BLD AUTO: 6.6 K/UL (ref 4.1–11.1)

## 2017-10-23 PROCEDURE — 3331090002 HH PPS REVENUE DEBIT

## 2017-10-23 PROCEDURE — 74011250637 HC RX REV CODE- 250/637: Performed by: PHYSICAL MEDICINE & REHABILITATION

## 2017-10-23 PROCEDURE — 74011636637 HC RX REV CODE- 636/637: Performed by: PHYSICAL MEDICINE & REHABILITATION

## 2017-10-23 PROCEDURE — 3331090001 HH PPS REVENUE CREDIT

## 2017-10-23 PROCEDURE — 36415 COLL VENOUS BLD VENIPUNCTURE: CPT | Performed by: PHYSICAL MEDICINE & REHABILITATION

## 2017-10-23 PROCEDURE — 85027 COMPLETE CBC AUTOMATED: CPT | Performed by: PHYSICAL MEDICINE & REHABILITATION

## 2017-10-23 RX ADMIN — INSULIN LISPRO 2 UNITS: 100 INJECTION, SOLUTION INTRAVENOUS; SUBCUTANEOUS at 12:33

## 2017-10-23 RX ADMIN — METOPROLOL TARTRATE 25 MG: 25 TABLET ORAL at 21:36

## 2017-10-23 RX ADMIN — DOCUSATE SODIUM AND SENNOSIDES 1 TABLET: 8.6; 5 TABLET, FILM COATED ORAL at 08:27

## 2017-10-23 RX ADMIN — FERROUS SULFATE TAB 325 MG (65 MG ELEMENTAL FE) 325 MG: 325 (65 FE) TAB at 08:27

## 2017-10-23 RX ADMIN — INSULIN LISPRO 8 UNITS: 100 INJECTION, SOLUTION INTRAVENOUS; SUBCUTANEOUS at 12:34

## 2017-10-23 RX ADMIN — PRAVASTATIN SODIUM 40 MG: 40 TABLET ORAL at 21:36

## 2017-10-23 RX ADMIN — FERROUS SULFATE TAB 325 MG (65 MG ELEMENTAL FE) 325 MG: 325 (65 FE) TAB at 17:48

## 2017-10-23 RX ADMIN — METOPROLOL TARTRATE 25 MG: 25 TABLET ORAL at 08:27

## 2017-10-23 RX ADMIN — LISINOPRIL 20 MG: 20 TABLET ORAL at 08:27

## 2017-10-23 RX ADMIN — ASPIRIN 81 MG 81 MG: 81 TABLET ORAL at 08:27

## 2017-10-23 RX ADMIN — INSULIN LISPRO 8 UNITS: 100 INJECTION, SOLUTION INTRAVENOUS; SUBCUTANEOUS at 17:48

## 2017-10-23 RX ADMIN — DOCUSATE SODIUM AND SENNOSIDES 1 TABLET: 8.6; 5 TABLET, FILM COATED ORAL at 21:36

## 2017-10-23 RX ADMIN — INSULIN GLARGINE 14 UNITS: 100 INJECTION, SOLUTION SUBCUTANEOUS at 08:28

## 2017-10-23 RX ADMIN — FERROUS SULFATE TAB 325 MG (65 MG ELEMENTAL FE) 325 MG: 325 (65 FE) TAB at 12:34

## 2017-10-23 RX ADMIN — INSULIN LISPRO 8 UNITS: 100 INJECTION, SOLUTION INTRAVENOUS; SUBCUTANEOUS at 08:28

## 2017-10-24 PROCEDURE — 3331090001 HH PPS REVENUE CREDIT

## 2017-10-24 PROCEDURE — 3331090002 HH PPS REVENUE DEBIT

## 2017-10-24 PROCEDURE — 74011250637 HC RX REV CODE- 250/637: Performed by: PHYSICAL MEDICINE & REHABILITATION

## 2017-10-24 PROCEDURE — 74011636637 HC RX REV CODE- 636/637: Performed by: PHYSICAL MEDICINE & REHABILITATION

## 2017-10-24 RX ADMIN — DOCUSATE SODIUM AND SENNOSIDES 1 TABLET: 8.6; 5 TABLET, FILM COATED ORAL at 21:28

## 2017-10-24 RX ADMIN — METOPROLOL TARTRATE 25 MG: 25 TABLET ORAL at 08:52

## 2017-10-24 RX ADMIN — MAGNESIUM HYDROXIDE 30 ML: 400 SUSPENSION ORAL at 17:56

## 2017-10-24 RX ADMIN — PRAVASTATIN SODIUM 40 MG: 40 TABLET ORAL at 21:28

## 2017-10-24 RX ADMIN — FERROUS SULFATE TAB 325 MG (65 MG ELEMENTAL FE) 325 MG: 325 (65 FE) TAB at 12:46

## 2017-10-24 RX ADMIN — PANTOPRAZOLE SODIUM 40 MG: 40 TABLET, DELAYED RELEASE ORAL at 05:53

## 2017-10-24 RX ADMIN — ASPIRIN 81 MG 81 MG: 81 TABLET ORAL at 08:52

## 2017-10-24 RX ADMIN — LISINOPRIL 20 MG: 20 TABLET ORAL at 08:52

## 2017-10-24 RX ADMIN — INSULIN LISPRO 2 UNITS: 100 INJECTION, SOLUTION INTRAVENOUS; SUBCUTANEOUS at 12:46

## 2017-10-24 RX ADMIN — INSULIN LISPRO 8 UNITS: 100 INJECTION, SOLUTION INTRAVENOUS; SUBCUTANEOUS at 08:53

## 2017-10-24 RX ADMIN — INSULIN LISPRO 8 UNITS: 100 INJECTION, SOLUTION INTRAVENOUS; SUBCUTANEOUS at 17:57

## 2017-10-24 RX ADMIN — INSULIN LISPRO 8 UNITS: 100 INJECTION, SOLUTION INTRAVENOUS; SUBCUTANEOUS at 12:46

## 2017-10-24 RX ADMIN — INSULIN GLARGINE 14 UNITS: 100 INJECTION, SOLUTION SUBCUTANEOUS at 08:53

## 2017-10-24 RX ADMIN — FERROUS SULFATE TAB 325 MG (65 MG ELEMENTAL FE) 325 MG: 325 (65 FE) TAB at 17:57

## 2017-10-24 RX ADMIN — DOCUSATE SODIUM AND SENNOSIDES 1 TABLET: 8.6; 5 TABLET, FILM COATED ORAL at 08:52

## 2017-10-24 RX ADMIN — METOPROLOL TARTRATE 25 MG: 25 TABLET ORAL at 21:28

## 2017-10-24 RX ADMIN — FERROUS SULFATE TAB 325 MG (65 MG ELEMENTAL FE) 325 MG: 325 (65 FE) TAB at 08:52

## 2017-10-25 PROCEDURE — 3331090001 HH PPS REVENUE CREDIT

## 2017-10-25 PROCEDURE — 74011250637 HC RX REV CODE- 250/637: Performed by: PHYSICAL MEDICINE & REHABILITATION

## 2017-10-25 PROCEDURE — 74011636637 HC RX REV CODE- 636/637: Performed by: PHYSICAL MEDICINE & REHABILITATION

## 2017-10-25 PROCEDURE — 3331090002 HH PPS REVENUE DEBIT

## 2017-10-25 RX ORDER — GUAIFENESIN/DEXTROMETHORPHAN 100-10MG/5
10 SYRUP ORAL
Status: DISCONTINUED | OUTPATIENT
Start: 2017-10-25 | End: 2017-10-31 | Stop reason: HOSPADM

## 2017-10-25 RX ADMIN — INSULIN LISPRO 8 UNITS: 100 INJECTION, SOLUTION INTRAVENOUS; SUBCUTANEOUS at 09:35

## 2017-10-25 RX ADMIN — PRAVASTATIN SODIUM 40 MG: 40 TABLET ORAL at 21:40

## 2017-10-25 RX ADMIN — INSULIN GLARGINE 14 UNITS: 100 INJECTION, SOLUTION SUBCUTANEOUS at 09:34

## 2017-10-25 RX ADMIN — FERROUS SULFATE TAB 325 MG (65 MG ELEMENTAL FE) 325 MG: 325 (65 FE) TAB at 17:35

## 2017-10-25 RX ADMIN — METOPROLOL TARTRATE 25 MG: 25 TABLET ORAL at 21:40

## 2017-10-25 RX ADMIN — FERROUS SULFATE TAB 325 MG (65 MG ELEMENTAL FE) 325 MG: 325 (65 FE) TAB at 13:26

## 2017-10-25 RX ADMIN — FERROUS SULFATE TAB 325 MG (65 MG ELEMENTAL FE) 325 MG: 325 (65 FE) TAB at 09:35

## 2017-10-25 RX ADMIN — LISINOPRIL 20 MG: 20 TABLET ORAL at 09:44

## 2017-10-25 RX ADMIN — INSULIN LISPRO 8 UNITS: 100 INJECTION, SOLUTION INTRAVENOUS; SUBCUTANEOUS at 17:35

## 2017-10-25 RX ADMIN — BENZOCAINE AND MENTHOL 1 LOZENGE: 15; 3.6 LOZENGE ORAL at 13:27

## 2017-10-25 RX ADMIN — ASPIRIN 81 MG 81 MG: 81 TABLET ORAL at 09:35

## 2017-10-25 RX ADMIN — MAGNESIUM HYDROXIDE 30 ML: 400 SUSPENSION ORAL at 17:36

## 2017-10-25 RX ADMIN — DOCUSATE SODIUM AND SENNOSIDES 1 TABLET: 8.6; 5 TABLET, FILM COATED ORAL at 21:40

## 2017-10-25 RX ADMIN — DOCUSATE SODIUM AND SENNOSIDES 1 TABLET: 8.6; 5 TABLET, FILM COATED ORAL at 09:35

## 2017-10-25 RX ADMIN — PANTOPRAZOLE SODIUM 40 MG: 40 TABLET, DELAYED RELEASE ORAL at 07:31

## 2017-10-25 RX ADMIN — METOPROLOL TARTRATE 25 MG: 25 TABLET ORAL at 09:35

## 2017-10-25 RX ADMIN — INSULIN LISPRO 8 UNITS: 100 INJECTION, SOLUTION INTRAVENOUS; SUBCUTANEOUS at 13:26

## 2017-10-26 PROCEDURE — 74011636637 HC RX REV CODE- 636/637: Performed by: PHYSICAL MEDICINE & REHABILITATION

## 2017-10-26 PROCEDURE — 74011250637 HC RX REV CODE- 250/637: Performed by: PHYSICAL MEDICINE & REHABILITATION

## 2017-10-26 PROCEDURE — 3331090002 HH PPS REVENUE DEBIT

## 2017-10-26 PROCEDURE — 3331090001 HH PPS REVENUE CREDIT

## 2017-10-26 RX ADMIN — LISINOPRIL 20 MG: 20 TABLET ORAL at 10:21

## 2017-10-26 RX ADMIN — INSULIN LISPRO 8 UNITS: 100 INJECTION, SOLUTION INTRAVENOUS; SUBCUTANEOUS at 17:27

## 2017-10-26 RX ADMIN — DOCUSATE SODIUM AND SENNOSIDES 1 TABLET: 8.6; 5 TABLET, FILM COATED ORAL at 10:22

## 2017-10-26 RX ADMIN — FERROUS SULFATE TAB 325 MG (65 MG ELEMENTAL FE) 325 MG: 325 (65 FE) TAB at 12:47

## 2017-10-26 RX ADMIN — FERROUS SULFATE TAB 325 MG (65 MG ELEMENTAL FE) 325 MG: 325 (65 FE) TAB at 10:22

## 2017-10-26 RX ADMIN — DOCUSATE SODIUM AND SENNOSIDES 1 TABLET: 8.6; 5 TABLET, FILM COATED ORAL at 21:42

## 2017-10-26 RX ADMIN — METOPROLOL TARTRATE 25 MG: 25 TABLET ORAL at 21:41

## 2017-10-26 RX ADMIN — INSULIN LISPRO 8 UNITS: 100 INJECTION, SOLUTION INTRAVENOUS; SUBCUTANEOUS at 10:20

## 2017-10-26 RX ADMIN — BISACODYL 10 MG: 10 SUPPOSITORY RECTAL at 21:42

## 2017-10-26 RX ADMIN — ASPIRIN 81 MG 81 MG: 81 TABLET ORAL at 10:22

## 2017-10-26 RX ADMIN — INSULIN GLARGINE 14 UNITS: 100 INJECTION, SOLUTION SUBCUTANEOUS at 10:22

## 2017-10-26 RX ADMIN — INSULIN LISPRO 2 UNITS: 100 INJECTION, SOLUTION INTRAVENOUS; SUBCUTANEOUS at 12:46

## 2017-10-26 RX ADMIN — METOPROLOL TARTRATE 25 MG: 25 TABLET ORAL at 10:22

## 2017-10-26 RX ADMIN — FERROUS SULFATE TAB 325 MG (65 MG ELEMENTAL FE) 325 MG: 325 (65 FE) TAB at 17:26

## 2017-10-26 RX ADMIN — INSULIN LISPRO 8 UNITS: 100 INJECTION, SOLUTION INTRAVENOUS; SUBCUTANEOUS at 12:46

## 2017-10-26 RX ADMIN — PRAVASTATIN SODIUM 40 MG: 40 TABLET ORAL at 21:41

## 2017-10-26 RX ADMIN — PANTOPRAZOLE SODIUM 40 MG: 40 TABLET, DELAYED RELEASE ORAL at 06:40

## 2017-10-27 PROCEDURE — 3331090001 HH PPS REVENUE CREDIT

## 2017-10-27 PROCEDURE — 3331090002 HH PPS REVENUE DEBIT

## 2017-10-27 PROCEDURE — 74011636637 HC RX REV CODE- 636/637: Performed by: PHYSICAL MEDICINE & REHABILITATION

## 2017-10-27 PROCEDURE — 74011250637 HC RX REV CODE- 250/637: Performed by: PHYSICAL MEDICINE & REHABILITATION

## 2017-10-27 RX ORDER — MAGNESIUM CITRATE
296 SOLUTION, ORAL ORAL
Status: COMPLETED | OUTPATIENT
Start: 2017-10-27 | End: 2017-10-27

## 2017-10-27 RX ADMIN — INSULIN LISPRO 8 UNITS: 100 INJECTION, SOLUTION INTRAVENOUS; SUBCUTANEOUS at 17:18

## 2017-10-27 RX ADMIN — LISINOPRIL 20 MG: 20 TABLET ORAL at 09:05

## 2017-10-27 RX ADMIN — PRAVASTATIN SODIUM 40 MG: 40 TABLET ORAL at 22:42

## 2017-10-27 RX ADMIN — MAGESIUM CITRATE 296 ML: 1.75 LIQUID ORAL at 13:15

## 2017-10-27 RX ADMIN — PANTOPRAZOLE SODIUM 40 MG: 40 TABLET, DELAYED RELEASE ORAL at 05:43

## 2017-10-27 RX ADMIN — METOPROLOL TARTRATE 25 MG: 25 TABLET ORAL at 09:05

## 2017-10-27 RX ADMIN — INSULIN LISPRO 8 UNITS: 100 INJECTION, SOLUTION INTRAVENOUS; SUBCUTANEOUS at 09:06

## 2017-10-27 RX ADMIN — INSULIN GLARGINE 14 UNITS: 100 INJECTION, SOLUTION SUBCUTANEOUS at 09:06

## 2017-10-27 RX ADMIN — METOPROLOL TARTRATE 25 MG: 25 TABLET ORAL at 22:41

## 2017-10-27 RX ADMIN — DOCUSATE SODIUM AND SENNOSIDES 1 TABLET: 8.6; 5 TABLET, FILM COATED ORAL at 09:05

## 2017-10-27 RX ADMIN — INSULIN LISPRO 8 UNITS: 100 INJECTION, SOLUTION INTRAVENOUS; SUBCUTANEOUS at 13:16

## 2017-10-27 RX ADMIN — ASPIRIN 81 MG 81 MG: 81 TABLET ORAL at 09:05

## 2017-10-27 RX ADMIN — DOCUSATE SODIUM AND SENNOSIDES 1 TABLET: 8.6; 5 TABLET, FILM COATED ORAL at 22:42

## 2017-10-27 RX ADMIN — FERROUS SULFATE TAB 325 MG (65 MG ELEMENTAL FE) 325 MG: 325 (65 FE) TAB at 13:16

## 2017-10-27 RX ADMIN — FERROUS SULFATE TAB 325 MG (65 MG ELEMENTAL FE) 325 MG: 325 (65 FE) TAB at 09:05

## 2017-10-27 RX ADMIN — FERROUS SULFATE TAB 325 MG (65 MG ELEMENTAL FE) 325 MG: 325 (65 FE) TAB at 17:17

## 2017-10-28 PROCEDURE — 74011250637 HC RX REV CODE- 250/637: Performed by: PHYSICAL MEDICINE & REHABILITATION

## 2017-10-28 PROCEDURE — 3331090002 HH PPS REVENUE DEBIT

## 2017-10-28 PROCEDURE — 3331090001 HH PPS REVENUE CREDIT

## 2017-10-28 PROCEDURE — 74011636637 HC RX REV CODE- 636/637: Performed by: PHYSICAL MEDICINE & REHABILITATION

## 2017-10-28 RX ADMIN — PRAVASTATIN SODIUM 40 MG: 40 TABLET ORAL at 21:28

## 2017-10-28 RX ADMIN — METOPROLOL TARTRATE 25 MG: 25 TABLET ORAL at 08:41

## 2017-10-28 RX ADMIN — INSULIN LISPRO 8 UNITS: 100 INJECTION, SOLUTION INTRAVENOUS; SUBCUTANEOUS at 17:42

## 2017-10-28 RX ADMIN — INSULIN GLARGINE 14 UNITS: 100 INJECTION, SOLUTION SUBCUTANEOUS at 08:41

## 2017-10-28 RX ADMIN — INSULIN LISPRO 8 UNITS: 100 INJECTION, SOLUTION INTRAVENOUS; SUBCUTANEOUS at 08:42

## 2017-10-28 RX ADMIN — DOCUSATE SODIUM AND SENNOSIDES 1 TABLET: 8.6; 5 TABLET, FILM COATED ORAL at 21:27

## 2017-10-28 RX ADMIN — INSULIN LISPRO 4 UNITS: 100 INJECTION, SOLUTION INTRAVENOUS; SUBCUTANEOUS at 12:26

## 2017-10-28 RX ADMIN — FERROUS SULFATE TAB 325 MG (65 MG ELEMENTAL FE) 325 MG: 325 (65 FE) TAB at 12:25

## 2017-10-28 RX ADMIN — FERROUS SULFATE TAB 325 MG (65 MG ELEMENTAL FE) 325 MG: 325 (65 FE) TAB at 08:41

## 2017-10-28 RX ADMIN — INSULIN LISPRO 8 UNITS: 100 INJECTION, SOLUTION INTRAVENOUS; SUBCUTANEOUS at 12:26

## 2017-10-28 RX ADMIN — METOPROLOL TARTRATE 25 MG: 25 TABLET ORAL at 21:28

## 2017-10-28 RX ADMIN — LISINOPRIL 20 MG: 20 TABLET ORAL at 08:41

## 2017-10-28 RX ADMIN — FERROUS SULFATE TAB 325 MG (65 MG ELEMENTAL FE) 325 MG: 325 (65 FE) TAB at 17:42

## 2017-10-28 RX ADMIN — ASPIRIN 81 MG 81 MG: 81 TABLET ORAL at 08:41

## 2017-10-28 RX ADMIN — DOCUSATE SODIUM AND SENNOSIDES 1 TABLET: 8.6; 5 TABLET, FILM COATED ORAL at 08:41

## 2017-10-29 PROCEDURE — 3331090002 HH PPS REVENUE DEBIT

## 2017-10-29 PROCEDURE — 74011250637 HC RX REV CODE- 250/637: Performed by: PHYSICAL MEDICINE & REHABILITATION

## 2017-10-29 PROCEDURE — 3331090001 HH PPS REVENUE CREDIT

## 2017-10-29 PROCEDURE — 74011636637 HC RX REV CODE- 636/637: Performed by: PHYSICAL MEDICINE & REHABILITATION

## 2017-10-29 RX ADMIN — INSULIN LISPRO 8 UNITS: 100 INJECTION, SOLUTION INTRAVENOUS; SUBCUTANEOUS at 09:03

## 2017-10-29 RX ADMIN — FERROUS SULFATE TAB 325 MG (65 MG ELEMENTAL FE) 325 MG: 325 (65 FE) TAB at 17:13

## 2017-10-29 RX ADMIN — DOCUSATE SODIUM AND SENNOSIDES 1 TABLET: 8.6; 5 TABLET, FILM COATED ORAL at 09:02

## 2017-10-29 RX ADMIN — PANTOPRAZOLE SODIUM 40 MG: 40 TABLET, DELAYED RELEASE ORAL at 06:11

## 2017-10-29 RX ADMIN — ASPIRIN 81 MG 81 MG: 81 TABLET ORAL at 09:02

## 2017-10-29 RX ADMIN — FERROUS SULFATE TAB 325 MG (65 MG ELEMENTAL FE) 325 MG: 325 (65 FE) TAB at 12:26

## 2017-10-29 RX ADMIN — INSULIN LISPRO 4 UNITS: 100 INJECTION, SOLUTION INTRAVENOUS; SUBCUTANEOUS at 12:23

## 2017-10-29 RX ADMIN — LISINOPRIL 20 MG: 20 TABLET ORAL at 09:02

## 2017-10-29 RX ADMIN — DOCUSATE SODIUM AND SENNOSIDES 1 TABLET: 8.6; 5 TABLET, FILM COATED ORAL at 21:37

## 2017-10-29 RX ADMIN — INSULIN LISPRO 8 UNITS: 100 INJECTION, SOLUTION INTRAVENOUS; SUBCUTANEOUS at 17:13

## 2017-10-29 RX ADMIN — METOPROLOL TARTRATE 25 MG: 25 TABLET ORAL at 09:02

## 2017-10-29 RX ADMIN — PRAVASTATIN SODIUM 40 MG: 40 TABLET ORAL at 21:37

## 2017-10-29 RX ADMIN — FERROUS SULFATE TAB 325 MG (65 MG ELEMENTAL FE) 325 MG: 325 (65 FE) TAB at 09:02

## 2017-10-29 RX ADMIN — METOPROLOL TARTRATE 25 MG: 25 TABLET ORAL at 21:37

## 2017-10-29 RX ADMIN — INSULIN GLARGINE 14 UNITS: 100 INJECTION, SOLUTION SUBCUTANEOUS at 09:03

## 2017-10-29 RX ADMIN — INSULIN LISPRO 8 UNITS: 100 INJECTION, SOLUTION INTRAVENOUS; SUBCUTANEOUS at 12:25

## 2017-10-30 VITALS
BODY MASS INDEX: 28.73 KG/M2 | DIASTOLIC BLOOD PRESSURE: 64 MMHG | WEIGHT: 248.31 LBS | HEART RATE: 69 BPM | HEIGHT: 78 IN | SYSTOLIC BLOOD PRESSURE: 125 MMHG

## 2017-10-30 PROCEDURE — 74011250637 HC RX REV CODE- 250/637: Performed by: PHYSICAL MEDICINE & REHABILITATION

## 2017-10-30 PROCEDURE — 3331090001 HH PPS REVENUE CREDIT

## 2017-10-30 PROCEDURE — 74011636637 HC RX REV CODE- 636/637: Performed by: PHYSICAL MEDICINE & REHABILITATION

## 2017-10-30 PROCEDURE — 3331090002 HH PPS REVENUE DEBIT

## 2017-10-30 RX ADMIN — DOCUSATE SODIUM AND SENNOSIDES 1 TABLET: 8.6; 5 TABLET, FILM COATED ORAL at 09:15

## 2017-10-30 RX ADMIN — METOPROLOL TARTRATE 25 MG: 25 TABLET ORAL at 09:20

## 2017-10-30 RX ADMIN — PRAVASTATIN SODIUM 40 MG: 40 TABLET ORAL at 21:29

## 2017-10-30 RX ADMIN — PANTOPRAZOLE SODIUM 40 MG: 40 TABLET, DELAYED RELEASE ORAL at 06:12

## 2017-10-30 RX ADMIN — ASPIRIN 81 MG 81 MG: 81 TABLET ORAL at 09:15

## 2017-10-30 RX ADMIN — METOPROLOL TARTRATE 25 MG: 25 TABLET ORAL at 21:29

## 2017-10-30 RX ADMIN — INSULIN LISPRO 2 UNITS: 100 INJECTION, SOLUTION INTRAVENOUS; SUBCUTANEOUS at 09:16

## 2017-10-30 RX ADMIN — INSULIN LISPRO 8 UNITS: 100 INJECTION, SOLUTION INTRAVENOUS; SUBCUTANEOUS at 12:50

## 2017-10-30 RX ADMIN — INSULIN LISPRO 2 UNITS: 100 INJECTION, SOLUTION INTRAVENOUS; SUBCUTANEOUS at 12:51

## 2017-10-30 RX ADMIN — INSULIN GLARGINE 14 UNITS: 100 INJECTION, SOLUTION SUBCUTANEOUS at 09:16

## 2017-10-30 RX ADMIN — LISINOPRIL 20 MG: 20 TABLET ORAL at 09:20

## 2017-10-30 RX ADMIN — DOCUSATE SODIUM AND SENNOSIDES 1 TABLET: 8.6; 5 TABLET, FILM COATED ORAL at 21:29

## 2017-10-30 RX ADMIN — INSULIN LISPRO 8 UNITS: 100 INJECTION, SOLUTION INTRAVENOUS; SUBCUTANEOUS at 09:15

## 2017-10-30 RX ADMIN — FERROUS SULFATE TAB 325 MG (65 MG ELEMENTAL FE) 325 MG: 325 (65 FE) TAB at 12:51

## 2017-10-30 RX ADMIN — FERROUS SULFATE TAB 325 MG (65 MG ELEMENTAL FE) 325 MG: 325 (65 FE) TAB at 16:44

## 2017-10-30 RX ADMIN — FERROUS SULFATE TAB 325 MG (65 MG ELEMENTAL FE) 325 MG: 325 (65 FE) TAB at 09:15

## 2017-10-30 RX ADMIN — INSULIN LISPRO 8 UNITS: 100 INJECTION, SOLUTION INTRAVENOUS; SUBCUTANEOUS at 18:54

## 2017-10-31 PROCEDURE — 74011250637 HC RX REV CODE- 250/637: Performed by: PHYSICAL MEDICINE & REHABILITATION

## 2017-10-31 PROCEDURE — 74011636637 HC RX REV CODE- 636/637: Performed by: PHYSICAL MEDICINE & REHABILITATION

## 2017-10-31 PROCEDURE — 3331090002 HH PPS REVENUE DEBIT

## 2017-10-31 PROCEDURE — 3331090001 HH PPS REVENUE CREDIT

## 2017-10-31 RX ADMIN — INSULIN GLARGINE 14 UNITS: 100 INJECTION, SOLUTION SUBCUTANEOUS at 09:56

## 2017-10-31 RX ADMIN — DOCUSATE SODIUM AND SENNOSIDES 1 TABLET: 8.6; 5 TABLET, FILM COATED ORAL at 09:54

## 2017-10-31 RX ADMIN — LISINOPRIL 20 MG: 20 TABLET ORAL at 09:54

## 2017-10-31 RX ADMIN — PANTOPRAZOLE SODIUM 40 MG: 40 TABLET, DELAYED RELEASE ORAL at 05:58

## 2017-10-31 RX ADMIN — ASPIRIN 81 MG 81 MG: 81 TABLET ORAL at 09:54

## 2017-10-31 RX ADMIN — METOPROLOL TARTRATE 25 MG: 25 TABLET ORAL at 09:54

## 2017-10-31 RX ADMIN — FERROUS SULFATE TAB 325 MG (65 MG ELEMENTAL FE) 325 MG: 325 (65 FE) TAB at 09:54

## 2017-10-31 RX ADMIN — INSULIN LISPRO 8 UNITS: 100 INJECTION, SOLUTION INTRAVENOUS; SUBCUTANEOUS at 09:55

## 2017-11-01 PROCEDURE — 3331090002 HH PPS REVENUE DEBIT

## 2017-11-01 PROCEDURE — 3331090001 HH PPS REVENUE CREDIT

## 2017-11-02 ENCOUNTER — HOME CARE VISIT (OUTPATIENT)
Dept: SCHEDULING | Facility: HOME HEALTH | Age: 77
End: 2017-11-02
Payer: MEDICARE

## 2017-11-02 ENCOUNTER — PATIENT OUTREACH (OUTPATIENT)
Dept: INTERNAL MEDICINE CLINIC | Age: 77
End: 2017-11-02

## 2017-11-02 PROCEDURE — 3331090002 HH PPS REVENUE DEBIT

## 2017-11-02 PROCEDURE — G0151 HHCP-SERV OF PT,EA 15 MIN: HCPCS

## 2017-11-02 PROCEDURE — 3331090001 HH PPS REVENUE CREDIT

## 2017-11-02 PROCEDURE — G0299 HHS/HOSPICE OF RN EA 15 MIN: HCPCS

## 2017-11-02 NOTE — PROGRESS NOTES
[facility] Discharge Follow-Up      Date/Time:  2017 5:06 PM    Patient listed on discharge Daily Census report on 2017. Patient discharged from [facility] for Rehab. Medical History:     Past Medical History:   Diagnosis Date    CAD (coronary artery disease)     palpitations    Diabetes ([address]     Hypertension     Other ill-defined conditions(799.89)     left foot ulcer       Nurse Navigator(NN) contacted the patient by telephone to perform post [address] discharge assessment. Verified  and address with patient as identifiers. Provided introduction to self, and explanation of the Nurses Navigator role. Diet:   Patient reports: Diabetic Diet  Renal Diet    Activity:    Patient reports: mostly moving around the house    Medication:   Performed medication reconciliation with patient, and patient verbalizes understanding of administration of home medications. There were no barriers to obtaining medications identified at this time. Support system:  patient and spouse    Discharge Instructions :  Reviewed discharge instructions with patient. Patient verbalizes understanding of discharge instructions and follow-up care. Red Flags:   Stump pain. Fever. Daily skin checks. Advance Care Planning:   Patient was offered the opportunity to discuss advance care planning:  yes     Does patient have an Advance Directive:  no   If no, did you provide information on Caring Connections? yes     PCP/Specialist follow up: Patient is not scheduled to follow up with Amira Alcantara MD.  Patient stated he will have all appointments scheduled and will call back to schedule PCP appointment. Patient given an opportunity to ask questions. No other clinical/social/functional needs noted. The patient agrees to contact the PCP office for questions related to their healthcare. The patient expressed thanks, offered no additional questions and ended the call. Case management Impression/ plan:    Goals Addressed             Most Recent     Completes daily skin checks   On track (11/2/2017)             Completes daily skin checks in wearing boot and wound vac.  Coordinate pain management plan for patient. On track (11/2/2017)     Reduce risk of comorbid complications related to diabetes (renal disease, heart disease, amputation, and retinopathy). On track (11/2/2017)             Reduce risk of comorbid complications related to diabetes (renal disease, heart disease, amputation, and retinopathy). Patient w/ prepare for limb prosthesis.           Due date:  11/16/2017

## 2017-11-03 ENCOUNTER — HOME CARE VISIT (OUTPATIENT)
Dept: SCHEDULING | Facility: HOME HEALTH | Age: 77
End: 2017-11-03
Payer: MEDICARE

## 2017-11-03 VITALS
OXYGEN SATURATION: 98 % | DIASTOLIC BLOOD PRESSURE: 75 MMHG | TEMPERATURE: 97.9 F | RESPIRATION RATE: 16 BRPM | SYSTOLIC BLOOD PRESSURE: 160 MMHG | HEART RATE: 79 BPM

## 2017-11-03 VITALS
HEART RATE: 64 BPM | OXYGEN SATURATION: 98 % | SYSTOLIC BLOOD PRESSURE: 116 MMHG | TEMPERATURE: 98.4 F | RESPIRATION RATE: 8 BRPM | DIASTOLIC BLOOD PRESSURE: 68 MMHG

## 2017-11-03 PROCEDURE — G0152 HHCP-SERV OF OT,EA 15 MIN: HCPCS

## 2017-11-03 PROCEDURE — 3331090002 HH PPS REVENUE DEBIT

## 2017-11-03 PROCEDURE — 3331090001 HH PPS REVENUE CREDIT

## 2017-11-04 PROCEDURE — 3331090002 HH PPS REVENUE DEBIT

## 2017-11-04 PROCEDURE — 3331090001 HH PPS REVENUE CREDIT

## 2017-11-05 PROCEDURE — 3331090001 HH PPS REVENUE CREDIT

## 2017-11-05 PROCEDURE — 3331090002 HH PPS REVENUE DEBIT

## 2017-11-06 ENCOUNTER — HOME CARE VISIT (OUTPATIENT)
Dept: SCHEDULING | Facility: HOME HEALTH | Age: 77
End: 2017-11-06
Payer: MEDICARE

## 2017-11-06 PROCEDURE — 3331090002 HH PPS REVENUE DEBIT

## 2017-11-06 PROCEDURE — G0151 HHCP-SERV OF PT,EA 15 MIN: HCPCS

## 2017-11-06 PROCEDURE — 3331090001 HH PPS REVENUE CREDIT

## 2017-11-07 VITALS
DIASTOLIC BLOOD PRESSURE: 80 MMHG | TEMPERATURE: 97.7 F | SYSTOLIC BLOOD PRESSURE: 158 MMHG | OXYGEN SATURATION: 98 % | HEART RATE: 71 BPM

## 2017-11-07 PROCEDURE — 3331090001 HH PPS REVENUE CREDIT

## 2017-11-07 PROCEDURE — 3331090002 HH PPS REVENUE DEBIT

## 2017-11-08 ENCOUNTER — HOME CARE VISIT (OUTPATIENT)
Dept: SCHEDULING | Facility: HOME HEALTH | Age: 77
End: 2017-11-08
Payer: MEDICARE

## 2017-11-08 PROCEDURE — G0158 HHC OT ASSISTANT EA 15: HCPCS

## 2017-11-08 PROCEDURE — G0151 HHCP-SERV OF PT,EA 15 MIN: HCPCS

## 2017-11-08 PROCEDURE — 3331090002 HH PPS REVENUE DEBIT

## 2017-11-08 PROCEDURE — 3331090001 HH PPS REVENUE CREDIT

## 2017-11-08 PROCEDURE — G0300 HHS/HOSPICE OF LPN EA 15 MIN: HCPCS

## 2017-11-09 VITALS
HEART RATE: 72 BPM | RESPIRATION RATE: 15 BRPM | TEMPERATURE: 98.5 F | SYSTOLIC BLOOD PRESSURE: 120 MMHG | DIASTOLIC BLOOD PRESSURE: 80 MMHG | OXYGEN SATURATION: 98 %

## 2017-11-09 VITALS
HEART RATE: 84 BPM | TEMPERATURE: 97.4 F | DIASTOLIC BLOOD PRESSURE: 72 MMHG | SYSTOLIC BLOOD PRESSURE: 140 MMHG | OXYGEN SATURATION: 98 %

## 2017-11-09 PROCEDURE — 3331090002 HH PPS REVENUE DEBIT

## 2017-11-09 PROCEDURE — 3331090001 HH PPS REVENUE CREDIT

## 2017-11-10 ENCOUNTER — HOME CARE VISIT (OUTPATIENT)
Dept: SCHEDULING | Facility: HOME HEALTH | Age: 77
End: 2017-11-10
Payer: MEDICARE

## 2017-11-10 VITALS
TEMPERATURE: 97.6 F | SYSTOLIC BLOOD PRESSURE: 126 MMHG | DIASTOLIC BLOOD PRESSURE: 68 MMHG | HEART RATE: 65 BPM | OXYGEN SATURATION: 98 %

## 2017-11-10 VITALS
HEART RATE: 85 BPM | DIASTOLIC BLOOD PRESSURE: 70 MMHG | TEMPERATURE: 98.2 F | OXYGEN SATURATION: 98 % | SYSTOLIC BLOOD PRESSURE: 138 MMHG

## 2017-11-10 PROCEDURE — G0158 HHC OT ASSISTANT EA 15: HCPCS

## 2017-11-10 PROCEDURE — G0157 HHC PT ASSISTANT EA 15: HCPCS

## 2017-11-10 PROCEDURE — 3331090001 HH PPS REVENUE CREDIT

## 2017-11-10 PROCEDURE — 3331090002 HH PPS REVENUE DEBIT

## 2017-11-11 PROCEDURE — 3331090001 HH PPS REVENUE CREDIT

## 2017-11-11 PROCEDURE — 3331090002 HH PPS REVENUE DEBIT

## 2017-11-12 PROCEDURE — 3331090001 HH PPS REVENUE CREDIT

## 2017-11-12 PROCEDURE — 3331090002 HH PPS REVENUE DEBIT

## 2017-11-13 ENCOUNTER — HOME CARE VISIT (OUTPATIENT)
Dept: SCHEDULING | Facility: HOME HEALTH | Age: 77
End: 2017-11-13
Payer: MEDICARE

## 2017-11-13 VITALS
OXYGEN SATURATION: 98 % | TEMPERATURE: 97.6 F | SYSTOLIC BLOOD PRESSURE: 126 MMHG | HEART RATE: 65 BPM | DIASTOLIC BLOOD PRESSURE: 68 MMHG

## 2017-11-13 VITALS — OXYGEN SATURATION: 99 % | DIASTOLIC BLOOD PRESSURE: 70 MMHG | SYSTOLIC BLOOD PRESSURE: 144 MMHG | HEART RATE: 74 BPM

## 2017-11-13 PROCEDURE — 3331090001 HH PPS REVENUE CREDIT

## 2017-11-13 PROCEDURE — G0158 HHC OT ASSISTANT EA 15: HCPCS

## 2017-11-13 PROCEDURE — 3331090002 HH PPS REVENUE DEBIT

## 2017-11-13 PROCEDURE — G0151 HHCP-SERV OF PT,EA 15 MIN: HCPCS

## 2017-11-14 ENCOUNTER — HOME CARE VISIT (OUTPATIENT)
Dept: SCHEDULING | Facility: HOME HEALTH | Age: 77
End: 2017-11-14
Payer: MEDICARE

## 2017-11-14 VITALS
DIASTOLIC BLOOD PRESSURE: 68 MMHG | HEART RATE: 78 BPM | RESPIRATION RATE: 18 BRPM | OXYGEN SATURATION: 98 % | SYSTOLIC BLOOD PRESSURE: 162 MMHG | TEMPERATURE: 97.9 F

## 2017-11-14 VITALS
HEART RATE: 60 BPM | RESPIRATION RATE: 16 BRPM | OXYGEN SATURATION: 98 % | SYSTOLIC BLOOD PRESSURE: 110 MMHG | DIASTOLIC BLOOD PRESSURE: 60 MMHG

## 2017-11-14 PROCEDURE — 3331090002 HH PPS REVENUE DEBIT

## 2017-11-14 PROCEDURE — G0152 HHCP-SERV OF OT,EA 15 MIN: HCPCS

## 2017-11-14 PROCEDURE — 3331090001 HH PPS REVENUE CREDIT

## 2017-11-15 ENCOUNTER — HOME CARE VISIT (OUTPATIENT)
Dept: SCHEDULING | Facility: HOME HEALTH | Age: 77
End: 2017-11-15
Payer: MEDICARE

## 2017-11-15 PROCEDURE — G0151 HHCP-SERV OF PT,EA 15 MIN: HCPCS

## 2017-11-15 PROCEDURE — 3331090002 HH PPS REVENUE DEBIT

## 2017-11-15 PROCEDURE — 3331090001 HH PPS REVENUE CREDIT

## 2017-11-15 PROCEDURE — G0299 HHS/HOSPICE OF RN EA 15 MIN: HCPCS

## 2017-11-16 PROCEDURE — 3331090002 HH PPS REVENUE DEBIT

## 2017-11-16 PROCEDURE — 3331090001 HH PPS REVENUE CREDIT

## 2017-11-17 ENCOUNTER — HOME CARE VISIT (OUTPATIENT)
Dept: SCHEDULING | Facility: HOME HEALTH | Age: 77
End: 2017-11-17
Payer: MEDICARE

## 2017-11-17 VITALS
DIASTOLIC BLOOD PRESSURE: 70 MMHG | HEART RATE: 60 BPM | SYSTOLIC BLOOD PRESSURE: 137 MMHG | OXYGEN SATURATION: 99 % | TEMPERATURE: 98.3 F | RESPIRATION RATE: 18 BRPM

## 2017-11-17 VITALS — SYSTOLIC BLOOD PRESSURE: 160 MMHG | HEART RATE: 69 BPM | DIASTOLIC BLOOD PRESSURE: 70 MMHG | OXYGEN SATURATION: 99 %

## 2017-11-17 PROCEDURE — 3331090002 HH PPS REVENUE DEBIT

## 2017-11-17 PROCEDURE — 3331090001 HH PPS REVENUE CREDIT

## 2017-11-17 PROCEDURE — G0157 HHC PT ASSISTANT EA 15: HCPCS

## 2017-11-18 PROCEDURE — 3331090003 HH PPS REVENUE ADJ

## 2017-11-18 PROCEDURE — 3331090001 HH PPS REVENUE CREDIT

## 2017-11-18 PROCEDURE — 3331090002 HH PPS REVENUE DEBIT

## 2017-11-19 VITALS
TEMPERATURE: 97.4 F | DIASTOLIC BLOOD PRESSURE: 84 MMHG | OXYGEN SATURATION: 97 % | RESPIRATION RATE: 18 BRPM | HEART RATE: 78 BPM | SYSTOLIC BLOOD PRESSURE: 162 MMHG

## 2017-11-19 PROCEDURE — 3331090002 HH PPS REVENUE DEBIT

## 2017-11-19 PROCEDURE — 3331090001 HH PPS REVENUE CREDIT

## 2017-11-20 PROCEDURE — 3331090001 HH PPS REVENUE CREDIT

## 2017-11-20 PROCEDURE — 3331090002 HH PPS REVENUE DEBIT

## 2017-11-21 ENCOUNTER — HOME CARE VISIT (OUTPATIENT)
Dept: SCHEDULING | Facility: HOME HEALTH | Age: 77
End: 2017-11-21
Payer: MEDICARE

## 2017-11-21 PROCEDURE — 400014 HH F/U

## 2017-11-21 PROCEDURE — G0300 HHS/HOSPICE OF LPN EA 15 MIN: HCPCS

## 2017-11-21 PROCEDURE — 3331090001 HH PPS REVENUE CREDIT

## 2017-11-21 PROCEDURE — 3331090002 HH PPS REVENUE DEBIT

## 2017-11-22 PROCEDURE — 3331090001 HH PPS REVENUE CREDIT

## 2017-11-22 PROCEDURE — 3331090002 HH PPS REVENUE DEBIT

## 2017-11-23 PROCEDURE — 3331090002 HH PPS REVENUE DEBIT

## 2017-11-23 PROCEDURE — 3331090001 HH PPS REVENUE CREDIT

## 2017-11-24 PROCEDURE — 3331090001 HH PPS REVENUE CREDIT

## 2017-11-24 PROCEDURE — 3331090002 HH PPS REVENUE DEBIT

## 2017-11-25 PROCEDURE — 3331090002 HH PPS REVENUE DEBIT

## 2017-11-25 PROCEDURE — 3331090001 HH PPS REVENUE CREDIT

## 2017-11-26 PROCEDURE — 3331090001 HH PPS REVENUE CREDIT

## 2017-11-26 PROCEDURE — 3331090002 HH PPS REVENUE DEBIT

## 2017-11-26 RX ORDER — FERROUS GLUCONATE 324(37.5)
TABLET ORAL
Qty: 90 TAB | Refills: 0 | Status: SHIPPED | OUTPATIENT
Start: 2017-11-26 | End: 2018-01-18 | Stop reason: SDUPTHER

## 2017-11-27 PROCEDURE — 3331090002 HH PPS REVENUE DEBIT

## 2017-11-27 PROCEDURE — 3331090001 HH PPS REVENUE CREDIT

## 2017-11-28 ENCOUNTER — HOME CARE VISIT (OUTPATIENT)
Dept: SCHEDULING | Facility: HOME HEALTH | Age: 77
End: 2017-11-28
Payer: MEDICARE

## 2017-11-28 PROCEDURE — G0300 HHS/HOSPICE OF LPN EA 15 MIN: HCPCS

## 2017-11-28 PROCEDURE — 3331090001 HH PPS REVENUE CREDIT

## 2017-11-28 PROCEDURE — 3331090002 HH PPS REVENUE DEBIT

## 2017-11-29 PROCEDURE — 3331090002 HH PPS REVENUE DEBIT

## 2017-11-29 PROCEDURE — 3331090001 HH PPS REVENUE CREDIT

## 2017-11-30 PROCEDURE — 3331090002 HH PPS REVENUE DEBIT

## 2017-11-30 PROCEDURE — 3331090001 HH PPS REVENUE CREDIT

## 2017-12-01 PROCEDURE — 3331090001 HH PPS REVENUE CREDIT

## 2017-12-01 PROCEDURE — 3331090002 HH PPS REVENUE DEBIT

## 2017-12-02 PROCEDURE — 3331090002 HH PPS REVENUE DEBIT

## 2017-12-02 PROCEDURE — 3331090001 HH PPS REVENUE CREDIT

## 2017-12-03 PROCEDURE — 3331090001 HH PPS REVENUE CREDIT

## 2017-12-03 PROCEDURE — 3331090002 HH PPS REVENUE DEBIT

## 2017-12-04 PROCEDURE — 3331090002 HH PPS REVENUE DEBIT

## 2017-12-04 PROCEDURE — 3331090001 HH PPS REVENUE CREDIT

## 2017-12-05 PROCEDURE — 3331090002 HH PPS REVENUE DEBIT

## 2017-12-05 PROCEDURE — 3331090001 HH PPS REVENUE CREDIT

## 2017-12-06 ENCOUNTER — HOME CARE VISIT (OUTPATIENT)
Dept: HOME HEALTH SERVICES | Facility: HOME HEALTH | Age: 77
End: 2017-12-06
Payer: MEDICARE

## 2017-12-06 PROCEDURE — G0300 HHS/HOSPICE OF LPN EA 15 MIN: HCPCS

## 2017-12-06 PROCEDURE — 3331090002 HH PPS REVENUE DEBIT

## 2017-12-06 PROCEDURE — 3331090001 HH PPS REVENUE CREDIT

## 2017-12-07 VITALS
OXYGEN SATURATION: 98 % | DIASTOLIC BLOOD PRESSURE: 76 MMHG | RESPIRATION RATE: 18 BRPM | TEMPERATURE: 97.9 F | SYSTOLIC BLOOD PRESSURE: 136 MMHG | HEART RATE: 65 BPM

## 2017-12-07 PROCEDURE — 3331090002 HH PPS REVENUE DEBIT

## 2017-12-07 PROCEDURE — 3331090001 HH PPS REVENUE CREDIT

## 2017-12-08 PROCEDURE — 3331090001 HH PPS REVENUE CREDIT

## 2017-12-08 PROCEDURE — 3331090002 HH PPS REVENUE DEBIT

## 2017-12-09 PROCEDURE — 3331090001 HH PPS REVENUE CREDIT

## 2017-12-09 PROCEDURE — 3331090002 HH PPS REVENUE DEBIT

## 2017-12-10 PROCEDURE — 3331090002 HH PPS REVENUE DEBIT

## 2017-12-10 PROCEDURE — 3331090001 HH PPS REVENUE CREDIT

## 2017-12-11 PROCEDURE — 3331090001 HH PPS REVENUE CREDIT

## 2017-12-11 PROCEDURE — 3331090002 HH PPS REVENUE DEBIT

## 2017-12-12 ENCOUNTER — HOME CARE VISIT (OUTPATIENT)
Dept: SCHEDULING | Facility: HOME HEALTH | Age: 77
End: 2017-12-12
Payer: MEDICARE

## 2017-12-12 VITALS
OXYGEN SATURATION: 98 % | RESPIRATION RATE: 18 BRPM | DIASTOLIC BLOOD PRESSURE: 84 MMHG | SYSTOLIC BLOOD PRESSURE: 138 MMHG | HEART RATE: 70 BPM | TEMPERATURE: 97.7 F

## 2017-12-12 PROCEDURE — 3331090003 HH PPS REVENUE ADJ

## 2017-12-12 PROCEDURE — 3331090001 HH PPS REVENUE CREDIT

## 2017-12-12 PROCEDURE — 3331090002 HH PPS REVENUE DEBIT

## 2017-12-12 PROCEDURE — G0299 HHS/HOSPICE OF RN EA 15 MIN: HCPCS

## 2017-12-13 VITALS
DIASTOLIC BLOOD PRESSURE: 72 MMHG | DIASTOLIC BLOOD PRESSURE: 78 MMHG | TEMPERATURE: 98 F | RESPIRATION RATE: 18 BRPM | HEART RATE: 82 BPM | HEART RATE: 80 BPM | RESPIRATION RATE: 18 BRPM | OXYGEN SATURATION: 98 % | SYSTOLIC BLOOD PRESSURE: 130 MMHG | SYSTOLIC BLOOD PRESSURE: 140 MMHG | TEMPERATURE: 98.2 F

## 2017-12-26 ENCOUNTER — OFFICE VISIT (OUTPATIENT)
Dept: INTERNAL MEDICINE CLINIC | Age: 77
End: 2017-12-26

## 2017-12-26 VITALS
OXYGEN SATURATION: 98 % | BODY MASS INDEX: 30.33 KG/M2 | TEMPERATURE: 97.9 F | DIASTOLIC BLOOD PRESSURE: 70 MMHG | RESPIRATION RATE: 18 BRPM | WEIGHT: 262.1 LBS | SYSTOLIC BLOOD PRESSURE: 133 MMHG | HEIGHT: 78 IN | HEART RATE: 75 BPM

## 2017-12-26 DIAGNOSIS — I87.2 VENOUS INSUFFICIENCY: ICD-10-CM

## 2017-12-26 DIAGNOSIS — I10 ESSENTIAL HYPERTENSION: ICD-10-CM

## 2017-12-26 DIAGNOSIS — D64.9 ANEMIA, UNSPECIFIED TYPE: ICD-10-CM

## 2017-12-26 DIAGNOSIS — E78.5 DYSLIPIDEMIA: ICD-10-CM

## 2017-12-26 NOTE — MR AVS SNAPSHOT
Visit Information Date & Time Provider Department Dept. Phone Encounter #  
 12/26/2017 11:30 AM Jhonathan Dumont [facility] Sports [facility] and [address] [id number] Follow-up Instructions Return in about 3 months (around 3/26/2018). Upcoming Health Maintenance Date Due MICROALBUMIN Q1 10/5/2016 GLAUCOMA SCREENING Q2Y 3/31/2017 FOOT EXAM Q1 4/11/2017 MEDICARE YEARLY EXAM 4/12/2017 EYE EXAM RETINAL OR DILATED Q1 1/7/2018* Pneumococcal 65+ Low/Medium Risk (2 of 2 - PPSV23) 2/6/2018 LIPID PANEL Q1 2/6/2018 HEMOGLOBIN A1C Q6M 4/10/2018 DTaP/Tdap/Td series (2 - Td) 2/6/2027 *Topic was postponed. The date shown is not the original due date. Allergies as of 12/26/2017  Review Complete On: 12/26/2017 By: Janki Stark LPN No Known Allergies Current Immunizations  Reviewed on 2/28/2011 Name Date Influenza Vaccine (Quad) PF 10/11/2017 10:25 AM  
 Influenza Vaccine Whole 11/1/2010 Not reviewed this visit You Were Diagnosed With   
  
 Codes Comments DM (diabetes mellitus), type 2, uncontrolled, periph vascular complic ([facility] [address]    -  Primary ICD-10-CM: E11.51, E11.65 ICD-9-CM: 250.72, 443.81 Essential hypertension     ICD-10-CM: I10 
ICD-9-CM: 401.9 Venous insufficiency     ICD-10-CM: I87.2 ICD-9-CM: 459.81 Anemia, unspecified type     ICD-10-CM: D64.9 ICD-9-CM: 595. 9 Vitals BP Pulse Temp Resp Height(growth percentile) Weight(growth percentile) 133/70 75 97.9 °F (36.6 °C) (Oral) 18 6' 9\" (2.057 m) 262 lb 1.6 oz (118.9 kg) SpO2 BMI Smoking Status 98% 28.09 kg/m2 Former Smoker Vitals History BMI and BSA Data Body Mass Index Body Surface Area 28.09 kg/m 2 2.61 m 2 Preferred Pharmacy Pharmacy Name Phone [address] [phone] Your Updated Medication List  
  
   
 This list is accurate as of: 12/26/17 12:35 PM.  Always use your most recent med list.  
  
  
  
  
 aspirin 81 mg tablet Take 81 mg by mouth. ferrous gluconate 324 mg (37.5 mg iron) tablet TAKE ONE TABLET BY MOUTH THREE TIMES DAILY WITH  A  MEAL  
  
 insulin lispro 100 unit/mL injection Commonly known as:  HUMALOG  
8 units sq 3 times daily acmeals  
  
 lisinopril 40 mg tablet Commonly known as:  Marilynne Shows Take 1 Tab by mouth daily. metoprolol tartrate 25 mg tablet Commonly known as:  LOPRESSOR  
TAKE ONE TABLET BY MOUTH TWICE DAILY NovoLIN 70/30 100 unit/mL (70-30) injection Generic drug:  insulin NPH/insulin regular INJECT 14 UNITS BEFORE BREAKFAST AND DINNER. oxyCODONE-acetaminophen 5-325 mg per tablet Commonly known as:  PERCOCET Take 1-2 Tabs by mouth every four (4) hours as needed. Max Daily Amount: 12 Tabs. senna 8.6 mg tablet Commonly known as:  [employer] Take 2 Tabs by mouth nightly. simvastatin 20 mg tablet Commonly known as:  ZOCOR  
TAKE ONE TABLET BY MOUTH NIGHTLY. We Performed the Following CBC WITH AUTOMATED DIFF [33352 CPT(R)] FRUCTOSAMINE [18078 CPT(R)] HM DIABETES FOOT EXAM [HM7 Custom] Follow-up Instructions Return in about 3 months (around 3/26/2018). Introducing \[facility]\"" & HEALTH SERVICES! Marlo Gallo introduces [employer] patient portal. Now you can access parts of your medical record, email your doctor's office, and request medication refills online. 1. In your internet browser, go to [email or web address] 2. Click on the First Time User? Click Here link in the Sign In box. You will see the New Member Sign Up page. 3. Enter your [employer] Access Code exactly as it appears below. You will not need to use this code after youve completed the sign-up process. If you do not sign up before the expiration date, you must request a new code. · [employer] Access Code: [email or web address] Expires: 3/6/2018 10:56 PM 
 
4. Enter the last four digits of your Social Security Number (xxxx) and Date of Birth (mm/dd/yyyy) as indicated and click Submit. You will be taken to the next sign-up page. 5. Create a [employer] ID. This will be your [employer] login ID and cannot be changed, so think of one that is secure and easy to remember. 6. Create a [employer] password. You can change your password at any time. 7. Enter your Password Reset Question and Answer. This can be used at a later time if you forget your password. 8. Enter your e-mail address. You will receive e-mail notification when new information is available in [address] 9. Click Sign Up. You can now view and download portions of your medical record. 10. Click the Download Summary menu link to download a portable copy of your medical information. If you have questions, please visit the Frequently Asked Questions section of the [employer] website. Remember, [employer] is NOT to be used for urgent needs. For medical emergencies, dial 911. Now available from your iPhone and Android! Please provide this summary of care documentation to your next provider. Your primary care clinician is listed as Liz Fam. If you have any questions after today's visit, please call [phone].

## 2017-12-26 NOTE — PROGRESS NOTES
1. Have you been to the ER, urgent care clinic since your last visit? Hospitalized since your last visit? No    2. Have you seen or consulted any other health care providers outside of the [address] [place] [address] since your last visit? Include any pap smears or colon screening.  No

## 2017-12-26 NOTE — PROGRESS NOTES
[facility]  [place] [address]  [address] [facility] [address]  Phone:  [phone]  Fax: [phone]       Chief Complaint   Patient presents with    Diabetes     f/u   . SUBJECTIVE:    Pb Avalos is a 68 y.o. male   Comes in for a return visit stating that se has done fairly well. 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 He now has a prosthesis. He wants to soon becoming independent of walking devices such as his walker. He has had complete healing of the stump. His diabetes allegedly is doing well except for last week. He is taking a premixed insulin ac breakfast and dinner. He has not had any interventional hypoglycemia. He has a past history of primary hypertension and dyslipidemia. MedDATA/gwo            Current Outpatient Prescriptions   Medication Sig Dispense Refill    ferrous gluconate 324 mg (37.5 mg iron) tablet TAKE ONE TABLET BY MOUTH THREE TIMES DAILY WITH  A  MEAL 90 Tab 0    NOVOLIN 70/30 100 unit/mL (70-30) injection INJECT 14 UNITS BEFORE BREAKFAST AND DINNER. 1 Vial 11    insulin lispro (HUMALOG) 100 unit/mL injection 8 units sq 3 times daily acmeals 1 Vial 0    oxyCODONE-acetaminophen (PERCOCET) 5-325 mg per tablet Take 1-2 Tabs by mouth every four (4) hours as needed. Max Daily Amount: 12 Tabs. 30 Tab 0    senna (SENOKOT) 8.6 mg tablet Take 2 Tabs by mouth nightly. 60 Tab 0    metoprolol tartrate (LOPRESSOR) 25 mg tablet TAKE ONE TABLET BY MOUTH TWICE DAILY 180 Tab 3    lisinopril (PRINIVIL, ZESTRIL) 40 mg tablet Take 1 Tab by mouth daily. 90 Tab 3    simvastatin (ZOCOR) 20 mg tablet TAKE ONE TABLET BY MOUTH NIGHTLY. 90 Tab 3    aspirin 81 mg tablet Take 81 mg by mouth daily.        Past Medical History:   Diagnosis Date    CAD (coronary artery disease)     palpitations    Diabetes ([address]     Hypertension     Other ill-defined conditions(799.89)     left foot ulcer     Past Surgical History:   Procedure Laterality Date    HX HEENT      bilateral cataract surgery    HX ORTHOPAEDIC      \"2 surgeries on right foot\"    HX OTHER SURGICAL      surgery to left foot ulcer, PICC right arm     No Known Allergies      REVIEW OF SYSTEMS:  General: negative for - chills or fever  ENT: negative for - headaches, nasal congestion or tinnitus  Respiratory: negative for - cough, hemoptysis, shortness of breath or wheezing  Cardiovascular : negative for - chest pain, edema, palpitations or shortness of breath  Gastrointestinal: negative for - abdominal pain, blood in stools, heartburn or nausea/vomiting  Genito-Urinary: no dysuria, trouble voiding, or hematuria  Musculoskeletal: negative for - gait disturbance, joint pain, joint stiffness or joint swelling  Neurological: no TIA or stroke symptoms  Hematologic: no bruises, no bleeding, no swollen glands  Integument: no lumps, mole changes, nail changes or rash  Endocrine: no malaise/lethargy or unexpected weight changes      Social History     Social History    Marital status:      Spouse name: N/A    Number of children: 4    Years of education: 12     Occupational History    employed-----formerly retired      Social History Main Topics    Smoking status: Former Smoker    Smokeless tobacco: Never Used    Alcohol use No    Drug use: No    Sexual activity: Not Asked     Other Topics Concern    None     Social History Narrative     Family History   Problem Relation Age of Onset    Cancer Father     No Known Problems Mother        OBJECTIVE:    Visit Vitals    /70    Pulse 75    Temp 97.9 °F (36.6 °C) (Oral)    Resp 18    Ht 6' 9\" (2.057 m)    Wt 262 lb 1.6 oz (118.9 kg)    SpO2 98%    BMI 28.09 kg/m2     CONSTITUTIONAL: well , well nourished, appears age appropriate  EYES: perrla, eom intact  ENMT:moist mucous membranes, pharynx clear  NECK: supple.  Thyroid normal  RESPIRATORY: Chest: clear to ascultation and percussion   CARDIOVASCULAR: Heart: regular rate and rhythm  GASTROINTESTINAL: Abdomen: soft, bowel sounds active  HEMATOLOGIC: no pathological lymph nodes palpated  MUSCULOSKELETAL: Extremities: no edema, pulse 1+,R BK amp, left foot deformity  INTEGUMENT: No unusual rashes or suspicious skin lesions noted. Nails appear normal.  NEUROLOGIC: non-focal exam   MENTAL STATUS: alert and oriented, appropriate affect      ASSESSMENT:  1. DM (diabetes mellitus), type 2, uncontrolled, periph vascular complic ([address]    2. Essential hypertension    3. Venous insufficiency    4. Anemia, unspecified type    5. Dyslipidemia        PLAN:    1. His diabetes hopefully is doing well. I will await the results of his fructosamine. 2. Blood pressure is excellent today, no adjustments are made. 3. His venous insufficiency is also stable. He continues to have swelling of his left leg currently. He is wearing a support stocking on that extremity. He has no swelling of his thighs. 4. He had moderate anemia when he was hospitalized. I will await the results of his CBC. 5. I encouraged the patient to remain as physically active as possible. 6. He will continue statin as prescribed in view of secondary cardiovascular risk status created by the peripheral vascular occlusive disease. .  Orders Placed This Encounter    CBC WITH AUTOMATED DIFF    FRUCTOSAMINE         Follow-up Disposition:  Return in about 3 months (around 3/26/2018).       Balaji Scott MD

## 2017-12-27 LAB
BASOPHILS # BLD AUTO: 0 X10E3/UL (ref 0–0.2)
BASOPHILS NFR BLD AUTO: 0 %
EOSINOPHIL # BLD AUTO: 0.1 X10E3/UL (ref 0–0.4)
EOSINOPHIL NFR BLD AUTO: 2 %
ERYTHROCYTE [DISTWIDTH] IN BLOOD BY AUTOMATED COUNT: 16.6 % (ref 12.3–15.4)
FRUCTOSAMINE SERPL-SCNC: 347 UMOL/L (ref 0–285)
HCT VFR BLD AUTO: 35.8 % (ref 37.5–51)
HGB BLD-MCNC: 11.6 G/DL (ref 13–17.7)
IMM GRANULOCYTES # BLD: 0 X10E3/UL (ref 0–0.1)
IMM GRANULOCYTES NFR BLD: 0 %
LYMPHOCYTES # BLD AUTO: 2.2 X10E3/UL (ref 0.7–3.1)
LYMPHOCYTES NFR BLD AUTO: 36 %
MCH RBC QN AUTO: 26.8 PG (ref 26.6–33)
MCHC RBC AUTO-ENTMCNC: 32.4 G/DL (ref 31.5–35.7)
MCV RBC AUTO: 83 FL (ref 79–97)
MONOCYTES # BLD AUTO: 0.5 X10E3/UL (ref 0.1–0.9)
MONOCYTES NFR BLD AUTO: 8 %
NEUTROPHILS # BLD AUTO: 3.2 X10E3/UL (ref 1.4–7)
NEUTROPHILS NFR BLD AUTO: 54 %
PLATELET # BLD AUTO: 259 X10E3/UL (ref 150–379)
RBC # BLD AUTO: 4.33 X10E6/UL (ref 4.14–5.8)
WBC # BLD AUTO: 6 X10E3/UL (ref 3.4–10.8)

## 2018-01-02 ENCOUNTER — TELEPHONE (OUTPATIENT)
Dept: INTERNAL MEDICINE CLINIC | Age: 78
End: 2018-01-02

## 2018-01-21 RX ORDER — FERROUS GLUCONATE 324(38)MG
TABLET ORAL
Qty: 90 TAB | Refills: 0 | Status: SHIPPED | OUTPATIENT
Start: 2018-01-21 | End: 2019-07-05

## 2018-03-27 ENCOUNTER — OFFICE VISIT (OUTPATIENT)
Dept: INTERNAL MEDICINE CLINIC | Age: 78
End: 2018-03-27

## 2018-03-27 VITALS
SYSTOLIC BLOOD PRESSURE: 134 MMHG | DIASTOLIC BLOOD PRESSURE: 58 MMHG | RESPIRATION RATE: 20 BRPM | HEIGHT: 78 IN | WEIGHT: 278.6 LBS | OXYGEN SATURATION: 98 % | BODY MASS INDEX: 32.23 KG/M2 | HEART RATE: 68 BPM | TEMPERATURE: 97.9 F

## 2018-03-27 DIAGNOSIS — E11.610 CHARCOT FOOT DUE TO DIABETES MELLITUS (HCC): Chronic | ICD-10-CM

## 2018-03-27 DIAGNOSIS — E66.3 OVERWEIGHT (BMI 25.0-29.9): ICD-10-CM

## 2018-03-27 DIAGNOSIS — G61.89 OTHER INFLAMMATORY POLYNEUROPATHIES (HCC): ICD-10-CM

## 2018-03-27 DIAGNOSIS — E78.5 DYSLIPIDEMIA: ICD-10-CM

## 2018-03-27 DIAGNOSIS — I10 ESSENTIAL HYPERTENSION: ICD-10-CM

## 2018-03-27 PROBLEM — M86.9 OSTEOMYELITIS (HCC): Status: RESOLVED | Noted: 2017-10-10 | Resolved: 2018-03-27

## 2018-03-27 PROBLEM — L97.502 FOOT ULCER WITH FAT LAYER EXPOSED (HCC): Status: RESOLVED | Noted: 2017-09-07 | Resolved: 2018-03-27

## 2018-03-27 NOTE — MR AVS SNAPSHOT
[address] [place] [address] [facility] [address] 
[phone] Patient: Andrei Patel MRN: [id number] CHB:[id number]/4/[id number] Visit Information Date & Time Provider Department Dept. Phone Encounter #  
 3/27/2018 10:00 AM Chanel Bland MD [facility] Sports Medicine and [place] [address] [id number] Upcoming Health Maintenance Date Due MICROALBUMIN Q1 10/5/2016 GLAUCOMA SCREENING Q2Y 3/31/2017 LIPID PANEL Q1 2/6/2018 MEDICARE YEARLY EXAM 3/14/2018 HEMOGLOBIN A1C Q6M 4/10/2018 FOOT EXAM Q1 12/26/2018 DTaP/Tdap/Td series (2 - Td) 2/6/2027 Allergies as of 3/27/2018  Review Complete On: 3/27/2018 By: Rafael Najera No Known Allergies Current Immunizations  Reviewed on 2/28/2011 Name Date Influenza Vaccine (Quad) PF 10/11/2017 10:25 AM  
 Influenza Vaccine Whole 11/1/2010 Not reviewed this visit You Were Diagnosed With   
  
 Codes Comments DM (diabetes mellitus), type 2, uncontrolled, periph vascular complic ([facility]    -  Primary ICD-10-CM: E11.51, E11.65 ICD-9-CM: 250.72, 443.81 Overweight (BMI 25.0-29. 9)     ICD-10-CM: T67.3 ICD-9-CM: 278.02 Essential hypertension     ICD-10-CM: I10 
ICD-9-CM: 401.9 Dyslipidemia     ICD-10-CM: E78.5 ICD-9-CM: 272.4 Other inflammatory polyneuropathies ([facility]     ICD-10-CM: D44.61 ICD-9-CM: 357.89 Vitals BP Pulse Temp Resp Height(growth percentile) Weight(growth percentile) 134/58 (BP 1 Location: Right arm, BP Patient Position: Sitting) 68 97.9 °F (36.6 °C) (Oral) 20 6' 9\" (2.057 m) 278 lb 9.6 oz (126.4 kg) SpO2 BMI Smoking Status 98% 29.85 kg/m2 Former Smoker Vitals History BMI and BSA Data Body Mass Index Body Surface Area  
 29.85 kg/m 2 2.69 m 2 [facility] [address] [facility] [address] [phone] Your Updated Medication List  
  
   
This list is accurate as of 3/27/18 11:35 AM.  Always use your most recent med list.  
  
  
  
  
 aspirin 81 mg tablet Take 81 mg by mouth daily. ferrous gluconate 324 mg (38 mg iron) tablet TAKE ONE TABLET BY MOUTH THREE TIMES DAILY WITH  A  MEAL  
  
 insulin lispro 100 unit/mL injection Commonly known as:  HUMALOG  
8 units sq 3 times daily acmeals  
  
 insulin NPH/insulin regular 100 unit/mL (70-30) injection Commonly known as:  NovoLIN 70/30 U-100 Insulin Take 24 units before breakfast and 24 units before dinner. lisinopril 40 mg tablet Commonly known as:  Ladona Dunks Take 1 Tab by mouth daily. metoprolol tartrate 25 mg tablet Commonly known as:  LOPRESSOR  
TAKE ONE TABLET BY MOUTH TWICE DAILY  
  
 oxyCODONE-acetaminophen 5-325 mg per tablet Commonly known as:  PERCOCET Take 1-2 Tabs by mouth every four (4) hours as needed. Max Daily Amount: 12 Tabs. senna 8.6 mg tablet Commonly known as:  Peter [employer] Take 2 Tabs by mouth nightly. simvastatin 20 mg tablet Commonly known as:  ZOCOR  
TAKE ONE TABLET BY MOUTH NIGHTLY. We Performed the Following HEMOGLOBIN A1C WITH EAG [80465 CPT(R)] LIPID PANEL [09417 CPT(R)] MICROALBUMIN, UR, RAND W/ MICROALB/CREAT RATIO [id number] CPT(R)] Introducing [facility] & HEALTH SERVICES! Dear Kimberly Villareal: Thank you for requesting a [employer] account. Our records indicate that you already have an active [employer] account. You can access your account anytime at [email or web address] Did you know that you can access your hospital and ER discharge instructions at any time in [employer]? You can also review all of your test results from your hospital stay or ER visit. Additional Information If you have questions, please visit the Frequently Asked Questions section of the [employer] website at [email or web address] Remember, MyChart is NOT to be used for urgent needs. For medical emergencies, dial 911. Now available from your iPhone and Android! Please provide this summary of care documentation to your next provider. Your primary care clinician is listed as Liz Fam. If you have any questions after today's visit, please call [phone].

## 2018-03-27 NOTE — PROGRESS NOTES
Chief Complaint   Patient presents with   [address] [place] [address] Annual Wellness Visit     1. Have you been to the ER, urgent care clinic since your last visit? Hospitalized since your last visit? No    2. Have you seen or consulted any other health care providers outside of the [address] [place] [address] since your last visit? Include any pap smears or colon screening.  No

## 2018-03-27 NOTE — PROGRESS NOTES
[facility]  [facility]  [address] [place] [address]  Phone:  [phone]  Fax: [phone]       Chief Complaint   Patient presents with   [facility] Wellness Visit   . SUBJECTIVE:    Tania Lr is a 68 y.o. male comes in for return visit having done poorly as far as his diabetes is concerned. He has literally gained 30 pounds. This is related to dietary indiscretion, eating indiscriminantly. He checks his sugars but he does nothing with the data that he gets. This is not practical and is not helping him in any way. He has a disconnect between his diabetic control and the microvascular complications which ultimately led to his right BK amputation. He is currently taking premixed insulin 24 units twice a day. He has severe peripheral neuropathy with development of a Charcot joint. Fortunately, he has no diabetic nephropathy. He has a past history of primary hypertension and dyslipidemia. Current Outpatient Prescriptions   Medication Sig Dispense Refill    insulin NPH/insulin regular (NOVOLIN 70/30) 100 unit/mL (70-30) injection Take 24 units before breakfast and 24 units before dinner. 2 Vial 11    ferrous gluconate 324 mg (38 mg iron) tablet TAKE ONE TABLET BY MOUTH THREE TIMES DAILY WITH  A  MEAL 90 Tab 0    oxyCODONE-acetaminophen (PERCOCET) 5-325 mg per tablet Take 1-2 Tabs by mouth every four (4) hours as needed. Max Daily Amount: 12 Tabs. 30 Tab 0    senna (SENOKOT) 8.6 mg tablet Take 2 Tabs by mouth nightly. 60 Tab 0    metoprolol tartrate (LOPRESSOR) 25 mg tablet TAKE ONE TABLET BY MOUTH TWICE DAILY 180 Tab 3    lisinopril (PRINIVIL, ZESTRIL) 40 mg tablet Take 1 Tab by mouth daily. 90 Tab 3    simvastatin (ZOCOR) 20 mg tablet TAKE ONE TABLET BY MOUTH NIGHTLY. 90 Tab 3    aspirin 81 mg tablet Take 81 mg by mouth daily.       insulin lispro (HUMALOG) 100 unit/mL injection 8 units sq 3 times daily acmeals 1 Vial 0 Past Medical History:   Diagnosis Date    CAD (coronary artery disease)     palpitations    Diabetes ([facility] [address]     Hypertension     Other ill-defined conditions(799.89)     left foot ulcer     Past Surgical History:   Procedure Laterality Date    HX AMPUTATION      Status post right BK amputation    HX HEENT      bilateral cataract surgery    HX ORTHOPAEDIC      \"2 surgeries on right foot\"    HX OTHER SURGICAL      surgery to left foot ulcer, PICC right arm     No Known Allergies      REVIEW OF SYSTEMS:  General: negative for - chills or fever  ENT: negative for - headaches, nasal congestion or tinnitus  Respiratory: negative for - cough, hemoptysis, shortness of breath or wheezing  Cardiovascular : negative for - chest pain, edema, palpitations or shortness of breath  Gastrointestinal: negative for - abdominal pain, blood in stools, heartburn or nausea/vomiting  Genito-Urinary: no dysuria, trouble voiding, or hematuria  Musculoskeletal: negative for - gait disturbance, joint pain, joint stiffness or joint swelling  Neurological: no TIA or stroke symptoms  Hematologic: no bruises, no bleeding, no swollen glands  Integument: no lumps, mole changes, nail changes or rash  Endocrine: no malaise/lethargy or unexpected weight changes      Social History     Social History    Marital status:      Spouse name: N/A    Number of children: 4    Years of education: 12     Occupational History    employed-----formerly retired      Social History Main Topics    Smoking status: Former Smoker    Smokeless tobacco: Never Used    Alcohol use No    Drug use: No    Sexual activity: Not Currently     Other Topics Concern    None     Social History Narrative     Family History   Problem Relation Age of Onset    Cancer Father     No Known Problems Mother        OBJECTIVE:    Visit Vitals    /58 (BP 1 Location: Right arm, BP Patient Position: Sitting)    Pulse 68    Temp 97.9 °F (36.6 °C) (Oral)    Resp 20    Ht 6' 9\" (2.057 m)    Wt 278 lb 9.6 oz (126.4 kg)    SpO2 98%    BMI 29.85 kg/m2     CONSTITUTIONAL: well , well nourished, appears age appropriate  EYES: perrla, eom intact  ENMT:moist mucous membranes, pharynx clear  NECK: supple. Thyroid normal  RESPIRATORY: Chest: clear to ascultation and percussion   CARDIOVASCULAR: Heart: regular rate and rhythm  GASTROINTESTINAL: Abdomen: soft, bowel sounds active  HEMATOLOGIC: no pathological lymph nodes palpated  MUSCULOSKELETAL: Extremities: no edema, pulse 1+, right BK amputation with prosthesis  INTEGUMENT: No unusual rashes or suspicious skin lesions noted. Nails appear normal.  NEUROLOGIC: non-focal exam   MENTAL STATUS: alert and oriented, appropriate affect      ASSESSMENT:  1. DM (diabetes mellitus), type 2, uncontrolled, periph vascular complic ([facility] [address]    2. Overweight (BMI 25.0-29.9)    3. Essential hypertension    4. Dyslipidemia    5. Other inflammatory polyneuropathies ([facility] [address]    6. Charcot foot due to diabetes mellitus ([facility] [address]      Diagnoses and all orders for this visit:    1. DM (diabetes mellitus), type 2, uncontrolled, periph vascular complic ([facility] [address]  Assessment & Plan:  Stable, based on history, physical exam and review of pertinent labs, studies and medications; meds reconciled; lifestyle modifications recommended, medication compliance emphasized. Key Antihyperglycemic Medications             insulin NPH/insulin regular (NOVOLIN 70/30) 100 unit/mL (70-30) injection  (Taking) Take 24 units before breakfast and 24 units before dinner. insulin lispro (HUMALOG) 100 unit/mL injection 8 units sq 3 times daily acmeals        Other Key Diabetic Medications             lisinopril (PRINIVIL, ZESTRIL) 40 mg tablet  (Taking) Take 1 Tab by mouth daily. simvastatin (ZOCOR) 20 mg tablet  (Taking) TAKE ONE TABLET BY MOUTH NIGHTLY.         Lab Results   Component Value Date/Time    Hemoglobin A1c 7.8 10/10/2017 03:24 PM    Glucose 177 10/21/2017 04:45 AM Creatinine 1.02 10/21/2017 04:45 AM    Cholesterol, total 152 02/06/2017 03:33 PM    HDL Cholesterol 44 02/06/2017 03:33 PM    LDL, calculated 95 02/06/2017 03:33 PM    Triglyceride 64 02/06/2017 03:33 PM     Diabetic Foot and Eye Exam HM Status   Topic Date Due    Diabetic Foot Care  12/26/2018       Orders:  -     MICROALBUMIN, UR, RAND  -     HEMOGLOBIN A1C WITH EAG    2. Overweight (BMI 25.0-29.9)    3. Essential hypertension    4. Dyslipidemia  -     LIPID PANEL    5. Other inflammatory polyneuropathies ([facility]    6. Charcot foot due to diabetes mellitus [facility])  Assessment & Plan:  Stable, based on history, physical exam and review of pertinent labs, studies and medications; meds reconciled; continue current treatment plan. Key Antihyperglycemic Medications             insulin NPH/insulin regular (NOVOLIN 70/30) 100 unit/mL (70-30) injection  (Taking) Take 24 units before breakfast and 24 units before dinner. insulin lispro (HUMALOG) 100 unit/mL injection 8 units sq 3 times daily acmeals        Other Key Diabetic Medications             lisinopril (PRINIVIL, ZESTRIL) 40 mg tablet  (Taking) Take 1 Tab by mouth daily. simvastatin (ZOCOR) 20 mg tablet  (Taking) TAKE ONE TABLET BY MOUTH NIGHTLY. Lab Results   Component Value Date/Time    Hemoglobin A1c 7.8 10/10/2017 03:24 PM    Glucose 177 10/21/2017 04:45 AM    Creatinine 1.02 10/21/2017 04:45 AM    Cholesterol, total 152 02/06/2017 03:33 PM    HDL Cholesterol 44 02/06/2017 03:33 PM    LDL, calculated 95 02/06/2017 03:33 PM    Triglyceride 64 02/06/2017 03:33 PM     Diabetic Foot and Eye Exam HM Status   Topic Date Due    Diabetic Foot Care  12/26/2018             PLAN:    1. His diabetes is poorly controlled. We talked about this at length. The importance of eating meals is emphasized repetitively. I will empirically increase his insulin to 30 units every morning and 26 units every evening. I will see how he fares with this.   This is a paltry increase but emphasis is again focused on eating at the same time every day. 2. He has gained a considerable amount of weight. This will not disappear and if anything he will add to this based on his caloric intake. This is going to aggravate his insulin resistance and will have an impact on his musculoskeletal function. 3. He will follow-up with his rehab doctors for his right BK amputation. 4. Blood pressure is excellent today, no adjustments are made. 5. He will continue statin for his increased cardiovascular risk which is actually secondary at this point based on the existence of peripheral vascular occlusive disease. Follow-up Disposition:  Return in about 4 weeks (around 4/24/2018).       Micehlle Dallas MD

## 2018-03-28 LAB
ALBUMIN/CREAT UR: 114.8 MG/G CREAT (ref 0–30)
CHOLEST SERPL-MCNC: 145 MG/DL (ref 100–199)
CREAT UR-MCNC: 49.3 MG/DL
EST. AVERAGE GLUCOSE BLD GHB EST-MCNC: 203 MG/DL
HBA1C MFR BLD: 8.7 % (ref 4.8–5.6)
HDLC SERPL-MCNC: 41 MG/DL
LDLC SERPL CALC-MCNC: 93 MG/DL (ref 0–99)
MICROALBUMIN UR-MCNC: 56.6 UG/ML
TRIGL SERPL-MCNC: 57 MG/DL (ref 0–149)
VLDLC SERPL CALC-MCNC: 11 MG/DL (ref 5–40)

## 2018-03-28 NOTE — ASSESSMENT & PLAN NOTE
Stable, based on history, physical exam and review of pertinent labs, studies and medications; meds reconciled; continue current treatment plan. Key Antihyperglycemic Medications             insulin NPH/insulin regular (NOVOLIN 70/30) 100 unit/mL (70-30) injection  (Taking) Take 24 units before breakfast and 24 units before dinner. insulin lispro (HUMALOG) 100 unit/mL injection 8 units sq 3 times daily acmeals        Other Key Diabetic Medications             lisinopril (PRINIVIL, ZESTRIL) 40 mg tablet  (Taking) Take 1 Tab by mouth daily. simvastatin (ZOCOR) 20 mg tablet  (Taking) TAKE ONE TABLET BY MOUTH NIGHTLY.         Lab Results   Component Value Date/Time    Hemoglobin A1c 7.8 10/10/2017 03:24 PM    Glucose 177 10/21/2017 04:45 AM    Creatinine 1.02 10/21/2017 04:45 AM    Cholesterol, total 152 02/06/2017 03:33 PM    HDL Cholesterol 44 02/06/2017 03:33 PM    LDL, calculated 95 02/06/2017 03:33 PM    Triglyceride 64 02/06/2017 03:33 PM     Diabetic Foot and Eye Exam HM Status   Topic Date Due    Diabetic Foot Care  12/26/2018

## 2018-03-28 NOTE — ASSESSMENT & PLAN NOTE
Stable, based on history, physical exam and review of pertinent labs, studies and medications; meds reconciled; lifestyle modifications recommended, medication compliance emphasized. Key Antihyperglycemic Medications             insulin NPH/insulin regular (NOVOLIN 70/30) 100 unit/mL (70-30) injection  (Taking) Take 24 units before breakfast and 24 units before dinner. insulin lispro (HUMALOG) 100 unit/mL injection 8 units sq 3 times daily acmeals        Other Key Diabetic Medications             lisinopril (PRINIVIL, ZESTRIL) 40 mg tablet  (Taking) Take 1 Tab by mouth daily. simvastatin (ZOCOR) 20 mg tablet  (Taking) TAKE ONE TABLET BY MOUTH NIGHTLY.         Lab Results   Component Value Date/Time    Hemoglobin A1c 7.8 10/10/2017 03:24 PM    Glucose 177 10/21/2017 04:45 AM    Creatinine 1.02 10/21/2017 04:45 AM    Cholesterol, total 152 02/06/2017 03:33 PM    HDL Cholesterol 44 02/06/2017 03:33 PM    LDL, calculated 95 02/06/2017 03:33 PM    Triglyceride 64 02/06/2017 03:33 PM     Diabetic Foot and Eye Exam HM Status   Topic Date Due    Diabetic Foot Care  12/26/2018

## 2018-05-01 ENCOUNTER — OFFICE VISIT (OUTPATIENT)
Dept: INTERNAL MEDICINE CLINIC | Age: 78
End: 2018-05-01

## 2018-05-01 VITALS
BODY MASS INDEX: 30.56 KG/M2 | SYSTOLIC BLOOD PRESSURE: 151 MMHG | TEMPERATURE: 98.2 F | WEIGHT: 264.1 LBS | HEIGHT: 78 IN | DIASTOLIC BLOOD PRESSURE: 82 MMHG | OXYGEN SATURATION: 98 % | HEART RATE: 79 BPM | RESPIRATION RATE: 18 BRPM

## 2018-05-01 DIAGNOSIS — Z13.39 SCREENING FOR ALCOHOLISM: ICD-10-CM

## 2018-05-01 DIAGNOSIS — Z89.511 STATUS POST BELOW KNEE AMPUTATION OF RIGHT LOWER EXTREMITY (HCC): ICD-10-CM

## 2018-05-01 DIAGNOSIS — Z00.00 WELLNESS EXAMINATION: ICD-10-CM

## 2018-05-01 DIAGNOSIS — I10 ESSENTIAL HYPERTENSION: ICD-10-CM

## 2018-05-01 DIAGNOSIS — E78.5 DYSLIPIDEMIA: ICD-10-CM

## 2018-05-01 DIAGNOSIS — Z13.31 SCREENING FOR DEPRESSION: ICD-10-CM

## 2018-05-01 NOTE — PROGRESS NOTES
[address] [facility] and Primary Care  [place] [address]  [address] [facility] [address]  Phone:  [phone]  Fax: [phone]       Chief Complaint   Patient presents with   [facility] Wellness Visit   . SUBJECTIVE:    Carlie Abreu is a 68 y.o. male comes in for return visit for follow-up of his diabetes. He states that his average fasting blood sugars are now less than 120 consistently. His ac dinner sugar is in the 140 range. He has not had any interventional hypoglycemia. Emphasis on his last visit was primarily placed on eating at the same time every day and minimizing snacking. He is seeing his therapist who states that his progress is extremely slow because he is not exercising on a regular basis. He has a past history of primary hypertension and dyslipidemia. Current Outpatient Prescriptions   Medication Sig Dispense Refill    Insulin Syringes, Disposable, 1 mL syrg Dx.e11.9--- before meals breakfast and dinner 100 Syringe 11    insulin NPH/insulin regular (NOVOLIN 70/30) 100 unit/mL (70-30) injection Take 24 units before breakfast and 24 units before dinner. (Patient taking differently: Take 30 units before breakfast and 26 units before dinner.) 2 Vial 11    ferrous gluconate 324 mg (38 mg iron) tablet TAKE ONE TABLET BY MOUTH THREE TIMES DAILY WITH  A  MEAL 90 Tab 0    insulin lispro (HUMALOG) 100 unit/mL injection 8 units sq 3 times daily acmeals 1 Vial 0    oxyCODONE-acetaminophen (PERCOCET) 5-325 mg per tablet Take 1-2 Tabs by mouth every four (4) hours as needed. Max Daily Amount: 12 Tabs. 30 Tab 0    senna (SENOKOT) 8.6 mg tablet Take 2 Tabs by mouth nightly. 60 Tab 0    metoprolol tartrate (LOPRESSOR) 25 mg tablet TAKE ONE TABLET BY MOUTH TWICE DAILY 180 Tab 3    lisinopril (PRINIVIL, ZESTRIL) 40 mg tablet Take 1 Tab by mouth daily. 90 Tab 3    simvastatin (ZOCOR) 20 mg tablet TAKE ONE TABLET BY MOUTH NIGHTLY.  90 Tab 3    aspirin 81 mg tablet Take 81 mg by mouth daily.        Past Medical History:   Diagnosis Date    CAD (coronary artery disease)     palpitations    Diabetes [address] [address]     Hypertension     Other ill-defined conditions(799.89)     left foot ulcer     Past Surgical History:   Procedure Laterality Date    HX AMPUTATION      Status post right BK amputation    HX HEENT      bilateral cataract surgery    HX ORTHOPAEDIC      \"2 surgeries on right foot\"    HX OTHER SURGICAL      surgery to left foot ulcer, PICC right arm     No Known Allergies      REVIEW OF SYSTEMS:  General: negative for - chills or fever  ENT: negative for - headaches, nasal congestion or tinnitus  Respiratory: negative for - cough, hemoptysis, shortness of breath or wheezing  Cardiovascular : negative for - chest pain, edema, palpitations or shortness of breath  Gastrointestinal: negative for - abdominal pain, blood in stools, heartburn or nausea/vomiting  Genito-Urinary: no dysuria, trouble voiding, or hematuria  Musculoskeletal: negative for - gait disturbance, joint pain, joint stiffness or joint swelling  Neurological: no TIA or stroke symptoms  Hematologic: no bruises, no bleeding, no swollen glands  Integument: no lumps, mole changes, nail changes or rash  Endocrine: no malaise/lethargy or unexpected weight changes      Social History     Social History    Marital status:      Spouse name: N/A    Number of children: 4    Years of education: 12     Occupational History    employed-----formerly retired      Social History Main Topics    Smoking status: Former Smoker    Smokeless tobacco: Never Used    Alcohol use No    Drug use: No    Sexual activity: Not Currently     Other Topics Concern    None     Social History Narrative     Family History   Problem Relation Age of Onset    Cancer Father     No Known Problems Mother        OBJECTIVE:    Visit Vitals    /82    Pulse 79    Temp 98.2 °F (36.8 °C) (Oral)    Resp 18    Ht 6' 9\" (2.057 m)    Wt 264 lb 1.6 oz (119.8 kg)    SpO2 98%    BMI 28.3 kg/m2     CONSTITUTIONAL: well , well nourished, appears age appropriate  EYES: perrla, eom intact  ENMT:moist mucous membranes, pharynx clear  NECK: supple. Thyroid normal  RESPIRATORY: Chest: clear to ascultation and percussion   CARDIOVASCULAR: Heart: regular rate and rhythm  GASTROINTESTINAL: Abdomen: soft, bowel sounds active  HEMATOLOGIC: no pathological lymph nodes palpated  MUSCULOSKELETAL: Extremities: no edema, pulse 1+, right BK amputation, Charcot joint left ankle  INTEGUMENT: No unusual rashes or suspicious skin lesions noted. Nails appear normal.  NEUROLOGIC: non-focal exam   MENTAL STATUS: alert and oriented, appropriate affect      ASSESSMENT:  1. DM (diabetes mellitus), type 2, uncontrolled, periph vascular complic ([address]    2. 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 Essential hypertension    4. Dyslipidemia    5. Screening for alcoholism    6. Screening for depression    7. Wellness examination        PLAN:    1. The patient will remain on his premixed insulin at 30 units ac breakfast, 26 units ac dinner. I again emphasized the importance of eating at the same time every day also with emphasis on reducing the consumption of processed carbohydrates. 2. He needs to increase his physical activity to strengthen his quad muscles to support his knees. 3. Blood pressure is excellent today, no adjustments are made. 4. He will continue statin as prescribed. He is a secondary cardiovascular risk prevention mode in view of his history of peripheral vascular occlusive disease. Follow-up Disposition:  Return in about 4 weeks (around 5/29/2018).       Edilia Briseno MD

## 2018-05-01 NOTE — MR AVS SNAPSHOT
[address] [facility] 
 
 
 [address] [address] 
[phone] Patient: Krystle Pacheco MRN: [id number] [facility]:[birth year] Visit Information Date & Time Provider Department Dept. Phone Encounter #  
 5/1/2018 10:30 AM Evelyn Vaca MD [facility] Sports Medicine and Primary Care  Your Appointments 6/1/2018  9:15 AM  
Any with Evelyn Vaca MD  
[address] [place] [address] and Primary Care [facility]) Appt Note: 1 month follow up  
 [address] [facility]  
  
   
 [address] [id number] Upcoming Health Maintenance Date Due  
 GLAUCOMA SCREENING Q2Y 3/31/2017 MEDICARE YEARLY EXAM 3/14/2018 Influenza Age 5 to Adult 8/1/2018 HEMOGLOBIN A1C Q6M 9/27/2018 FOOT EXAM Q1 12/26/2018 MICROALBUMIN Q1 3/27/2019 LIPID PANEL Q1 3/27/2019 DTaP/Tdap/Td series (2 - Td) 2/6/2027 Allergies as of 5/1/2018  Review Complete On: 5/1/2018 By: Shantanu Jensen No Known Allergies Current Immunizations  Reviewed on 2/28/2011 Name Date Influenza Vaccine (Quad) PF 10/11/2017 10:25 AM  
 Influenza Vaccine Whole 11/1/2010 Not reviewed this visit Vitals BP Pulse Temp Resp Height(growth percentile) Weight(growth percentile) 151/82 79 98.2 °F (36.8 °C) (Oral) 18 6' 9\" (2.057 m) 264 lb 1.6 oz (119.8 kg) SpO2 BMI Smoking Status 98% 28.3 kg/m2 Former Smoker Vitals History BMI and BSA Data Body Mass Index Body Surface Area  
 28.3 kg/m 2 2.62 m 2 Preferred Pharmacy Pharmacy Name Phone [address] [phone] Your Updated Medication List  
  
   
This list is accurate as of 5/1/18 11:04 AM.  Always use your most recent med list.  
  
  
  
  
 aspirin 81 mg tablet Take 81 mg by mouth daily. ferrous gluconate 324 mg (38 mg iron) tablet TAKE ONE TABLET BY MOUTH THREE TIMES DAILY WITH  A  MEAL  
  
 insulin lispro 100 unit/mL injection Commonly known as:  HUMALOG  
8 units sq 3 times daily acmeals  
  
 insulin NPH/insulin regular 100 unit/mL (70-30) injection Commonly known as:  NovoLIN 70/30 U-100 Insulin Take 24 units before breakfast and 24 units before dinner. Insulin Syringes (Disposable) 1 mL Syrg Dx.e11.9--- before meals breakfast and dinner  
  
 lisinopril 40 mg tablet Commonly known as:  Pecola Cypher Take 1 Tab by mouth daily. metoprolol tartrate 25 mg tablet Commonly known as:  LOPRESSOR  
TAKE ONE TABLET BY MOUTH TWICE DAILY  
  
 oxyCODONE-acetaminophen 5-325 mg per tablet Commonly known as:  PERCOCET Take 1-2 Tabs by mouth every four (4) hours as needed. Max Daily Amount: 12 Tabs. senna 8.6 mg tablet Commonly known as:  [employer] Take 2 Tabs by mouth nightly. simvastatin 20 mg tablet Commonly known as:  ZOCOR  
TAKE ONE TABLET BY MOUTH NIGHTLY. Introducing [facility]! Dear Dinora Greco: Thank you for requesting a [employer] account. Our records indicate that you already have an active [employer] account. You can access your account anytime at [email or web address] Did you know that you can access your hospital and ER discharge instructions at any time in [employer]? You can also review all of your test results from your hospital stay or ER visit. Additional Information If you have questions, please visit the Frequently Asked Questions section of the [employer] website at [email or web address] Remember, [employer] is NOT to be used for urgent needs. For medical emergencies, dial 911. Now available from your iPhone and Android! Please provide this summary of care documentation to your next provider. Your primary care clinician is listed as Liz Brown  If you have any questions after today's visit, please call [phone].

## 2018-05-01 NOTE — PROGRESS NOTES
Chief Complaint   Patient presents with    Diabetes     1 month follow up      1. Have you been to the ER, urgent care clinic since your last visit? Hospitalized since your last visit? No    2. Have you seen or consulted any other health care providers outside of the [address] [place] [address] since your last visit? Include any pap smears or colon screening.  No

## 2018-05-02 NOTE — PATIENT INSTRUCTIONS

## 2018-05-04 LAB — FRUCTOSAMINE SERPL-SCNC: 356 UMOL/L (ref 0–285)

## 2018-06-01 ENCOUNTER — OFFICE VISIT (OUTPATIENT)
Dept: INTERNAL MEDICINE CLINIC | Age: 78
End: 2018-06-01

## 2018-06-01 VITALS
DIASTOLIC BLOOD PRESSURE: 74 MMHG | RESPIRATION RATE: 18 BRPM | TEMPERATURE: 98.2 F | HEIGHT: 78 IN | HEART RATE: 72 BPM | SYSTOLIC BLOOD PRESSURE: 160 MMHG | WEIGHT: 276.4 LBS | BODY MASS INDEX: 31.98 KG/M2 | OXYGEN SATURATION: 98 %

## 2018-06-01 DIAGNOSIS — R53.81 PHYSICAL DECONDITIONING: ICD-10-CM

## 2018-06-01 DIAGNOSIS — E88.09 PLASMA CELL DYSCRASIA: ICD-10-CM

## 2018-06-01 DIAGNOSIS — D64.9 ANEMIA, UNSPECIFIED TYPE: ICD-10-CM

## 2018-06-01 DIAGNOSIS — I10 ESSENTIAL HYPERTENSION: ICD-10-CM

## 2018-06-01 DIAGNOSIS — Z89.511 STATUS POST BELOW KNEE AMPUTATION OF RIGHT LOWER EXTREMITY (HCC): ICD-10-CM

## 2018-06-01 DIAGNOSIS — E78.5 DYSLIPIDEMIA: ICD-10-CM

## 2018-06-01 DIAGNOSIS — E11.610 CHARCOT FOOT DUE TO DIABETES MELLITUS (HCC): Chronic | ICD-10-CM

## 2018-06-01 NOTE — MR AVS SNAPSHOT
[address] [place] [address] [place] [address] 
[phone] Patient: Kwan Mae MRN: [id number] LXA:[birth year] Visit Information Date & Time Provider Department Dept. Phone Encounter #  
 6/1/2018  9:15 AM Jhonathan Collins 80 Sports Medicine and Primary Care [phone] [id number] Upcoming Health Maintenance Date Due  
 GLAUCOMA SCREENING Q2Y 3/31/2017 Influenza Age 5 to Adult 8/1/2018 HEMOGLOBIN A1C Q6M 9/27/2018 FOOT EXAM Q1 12/26/2018 MICROALBUMIN Q1 3/27/2019 LIPID PANEL Q1 3/27/2019 MEDICARE YEARLY EXAM 5/2/2019 DTaP/Tdap/Td series (2 - Td) 2/6/2027 Allergies as of 6/1/2018  Review Complete On: 6/1/2018 By: Ana Cano No Known Allergies Current Immunizations  Reviewed on 2/28/2011 Name Date Influenza Vaccine (Quad) PF 10/11/2017 10:25 AM  
 Influenza Vaccine Whole 11/1/2010 Not reviewed this visit You Were Diagnosed With   
  
 Codes Comments DM (diabetes mellitus), type 2, uncontrolled, periph vascular complic ([facility] [address]    -  Primary ICD-10-CM: E11.51, E11.65 ICD-9-CM: 250.72, 443.81 Essential hypertension     ICD-10-CM: I10 
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 
ICD-9-CM: 273.9 Physical deconditioning     ICD-10-CM: R53.81 ICD-9-CM: 799.3 Vitals BP Pulse Temp Resp Height(growth percentile) Weight(growth percentile) 160/74 72 98.2 °F (36.8 °C) (Oral) 18 6' 9\" (2.057 m) 276 lb 6.4 oz (125.4 kg) SpO2 BMI Smoking Status 98% 29.62 kg/m2 Former Smoker Vitals History BMI and BSA Data Body Mass Index Body Surface Area  
 29.62 kg/m 2 2.68 m 2 Preferred Pharmacy Pharmacy Name Phone Kenya Neil [address] [place] [phone] [address] [phone] Your Updated Medication List  
  
   
This list is accurate as of 6/1/18 10:43 AM.  Always use your most recent med list.  
  
  
  
  
 aspirin 81 mg tablet Take 81 mg by mouth daily. ferrous gluconate 324 mg (38 mg iron) tablet TAKE ONE TABLET BY MOUTH THREE TIMES DAILY WITH  A  MEAL  
  
 insulin lispro 100 unit/mL injection Commonly known as:  HUMALOG  
8 units sq 3 times daily acmeals  
  
 insulin NPH/insulin regular 100 unit/mL (70-30) injection Commonly known as:  NovoLIN 70/30 U-100 Insulin Take 24 units before breakfast and 24 units before dinner. Insulin Syringes (Disposable) 1 mL Syrg Dx.e11.9--- before meals breakfast and dinner  
  
 lisinopril 40 mg tablet Commonly known as:  Therisa Semen Take 1 Tab by mouth daily. metoprolol tartrate 25 mg tablet Commonly known as:  LOPRESSOR  
TAKE ONE TABLET BY MOUTH TWICE DAILY  
  
 oxyCODONE-acetaminophen 5-325 mg per tablet Commonly known as:  PERCOCET Take 1-2 Tabs by mouth every four (4) hours as needed. Max Daily Amount: 12 Tabs. senna 8.6 mg tablet Commonly known as:  [employer] Take 2 Tabs by mouth nightly. simvastatin 20 mg tablet Commonly known as:  ZOCOR  
TAKE ONE TABLET BY MOUTH NIGHTLY. We Performed the Following APOLIPOPROTEIN B [id number] CPT(R)] CBC WITH AUTOMATED DIFF [11411 CPT(R)] FRUCTOSAMINE [17218 CPT(R)] IMMUNOELECTROPHORESIS [facility] [id number] CPT(R)] PROTEIN ELECTROPHORESIS [26296 CPT(R)] Introducing [facility]! Dear Saniya Cea: Thank you for requesting a [employer] account. Our records indicate that you already have an active [employer] account. You can access your account anytime at [email or web address] Did you know that you can access your hospital and ER discharge instructions at any time in [employer]? You can also review all of your test results from your hospital stay or ER visit. Additional Information If you have questions, please visit the Frequently Asked Questions section of the [employer] website at [email or web address] Remember, [employer] is NOT to be used for urgent needs. For medical emergencies, dial 911. Now available from your iPhone and Android! Please provide this summary of care documentation to your next provider. Your primary care clinician is listed as Liz Fam. If you have any questions after today's visit, please call [phone].

## 2018-06-01 NOTE — PROGRESS NOTES
[facility]  [facility]  [address] [facility] [address]  Phone:  [phone]  Fax: [phone]       Chief Complaint   Patient presents with    Diabetes     1 month follow up   . SUBJECTIVE:    Jay Castillo is a 68 y.o. male comes in for return visit stating that he has done fairly well. He did not increase the insulin as I suggested. I remind him of the importance of eating at the same time every day and to minimize snacking. The only thing he snacks on are peanuts. During his hospital stay, he had anemia of chronic disease and MGUS. This needs to be followed up on. He has a past history of primary hypertension and dyslipidemia. As far as his right BK amputation is concerned, he is doing well although he is severely deconditioned as a direct result of his physical inactivity. We talked about this at length. Current Outpatient Prescriptions   Medication Sig Dispense Refill    Insulin Syringes, Disposable, 1 mL syrg Dx.e11.9--- before meals breakfast and dinner 100 Syringe 11    insulin NPH/insulin regular (NOVOLIN 70/30) 100 unit/mL (70-30) injection Take 24 units before breakfast and 24 units before dinner. (Patient taking differently: Take 30 units before breakfast and 26 units before dinner.) 2 Vial 11    ferrous gluconate 324 mg (38 mg iron) tablet TAKE ONE TABLET BY MOUTH THREE TIMES DAILY WITH  A  MEAL 90 Tab 0    insulin lispro (HUMALOG) 100 unit/mL injection 8 units sq 3 times daily acmeals 1 Vial 0    oxyCODONE-acetaminophen (PERCOCET) 5-325 mg per tablet Take 1-2 Tabs by mouth every four (4) hours as needed. Max Daily Amount: 12 Tabs. 30 Tab 0    senna (SENOKOT) 8.6 mg tablet Take 2 Tabs by mouth nightly. 60 Tab 0    metoprolol tartrate (LOPRESSOR) 25 mg tablet TAKE ONE TABLET BY MOUTH TWICE DAILY 180 Tab 3    lisinopril (PRINIVIL, ZESTRIL) 40 mg tablet Take 1 Tab by mouth daily.  90 Tab 3    simvastatin (ZOCOR) 20 mg tablet TAKE ONE TABLET BY MOUTH NIGHTLY. 90 Tab 3    aspirin 81 mg tablet Take 81 mg by mouth daily.        Past Medical History:   Diagnosis Date    CAD (coronary artery disease)     palpitations    Diabetes [facility]     Hypertension     Other ill-defined conditions(799.89)     left foot ulcer     Past Surgical History:   Procedure Laterality Date    HX AMPUTATION      Status post right BK amputation    HX HEENT      bilateral cataract surgery    HX ORTHOPAEDIC      \"2 surgeries on right foot\"    HX OTHER SURGICAL      surgery to left foot ulcer, PICC right arm     No Known Allergies      REVIEW OF SYSTEMS:  General: negative for - chills or fever  ENT: negative for - headaches, nasal congestion or tinnitus  Respiratory: negative for - cough, hemoptysis, shortness of breath or wheezing  Cardiovascular : negative for - chest pain, edema, palpitations or shortness of breath  Gastrointestinal: negative for - abdominal pain, blood in stools, heartburn or nausea/vomiting  Genito-Urinary: no dysuria, trouble voiding, or hematuria  Musculoskeletal: negative for - gait disturbance, joint pain, joint stiffness or joint swelling  Neurological: no TIA or stroke symptoms  Hematologic: no bruises, no bleeding, no swollen glands  Integument: no lumps, mole changes, nail changes or rash  Endocrine: no malaise/lethargy or unexpected weight changes      Social History     Social History    Marital status:      Spouse name: N/A    Number of children: 4    Years of education: 12     Occupational History    employed-----formerly retired      Social History Main Topics    Smoking status: Former Smoker    Smokeless tobacco: Never Used    Alcohol use No    Drug use: No    Sexual activity: Not Currently     Other Topics Concern    None     Social History Narrative     Family History   Problem Relation Age of Onset    Cancer Father     No Known Problems Mother        OBJECTIVE:    Visit Vitals    /74    Pulse 72    Temp 98.2 °F (36.8 °C) (Oral)    Resp 18    Ht 6' 9\" (2.057 m)    Wt 276 lb 6.4 oz (125.4 kg)    SpO2 98%    BMI 29.62 kg/m2     CONSTITUTIONAL: well , well nourished, appears age appropriate  EYES: perrla, eom intact  ENMT:moist mucous membranes, pharynx clear  NECK: supple. Thyroid normal  RESPIRATORY: Chest: clear to ascultation and percussion   CARDIOVASCULAR: Heart: regular rate and rhythm  GASTROINTESTINAL: Abdomen: soft, bowel sounds active  HEMATOLOGIC: no pathological lymph nodes palpated  MUSCULOSKELETAL: Extremities: no edema, pulse 1+, right BK amputation  INTEGUMENT: No unusual rashes or suspicious skin lesions noted. Nails appear normal.  NEUROLOGIC: non-focal exam   MENTAL STATUS: alert and oriented, appropriate affect      ASSESSMENT:  1. DM (diabetes mellitus), type 2, uncontrolled, periph vascular complic ([address]    2. Essential hypertension    3. Dyslipidemia    4. 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 Anemia, unspecified type    6. Plasma cell dyscrasia    7. Physical deconditioning    8. Charcot foot due to diabetes mellitus ([address]      Diagnoses and all orders for this visit:    1. DM (diabetes mellitus), type 2, uncontrolled, periph vascular complic ([address]  Assessment & Plan:  Uncontrolled, based on history, physical exam and review of pertinent labs, studies and medications; meds reconciled; lifestyle modifications recommended, medication compliance emphasized. Key Antihyperglycemic Medications             insulin NPH/insulin regular (NOVOLIN 70/30) 100 unit/mL (70-30) injection  (Taking) Take 24 units before breakfast and 24 units before dinner. insulin lispro (HUMALOG) 100 unit/mL injection  (Taking) 8 units sq 3 times daily acmeals        Other Key Diabetic Medications             lisinopril (PRINIVIL, ZESTRIL) 40 mg tablet  (Taking) Take 1 Tab by mouth daily. simvastatin (ZOCOR) 20 mg tablet  (Taking) TAKE ONE TABLET BY MOUTH NIGHTLY. Lab Results   Component Value Date/Time    Hemoglobin A1c 8.7 03/27/2018 11:18 AM    Glucose 177 10/21/2017 04:45 AM    Creatinine 1.02 10/21/2017 04:45 AM    Microalb/Creat ratio (ug/mg creat.) 114.8 03/27/2018 11:18 AM    Cholesterol, total 145 03/27/2018 11:18 AM    HDL Cholesterol 41 03/27/2018 11:18 AM    LDL, calculated 93 03/27/2018 11:18 AM    Triglyceride 57 03/27/2018 11:18 AM     Diabetic Foot and Eye Exam HM Status   Topic Date Due    Diabetic Foot Care  12/26/2018       Orders:  -     FRUCTOSAMINE    2. Essential hypertension    3. Dyslipidemia  -     APOLIPOPROTEIN B    4. Status post below knee amputation of right lower extremity (HCC)  Assessment & Plan:  Stable, based on history, physical exam and review of pertinent labs, studies and medications; meds reconciled; lifestyle modifications recommended. Lab Results   Component Value Date/Time    WBC 6.0 12/26/2017 04:43 PM    HGB 11.6 12/26/2017 04:43 PM    Hemoglobin (POC) 8.8 10/17/2017 09:28 AM    HCT 35.8 12/26/2017 04:43 PM    PLATELET 709 83/32/3873 04:43 PM    Creatinine 1.02 10/21/2017 04:45 AM    BUN 19 10/21/2017 04:45 AM    Potassium 4.5 10/21/2017 04:45 AM         5. Anemia, unspecified type  -     CBC WITH AUTOMATED DIFF  -     PROTEIN ELECTROPHORESIS  -     IMMUNOELECTROPHORESIS (IMMUNOFIX.)    6. Plasma cell dyscrasia    7. Physical deconditioning    8. Charcot foot due to diabetes mellitus ([facility]  Assessment & Plan:  Nothing to do with Charcot joint----continue diabetic control  Key Antihyperglycemic Medications             insulin NPH/insulin regular (NOVOLIN 70/30) 100 unit/mL (70-30) injection  (Taking) Take 24 units before breakfast and 24 units before dinner. insulin lispro (HUMALOG) 100 unit/mL injection  (Taking) 8 units sq 3 times daily acmeals        Other Key Diabetic Medications             lisinopril (PRINIVIL, ZESTRIL) 40 mg tablet  (Taking) Take 1 Tab by mouth daily.     simvastatin (ZOCOR) 20 mg tablet  (Taking) TAKE ONE TABLET BY MOUTH NIGHTLY. Lab Results   Component Value Date/Time    Hemoglobin A1c 8.7 03/27/2018 11:18 AM    Glucose 177 10/21/2017 04:45 AM    Creatinine 1.02 10/21/2017 04:45 AM    Microalb/Creat ratio (ug/mg creat.) 114.8 03/27/2018 11:18 AM    Cholesterol, total 145 03/27/2018 11:18 AM    HDL Cholesterol 41 03/27/2018 11:18 AM    LDL, calculated 93 03/27/2018 11:18 AM    Triglyceride 57 03/27/2018 11:18 AM     Diabetic Foot and Eye Exam HM Status   Topic Date Due    Diabetic Foot Care  12/26/2018             PLAN:    1. I remind the patient to eat at the same time every day. He is not to snack on anything but his peanuts for the most part. I will await the results of his fructosamine level. 2. Blood pressure is excellent. No adjustments are made. 3. Lipid status will be assessed. His ApoB level needs to be less than 70.  4. He does have an anemia and I will recheck his serum protein electrophoresis and his immunoelectrophoresis to ensure that there has been no progression. I suspect he has MGUS. 5. He is deconditioned and needs to exercise more. He is accommodating nicely to his prosthesis with his BK amputation. .  Orders Placed This Encounter    FRUCTOSAMINE    CBC WITH AUTOMATED DIFF    PROTEIN ELECTROPHORESIS    IMMUNOELECTROPHORESIS (IMMUNOFIX.)    APOLIPOPROTEIN B         Follow-up Disposition:  Return in about 4 weeks (around 6/29/2018).       Cristy Ventura MD

## 2018-06-01 NOTE — PROGRESS NOTES
Verified with patient that he take 30 units in the mornings and 26 units in the evening. Chief Complaint   Patient presents with    Diabetes     1 month follow up     1. Have you been to the ER, urgent care clinic since your last visit? Hospitalized since your last visit? No    2. Have you seen or consulted any other health care providers outside of the [address] [place] [address] since your last visit? Include any pap smears or colon screening.  No

## 2018-06-03 NOTE — ASSESSMENT & PLAN NOTE
Nothing to do with [facility] joint----continue diabetic control  Key Antihyperglycemic Medications             insulin NPH/insulin regular (NOVOLIN 70/30) 100 unit/mL (70-30) injection  (Taking) Take 24 units before breakfast and 24 units before dinner. insulin lispro (HUMALOG) 100 unit/mL injection  (Taking) 8 units sq 3 times daily acmeals        Other Key Diabetic Medications             lisinopril (PRINIVIL, ZESTRIL) 40 mg tablet  (Taking) Take 1 Tab by mouth daily. simvastatin (ZOCOR) 20 mg tablet  (Taking) TAKE ONE TABLET BY MOUTH NIGHTLY.         Lab Results   Component Value Date/Time    Hemoglobin A1c 8.7 03/27/2018 11:18 AM    Glucose 177 10/21/2017 04:45 AM    Creatinine 1.02 10/21/2017 04:45 AM    Microalb/Creat ratio (ug/mg creat.) 114.8 03/27/2018 11:18 AM    Cholesterol, total 145 03/27/2018 11:18 AM    HDL Cholesterol 41 03/27/2018 11:18 AM    LDL, calculated 93 03/27/2018 11:18 AM    Triglyceride 57 03/27/2018 11:18 AM     Diabetic Foot and Eye Exam HM Status   Topic Date Due    Diabetic Foot Care  12/26/2018

## 2018-06-03 NOTE — ASSESSMENT & PLAN NOTE
Uncontrolled, based on history, physical exam and review of pertinent labs, studies and medications; meds reconciled; lifestyle modifications recommended, medication compliance emphasized. Key Antihyperglycemic Medications             insulin NPH/insulin regular (NOVOLIN 70/30) 100 unit/mL (70-30) injection  (Taking) Take 24 units before breakfast and 24 units before dinner. insulin lispro (HUMALOG) 100 unit/mL injection  (Taking) 8 units sq 3 times daily acmeals        Other Key Diabetic Medications             lisinopril (PRINIVIL, ZESTRIL) 40 mg tablet  (Taking) Take 1 Tab by mouth daily. simvastatin (ZOCOR) 20 mg tablet  (Taking) TAKE ONE TABLET BY MOUTH NIGHTLY.         Lab Results   Component Value Date/Time    Hemoglobin A1c 8.7 03/27/2018 11:18 AM    Glucose 177 10/21/2017 04:45 AM    Creatinine 1.02 10/21/2017 04:45 AM    Microalb/Creat ratio (ug/mg creat.) 114.8 03/27/2018 11:18 AM    Cholesterol, total 145 03/27/2018 11:18 AM    HDL Cholesterol 41 03/27/2018 11:18 AM    LDL, calculated 93 03/27/2018 11:18 AM    Triglyceride 57 03/27/2018 11:18 AM     Diabetic Foot and Eye Exam HM Status   Topic Date Due    Diabetic Foot Care  12/26/2018

## 2018-06-03 NOTE — ASSESSMENT & PLAN NOTE
Stable, based on history, physical exam and review of pertinent labs, studies and medications; meds reconciled; lifestyle modifications recommended.   Lab Results   Component Value Date/Time    WBC 6.0 12/26/2017 04:43 PM    HGB 11.6 12/26/2017 04:43 PM    Hemoglobin (POC) 8.8 10/17/2017 09:28 AM    HCT 35.8 12/26/2017 04:43 PM    PLATELET 397 67/55/3835 04:43 PM    Creatinine 1.02 10/21/2017 04:45 AM    BUN 19 10/21/2017 04:45 AM    Potassium 4.5 10/21/2017 04:45 AM

## 2018-06-05 LAB
ALBUMIN SERPL ELPH-MCNC: 3.7 G/DL (ref 2.9–4.4)
ALBUMIN/GLOB SERPL: 0.9 {RATIO} (ref 0.7–1.7)
ALPHA1 GLOB SERPL ELPH-MCNC: 0.2 G/DL (ref 0–0.4)
ALPHA2 GLOB SERPL ELPH-MCNC: 0.8 G/DL (ref 0.4–1)
APO B SERPL-MCNC: 84 MG/DL (ref 52–135)
B-GLOBULIN SERPL ELPH-MCNC: 1.2 G/DL (ref 0.7–1.3)
BASOPHILS # BLD AUTO: 0 X10E3/UL (ref 0–0.2)
BASOPHILS NFR BLD AUTO: 0 %
EOSINOPHIL # BLD AUTO: 0.1 X10E3/UL (ref 0–0.4)
EOSINOPHIL NFR BLD AUTO: 1 %
ERYTHROCYTE [DISTWIDTH] IN BLOOD BY AUTOMATED COUNT: 15.5 % (ref 12.3–15.4)
FRUCTOSAMINE SERPL-SCNC: 355 UMOL/L (ref 0–285)
GAMMA GLOB SERPL ELPH-MCNC: 1.7 G/DL (ref 0.4–1.8)
GLOBULIN SER CALC-MCNC: 3.9 G/DL (ref 2.2–3.9)
HCT VFR BLD AUTO: 39 % (ref 37.5–51)
HGB BLD-MCNC: 12.9 G/DL (ref 13–17.7)
IGA SERPL-MCNC: 128 MG/DL (ref 61–437)
IGG SERPL-MCNC: 1706 MG/DL (ref 700–1600)
IGM SERPL-MCNC: 48 MG/DL (ref 15–143)
IMM GRANULOCYTES # BLD: 0 X10E3/UL (ref 0–0.1)
IMM GRANULOCYTES NFR BLD: 1 %
LYMPHOCYTES # BLD AUTO: 1.8 X10E3/UL (ref 0.7–3.1)
LYMPHOCYTES NFR BLD AUTO: 30 %
M PROTEIN SERPL ELPH-MCNC: 0.8 G/DL
MCH RBC QN AUTO: 28.5 PG (ref 26.6–33)
MCHC RBC AUTO-ENTMCNC: 33.1 G/DL (ref 31.5–35.7)
MCV RBC AUTO: 86 FL (ref 79–97)
MONOCYTES # BLD AUTO: 0.6 X10E3/UL (ref 0.1–0.9)
MONOCYTES NFR BLD AUTO: 10 %
NEUTROPHILS # BLD AUTO: 3.4 X10E3/UL (ref 1.4–7)
NEUTROPHILS NFR BLD AUTO: 58 %
PLATELET # BLD AUTO: 220 X10E3/UL (ref 150–379)
PLEASE NOTE, 011150: ABNORMAL
PROT PATTERN SERPL IFE-IMP: ABNORMAL
PROT SERPL-MCNC: 7.6 G/DL (ref 6–8.5)
RBC # BLD AUTO: 4.53 X10E6/UL (ref 4.14–5.8)
WBC # BLD AUTO: 5.9 X10E3/UL (ref 3.4–10.8)

## 2018-06-29 ENCOUNTER — OFFICE VISIT (OUTPATIENT)
Dept: INTERNAL MEDICINE CLINIC | Age: 78
End: 2018-06-29

## 2018-06-29 VITALS
BODY MASS INDEX: 31.5 KG/M2 | HEIGHT: 78 IN | DIASTOLIC BLOOD PRESSURE: 67 MMHG | OXYGEN SATURATION: 98 % | HEART RATE: 58 BPM | SYSTOLIC BLOOD PRESSURE: 161 MMHG | RESPIRATION RATE: 16 BRPM | TEMPERATURE: 97.9 F | WEIGHT: 272.3 LBS

## 2018-06-29 DIAGNOSIS — I10 ESSENTIAL HYPERTENSION: ICD-10-CM

## 2018-06-29 DIAGNOSIS — E78.5 DYSLIPIDEMIA: ICD-10-CM

## 2018-06-29 DIAGNOSIS — E88.09 PLASMA CELL DYSCRASIA: ICD-10-CM

## 2018-06-29 NOTE — PROGRESS NOTES
Chief Complaint   Patient presents with    Diabetes     1 month follow up    . SUBECTIVE:    Kwan Mae is a 68 y.o. male   Comes in for a return visit stating his diabetes is significantly better. His average fasting blood sugar has been in the 110 or less range and his ac dinner sugars are generally 140-160. He has not had any interventional hypoglycemia. He has made a commitment to reduce his consumption of processed carbohydrates. He is taking the same dose of premixed insulin as previously. He is accommodating to the right BKA nicely and is walking much better currently. He has a past history of primary hypertension and dyslipidemia. Current Outpatient Prescriptions   Medication Sig Dispense Refill    Insulin Syringes, Disposable, 1 mL syrg Dx.e11.9--- before meals breakfast and dinner 100 Syringe 11    insulin NPH/insulin regular (NOVOLIN 70/30) 100 unit/mL (70-30) injection Take 24 units before breakfast and 24 units before dinner. (Patient taking differently: Take 30 units before breakfast and 26 units before dinner.) 2 Vial 11    ferrous gluconate 324 mg (38 mg iron) tablet TAKE ONE TABLET BY MOUTH THREE TIMES DAILY WITH  A  MEAL 90 Tab 0    insulin lispro (HUMALOG) 100 unit/mL injection 8 units sq 3 times daily acmeals 1 Vial 0    oxyCODONE-acetaminophen (PERCOCET) 5-325 mg per tablet Take 1-2 Tabs by mouth every four (4) hours as needed. Max Daily Amount: 12 Tabs. 30 Tab 0    senna (SENOKOT) 8.6 mg tablet Take 2 Tabs by mouth nightly. 60 Tab 0    metoprolol tartrate (LOPRESSOR) 25 mg tablet TAKE ONE TABLET BY MOUTH TWICE DAILY 180 Tab 3    lisinopril (PRINIVIL, ZESTRIL) 40 mg tablet Take 1 Tab by mouth daily. 90 Tab 3    simvastatin (ZOCOR) 20 mg tablet TAKE ONE TABLET BY MOUTH NIGHTLY. 90 Tab 3    aspirin 81 mg tablet Take 81 mg by mouth daily.        Past Medical History:   Diagnosis Date    CAD (coronary artery disease)     palpitations    Diabetes ([address]     Hypertension     Other ill-defined conditions(799.89)     left foot ulcer     Past Surgical History:   Procedure Laterality Date    HX AMPUTATION      Status post right BK amputation    HX HEENT      bilateral cataract surgery    HX ORTHOPAEDIC      \"2 surgeries on right foot\"    HX OTHER SURGICAL      surgery to left foot ulcer, PICC right arm     No Known Allergies    REVIEW OF SYSTEMS:  Review of Systems - Negative except   ENT ROS: negative for - headaches, hearing change, nasal congestion, oral lesions, tinnitus, visual changes or   Respiratory ROS: no cough, shortness of breath, or wheezing  Cardiovascular ROS: no chest pain or dyspnea on exertion  Gastrointestinal ROS: no abdominal pain, change in bowel habits, or black or blood  Genito-Urinary ROS: no dysuria, trouble voiding, or hematuria  Musculoskeletal ROS: negative  Neurological ROS: no TIA or stroke symptoms      Social History     Social History    Marital status:      Spouse name: N/A    Number of children: 4    Years of education: 12     Occupational History    employed-----formerly retired      Social History Main Topics    Smoking status: Former Smoker    Smokeless tobacco: Never Used    Alcohol use No    Drug use: No    Sexual activity: Not Currently     Other Topics Concern    None     Social History Narrative   r  Family History   Problem Relation Age of Onset    Cancer Father     No Known Problems Mother        OBJECTIVE:  Visit Vitals    /67    Pulse (!) 58    Temp 97.9 °F (36.6 °C) (Oral)    Resp 16    Ht 6' 9\" (2.057 m)    Wt 272 lb 4.8 oz (123.5 kg)    SpO2 98%    BMI 29.18 kg/m2     ENT: perrla,  eom intact  NECK: supple.  Thyroid normal, no JVD  CHEST: clear to ascultation and percussion   HEART: regular rate and rhythm  ABD: soft, bowel sounds active,   EXTREMITIES: trace edema distal left leg, pulse 1+, right BK amputation, Charcot left ankle  INTEGUMENT: clear      ASSESSMENT:  1. DM (diabetes mellitus), type 2, uncontrolled, periph vascular complic ([address]    2. Essential hypertension    3. Plasma cell dyscrasia    4. Dyslipidemia        PLAN:    1. His diabetes hopefully is doing well but I will await results of his HbA1c.  2. Blood pressure is excellent today, no adjustments are made. 3. He is actively being followed by hematology for his plasma cell dyscrasia. 4. He will continue statin as prescribed in view of his secondary cardiovascular risk prevention created by his peripheral vascular occlusive disease. .  Orders Placed This Encounter    HEMOGLOBIN A1C WITH EAG    APOLIPOPROTEIN B       Follow-up Disposition:  Return in about 6 weeks (around 8/10/2018).       Kimberley Cuellar MD

## 2018-06-29 NOTE — PROGRESS NOTES
Chief Complaint   Patient presents with    Diabetes     1 month follow up      1. Have you been to the ER, urgent care clinic since your last visit? Hospitalized since your last visit? No    2. Have you seen or consulted any other health care providers outside of the [address] [place] [address] since your last visit? Include any pap smears or colon screening.  No

## 2018-06-30 LAB
APO B SERPL-MCNC: 71 MG/DL (ref 52–135)
EST. AVERAGE GLUCOSE BLD GHB EST-MCNC: 163 MG/DL
HBA1C MFR BLD: 7.3 % (ref 4.8–5.6)

## 2018-07-07 DIAGNOSIS — I10 ESSENTIAL HYPERTENSION: ICD-10-CM

## 2018-07-07 RX ORDER — LISINOPRIL 40 MG/1
TABLET ORAL
Qty: 90 TAB | Refills: 3 | Status: SHIPPED | OUTPATIENT
Start: 2018-07-07 | End: 2019-03-24 | Stop reason: CLARIF

## 2018-07-07 RX ORDER — METOPROLOL TARTRATE 25 MG/1
TABLET, FILM COATED ORAL
Qty: 180 TAB | Refills: 3 | Status: SHIPPED | OUTPATIENT
Start: 2018-07-07 | End: 2019-10-13 | Stop reason: SDUPTHER

## 2018-07-24 DIAGNOSIS — E78.5 DYSLIPIDEMIA: ICD-10-CM

## 2018-07-25 RX ORDER — SIMVASTATIN 20 MG/1
TABLET, FILM COATED ORAL
Qty: 90 TAB | Refills: 3 | Status: SHIPPED | OUTPATIENT
Start: 2018-07-25 | End: 2019-09-04 | Stop reason: SDUPTHER

## 2018-11-15 ENCOUNTER — OFFICE VISIT (OUTPATIENT)
Dept: INTERNAL MEDICINE CLINIC | Age: 78
End: 2018-11-15

## 2018-11-15 VITALS
HEIGHT: 78 IN | WEIGHT: 274.8 LBS | SYSTOLIC BLOOD PRESSURE: 181 MMHG | TEMPERATURE: 98.2 F | OXYGEN SATURATION: 98 % | RESPIRATION RATE: 20 BRPM | DIASTOLIC BLOOD PRESSURE: 73 MMHG | HEART RATE: 68 BPM | BODY MASS INDEX: 31.8 KG/M2

## 2018-11-15 DIAGNOSIS — Z89.511 STATUS POST BELOW KNEE AMPUTATION OF RIGHT LOWER EXTREMITY (HCC): ICD-10-CM

## 2018-11-15 DIAGNOSIS — E78.5 DYSLIPIDEMIA: ICD-10-CM

## 2018-11-15 DIAGNOSIS — I10 ESSENTIAL HYPERTENSION: ICD-10-CM

## 2018-11-15 DIAGNOSIS — G61.89 OTHER INFLAMMATORY POLYNEUROPATHIES (HCC): ICD-10-CM

## 2018-11-15 DIAGNOSIS — E88.09 PLASMA CELL DYSCRASIA: ICD-10-CM

## 2018-11-15 DIAGNOSIS — R53.81 PHYSICAL DECONDITIONING: ICD-10-CM

## 2018-11-15 DIAGNOSIS — M14.672 CHARCOT'S JOINT OF LEFT ANKLE: ICD-10-CM

## 2018-11-15 NOTE — PROGRESS NOTES
Chief Complaint Patient presents with  Diabetes  
  check up 1. Have you been to the ER, urgent care clinic since your last visit? Hospitalized since your last visit? No 
 
2. Have you seen or consulted any other health care providers outside of the [facility] since your last visit? Include any pap smears or colon screening.  No

## 2018-11-20 LAB
ALBUMIN SERPL ELPH-MCNC: 3.6 G/DL (ref 2.9–4.4)
ALBUMIN/GLOB SERPL: 0.9 {RATIO} (ref 0.7–1.7)
ALPHA1 GLOB SERPL ELPH-MCNC: 0.2 G/DL (ref 0–0.4)
ALPHA2 GLOB SERPL ELPH-MCNC: 0.8 G/DL (ref 0.4–1)
APO B SERPL-MCNC: 74 MG/DL (ref 52–135)
B-GLOBULIN SERPL ELPH-MCNC: 1.1 G/DL (ref 0.7–1.3)
BASOPHILS # BLD AUTO: 0 X10E3/UL (ref 0–0.2)
BASOPHILS NFR BLD AUTO: 0 %
BUN SERPL-MCNC: 25 MG/DL (ref 8–27)
BUN/CREAT SERPL: 18 (ref 10–24)
CALCIUM SERPL-MCNC: 9.1 MG/DL (ref 8.6–10.2)
CHLORIDE SERPL-SCNC: 103 MMOL/L (ref 96–106)
CO2 SERPL-SCNC: 19 MMOL/L (ref 20–29)
CREAT SERPL-MCNC: 1.4 MG/DL (ref 0.76–1.27)
EOSINOPHIL # BLD AUTO: 0.1 X10E3/UL (ref 0–0.4)
EOSINOPHIL NFR BLD AUTO: 1 %
ERYTHROCYTE [DISTWIDTH] IN BLOOD BY AUTOMATED COUNT: 14.9 % (ref 12.3–15.4)
EST. AVERAGE GLUCOSE BLD GHB EST-MCNC: 194 MG/DL
GAMMA GLOB SERPL ELPH-MCNC: 1.7 G/DL (ref 0.4–1.8)
GLOBULIN SER CALC-MCNC: 3.9 G/DL (ref 2.2–3.9)
GLUCOSE SERPL-MCNC: 142 MG/DL (ref 65–99)
HBA1C MFR BLD: 8.4 % (ref 4.8–5.6)
HCT VFR BLD AUTO: 38.7 % (ref 37.5–51)
HGB BLD-MCNC: 12.9 G/DL (ref 13–17.7)
IGA SERPL-MCNC: 116 MG/DL (ref 61–437)
IGG SERPL-MCNC: 1749 MG/DL (ref 700–1600)
IGM SERPL-MCNC: 49 MG/DL (ref 15–143)
IMM GRANULOCYTES # BLD: 0 X10E3/UL (ref 0–0.1)
IMM GRANULOCYTES NFR BLD: 1 %
LYMPHOCYTES # BLD AUTO: 2.2 X10E3/UL (ref 0.7–3.1)
LYMPHOCYTES NFR BLD AUTO: 37 %
M PROTEIN SERPL ELPH-MCNC: 1.1 G/DL
MCH RBC QN AUTO: 28.9 PG (ref 26.6–33)
MCHC RBC AUTO-ENTMCNC: 33.3 G/DL (ref 31.5–35.7)
MCV RBC AUTO: 87 FL (ref 79–97)
MONOCYTES # BLD AUTO: 0.6 X10E3/UL (ref 0.1–0.9)
MONOCYTES NFR BLD AUTO: 9 %
NEUTROPHILS # BLD AUTO: 3.1 X10E3/UL (ref 1.4–7)
NEUTROPHILS NFR BLD AUTO: 52 %
PLATELET # BLD AUTO: 246 X10E3/UL (ref 150–379)
PLEASE NOTE, 011150: ABNORMAL
POTASSIUM SERPL-SCNC: 5 MMOL/L (ref 3.5–5.2)
PROT PATTERN SERPL IFE-IMP: ABNORMAL
PROT SERPL-MCNC: 7.5 G/DL (ref 6–8.5)
RBC # BLD AUTO: 4.47 X10E6/UL (ref 4.14–5.8)
SODIUM SERPL-SCNC: 139 MMOL/L (ref 134–144)
WBC # BLD AUTO: 6 X10E3/UL (ref 3.4–10.8)

## 2019-01-01 DIAGNOSIS — Z86.010 HISTORY OF COLONIC POLYPS: Primary | ICD-10-CM

## 2019-01-04 ENCOUNTER — OFFICE VISIT (OUTPATIENT)
Dept: INTERNAL MEDICINE CLINIC | Age: 79
End: 2019-01-04

## 2019-01-04 DIAGNOSIS — M17.0 PRIMARY OSTEOARTHRITIS OF BOTH KNEES: ICD-10-CM

## 2019-01-04 DIAGNOSIS — I10 ESSENTIAL HYPERTENSION: ICD-10-CM

## 2019-01-04 DIAGNOSIS — Z12.11 ENCOUNTER FOR SCREENING COLONOSCOPY: ICD-10-CM

## 2019-01-04 DIAGNOSIS — R53.81 PHYSICAL DECONDITIONING: ICD-10-CM

## 2019-01-04 DIAGNOSIS — G61.89 OTHER INFLAMMATORY POLYNEUROPATHIES (HCC): ICD-10-CM

## 2019-01-04 DIAGNOSIS — E88.09 PLASMA CELL DYSCRASIA: ICD-10-CM

## 2019-01-04 NOTE — PROGRESS NOTES
Chief Complaint Patient presents with  Hypertension  
  follow up 1. Have you been to the ER, urgent care clinic since your last visit? Hospitalized since your last visit? No 
 
2. Have you seen or consulted any other health care providers outside of the [address] [place] [address] since your last visit? Include any pap smears or colon screening.  No

## 2019-01-06 VITALS
WEIGHT: 280.2 LBS | RESPIRATION RATE: 18 BRPM | DIASTOLIC BLOOD PRESSURE: 71 MMHG | HEIGHT: 78 IN | SYSTOLIC BLOOD PRESSURE: 149 MMHG | TEMPERATURE: 98.4 F | HEART RATE: 66 BPM | OXYGEN SATURATION: 98 % | BODY MASS INDEX: 32.42 KG/M2
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
## 2019-02-23 NOTE — TELEPHONE ENCOUNTER
Patient called in BS readings 2/17-------2/18------2/19-----2/20  133          196        149      147  283          148        137      127 taking 20 units bid no changes call in another week. 1.83

## 2019-03-05 ENCOUNTER — OFFICE VISIT (OUTPATIENT)
Dept: INTERNAL MEDICINE CLINIC | Age: 79
End: 2019-03-05

## 2019-03-05 VITALS
HEIGHT: 78 IN | RESPIRATION RATE: 16 BRPM | BODY MASS INDEX: 32.19 KG/M2 | OXYGEN SATURATION: 98 % | WEIGHT: 278.2 LBS | HEART RATE: 71 BPM | TEMPERATURE: 98.5 F | DIASTOLIC BLOOD PRESSURE: 68 MMHG | SYSTOLIC BLOOD PRESSURE: 145 MMHG

## 2019-03-05 DIAGNOSIS — G61.89 OTHER INFLAMMATORY POLYNEUROPATHIES (HCC): ICD-10-CM

## 2019-03-05 DIAGNOSIS — E78.5 DYSLIPIDEMIA: ICD-10-CM

## 2019-03-05 DIAGNOSIS — E11.610 CHARCOT FOOT DUE TO DIABETES MELLITUS (HCC): Chronic | ICD-10-CM

## 2019-03-05 DIAGNOSIS — I10 ESSENTIAL HYPERTENSION: ICD-10-CM

## 2019-03-05 DIAGNOSIS — E88.09 PLASMA CELL DYSCRASIA: ICD-10-CM

## 2019-03-05 DIAGNOSIS — I87.2 VENOUS INSUFFICIENCY: ICD-10-CM

## 2019-03-05 PROBLEM — E11.40 TYPE 2 DIABETES MELLITUS WITH DIABETIC NEUROPATHY (HCC): Status: ACTIVE | Noted: 2019-03-05

## 2019-03-05 NOTE — PROGRESS NOTES
[address] [facility]  [place] [address]  [address] [place] [address]  Phone:  [phone]  Fax: [phone]       Chief Complaint   Patient presents with    Diabetes     2 month follow up    . SUBJECTIVE:    Davey Franklin is a 66 y.o. male Comes in for return visit stating that he has done reasonably well. Average FBS is in the 140 range and ac dinner sugar is also in the 140 range. He has not had any interventional hypoglycemia. He remains on his insulin and appears to be improving as far as caloric intake is concerned. He has a new prosthesis on his right leg. He is now getting new shoes from his podiatrist for offloading. He has a past history of primary hypertension, dyslipidemia, as well as plasma cell dyscrasia. The latter is doing quite well with no major increase in his IgG fraction. Current Outpatient Medications   Medication Sig Dispense Refill    insulin NPH/insulin regular (NOVOLIN 70/30 U-100 INSULIN) 100 unit/mL (70-30) injection INJECT 24 UNITS SUBCUTANEOUSLY BEFORE BREAKFAST AND 24 UNITS BEFORE DINNER 2 Vial 11    simvastatin (ZOCOR) 20 mg tablet TAKE ONE TABLET BY MOUTH ONCE NIGHTLY 90 Tab 3    metoprolol tartrate (LOPRESSOR) 25 mg tablet TAKE ONE TABLET BY MOUTH TWICE DAILY 180 Tab 3    lisinopril (PRINIVIL, ZESTRIL) 40 mg tablet TAKE ONE TABLET BY MOUTH ONCE DAILY 90 Tab 3    Insulin Syringes, Disposable, 1 mL syrg Dx.e11.9--- before meals breakfast and dinner 100 Syringe 11    ferrous gluconate 324 mg (38 mg iron) tablet TAKE ONE TABLET BY MOUTH THREE TIMES DAILY WITH  A  MEAL 90 Tab 0    insulin lispro (HUMALOG) 100 unit/mL injection 8 units sq 3 times daily acmeals 1 Vial 0    oxyCODONE-acetaminophen (PERCOCET) 5-325 mg per tablet Take 1-2 Tabs by mouth every four (4) hours as needed. Max Daily Amount: 12 Tabs. 30 Tab 0    senna (SENOKOT) 8.6 mg tablet Take 2 Tabs by mouth nightly.  60 Tab 0    aspirin 81 mg tablet Take 81 mg by mouth daily.        Past Medical History:   Diagnosis Date    CAD (coronary artery disease)     palpitations    Diabetes ([facility] [address]     Hypertension     Other ill-defined conditions(799.89)     left foot ulcer     Past Surgical History:   Procedure Laterality Date    HX AMPUTATION      Status post right BK amputation    HX HEENT      bilateral cataract surgery    HX ORTHOPAEDIC      \"2 surgeries on right foot\"    HX OTHER SURGICAL      surgery to left foot ulcer, PICC right arm     No Known Allergies      REVIEW OF SYSTEMS:  General: negative for - chills or fever  ENT: negative for - headaches, nasal congestion or tinnitus  Respiratory: negative for - cough, hemoptysis, shortness of breath or wheezing  Cardiovascular : negative for - chest pain, edema, palpitations or shortness of breath  Gastrointestinal: negative for - abdominal pain, blood in stools, heartburn or nausea/vomiting  Genito-Urinary: no dysuria, trouble voiding, or hematuria  Musculoskeletal: negative for - gait disturbance, joint pain, joint stiffness or joint swelling  Neurological: no TIA or stroke symptoms  Hematologic: no bruises, no bleeding, no swollen glands  Integument: no lumps, mole changes, nail changes or rash  Endocrine: no malaise/lethargy or unexpected weight changes      Social History     Socioeconomic History    Marital status:      Spouse name: Not on file    Number of children: 4    Years of education: [address] [place]    Highest education level: Not on file   Occupational History    Occupation: employed-----formerly retired   Tobacco Use    Smoking status: Former Smoker    Smokeless tobacco: Never Used   Substance and Sexual Activity    Alcohol use: No    Drug use: No    Sexual activity: Not Currently     Family History   Problem Relation Age of Onset    Cancer Father     No Known Problems Mother        OBJECTIVE:    Visit Vitals  /68   Pulse 71   Temp 98.5 °F (36.9 °C) (Oral)   Resp 16   Ht 6' 9\" (2.057 m)   Wt 278 lb 3.2 oz (126.2 kg)   SpO2 98%   BMI 29.81 kg/m²     CONSTITUTIONAL: well , well nourished, appears age appropriate  EYES: perrla, eom intact  ENMT:moist mucous membranes, pharynx clear  NECK: supple. Thyroid normal  RESPIRATORY: Chest: clear to ascultation and percussion   CARDIOVASCULAR: Heart: regular rate and rhythm  GASTROINTESTINAL: Abdomen: soft, bowel sounds active  HEMATOLOGIC: no pathological lymph nodes palpated  MUSCULOSKELETAL: Extremities: 2+ edema distal L leg, pulse 1+, right BK amputation, Charcot joint left ankle  INTEGUMENT: No unusual rashes or suspicious skin lesions noted. Nails appear normal.  stasis changes distal left leg with skin lichenification and hyperpigmentation  NEUROLOGIC: non-focal exam   MENTAL STATUS: alert and oriented, appropriate affect      ASSESSMENT:  1. DM (diabetes mellitus), type 2, uncontrolled, periph vascular complic ([address]    2. Essential hypertension    3. Venous insufficiency    4. Charcot foot due to diabetes mellitus ([address]    5. Other inflammatory polyneuropathies ([facility] [address]    6. Dyslipidemia    7. Plasma cell dyscrasia        PLAN:    1. His diabetes hopefully is doing well, but I will await the results of his Hgb A1c. I remind him of the importance of eating at the same time every day. 2. His blood pressure is excellent today, no adjustments are made. 3. He will continue statin as prescribed in view of his secondary cardiovascular risk prevention created by his peripheral vascular occlusive disease. 4. He does have chronic venous insufficiency with swelling of lower extremities with venous stasis changes. 5. He has severe peripheral neuropathy, which was a contributing factor to the amputation of his right foot. He needs offloading shoes on his left and will follow up with his podiatrist as relates to this. 6. He does have a history of plasma cell dyscrasia, but this has been reasonably stable. I will continue to monitor the levels.            Follow-up Disposition:  Return in about 6 weeks (around 4/16/2019).       Elza Salazar MD

## 2019-03-05 NOTE — PROGRESS NOTES
Chief Complaint   Patient presents with    Diabetes     2 month follow up      1. Have you been to the ER, urgent care clinic since your last visit? Hospitalized since your last visit? No    2. Have you seen or consulted any other health care providers outside of the [address] [place] [address] since your last visit? Include any pap smears or colon screening.  No

## 2019-03-08 LAB
ALBUMIN SERPL ELPH-MCNC: 3.6 G/DL (ref 2.9–4.4)
ALBUMIN/CREAT UR: 126.9 MG/G CREAT (ref 0–30)
ALBUMIN/GLOB SERPL: 0.9 {RATIO} (ref 0.7–1.7)
ALPHA1 GLOB SERPL ELPH-MCNC: 0.2 G/DL (ref 0–0.4)
ALPHA2 GLOB SERPL ELPH-MCNC: 0.8 G/DL (ref 0.4–1)
APO B SERPL-MCNC: 76 MG/DL
B-GLOBULIN SERPL ELPH-MCNC: 1.1 G/DL (ref 0.7–1.3)
BUN SERPL-MCNC: 35 MG/DL (ref 8–27)
BUN/CREAT SERPL: 25 (ref 10–24)
CALCIUM SERPL-MCNC: 9.3 MG/DL (ref 8.6–10.2)
CHLORIDE SERPL-SCNC: 106 MMOL/L (ref 96–106)
CHOLEST SERPL-MCNC: 129 MG/DL (ref 100–199)
CO2 SERPL-SCNC: 24 MMOL/L (ref 20–29)
CREAT SERPL-MCNC: 1.38 MG/DL (ref 0.76–1.27)
CREAT UR-MCNC: 102.1 MG/DL
EST. AVERAGE GLUCOSE BLD GHB EST-MCNC: 206 MG/DL
GAMMA GLOB SERPL ELPH-MCNC: 1.6 G/DL (ref 0.4–1.8)
GLOBULIN SER CALC-MCNC: 3.8 G/DL (ref 2.2–3.9)
GLUCOSE SERPL-MCNC: 250 MG/DL (ref 65–99)
HBA1C MFR BLD: 8.8 % (ref 4.8–5.6)
HDLC SERPL-MCNC: 40 MG/DL
IGA SERPL-MCNC: 114 MG/DL (ref 61–437)
IGG SERPL-MCNC: 1784 MG/DL (ref 700–1600)
IGM SERPL-MCNC: 46 MG/DL (ref 15–143)
LDLC SERPL CALC-MCNC: 73 MG/DL (ref 0–99)
M PROTEIN SERPL ELPH-MCNC: 0.8 G/DL
MICROALBUMIN UR-MCNC: 129.6 UG/ML
PLEASE NOTE, 011150: ABNORMAL
POTASSIUM SERPL-SCNC: 5.8 MMOL/L (ref 3.5–5.2)
PROT PATTERN SERPL IFE-IMP: ABNORMAL
PROT SERPL-MCNC: 7.4 G/DL (ref 6–8.5)
PROT UR-MCNC: 30.3 MG/DL
SODIUM SERPL-SCNC: 141 MMOL/L (ref 134–144)
TRIGL SERPL-MCNC: 78 MG/DL (ref 0–149)
VLDLC SERPL CALC-MCNC: 16 MG/DL (ref 5–40)

## 2019-03-21 NOTE — TELEPHONE ENCOUNTER
Patient notified by phone per Sharon Mccarty and ask to break his lisinopril 40mg in half and take only a half which will be 20mg. patient  Wants to get new rx for 20mg tablets.

## 2019-03-24 RX ORDER — LISINOPRIL 20 MG/1
20 TABLET ORAL DAILY
Qty: 90 TAB | Refills: 3 | Status: SHIPPED | OUTPATIENT
Start: 2019-03-24 | End: 2019-12-04 | Stop reason: CLARIF

## 2019-04-16 ENCOUNTER — OFFICE VISIT (OUTPATIENT)
Dept: INTERNAL MEDICINE CLINIC | Age: 79
End: 2019-04-16

## 2019-04-16 VITALS
SYSTOLIC BLOOD PRESSURE: 150 MMHG | DIASTOLIC BLOOD PRESSURE: 77 MMHG | BODY MASS INDEX: 30.55 KG/M2 | TEMPERATURE: 98.2 F | HEART RATE: 65 BPM | WEIGHT: 285.1 LBS | OXYGEN SATURATION: 97 %

## 2019-04-16 DIAGNOSIS — E78.5 DYSLIPIDEMIA: ICD-10-CM

## 2019-04-16 DIAGNOSIS — I10 ESSENTIAL HYPERTENSION: Primary | ICD-10-CM

## 2019-04-16 DIAGNOSIS — E88.09 PLASMA CELL DYSCRASIA: ICD-10-CM

## 2019-04-17 LAB
BUN SERPL-MCNC: 24 MG/DL (ref 8–27)
BUN/CREAT SERPL: 18 (ref 10–24)
CALCIUM SERPL-MCNC: 9.6 MG/DL (ref 8.6–10.2)
CHLORIDE SERPL-SCNC: 105 MMOL/L (ref 96–106)
CO2 SERPL-SCNC: 21 MMOL/L (ref 20–29)
CREAT SERPL-MCNC: 1.36 MG/DL (ref 0.76–1.27)
FRUCTOSAMINE SERPL-SCNC: 344 UMOL/L (ref 0–285)
GLUCOSE SERPL-MCNC: 115 MG/DL (ref 65–99)
POTASSIUM SERPL-SCNC: 5.2 MMOL/L (ref 3.5–5.2)
SODIUM SERPL-SCNC: 142 MMOL/L (ref 134–144)

## 2019-04-21 PROBLEM — E11.21 TYPE 2 DIABETES WITH NEPHROPATHY (HCC): Status: ACTIVE | Noted: 2019-04-21

## 2019-04-21 NOTE — PROGRESS NOTES
[address] [place] [address] and Primary Care 
[place] [address] 
[address] [facility] [address] Phone:  [phone]  Fax: [phone] Chief Complaint Patient presents with  Diabetes Patient is here for a 6 week follow up. .   
 
SUBJECTIVE: 
  Pb Avalos is a 66 y.o. male Comes in for follow up of his diabetes. He actually brings a lot of his blood sugars in and they are consistent and reasonable. This indicates he has significantly reduced his consumption of processed carbohydrates and is making an active effort to eat at the same time every day. He has not had any interventional hypoglycemic episodes. He is taking the same dose of insulin. His prosthesis is doing reasonably well and he is accommodating nicely. He has a past history of primary hypertension and dyslipidemia. He continues to be sedentary unfortunately. Current Outpatient Medications Medication Sig Dispense Refill  lisinopril (PRINIVIL, ZESTRIL) 20 mg tablet Take 1 Tab by mouth daily.  90 Tab 3  
 glucose blood VI test strips (ACCU-CHEK CLAUDINE PLUS TEST STRP) strip Use to test blood sugar three times a day 300 Strip 3  
 insulin NPH/insulin regular (NOVOLIN 70/30 U-100 INSULIN) 100 unit/mL (70-30) injection INJECT 24 UNITS SUBCUTANEOUSLY BEFORE BREAKFAST AND 24 UNITS BEFORE DINNER 2 Vial 11  
 simvastatin (ZOCOR) 20 mg tablet TAKE ONE TABLET BY MOUTH ONCE NIGHTLY 90 Tab 3  
 metoprolol tartrate (LOPRESSOR) 25 mg tablet TAKE ONE TABLET BY MOUTH TWICE DAILY 180 Tab 3  
 Insulin Syringes, Disposable, 1 mL syrg Dx.e11.9--- before meals breakfast and dinner 100 Syringe 11  
 ferrous gluconate 324 mg (38 mg iron) tablet TAKE ONE TABLET BY MOUTH THREE TIMES DAILY WITH  A  MEAL 90 Tab 0  
 insulin lispro (HUMALOG) 100 unit/mL injection 8 units sq 3 times daily acmeals 1 Vial 0  
 oxyCODONE-acetaminophen (PERCOCET) 5-325 mg per tablet Take 1-2 Tabs by mouth every four (4) hours as needed. Max Daily Amount: 12 Tabs. 30 Tab 0  
 senna (SENOKOT) 8.6 mg tablet Take 2 Tabs by mouth nightly. 60 Tab 0  
 aspirin 81 mg tablet Take 81 mg by mouth daily. Past Medical History:  
Diagnosis Date  CAD (coronary artery disease)   
 palpitations  Diabetes ([facility] [address]  Hypertension  Other ill-defined conditions(799.89) left foot ulcer Past Surgical History:  
Procedure Laterality Date  HX AMPUTATION Status post right BK amputation  HX HEENT    
 bilateral cataract surgery  HX ORTHOPAEDIC    
 \"2 surgeries on right foot\"  HX OTHER SURGICAL    
 surgery to left foot ulcer, PICC right arm No Known Allergies REVIEW OF SYSTEMS: 
General: negative for - chills or fever ENT: negative for - headaches, nasal congestion or tinnitus Respiratory: negative for - cough, hemoptysis, shortness of breath or wheezing Cardiovascular : negative for - chest pain, edema, palpitations or shortness of breath Gastrointestinal: negative for - abdominal pain, blood in stools, heartburn or nausea/vomiting Genito-Urinary: no dysuria, trouble voiding, or hematuria Musculoskeletal: negative for - gait disturbance, joint pain, joint stiffness or joint swelling Neurological: no TIA or stroke symptoms Hematologic: no bruises, no bleeding, no swollen glands Integument: no lumps, mole changes, nail changes or rash Endocrine: no malaise/lethargy or unexpected weight changes Social History Socioeconomic History  Marital status:  Spouse name: Not on file  Number of children: 4  
 Years of education: 12  
 Highest education level: Not on file Occupational History  Occupation: employed-----formerly retired Tobacco Use  Smoking status: Former Smoker  Smokeless tobacco: Never Used Substance and Sexual Activity  Alcohol use: No  
 Drug use: No  
 Sexual activity: Not Currently Family History Problem Relation Age of Onset  Cancer Father  No Known Problems Mother OBJECTIVE: 
 
Visit Vitals /77 Pulse 65 Temp 98.2 °F (36.8 °C) Wt 285 lb 1.6 oz (129.3 kg) SpO2 97% BMI 30.55 kg/m² CONSTITUTIONAL: well , well nourished, appears age appropriate EYES: perrla, eom intact ENMT:moist mucous membranes, pharynx clear NECK: supple. Thyroid normal 
RESPIRATORY: Chest: clear to ascultation and percussion CARDIOVASCULAR: Heart: regular rate and rhythm GASTROINTESTINAL: Abdomen: soft, bowel sounds active HEMATOLOGIC: no pathological lymph nodes palpated MUSCULOSKELETAL: Extremities: no edema, pulse 1+, right BKA amputation INTEGUMENT: No unusual rashes or suspicious skin lesions noted. Nails appear normal. 
NEUROLOGIC: non-focal exam  
MENTAL STATUS: alert and oriented, appropriate affect ASSESSMENT: 
1. Essential hypertension 2. DM (diabetes mellitus), type 2, uncontrolled, periph vascular complic ([address] 3. Dyslipidemia 4. Plasma cell dyscrasia PLAN: 
 
1. His blood pressure is reasonable. 2. From a diabetic perspective he appears to be doing better. I will await the results of his fructosamine. On his next monthly visit he will have a hemoglobin A1c. The frequency of his visits are exclusively related to his diabetes. Historically when he comes in frequently his diabetic control is significantly better because he is more attuned. 3. He will continue statin as prescribed because he is in a secondary risk prevention mode. 4. He does have a history of plasma cell dyscrasia, but this has remained stable based on the amount of protein that is present. . 
Orders Placed This Encounter  METABOLIC PANEL, BASIC  
 FRUCTOSAMINE Follow-up and Dispositions · Return in about 1 month (around 5/14/2019).  
  
 
 
 
Amira Alcantara MD

## 2019-04-26 ENCOUNTER — TELEPHONE (OUTPATIENT)
Dept: INTERNAL MEDICINE CLINIC | Age: 79
End: 2019-04-26

## 2019-04-26 NOTE — TELEPHONE ENCOUNTER
PATIENT NOTIFIED BY PHONE OF [facility] [address] PER [address]. PRO PATIENT PATIENT TO INCEASE QAM INSULIN TO 34 UNITS AND QPM INSULIN TO 28 UNITS. THIS IS AN INCREASE OF 4 UNITS BOTH WAYS.

## 2019-06-04 ENCOUNTER — OFFICE VISIT (OUTPATIENT)
Dept: INTERNAL MEDICINE CLINIC | Age: 79
End: 2019-06-04

## 2019-06-04 VITALS
TEMPERATURE: 98.4 F | RESPIRATION RATE: 18 BRPM | HEIGHT: 78 IN | HEART RATE: 67 BPM | OXYGEN SATURATION: 97 % | BODY MASS INDEX: 32.51 KG/M2 | DIASTOLIC BLOOD PRESSURE: 69 MMHG | SYSTOLIC BLOOD PRESSURE: 167 MMHG | WEIGHT: 281 LBS

## 2019-06-04 DIAGNOSIS — E88.09 PLASMA CELL DYSCRASIA: ICD-10-CM

## 2019-06-04 DIAGNOSIS — E78.5 DYSLIPIDEMIA: ICD-10-CM

## 2019-06-04 DIAGNOSIS — Z13.39 SCREENING FOR ALCOHOLISM: ICD-10-CM

## 2019-06-04 DIAGNOSIS — Z00.00 WELLNESS EXAMINATION: ICD-10-CM

## 2019-06-04 DIAGNOSIS — I10 ESSENTIAL HYPERTENSION: ICD-10-CM

## 2019-06-04 DIAGNOSIS — Z13.31 SCREENING FOR DEPRESSION: ICD-10-CM

## 2019-06-04 NOTE — PROGRESS NOTES
Chief Complaint   Patient presents with   [place] Annual Wellness Visit       1. Have you been to the ER, urgent care clinic since your last visit? Hospitalized since your last visit? No    2. Have you seen or consulted any other health care providers outside of the [address] [place] [address] since your last visit? Include any pap smears or colon screening.  No

## 2019-06-04 NOTE — PROGRESS NOTES
[address] [place] [address] and Primary Care  [place] [address]  [address] [place] [address]  Phone:  [phone]  Fax: [phone]       Chief Complaint   Patient presents with   [facility] Wellness Visit   . SUBJECTIVE:    Chelsea Flood is a 66 y.o. male Comes in for return visit bringing a list of blood sugars in. He is quite proud of his sugars and they are actually excellent with the exception of a rare outlier. His mobility is improving with use of his prosthesis. He has a past history of primary hypertension and dyslipidemia. Current Outpatient Medications   Medication Sig Dispense Refill    lisinopril (PRINIVIL, ZESTRIL) 20 mg tablet Take 1 Tab by mouth daily. 90 Tab 3    glucose blood VI test strips (ACCU-CHEK CLAUDINE PLUS TEST STRP) strip Use to test blood sugar three times a day 300 Strip 3    insulin NPH/insulin regular (NOVOLIN 70/30 U-100 INSULIN) 100 unit/mL (70-30) injection INJECT 24 UNITS SUBCUTANEOUSLY BEFORE BREAKFAST AND 24 UNITS BEFORE DINNER 2 Vial 11    simvastatin (ZOCOR) 20 mg tablet TAKE ONE TABLET BY MOUTH ONCE NIGHTLY 90 Tab 3    metoprolol tartrate (LOPRESSOR) 25 mg tablet TAKE ONE TABLET BY MOUTH TWICE DAILY 180 Tab 3    Insulin Syringes, Disposable, 1 mL syrg Dx.e11.9--- before meals breakfast and dinner 100 Syringe 11    ferrous gluconate 324 mg (38 mg iron) tablet TAKE ONE TABLET BY MOUTH THREE TIMES DAILY WITH  A  MEAL 90 Tab 0    insulin lispro (HUMALOG) 100 unit/mL injection 8 units sq 3 times daily acmeals 1 Vial 0    oxyCODONE-acetaminophen (PERCOCET) 5-325 mg per tablet Take 1-2 Tabs by mouth every four (4) hours as needed. Max Daily Amount: 12 Tabs. 30 Tab 0    senna (SENOKOT) 8.6 mg tablet Take 2 Tabs by mouth nightly. 60 Tab 0    aspirin 81 mg tablet Take 81 mg by mouth daily.       BD INSULIN SYRINGE ULTRA-FINE 1 mL 31 gauge x 5/16 syrg USE BEFORE MEALS, BREAKFAST AND DINNER 180 Syringe 11     Past Medical History:   Diagnosis Date    CAD (coronary artery disease)     palpitations    Diabetes ([facility]     Hypertension     Other ill-defined conditions(799.89)     left foot ulcer     Past Surgical History:   Procedure Laterality Date    HX AMPUTATION      Status post right BK amputation    HX HEENT      bilateral cataract surgery    HX ORTHOPAEDIC      \"2 surgeries on right foot\"    HX OTHER SURGICAL      surgery to left foot ulcer, PICC right arm     No Known Allergies      REVIEW OF SYSTEMS:  General: negative for - chills or fever  ENT: negative for - headaches, nasal congestion or tinnitus  Respiratory: negative for - cough, hemoptysis, shortness of breath or wheezing  Cardiovascular : negative for - chest pain, edema, palpitations or shortness of breath  Gastrointestinal: negative for - abdominal pain, blood in stools, heartburn or nausea/vomiting  Genito-Urinary: no dysuria, trouble voiding, or hematuria  Musculoskeletal: negative for - gait disturbance, joint pain, joint stiffness or joint swelling  Neurological: no TIA or stroke symptoms  Hematologic: no bruises, no bleeding, no swollen glands  Integument: no lumps, mole changes, nail changes or rash  Endocrine: no malaise/lethargy or unexpected weight changes      Social History     Socioeconomic History    Marital status:      Spouse name: Not on file    Number of children: 4    Years of education: 12    Highest education level: Not on file   Occupational History    Occupation: employed-----formerly retired   Tobacco Use    Smoking status: Former Smoker    Smokeless tobacco: Never Used   Substance and Sexual Activity    Alcohol use: No    Drug use: No    Sexual activity: Not Currently     Family History   Problem Relation Age of Onset    Cancer Father     No Known Problems Mother        OBJECTIVE:    Visit Vitals  /69   Pulse 67   Temp 98.4 °F (36.9 °C) (Oral)   Resp 18   Ht 6' 9\" (2.057 m)   Wt 281 lb (127.5 kg)   SpO2 97%   BMI 30.11 kg/m² CONSTITUTIONAL: well , well nourished, appears age appropriate  EYES: perrla, eom intact  ENMT:moist mucous membranes, pharynx clear  NECK: supple. Thyroid normal  RESPIRATORY: Chest: clear to ascultation and percussion   CARDIOVASCULAR: Heart: regular rate and rhythm  GASTROINTESTINAL: Abdomen: soft, bowel sounds active  HEMATOLOGIC: no pathological lymph nodes palpated  MUSCULOSKELETAL: Extremities: no edema, pulse 1+, right BKA amputation  INTEGUMENT: No unusual rashes or suspicious skin lesions noted. Nails appear normal.  NEUROLOGIC: non-focal exam   MENTAL STATUS: alert and oriented, appropriate affect      ASSESSMENT:  1. DM (diabetes mellitus), type 2, uncontrolled, periph vascular complic ([facility]    2. Essential hypertension    3. Dyslipidemia    4. Plasma cell dyscrasia    5. Screening for alcoholism    6. Screening for depression    7. Wellness examination        PLAN:    1. Hopefully his diabetes is doing well, but I will await the results of his Hgb A1c.  2. BP is excellent today, no adjustments are made. 3. He will continue statin as prescribed in view of his secondary cardiovascular risk prevention, which is created by his PVD. 4. He also has a plasma cell dysplasia, which has been stable. A referral to hematology will occur only if his IgG immunoglobulins increase. .  Orders Placed This Encounter    Depression Screen Annual    HEMOGLOBIN A1C WITH EAG    IMMUNOELECTROPHORESIS (IMMUNOFIX.)    CBC WITH AUTOMATED DIFF         Follow-up and Dispositions    · Return in about 6 weeks (around 7/16/2019). Balaji Scott MD  This is the Subsequent Medicare Annual Wellness Exam, performed 12 months or more after the Initial AWV or the last Subsequent AWV    I have reviewed the patient's medical history in detail and updated the computerized patient record.      History     Past Medical History:   Diagnosis Date    CAD (coronary artery disease)     palpitations    Diabetes ([facility]     Hypertension     Other ill-defined conditions(799.89)     left foot ulcer      Past Surgical History:   Procedure Laterality Date    HX AMPUTATION      Status post right BK amputation    HX HEENT      bilateral cataract surgery    HX ORTHOPAEDIC      \"2 surgeries on right foot\"    HX OTHER SURGICAL      surgery to left foot ulcer, PICC right arm     Current Outpatient Medications   Medication Sig Dispense Refill    lisinopril (PRINIVIL, ZESTRIL) 20 mg tablet Take 1 Tab by mouth daily. 90 Tab 3    glucose blood VI test strips (ACCU-CHEK CLAUDINE PLUS TEST STRP) strip Use to test blood sugar three times a day 300 Strip 3    insulin NPH/insulin regular (NOVOLIN 70/30 U-100 INSULIN) 100 unit/mL (70-30) injection INJECT 24 UNITS SUBCUTANEOUSLY BEFORE BREAKFAST AND 24 UNITS BEFORE DINNER 2 Vial 11    simvastatin (ZOCOR) 20 mg tablet TAKE ONE TABLET BY MOUTH ONCE NIGHTLY 90 Tab 3    metoprolol tartrate (LOPRESSOR) 25 mg tablet TAKE ONE TABLET BY MOUTH TWICE DAILY 180 Tab 3    Insulin Syringes, Disposable, 1 mL syrg Dx.e11.9--- before meals breakfast and dinner 100 Syringe 11    ferrous gluconate 324 mg (38 mg iron) tablet TAKE ONE TABLET BY MOUTH THREE TIMES DAILY WITH  A  MEAL 90 Tab 0    insulin lispro (HUMALOG) 100 unit/mL injection 8 units sq 3 times daily acmeals 1 Vial 0    oxyCODONE-acetaminophen (PERCOCET) 5-325 mg per tablet Take 1-2 Tabs by mouth every four (4) hours as needed. Max Daily Amount: 12 Tabs. 30 Tab 0    senna (SENOKOT) 8.6 mg tablet Take 2 Tabs by mouth nightly. 60 Tab 0    aspirin 81 mg tablet Take 81 mg by mouth daily.       BD INSULIN SYRINGE ULTRA-FINE 1 mL 31 gauge x 5/16 syrg USE BEFORE MEALS, BREAKFAST AND DINNER 180 Syringe 11     No Known Allergies  Family History   Problem Relation Age of Onset    Cancer Father     No Known Problems Mother      Social History     Tobacco Use    Smoking status: Former Smoker    Smokeless tobacco: Never Used   Substance Use Topics    Alcohol use: No     Patient Active Problem List   Diagnosis Code    DM (diabetes mellitus), type 2, uncontrolled, periph vascular complic ([facility] S20.70, A65.18    Charcot foot due to diabetes mellitus ([facility] E11.610    Essential hypertension I10    Dyslipidemia E78.5    Venous insufficiency I87.2    Synovitis of knee M65.9    Primary osteoarthritis of both knees M17.0    Peripheral neuropathy G62.9    Status post below knee amputation of right lower extremity (HCC) Z89.511    Plasma cell dyscrasia E88.09    Physical deconditioning R53.81    Anemia D64.9    Type 2 diabetes mellitus with diabetic neuropathy ([facility]) E11.40    Type 2 diabetes with nephropathy ([facility] E11.21       Depression Risk Factor Screening:     3 most recent PHQ Screens 6/4/2019   PHQ Not Done -   Little interest or pleasure in doing things Not at all   Feeling down, depressed, irritable, or hopeless Not at all   Total Score PHQ 2 0     Alcohol Risk Factor Screening: You do not drink alcohol or very rarely. Functional Ability and Level of Safety:   Hearing Loss  Hearing is good. Activities of Daily Living  The home contains: no safety equipment. Patient does total self care    Fall Risk  Fall Risk Assessment, last 12 mths 6/4/2019   Able to walk? Yes   Fall in past 12 months? No   Fall with injury? -   Number of falls in past 12 months -   Fall Risk Score -       Abuse Screen  Patient is not abused    Cognitive Screening   Evaluation of Cognitive Function:  Has your family/caregiver stated any concerns about your memory: no  Normal    Patient Care Team   Patient Care Team:  Cristina Wilkins MD as PCP - General (Internal Medicine)  Milo Ceja [place] (Podiatry)  Lyle Connelly MD (Hematology and Oncology)    Assessment/Plan   Education and counseling provided:  Are appropriate based on today's review and evaluation    Diagnoses and all orders for this visit:    1.  DM (diabetes mellitus), type 2, uncontrolled, periph vascular complic ([facility]  -     HEMOGLOBIN A1C WITH EAG    2. Essential hypertension    3. Dyslipidemia    4. Plasma cell dyscrasia  -     IMMUNOELECTROPHORESIS (IMMUNOFIX.)  -     CBC WITH AUTOMATED DIFF    5. Screening for alcoholism  -     [place] ANNUAL ALCOHOL SCREEN 15 MIN    6. Screening for depression  -     DEPRESSION SCREEN ANNUAL    7.  Wellness examination        Health Maintenance Due   Topic Date Due    Pneumococcal 65+ years (1 of 2 - PCV13) 10/02/2005    GLAUCOMA SCREENING Q2Y  03/31/2017

## 2019-06-09 LAB
BASOPHILS # BLD AUTO: 0 X10E3/UL (ref 0–0.2)
BASOPHILS NFR BLD AUTO: 1 %
EOSINOPHIL # BLD AUTO: 0.1 X10E3/UL (ref 0–0.4)
EOSINOPHIL NFR BLD AUTO: 2 %
ERYTHROCYTE [DISTWIDTH] IN BLOOD BY AUTOMATED COUNT: 15.3 % (ref 12.3–15.4)
EST. AVERAGE GLUCOSE BLD GHB EST-MCNC: 166 MG/DL
HBA1C MFR BLD: 7.4 % (ref 4.8–5.6)
HCT VFR BLD AUTO: 39.8 % (ref 37.5–51)
HGB BLD-MCNC: 12.9 G/DL (ref 13–17.7)
IGA SERPL-MCNC: 101 MG/DL (ref 61–437)
IGG SERPL-MCNC: 1670 MG/DL (ref 700–1600)
IGM SERPL-MCNC: 41 MG/DL (ref 15–143)
IMM GRANULOCYTES # BLD AUTO: 0 X10E3/UL (ref 0–0.1)
IMM GRANULOCYTES NFR BLD AUTO: 1 %
LYMPHOCYTES # BLD AUTO: 2.2 X10E3/UL (ref 0.7–3.1)
LYMPHOCYTES NFR BLD AUTO: 35 %
MCH RBC QN AUTO: 27.8 PG (ref 26.6–33)
MCHC RBC AUTO-ENTMCNC: 32.4 G/DL (ref 31.5–35.7)
MCV RBC AUTO: 86 FL (ref 79–97)
MONOCYTES # BLD AUTO: 0.6 X10E3/UL (ref 0.1–0.9)
MONOCYTES NFR BLD AUTO: 10 %
NEUTROPHILS # BLD AUTO: 3.3 X10E3/UL (ref 1.4–7)
NEUTROPHILS NFR BLD AUTO: 51 %
PLATELET # BLD AUTO: 231 X10E3/UL (ref 150–450)
PROT PATTERN SERPL IFE-IMP: ABNORMAL
RBC # BLD AUTO: 4.64 X10E6/UL (ref 4.14–5.8)
WBC # BLD AUTO: 6.2 X10E3/UL (ref 3.4–10.8)

## 2019-06-11 RX ORDER — PEN NEEDLE, DIABETIC 29 G X1/2"
NEEDLE, DISPOSABLE MISCELLANEOUS
Qty: 180 SYRINGE | Refills: 11 | Status: ON HOLD | OUTPATIENT
Start: 2019-06-11 | End: 2019-07-08 | Stop reason: CLARIF

## 2019-06-23 NOTE — PATIENT INSTRUCTIONS
Medicare Wellness Visit, Male The best way to live healthy is to have a lifestyle where you eat a well-balanced diet, exercise regularly, limit alcohol use, and quit all forms of tobacco/nicotine, if applicable. Regular preventive services are another way to keep healthy. Preventive services (vaccines, screening tests, monitoring & exams) can help personalize your care plan, which helps you manage your own care. Screening tests can find health problems at the earliest stages, when they are easiest to treat. [facility] follows the current, evidence-based guidelines published by the [facility] ([facility]) when recommending preventive services for our patients. Because we follow these guidelines, sometimes recommendations change over time as research supports it. (For example, a prostate screening blood test is no longer routinely recommended for men with no symptoms.) Of course, you and your doctor may decide to screen more often for some diseases, based on your risk and co-morbidities (chronic disease you are already diagnosed with). Preventive services for you include: - Medicare offers their members a free annual wellness visit, which is time for you and your primary care provider to discuss and plan for your preventive service needs. Take advantage of this benefit every year! 
-All adults over age 72 should receive the recommended pneumonia vaccines. Current USPSTF guidelines recommend a series of two vaccines for the best pneumonia protection.  
-All adults should have a flu vaccine yearly and an ECG.  All adults age 61 and older should receive a shingles vaccine once in their lifetime.   
-All adults age 38-68 who are overweight should have a diabetes screening test once every three years.  
-Other screening tests & preventive services for persons with diabetes include: an eye exam to screen for diabetic retinopathy, a kidney function test, a foot exam, and stricter control over your cholesterol.  
-Cardiovascular screening for adults with routine risk involves an electrocardiogram (ECG) at intervals determined by the provider.  
-Colorectal cancer screening should be done for adults age 54-65 with no increased risk factors for colorectal cancer. There are a number of acceptable methods of screening for this type of cancer. Each test has its own benefits and drawbacks. Discuss with your provider what is most appropriate for you during your annual wellness visit. The different tests include: colonoscopy (considered the best screening method), a fecal occult blood test, a fecal DNA test, and sigmoidoscopy. 
-All adults born between [facility] should be screened once for Hepatitis C. 
-An Abdominal Aortic Aneurysm (AAA) Screening is recommended for men age 73-68 who has ever smoked in their lifetime. Here is a list of your current Health Maintenance items (your personalized list of preventive services) with a due date: 
Health Maintenance Due Topic Date Due  Pneumococcal Vaccine (1 of 2 - PCV13) 10/02/2005  Glaucoma Screening   03/31/2017

## 2019-07-08 ENCOUNTER — HOSPITAL ENCOUNTER (OUTPATIENT)
Age: 79
Setting detail: OUTPATIENT SURGERY
Discharge: HOME | End: 2019-07-08
Attending: INTERNAL MEDICINE | Admitting: INTERNAL MEDICINE
Payer: MEDICARE

## 2019-07-08 ENCOUNTER — ANESTHESIA EVENT (OUTPATIENT)
Dept: ENDOSCOPY | Age: 79
End: 2019-07-08
Payer: MEDICARE

## 2019-07-08 ENCOUNTER — ANESTHESIA (OUTPATIENT)
Dept: ENDOSCOPY | Age: 79
End: 2019-07-08
Payer: MEDICARE

## 2019-07-08 VITALS
RESPIRATION RATE: 12 BRPM | SYSTOLIC BLOOD PRESSURE: 101 MMHG | DIASTOLIC BLOOD PRESSURE: 77 MMHG | TEMPERATURE: 97.6 F | HEART RATE: 79 BPM | OXYGEN SATURATION: 97 %

## 2019-07-08 LAB
GLUCOSE BLD STRIP.AUTO-MCNC: 119 MG/DL (ref 65–100)
SERVICE CMNT-IMP: ABNORMAL

## 2019-07-08 PROCEDURE — 77030027957 HC TBNG IRR ENDOGTR BUSS -B: Performed by: INTERNAL MEDICINE

## 2019-07-08 PROCEDURE — 82962 GLUCOSE BLOOD TEST: CPT

## 2019-07-08 PROCEDURE — 88305 TISSUE EXAM BY PATHOLOGIST: CPT

## 2019-07-08 PROCEDURE — 74011250636 HC RX REV CODE- 250/636

## 2019-07-08 PROCEDURE — 76040000007: Performed by: INTERNAL MEDICINE

## 2019-07-08 PROCEDURE — 76060000032 HC ANESTHESIA 0.5 TO 1 HR: Performed by: INTERNAL MEDICINE

## 2019-07-08 PROCEDURE — 77030009426 HC FCPS BIOP ENDOSC BSC -B: Performed by: INTERNAL MEDICINE

## 2019-07-08 RX ORDER — SODIUM CHLORIDE 0.9 % (FLUSH) 0.9 %
5-40 SYRINGE (ML) INJECTION AS NEEDED
Status: DISCONTINUED | OUTPATIENT
Start: 2019-07-08 | End: 2019-07-08 | Stop reason: HOSPADM

## 2019-07-08 RX ORDER — NALOXONE HYDROCHLORIDE 0.4 MG/ML
0.4 INJECTION, SOLUTION INTRAMUSCULAR; INTRAVENOUS; SUBCUTANEOUS
Status: DISCONTINUED | OUTPATIENT
Start: 2019-07-08 | End: 2019-07-08 | Stop reason: HOSPADM

## 2019-07-08 RX ORDER — ATROPINE SULFATE 0.1 MG/ML
0.5 INJECTION INTRAVENOUS
Status: DISCONTINUED | OUTPATIENT
Start: 2019-07-08 | End: 2019-07-08 | Stop reason: HOSPADM

## 2019-07-08 RX ORDER — DEXTROMETHORPHAN/PSEUDOEPHED 2.5-7.5/.8
1.2 DROPS ORAL
Status: DISCONTINUED | OUTPATIENT
Start: 2019-07-08 | End: 2019-07-08 | Stop reason: HOSPADM

## 2019-07-08 RX ORDER — PROPOFOL 10 MG/ML
INJECTION, EMULSION INTRAVENOUS AS NEEDED
Status: DISCONTINUED | OUTPATIENT
Start: 2019-07-08 | End: 2019-07-08 | Stop reason: HOSPADM

## 2019-07-08 RX ORDER — FENTANYL CITRATE 50 UG/ML
25-200 INJECTION, SOLUTION INTRAMUSCULAR; INTRAVENOUS
Status: DISCONTINUED | OUTPATIENT
Start: 2019-07-08 | End: 2019-07-08 | Stop reason: HOSPADM

## 2019-07-08 RX ORDER — LIDOCAINE HYDROCHLORIDE 20 MG/ML
INJECTION, SOLUTION EPIDURAL; INFILTRATION; INTRACAUDAL; PERINEURAL AS NEEDED
Status: DISCONTINUED | OUTPATIENT
Start: 2019-07-08 | End: 2019-07-08 | Stop reason: HOSPADM

## 2019-07-08 RX ORDER — SODIUM CHLORIDE 9 MG/ML
INJECTION, SOLUTION INTRAVENOUS
Status: DISCONTINUED | OUTPATIENT
Start: 2019-07-08 | End: 2019-07-08 | Stop reason: HOSPADM

## 2019-07-08 RX ORDER — FLUMAZENIL 0.1 MG/ML
0.2 INJECTION INTRAVENOUS
Status: DISCONTINUED | OUTPATIENT
Start: 2019-07-08 | End: 2019-07-08 | Stop reason: HOSPADM

## 2019-07-08 RX ORDER — SODIUM CHLORIDE 0.9 % (FLUSH) 0.9 %
5-40 SYRINGE (ML) INJECTION EVERY 8 HOURS
Status: DISCONTINUED | OUTPATIENT
Start: 2019-07-08 | End: 2019-07-08 | Stop reason: HOSPADM

## 2019-07-08 RX ORDER — SODIUM CHLORIDE 9 MG/ML
50 INJECTION, SOLUTION INTRAVENOUS CONTINUOUS
Status: DISCONTINUED | OUTPATIENT
Start: 2019-07-08 | End: 2019-07-08 | Stop reason: HOSPADM

## 2019-07-08 RX ORDER — MIDAZOLAM HYDROCHLORIDE 1 MG/ML
.25-5 INJECTION, SOLUTION INTRAMUSCULAR; INTRAVENOUS
Status: DISCONTINUED | OUTPATIENT
Start: 2019-07-08 | End: 2019-07-08 | Stop reason: HOSPADM

## 2019-07-08 RX ORDER — EPINEPHRINE 0.1 MG/ML
1 INJECTION INTRACARDIAC; INTRAVENOUS
Status: DISCONTINUED | OUTPATIENT
Start: 2019-07-08 | End: 2019-07-08 | Stop reason: HOSPADM

## 2019-07-08 RX ADMIN — LIDOCAINE HYDROCHLORIDE 80 MG: 20 INJECTION, SOLUTION EPIDURAL; INFILTRATION; INTRACAUDAL; PERINEURAL at 09:23

## 2019-07-08 RX ADMIN — PROPOFOL 50 MG: 10 INJECTION, EMULSION INTRAVENOUS at 09:36

## 2019-07-08 RX ADMIN — SODIUM CHLORIDE: 9 INJECTION, SOLUTION INTRAVENOUS at 09:17

## 2019-07-08 RX ADMIN — PROPOFOL 50 MG: 10 INJECTION, EMULSION INTRAVENOUS at 09:29

## 2019-07-08 RX ADMIN — PROPOFOL 50 MG: 10 INJECTION, EMULSION INTRAVENOUS at 09:41

## 2019-07-08 RX ADMIN — PROPOFOL 150 MG: 10 INJECTION, EMULSION INTRAVENOUS at 09:23

## 2019-07-08 NOTE — DISCHARGE INSTRUCTIONS
[facility]    COLON DISCHARGE INSTRUCTIONS    Rai Gray  [id number]  1940    Discomfort:  Redness at IV site- apply warm compress to area; if redness or soreness persist- contact your physician  There may be a slight amount of blood passed from the rectum  Gaseous discomfort- walking, belching will help relieve any discomfort  You may not operate a vehicle for 12 hours  You may not engage in an occupation involving machinery or appliances for rest of today  You may not drink alcoholic beverages for at least 12 hours  Avoid making any critical decisions for at least 24 hour  DIET:  You may resume your regular diet - however -  remember your colon is empty and a heavy meal will produce gas. Avoid these foods:  vegetables, fried / greasy foods, carbonated drinks     ACTIVITY:  You may  resume your normal daily activities it is recommended that you spend the remainder of the day resting -  avoid any strenuous activity. CALL M.D.   ANY SIGN OF:   Increasing pain, nausea, vomiting  Abdominal distension (swelling)  New increased bleeding (oral or rectal)  Fever (chills)  Pain in chest area  Bloody discharge from nose or mouth  Shortness of breath      Follow-up Instructions:   Call Dr. Stephane Ellison for any questions or problems at [address] [place] [address]:   Your colonoscopy showed 2 small polyps removed, rest of exam was normal.  Telephone # [phone]      Signed By: Stephane Ellison MD     7/8/2019  9:50 AM       DISCHARGE SUMMARY from Nurse    The following personal items collected during your admission are returned to you:   Dental Appliance: Dental Appliances: None  Vision: Visual Aid: None  Hearing Aid:    Jewelry:    Clothing:    Other Valuables:    Valuables sent to safe:

## 2019-07-08 NOTE — PROGRESS NOTES
Alicia Eye  1940  [id number]    Situation:  Verbal report received from:   Zev Crouch RN  Procedure: Procedure(s):  COLONOSCOPY  ENDOSCOPIC POLYPECTOMY    Background:    Preoperative diagnosis: HISTORY OF POLYPS   FAMILY HISTORY OF COLON CANCER  Postoperative diagnosis: colon polyps    :  Dr. Marisa Goodell   Assistant(s): Endoscopy Technician-1: Hugh FISCHER  Endoscopy RN-1: Suzi Jones RN    Specimens:   ID Type Source Tests Collected by Time Destination   1 : pathology Preservative Colon, Transverse  Eufemia Leung MD 7/8/2019 5253 Pathology     H. Pylori  no    Assessment:  Intra-procedure medications      Anesthesia gave intra-procedure sedation and medications, see anesthesia flow sheet yes    Intravenous fluids: NS@ KVO     Vital signs stable   yes    Abdominal assessment: round and soft   yes    Recommendation:  Discharge patient per MD order  yes.   Return to floor  outpatient  Family or Friend   spouse  Permission to share finding with family or friend yes

## 2019-07-08 NOTE — ANESTHESIA PREPROCEDURE EVALUATION
Relevant Problems   No relevant active problems       Anesthetic History   No history of anesthetic complications            Review of Systems / Medical History  Patient summary reviewed, nursing notes reviewed and pertinent labs reviewed    Pulmonary          Smoker         Neuro/Psych   Within defined limits           Cardiovascular    Hypertension        Dysrhythmias   PAD         GI/Hepatic/Renal  Within defined limits              Endo/Other    Diabetes    Arthritis     Other Findings              Physical Exam    Airway  Mallampati: II  TM Distance: > 6 cm  Neck ROM: normal range of motion   Mouth opening: Normal     Cardiovascular  Regular rate and rhythm,  S1 and S2 normal,  no murmur, click, rub, or gallop             Dental  No notable dental hx       Pulmonary  Breath sounds clear to auscultation               Abdominal  GI exam deferred       Other Findings            Anesthetic Plan    ASA: 3  Anesthesia type: MAC            Anesthetic plan and risks discussed with: Patient

## 2019-07-08 NOTE — ANESTHESIA POSTPROCEDURE EVALUATION
Post-Anesthesia Evaluation and Assessment    Patient: Rj Canseco MRN: [id number]  SSN: [id number]    YOB: 1940  Age: 66 y.o. Sex: male      I have evaluated the patient and they are stable and ready for discharge from the PACU. Cardiovascular Function/Vital Signs  Visit Vitals  /62   Pulse 69   Resp (!) 68   SpO2 94%       Patient is status post MAC anesthesia for Procedure(s):  COLONOSCOPY  ENDOSCOPIC POLYPECTOMY. Nausea/Vomiting: None    Postoperative hydration reviewed and adequate. Pain:  Pain Scale 1: Numeric (0 - 10) (07/08/19 0829)  Pain Intensity 1: 0 (07/08/19 0829)   Managed    Neurological Status: At baseline    Mental Status, Level of Consciousness: Alert and  oriented to person, place, and time    Pulmonary Status:   O2 Device: Nasal cannula (07/08/19 0946)   Adequate oxygenation and airway patent    Complications related to anesthesia: None    Post-anesthesia assessment completed. No concerns    Signed By: Tenisha Vera MD     July 8, 2019              Procedure(s):  COLONOSCOPY  ENDOSCOPIC POLYPECTOMY. MAC    <BSHSIANPOST>    Vitals Value Taken Time   /85 7/8/2019  9:51 AM   Temp     Pulse 76 7/8/2019  9:55 AM   Resp 24 7/8/2019  9:55 AM   SpO2 97 % 7/8/2019  9:55 AM   Vitals shown include unvalidated device data.

## 2019-07-08 NOTE — PROCEDURES
[address], [address] [place] [address]      Colonoscopy Operative Report    Reji Virk  [id number]  1940      Procedure Type:   Colonoscopy with polypectomy (hot biopsy)     Indications:    Family history of coloretal cancer (screening only), Personal history of colon polyps (screening only)   Date of last colonoscopy: 8 years ago, Polyps  Yes    Pre-operative Diagnosis: see indication above    Post-operative Diagnosis:  See findings below    :  Tyshawn Beck MD    Surgical Assistant: None    Implants:  None    Referring Provider: Gómez Forde MD      Sedation:  MAC anesthesia Propofol      Procedure Details:  After informed consent was obtained with all risks and benefits of procedure explained and preoperative exam completed, the patient was taken to the endoscopy suite and placed in the left lateral decubitus position. Upon sequential sedation as per above, a digital rectal exam was performed demonstrating internal hemorrhoids. The Olympus videocolonoscope  was inserted in the rectum and carefully advanced to the cecum, which was identified by the ileocecal valve and appendiceal orifice. The cecum was identified by the ileocecal valve and appendiceal orifice. The quality of preparation was good. The colonoscope was slowly withdrawn with careful evaluation between folds. Retroflexion in the rectum was completed . Findings:   Rectum: normal  Sigmoid: normal  Descending Colon: normal  Transverse Colon: normal  2 polyps around 8 mm each removed by hot biopsies  Ascending Colon: normal  Cecum: normal        Specimen Removed:  as above    Complications: None. EBL:  None. Impression:    see findings    Recommendations: --Await pathology.       Recommendation for next colonscopy in 3 years      Signed By: Tyshawn Beck MD     7/8/2019  9:47 AM

## 2019-07-08 NOTE — H&P
The patient is a 66year old male who presents with a complaint of Bloating. The patient presents consultation at the request of Dr. Alber Lakhani). There has been no associated abdominal pain, anorexia, anxiety, bloody stools, chewing gum, constipation, diarrhea, drinking large amounts of carbonated beverages, dysmenorrhea, dysuria, hard stools, hematemesis, hematuria, history of abdominal surgery, melena, peripheral edema, poor fitting dentures, possible pregnancy, post-nasal drip, rapid eating, steatorrhea, weight gain or weight loss. Note for \"Bloating\": he has [address] of colon cancer, and personal h/o colon polyps on last colonosocpy 8 years ago, c/o intermittent bloating otherwise no GI complaints      Problem List/Past Medical (Delroy Alvarez; 5/13/2019 1:00 PM)  Diabetes Mellitus, Type II    Amputation  [09/2018]:  Colon polyps (211.3  K63.5)    Wheelchair bound (V46.3  Z99.3)      Past Surgical History (Delroy Sandoval; 5/13/2019 1:00 PM)  Polypectomy      Allergies (Dleroy Alvarez; 5/13/2019 1:00 PM)  No Known Drug Allergies  [04/12/2019]:  No Known Allergies  [04/12/2019]:    Medication History (Delroy Sandoval; 5/13/2019 1:01 PM)  NovoLOG Mix 70/30  ((70-30) 100UNIT/ML Suspension, 56 units Subcutaneous daily) Active. Simvastatin  (20MG Tablet, 1 tablet Oral daily) Active. Metoprolol Tartrate  (25MG Tablet, 1 tablet Oral twice a day) Active. Lisinopril  (20MG Tablet, 1 tablet Oral daily) Active. Aspirin Low Strength  (81MG Tablet Chewable, Oral) Active. Medications Reconciled     Family History (Delroy Alvarez; 5/13/2019 1:00 PM)  Colon Cancer   First Degree Relatives. Social History (Delroy Sandoval; 5/13/2019 1:00 PM)  Blood Transfusion   No.  Alcohol Use   Has never drank. Employment status   Retired. Marital status   . Tobacco Use   Never smoker. Diagnostic Studies History (Delroy Alvarez; 5/13/2019 1:00 PM)  Colonoscopy   [facility] Maintenance History (Delroy Graham; 5/13/2019 1:00 PM)  Flu Vaccine  [2018]:  Pneumovax  [2018]:        Review of Systems (Delroy Graham; 5/13/2019 1:04 PM)  General Not Present- Chronic Fatigue, Poor Appetite, Weight Gain and Weight Loss. Skin Not Present- Itching, Rash and Skin Color Changes. HEENT Not Present- Hearing Loss and Vertigo. Respiratory Not Present- Difficulty Breathing and TB exposure. Cardiovascular Present- Chest Pain and Use of Antibiotics before Dental Procedures. Not Present- Use of Blood Thinners. Gastrointestinal Present- See HPI. Musculoskeletal Not Present- Arthritis, Hip Replacement Surgery and Knee Replacement Surgery. Neurological Not Present- Weakness. Psychiatric Not Present- Depression. Endocrine Not Present- Diabetes and Thyroid Problems. Hematology Not Present- Anemia. Vitals (Delroy Graham; 5/13/2019 1:07 PM)  5/13/2019 1:03 PM  Weight: 275 lb   Height: 79 in   Body Surface Area: 2.61 m²   Body Mass Index: 30.98 kg/m²    Pulse: 80 (Regular)    Resp.: 20 (Unlabored)     BP: 160/70 (Sitting, Left Arm, Standard)              Physical Exam Toni Cancer MD; 5/13/2019 2:53 PM)  General  Mental Status - Alert. General Appearance - Cooperative, Pleasant, Not in acute distress. Orientation - Oriented X3. Build & Nutrition - Well nourished and Well developed. Integumentary  General Characteristics  Overall examination of the patient's skin reveals - no rashes, no bruises and no spider angiomas. Color - normal coloration of skin. Head and Neck  Neck  Global Assessment - full range of motion and supple, no bruit auscultated on the right, no bruit auscultated on the left, non-tender, no lymphadenopathy. Thyroid  Gland Characteristics - normal size and consistency. Eye  Eyeball - Left - No Exophthalmos. Eyeball - Right - No Exophthalmos. Sclera/Conjunctiva - Left - No Jaundice. Sclera/Conjunctiva - Right - No Jaundice.     Chest and Lung Exam  Chest and lung exam reveals  - quiet, even and easy respiratory effort with no use of accessory muscles. Auscultation  Breath sounds - Normal. Adventitious sounds - No Adventitious sounds. Cardiovascular  Auscultation  Rhythm - Regular, No Tachycardia, No Bradycardia . Heart Sounds - Normal heart sounds , S1 WNL and S2 WNL, No S3, No Summation Gallop. Murmurs & Other Heart Sounds - Auscultation of the heart reveals - No Murmurs. Abdomen  Palpation/Percussion  Tenderness - Non-Tender. Rebound tenderness - No rebound. Rigidity (guarding) - No Rigidity. Dullness to percussion - No abnormal dullness to percussion. Liver - No hepatosplenomegaly. Abdominal Mass Palpable - No masses. Other Characteristics - No Ascites. Auscultation  Auscultation of the abdomen reveals - Bowel sounds normal, No Abdominal bruits and No Succussion splash. Rectal - Did not examine. Peripheral Vascular  Upper Extremity  Inspection - Left - Normal - No Clubbing, No Cyanosis, No Edema, Pulses Intact. Right - Normal - No Clubbing, No Cyanosis, No Edema, Pulses Intact. Palpation - Edema - Left - No edema. Right - No edema. Lower Extremity  Inspection - Left - Inspection Normal. Right - Inspection Normal. Palpation - Edema - Left - No edema. Right - No edema. Neurologic  Neurologic evaluation reveals  - Cranial nerves grossly intact, no focal neurologic deficits. Motor  Involuntary Movements - Asterixis - not present. Musculoskeletal  Global Assessment  Gait and Station - normal gait and station. Assessment & Plan Saranya Chris MD; 5/13/2019 2:54 PM)  Bloating (787.3  R14.0)  Impression: he has FHX of colon cancer, and personal h/o colon polyps on last colonosocpy 8 years ago, c/o intermittent bloating otherwise no GI complaints  Family history of cancer of GI tract (V16.0  Z80.0)  Current Plans  Discussed the risks and benefits of colonoscopy with the patient.   Colonoscopy (39203) (Discussed risks and benefits with the patient to include:; perforation, post polypectomy, or post biopsy bleeding, missed lesions, and sedation reactions.)  Started Suprep Bowel Prep Kit 17.5-3.13-1.6GM/177ML, 1 (one) KIt container Milliliter as directed, 354 Milliliter, 1 day starting 05/13/2019, No Refill. History of colon polyps (V12.72  Z86.010)  Current Plans  Discussed the risks and benefits of colonoscopy with the patient. Date of Surgery Update:  Charles Erwin was seen and examined. History and physical has been reviewed. The patient has been examined.  There have been no significant clinical changes since the completion of the originally dated History and Physical.    Signed By: Hilton Pedersen MD     July 8, 2019 9:19 AM

## 2019-07-16 ENCOUNTER — OFFICE VISIT (OUTPATIENT)
Dept: INTERNAL MEDICINE CLINIC | Age: 79
End: 2019-07-16

## 2019-07-16 VITALS
OXYGEN SATURATION: 98 % | HEIGHT: 78 IN | BODY MASS INDEX: 32.96 KG/M2 | SYSTOLIC BLOOD PRESSURE: 185 MMHG | DIASTOLIC BLOOD PRESSURE: 82 MMHG | RESPIRATION RATE: 20 BRPM | TEMPERATURE: 97.9 F | WEIGHT: 284.9 LBS | HEART RATE: 69 BPM

## 2019-07-16 DIAGNOSIS — E88.09 PLASMA CELL DYSCRASIA: ICD-10-CM

## 2019-07-16 DIAGNOSIS — I10 ESSENTIAL HYPERTENSION: ICD-10-CM

## 2019-07-16 DIAGNOSIS — R53.81 PHYSICAL DECONDITIONING: ICD-10-CM

## 2019-07-16 DIAGNOSIS — E78.5 DYSLIPIDEMIA: ICD-10-CM

## 2019-07-16 NOTE — PROGRESS NOTES
Chief Complaint   Patient presents with    Diabetes     6 week follow up      1. Have you been to the ER, urgent care clinic since your last visit? Hospitalized since your last visit? No    2. Have you seen or consulted any other health care providers outside of the [address] [place] [address] since your last visit? Include any pap smears or colon screening.  No
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
## 2019-09-04 DIAGNOSIS — E78.5 DYSLIPIDEMIA: ICD-10-CM

## 2019-09-04 RX ORDER — SIMVASTATIN 20 MG/1
TABLET, FILM COATED ORAL
Qty: 90 TAB | Refills: 3 | Status: SHIPPED | OUTPATIENT
Start: 2019-09-04 | End: 2020-12-02 | Stop reason: SDUPTHER

## 2019-10-01 ENCOUNTER — OFFICE VISIT (OUTPATIENT)
Dept: INTERNAL MEDICINE CLINIC | Age: 79
End: 2019-10-01

## 2019-10-01 VITALS
SYSTOLIC BLOOD PRESSURE: 176 MMHG | OXYGEN SATURATION: 95 % | WEIGHT: 280.7 LBS | DIASTOLIC BLOOD PRESSURE: 81 MMHG | TEMPERATURE: 98 F | HEART RATE: 61 BPM | BODY MASS INDEX: 32.48 KG/M2 | RESPIRATION RATE: 16 BRPM | HEIGHT: 78 IN

## 2019-10-01 DIAGNOSIS — E11.610 CHARCOT FOOT DUE TO DIABETES MELLITUS (HCC): Chronic | ICD-10-CM

## 2019-10-01 DIAGNOSIS — I10 ESSENTIAL HYPERTENSION: ICD-10-CM

## 2019-10-01 DIAGNOSIS — E88.09 PLASMA CELL DYSCRASIA: ICD-10-CM

## 2019-10-01 DIAGNOSIS — E78.5 DYSLIPIDEMIA: ICD-10-CM

## 2019-10-01 DIAGNOSIS — E66.9 OBESITY (BMI 30.0-34.9): ICD-10-CM

## 2019-10-01 NOTE — PROGRESS NOTES
Chief Complaint   Patient presents with    Diabetes     1 month follow up      1. Have you been to the ER, urgent care clinic since your last visit? Hospitalized since your last visit? No    2. Have you seen or consulted any other health care providers outside of the [address] [place] [address] since your last visit? Include any pap smears or colon screening.  No

## 2019-10-02 LAB
APO B SERPL-MCNC: 79 MG/DL
BUN SERPL-MCNC: 21 MG/DL (ref 8–27)
BUN/CREAT SERPL: 17 (ref 10–24)
CALCIUM SERPL-MCNC: 9.3 MG/DL (ref 8.6–10.2)
CHLORIDE SERPL-SCNC: 103 MMOL/L (ref 96–106)
CO2 SERPL-SCNC: 23 MMOL/L (ref 20–29)
CREAT SERPL-MCNC: 1.26 MG/DL (ref 0.76–1.27)
EST. AVERAGE GLUCOSE BLD GHB EST-MCNC: 197 MG/DL
GLUCOSE SERPL-MCNC: 133 MG/DL (ref 65–99)
HBA1C MFR BLD: 8.5 % (ref 4.8–5.6)
POTASSIUM SERPL-SCNC: 4.9 MMOL/L (ref 3.5–5.2)
SODIUM SERPL-SCNC: 139 MMOL/L (ref 134–144)

## 2019-10-06 PROBLEM — E66.9 OBESITY (BMI 30.0-34.9): Status: ACTIVE | Noted: 2019-10-06
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
## 2019-10-13 DIAGNOSIS — I10 ESSENTIAL HYPERTENSION: ICD-10-CM

## 2019-10-14 RX ORDER — METOPROLOL TARTRATE 25 MG/1
TABLET, FILM COATED ORAL
Qty: 180 TAB | Refills: 3 | Status: SHIPPED | OUTPATIENT
Start: 2019-10-14 | End: 2020-12-17

## 2019-12-03 DIAGNOSIS — I10 ESSENTIAL HYPERTENSION: ICD-10-CM

## 2019-12-04 RX ORDER — LISINOPRIL 40 MG/1
TABLET ORAL
Qty: 90 TAB | Refills: 3 | Status: SHIPPED | OUTPATIENT
Start: 2019-12-04 | End: 2021-01-29

## 2020-02-15 RX ORDER — LANCETS
EACH MISCELLANEOUS
Qty: 300 EACH | Refills: 3 | Status: SHIPPED | OUTPATIENT
Start: 2020-02-15

## 2020-10-26 ENCOUNTER — OFFICE VISIT (OUTPATIENT)
Dept: INTERNAL MEDICINE CLINIC | Age: 80
End: 2020-10-26
Payer: MEDICARE

## 2020-10-26 DIAGNOSIS — M47.812 CERVICAL SPONDYLOSIS: ICD-10-CM

## 2020-10-26 DIAGNOSIS — Z13.31 SCREENING FOR DEPRESSION: ICD-10-CM

## 2020-10-26 DIAGNOSIS — I10 ESSENTIAL HYPERTENSION: ICD-10-CM

## 2020-10-26 DIAGNOSIS — E78.5 DYSLIPIDEMIA: ICD-10-CM

## 2020-10-26 DIAGNOSIS — E88.09 PLASMA CELL DYSCRASIA: ICD-10-CM

## 2020-10-26 DIAGNOSIS — Z00.00 WELLNESS EXAMINATION: ICD-10-CM

## 2020-10-26 DIAGNOSIS — Z23 NEEDS FLU SHOT: ICD-10-CM

## 2020-10-26 DIAGNOSIS — G61.89 OTHER INFLAMMATORY POLYNEUROPATHIES (HCC): ICD-10-CM

## 2020-10-26 DIAGNOSIS — M54.12 CERVICAL RADICULOPATHY: ICD-10-CM

## 2020-10-26 DIAGNOSIS — Z23 ENCOUNTER FOR IMMUNIZATION: ICD-10-CM

## 2020-10-26 DIAGNOSIS — R53.81 PHYSICAL DECONDITIONING: ICD-10-CM

## 2020-10-26 DIAGNOSIS — N40.0 BENIGN PROSTATIC HYPERPLASIA WITHOUT LOWER URINARY TRACT SYMPTOMS: ICD-10-CM

## 2020-10-26 DIAGNOSIS — D64.9 ANEMIA, UNSPECIFIED TYPE: ICD-10-CM

## 2020-10-26 DIAGNOSIS — Z89.511 STATUS POST BELOW KNEE AMPUTATION OF RIGHT LOWER EXTREMITY (HCC): ICD-10-CM

## 2020-10-26 PROCEDURE — G0008 ADMIN INFLUENZA VIRUS VAC: HCPCS

## 2020-10-26 PROCEDURE — 3052F HG A1C>EQUAL 8.0%<EQUAL 9.0%: CPT

## 2020-10-26 PROCEDURE — G0439 PPPS, SUBSEQ VISIT: HCPCS

## 2020-10-26 PROCEDURE — 90653 IIV ADJUVANT VACCINE IM: CPT

## 2020-10-26 PROCEDURE — G8417 CALC BMI ABV UP PARAM F/U: HCPCS

## 2020-10-26 PROCEDURE — G8753 SYS BP > OR = 140: HCPCS

## 2020-10-26 PROCEDURE — 99215 OFFICE O/P EST HI 40 MIN: CPT

## 2020-10-26 PROCEDURE — G8427 DOCREV CUR MEDS BY ELIG CLIN: HCPCS

## 2020-10-26 PROCEDURE — G8754 DIAS BP LESS 90: HCPCS

## 2020-10-26 PROCEDURE — G8536 NO DOC ELDER MAL SCRN: HCPCS

## 2020-10-26 PROCEDURE — G8510 SCR DEP NEG, NO PLAN REQD: HCPCS

## 2020-10-26 PROCEDURE — 1101F PT FALLS ASSESS-DOCD LE1/YR: CPT

## 2020-10-26 RX ORDER — MELOXICAM 15 MG/1
15 TABLET ORAL DAILY
Qty: 30 TAB | Refills: 1 | Status: SHIPPED | OUTPATIENT
Start: 2020-10-26 | End: 2021-01-04

## 2020-10-26 NOTE — PROGRESS NOTES
Chief Complaint   Patient presents with   [facility] Annual Wellness Visit       1. Have you been to the ER, urgent care clinic since your last visit? Hospitalized since your last visit? No    2. Have you seen or consulted any other health care providers outside of the [address] [place] [address] since your last visit? Include any pap smears or colon screening. No       Jere Shock is a [de-identified] y.o. male who presents for routine immunizations. He denies any symptoms , reactions or allergies that would exclude them from being immunized today. Risks and adverse reactions were discussed and the VIS was given to them. All questions were addressed. He was observed for 15 min post injection. There were no reactions observed.     Vishal Cleveland, SHERRI

## 2020-10-29 LAB
ALBUMIN SERPL ELPH-MCNC: 3.9 G/DL (ref 2.9–4.4)
ALBUMIN SERPL-MCNC: 4.3 G/DL (ref 3.7–4.7)
ALBUMIN/CREAT UR: 392 MG/G CREAT (ref 0–29)
ALBUMIN/GLOB SERPL: 1.2 {RATIO} (ref 0.7–1.7)
ALBUMIN/GLOB SERPL: 1.5 {RATIO} (ref 1.2–2.2)
ALP SERPL-CCNC: 82 IU/L (ref 39–117)
ALPHA1 GLOB SERPL ELPH-MCNC: 0.2 G/DL (ref 0–0.4)
ALPHA2 GLOB SERPL ELPH-MCNC: 0.8 G/DL (ref 0.4–1)
ALT SERPL-CCNC: 22 IU/L (ref 0–44)
APO B SERPL-MCNC: 80 MG/DL
APPEARANCE UR: CLEAR
AST SERPL-CCNC: 22 IU/L (ref 0–40)
B-GLOBULIN SERPL ELPH-MCNC: 0.9 G/DL (ref 0.7–1.3)
BACTERIA #/AREA URNS HPF: NORMAL /[HPF]
BASOPHILS # BLD AUTO: 0 X10E3/UL (ref 0–0.2)
BASOPHILS NFR BLD AUTO: 1 %
BILIRUB SERPL-MCNC: 0.3 MG/DL (ref 0–1.2)
BILIRUB UR QL STRIP: NEGATIVE
BUN SERPL-MCNC: 19 MG/DL (ref 8–27)
BUN/CREAT SERPL: 14 (ref 10–24)
CALCIUM SERPL-MCNC: 9.2 MG/DL (ref 8.6–10.2)
CASTS URNS QL MICRO: NORMAL /LPF
CHLORIDE SERPL-SCNC: 106 MMOL/L (ref 96–106)
CHOLEST SERPL-MCNC: 128 MG/DL (ref 100–199)
CO2 SERPL-SCNC: 22 MMOL/L (ref 20–29)
COLOR UR: YELLOW
CREAT SERPL-MCNC: 1.34 MG/DL (ref 0.76–1.27)
CREAT UR-MCNC: 149.7 MG/DL
EOSINOPHIL # BLD AUTO: 0.1 X10E3/UL (ref 0–0.4)
EOSINOPHIL NFR BLD AUTO: 1 %
EPI CELLS #/AREA URNS HPF: NORMAL /HPF (ref 0–10)
ERYTHROCYTE [DISTWIDTH] IN BLOOD BY AUTOMATED COUNT: 13.8 % (ref 11.6–15.4)
EST. AVERAGE GLUCOSE BLD GHB EST-MCNC: 212 MG/DL
GAMMA GLOB SERPL ELPH-MCNC: 1.4 G/DL (ref 0.4–1.8)
GLOBULIN SER CALC-MCNC: 2.8 G/DL (ref 1.5–4.5)
GLOBULIN SER CALC-MCNC: 3.2 G/DL (ref 2.2–3.9)
GLUCOSE SERPL-MCNC: 125 MG/DL (ref 65–99)
GLUCOSE UR QL: ABNORMAL
HBA1C MFR BLD: 9 % (ref 4.8–5.6)
HCT VFR BLD AUTO: 39.3 % (ref 37.5–51)
HDLC SERPL-MCNC: 38 MG/DL
HGB BLD-MCNC: 13.5 G/DL (ref 13–17.7)
HGB UR QL STRIP: NEGATIVE
IGA SERPL-MCNC: 96 MG/DL (ref 61–437)
IGG SERPL-MCNC: 1724 MG/DL (ref 603–1613)
IGM SERPL-MCNC: 38 MG/DL (ref 15–143)
IMM GRANULOCYTES # BLD AUTO: 0 X10E3/UL (ref 0–0.1)
IMM GRANULOCYTES NFR BLD AUTO: 1 %
KETONES UR QL STRIP: NEGATIVE
LDLC SERPL CALC-MCNC: 75 MG/DL (ref 0–99)
LEUKOCYTE ESTERASE UR QL STRIP: NEGATIVE
LYMPHOCYTES # BLD AUTO: 1.6 X10E3/UL (ref 0.7–3.1)
LYMPHOCYTES NFR BLD AUTO: 31 %
M PROTEIN SERPL ELPH-MCNC: 0.7 G/DL
MCH RBC QN AUTO: 28.6 PG (ref 26.6–33)
MCHC RBC AUTO-ENTMCNC: 34.4 G/DL (ref 31.5–35.7)
MCV RBC AUTO: 83 FL (ref 79–97)
MICRO URNS: ABNORMAL
MICROALBUMIN UR-MCNC: 587.4 UG/ML
MONOCYTES # BLD AUTO: 0.4 X10E3/UL (ref 0.1–0.9)
MONOCYTES NFR BLD AUTO: 8 %
MUCOUS THREADS URNS QL MICRO: PRESENT
NEUTROPHILS # BLD AUTO: 3 X10E3/UL (ref 1.4–7)
NEUTROPHILS NFR BLD AUTO: 58 %
NITRITE UR QL STRIP: NEGATIVE
PH UR STRIP: 5 [PH] (ref 5–7.5)
PLATELET # BLD AUTO: 222 X10E3/UL (ref 150–450)
PLEASE NOTE, 011150: ABNORMAL
POTASSIUM SERPL-SCNC: 4.6 MMOL/L (ref 3.5–5.2)
PROT PATTERN SERPL IFE-IMP: ABNORMAL
PROT SERPL-MCNC: 7.1 G/DL (ref 6–8.5)
PROT UR QL STRIP: ABNORMAL
PSA SERPL-MCNC: 2.3 NG/ML (ref 0–4)
RBC # BLD AUTO: 4.72 X10E6/UL (ref 4.14–5.8)
RBC #/AREA URNS HPF: NORMAL /HPF (ref 0–2)
SODIUM SERPL-SCNC: 139 MMOL/L (ref 134–144)
SP GR UR: 1.02 (ref 1–1.03)
TRIGL SERPL-MCNC: 71 MG/DL (ref 0–149)
UROBILINOGEN UR STRIP-MCNC: 0.2 MG/DL (ref 0.2–1)
VLDLC SERPL CALC-MCNC: 15 MG/DL (ref 5–40)
WBC # BLD AUTO: 5.2 X10E3/UL (ref 3.4–10.8)
WBC #/AREA URNS HPF: NORMAL /HPF (ref 0–5)

## 2020-10-31 VITALS
HEART RATE: 73 BPM | BODY MASS INDEX: 32.55 KG/M2 | OXYGEN SATURATION: 97 % | HEIGHT: 78 IN | DIASTOLIC BLOOD PRESSURE: 70 MMHG | RESPIRATION RATE: 18 BRPM | TEMPERATURE: 98.2 F | SYSTOLIC BLOOD PRESSURE: 142 MMHG | WEIGHT: 281.3 LBS

## 2020-10-31 NOTE — PATIENT INSTRUCTIONS
Medicare Wellness Visit, Male The best way to live healthy is to have a lifestyle where you eat a well-balanced diet, exercise regularly, limit alcohol use, and quit all forms of tobacco/nicotine, if applicable. Regular preventive services are another way to keep healthy. Preventive services (vaccines, screening tests, monitoring & exams) can help personalize your care plan, which helps you manage your own care. Screening tests can find health problems at the earliest stages, when they are easiest to treat. [address] follows the current, evidence-based guidelines published by the [facility] ([facility]) when recommending preventive services for our patients. Because we follow these guidelines, sometimes recommendations change over time as research supports it. (For example, a prostate screening blood test is no longer routinely recommended for men with no symptoms.) Of course, you and your doctor may decide to screen more often for some diseases, based on your risk and co-morbidities (chronic disease you are already diagnosed with). Preventive services for you include: - Medicare offers their members a free annual wellness visit, which is time for you and your primary care provider to discuss and plan for your preventive service needs. Take advantage of this benefit every year! 
-All adults over age 72 should receive the recommended pneumonia vaccines. Current USPSTF guidelines recommend a series of two vaccines for the best pneumonia protection.  
-All adults should have a flu vaccine yearly and an ECG.  All adults age 48 and older should receive a shingles vaccine once in their lifetime.   
-All adults age 38-68 who are overweight should have a diabetes screening test once every three years.  
-Other screening tests & preventive services for persons with diabetes include: an eye exam to screen for diabetic retinopathy, a kidney function test, a foot exam, and stricter control over your cholesterol.  
-Cardiovascular screening for adults with routine risk involves an electrocardiogram (ECG) at intervals determined by the provider.  
-Colorectal cancer screening should be done for adults age 54-65 with no increased risk factors for colorectal cancer. There are a number of acceptable methods of screening for this type of cancer. Each test has its own benefits and drawbacks. Discuss with your provider what is most appropriate for you during your annual wellness visit. The different tests include: colonoscopy (considered the best screening method), a fecal occult blood test, a fecal DNA test, and sigmoidoscopy. 
-All adults born between [facility] should be screened once for Hepatitis C. 
-An Abdominal Aortic Aneurysm (AAA) Screening is recommended for men age 73-68 who has ever smoked in their lifetime. Here is a list of your current Health Maintenance items (your personalized list of preventive services) with a due date: 
Health Maintenance Due Topic Date Due  Shingles Vaccine (1 of 2) 10/02/1990  Glaucoma Screening   03/31/2017 [facility] Eye Exam  03/31/2017  Pneumococcal Vaccine (2 of 2 - PPSV23) 12/19/2019 [facility]abetic Foot [facility]  03/05/2020 Medicare Wellness Visit, Male The best way to live healthy is to have a lifestyle where you eat a well-balanced diet, exercise regularly, limit alcohol use, and quit all forms of tobacco/nicotine, if applicable. Regular preventive services are another way to keep healthy. Preventive services (vaccines, screening tests, monitoring & exams) can help personalize your care plan, which helps you manage your own care. Screening tests can find health problems at the earliest stages, when they are easiest to treat.   
[facility] follows the current, evidence-based guidelines published by the [employer] (USPSTF) when recommending preventive services for our patients. Because we follow these guidelines, sometimes recommendations change over time as research supports it. (For example, a prostate screening blood test is no longer routinely recommended for men with no symptoms). Of course, you and your doctor may decide to screen more often for some diseases, based on your risk and co-morbidities (chronic disease you are already diagnosed with). Preventive services for you include: - Medicare offers their members a free annual wellness visit, which is time for you and your primary care provider to discuss and plan for your preventive service needs. Take advantage of this benefit every year! 
-All adults over age 72 should receive the recommended pneumonia vaccines. Current USPSTF guidelines recommend a series of two vaccines for the best pneumonia protection.  
-All adults should have a flu vaccine yearly and tetanus vaccine every 10 years. 
-All adults age 48 and older should receive the shingles vaccines (series of two vaccines). -All adults age 38-68 who are overweight should have a diabetes screening test once every three years.  
-Other screening tests & preventive services for persons with diabetes include: an eye exam to screen for diabetic retinopathy, a kidney function test, a foot exam, and stricter control over your cholesterol.  
-Cardiovascular screening for adults with routine risk involves an electrocardiogram (ECG) at intervals determined by the provider.  
-Colorectal cancer screening should be done for adults age 54-65 with no increased risk factors for colorectal cancer. There are a number of acceptable methods of screening for this type of cancer. Each test has its own benefits and drawbacks. Discuss with your provider what is most appropriate for you during your annual wellness visit.  The different tests include: colonoscopy (considered the best screening method), a fecal occult blood test, a fecal DNA test, and sigmoidoscopy. 
-All adults born between [facility] should be screened once for Hepatitis C. 
-An Abdominal Aortic Aneurysm (AAA) Screening is recommended for men age 73-68 who has ever smoked in their lifetime. Here is a list of your current Health Maintenance items (your personalized list of preventive services) with a due date: 
Health Maintenance Due Topic Date Due  Shingles Vaccine (1 of 2) 10/02/1990  Glaucoma Screening   03/31/2017 [facility] Exam  03/31/2017  Pneumococcal Vaccine (2 of 2 - PPSV23) 12/19/2019 [facility]  03/05/2020

## 2020-11-01 NOTE — PROGRESS NOTES
Tell patient to increase his morning insulin dose from 34-38 and continue the evening dose unchanged. Remind him the importance of eating at the same time every day.

## 2020-12-02 DIAGNOSIS — E78.5 DYSLIPIDEMIA: ICD-10-CM

## 2020-12-02 RX ORDER — SIMVASTATIN 20 MG/1
TABLET, FILM COATED ORAL
Qty: 90 TAB | Refills: 3 | Status: SHIPPED | OUTPATIENT
Start: 2020-12-02 | End: 2021-12-19

## 2021-01-04 DIAGNOSIS — M54.12 CERVICAL RADICULOPATHY: ICD-10-CM

## 2021-01-04 RX ORDER — MELOXICAM 15 MG/1
TABLET ORAL
Qty: 30 TAB | Refills: 0 | Status: SHIPPED | OUTPATIENT
Start: 2021-01-04 | End: 2021-02-15

## 2021-02-21 NOTE — OP NOTES
[address]   OP NOTE       Name:  Antonia De La Rosa   MR#:  [id number]   :  1940   Account #:  [de-identified]    Surgery Date:  10/12/2017   Date of Adm:  10/10/2017       PREOPERATIVE DIAGNOS IS: Osteomyelitis of right foot. POSTOPERATIVE DIAGNOS IS: Osteomyelitis of right foot. PROCEDURES PERFORMED:  Excisional debridement of right foot   and resection of remaining right fifth metatarsal.    ESTIMATED BLOOD LOSS:  50 mL. SPECIMENS REMOVED:  Debrided soft tissue. ANESTHESIA:  General.    DRAINS: None. COMPLICATIONS: None. INDICATIONS: The patient is a 80-year-old diabetic with peripheral   arterial disease. He originally presented with gangrene of the lateral   right forefoot and underwent right third, fourth and fifth toe amputation   and metatarsal head resection several weeks ago. He has undergone   atherectomy and angioplasty of severe stenoses in his distal popliteal   artery and tibioperoneal trunk, but still has significant arterial disease in   the lower leg. The patient has been treated with antibiotics and wound   care, but now presents with progressive soft tissue infection at the   previous surgical wound. There is exposed bone within the wound with   obviously necrotic tissue. The patient presents for further foot   debridement and resection of exposed fifth metatarsal.    DESCRIPTION OF PROCEDURE: After informed consent was   obtained, the patient was maintained on his current broad-spectrum   intravenous antibiotics and taken to the operating room. After induction   of adequate general anesthesia, the right foot was prepped and draped   as a sterile field. The existing nylon sutures were cut and the wound   was explored. The more medial aspect of the wound had some red   granulation tissue bilaterally. There was obviously necrotic, foul-  smelling skin and subcutaneous fat.  The distal aspect of the fifth   metatarsal shaft was surrounded by this necrotic tissue. Several   incisions were made on the lateral aspect of the foot, moving more   proximally each time because necrotic tan, gray soft tissue was   encountered. To get back to viable soft tissue, the proximal extent of   the incision was back at the level of the base of the fifth metatarsal.   This incision was also carried anteriorly and on the plantar aspect of   the foot again until viable tissue was encountered. All necrotic soft   tissue was excised. The entire fifth metatarsal was exposed and,   because this was surrounded by necrotic tissue, it was resected in its   entirety. The exposed articular cartilage was removed with a rongeur. All necrotic soft tissue was removed from the wound. There was some   nonviable appearing subcutaneous fat and skin on the lateral aspect of   the second metatarsophalangeal joint, and this was resected, but the   joint space was not entered. The wound was irrigated copiously with   antibiotic irrigation and hemostasis was obtained with electrocautery. The wound was packed with a wet-to-dry dressing. The wound was left   open and had a final measurement of 15 x 7 cm. There was exposed   bone in the base of the wound. The patient was extubated and   transferred to the PACU in stable condition. He will continue wound   care and IV antibiotics, and be closely observed, but progression to   major amputation is likely.         Concepcion Mariee ([facility]) MD ESTIVEN Bragg / KERMIT   D:  10/17/2017   09:13   T:  10/17/2017   09:38   Job #:  [id number] Respiratory (Pediatric)

## 2021-05-27 ENCOUNTER — OFFICE VISIT (OUTPATIENT)
Dept: INTERNAL MEDICINE CLINIC | Age: 81
End: 2021-05-27
Payer: MEDICARE

## 2021-05-27 VITALS
TEMPERATURE: 98.7 F | HEIGHT: 78 IN | OXYGEN SATURATION: 100 % | WEIGHT: 290 LBS | HEART RATE: 64 BPM | SYSTOLIC BLOOD PRESSURE: 172 MMHG | DIASTOLIC BLOOD PRESSURE: 78 MMHG | RESPIRATION RATE: 20 BRPM | BODY MASS INDEX: 33.55 KG/M2

## 2021-05-27 DIAGNOSIS — I10 ESSENTIAL HYPERTENSION: Primary | ICD-10-CM

## 2021-05-27 DIAGNOSIS — R53.81 PHYSICAL DECONDITIONING: ICD-10-CM

## 2021-05-27 DIAGNOSIS — I87.2 VENOUS INSUFFICIENCY: ICD-10-CM

## 2021-05-27 DIAGNOSIS — E78.5 DYSLIPIDEMIA: ICD-10-CM

## 2021-05-27 DIAGNOSIS — G61.89 OTHER INFLAMMATORY POLYNEUROPATHIES (HCC): ICD-10-CM

## 2021-05-27 DIAGNOSIS — M17.0 PRIMARY OSTEOARTHRITIS OF BOTH KNEES: ICD-10-CM

## 2021-05-27 DIAGNOSIS — E11.40 TYPE 2 DIABETES MELLITUS WITH DIABETIC NEUROPATHY, WITH LONG-TERM CURRENT USE OF INSULIN (HCC): ICD-10-CM

## 2021-05-27 DIAGNOSIS — Z79.4 TYPE 2 DIABETES MELLITUS WITH DIABETIC NEUROPATHY, WITH LONG-TERM CURRENT USE OF INSULIN (HCC): ICD-10-CM

## 2021-05-27 PROCEDURE — G8536 NO DOC ELDER MAL SCRN: HCPCS | Performed by: INTERNAL MEDICINE

## 2021-05-27 PROCEDURE — G8427 DOCREV CUR MEDS BY ELIG CLIN: HCPCS | Performed by: INTERNAL MEDICINE

## 2021-05-27 PROCEDURE — G8417 CALC BMI ABV UP PARAM F/U: HCPCS | Performed by: INTERNAL MEDICINE

## 2021-05-27 PROCEDURE — 99214 OFFICE O/P EST MOD 30 MIN: CPT | Performed by: INTERNAL MEDICINE

## 2021-05-27 PROCEDURE — G8753 SYS BP > OR = 140: HCPCS | Performed by: INTERNAL MEDICINE

## 2021-05-27 PROCEDURE — G8754 DIAS BP LESS 90: HCPCS | Performed by: INTERNAL MEDICINE

## 2021-05-27 PROCEDURE — G8432 DEP SCR NOT DOC, RNG: HCPCS | Performed by: INTERNAL MEDICINE

## 2021-05-27 PROCEDURE — 1101F PT FALLS ASSESS-DOCD LE1/YR: CPT | Performed by: INTERNAL MEDICINE

## 2021-05-27 NOTE — PROGRESS NOTES
Chief Complaint   Patient presents with    Diabetes     6 month follow up         1. Have you been to the ER, urgent care clinic since your last visit? Hospitalized since your last visit? No    2. Have you seen or consulted any other health care providers outside of the [address] [place] [address] since your last visit? Include any pap smears or colon screening.  No

## 2021-05-28 LAB
ANION GAP SERPL CALC-SCNC: 8 MMOL/L (ref 5–15)
APO B SERPL-MCNC: 86 MG/DL
BUN SERPL-MCNC: 27 MG/DL (ref 6–20)
BUN/CREAT SERPL: 17 (ref 12–20)
CALCIUM SERPL-MCNC: 9.5 MG/DL (ref 8.5–10.1)
CHLORIDE SERPL-SCNC: 109 MMOL/L (ref 97–108)
CO2 SERPL-SCNC: 22 MMOL/L (ref 21–32)
CREAT SERPL-MCNC: 1.59 MG/DL (ref 0.7–1.3)
CREAT UR-MCNC: 178 MG/DL
EST. AVERAGE GLUCOSE BLD GHB EST-MCNC: 229 MG/DL
GLUCOSE SERPL-MCNC: 54 MG/DL (ref 65–100)
HBA1C MFR BLD: 9.6 % (ref 4–5.6)
POTASSIUM SERPL-SCNC: 4.2 MMOL/L (ref 3.5–5.1)
PROT UR-MCNC: 141 MG/DL (ref 0–11.9)
SODIUM SERPL-SCNC: 139 MMOL/L (ref 136–145)
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
## 2021-06-02 ENCOUNTER — TELEPHONE (OUTPATIENT)
Dept: INTERNAL MEDICINE CLINIC | Age: 81
End: 2021-06-02

## 2021-06-02 NOTE — TELEPHONE ENCOUNTER
Spoke with patient per Dr. Kadie Eldridge and ask that he increase his morning insulin by 4 units and his evening insulin by 2 units making his morning dose 32 units and his evening dose 40 units.

## 2021-08-09 ENCOUNTER — OFFICE VISIT (OUTPATIENT)
Dept: INTERNAL MEDICINE CLINIC | Age: 81
End: 2021-08-09
Payer: MEDICARE

## 2021-08-09 VITALS
HEIGHT: 78 IN | HEART RATE: 67 BPM | TEMPERATURE: 98.4 F | DIASTOLIC BLOOD PRESSURE: 74 MMHG | BODY MASS INDEX: 34.09 KG/M2 | SYSTOLIC BLOOD PRESSURE: 174 MMHG | RESPIRATION RATE: 18 BRPM | OXYGEN SATURATION: 100 % | WEIGHT: 294.6 LBS

## 2021-08-09 DIAGNOSIS — J43.1 PANLOBULAR EMPHYSEMA (HCC): ICD-10-CM

## 2021-08-09 DIAGNOSIS — I10 ESSENTIAL HYPERTENSION: ICD-10-CM

## 2021-08-09 DIAGNOSIS — E78.5 DYSLIPIDEMIA: ICD-10-CM

## 2021-08-09 DIAGNOSIS — E11.21 TYPE 2 DIABETES WITH NEPHROPATHY (HCC): ICD-10-CM

## 2021-08-09 DIAGNOSIS — J45.20 MILD INTERMITTENT REACTIVE AIRWAY DISEASE WITHOUT COMPLICATION: Primary | ICD-10-CM

## 2021-08-09 PROCEDURE — 1101F PT FALLS ASSESS-DOCD LE1/YR: CPT | Performed by: INTERNAL MEDICINE

## 2021-08-09 PROCEDURE — 99214 OFFICE O/P EST MOD 30 MIN: CPT | Performed by: INTERNAL MEDICINE

## 2021-08-09 PROCEDURE — G8417 CALC BMI ABV UP PARAM F/U: HCPCS | Performed by: INTERNAL MEDICINE

## 2021-08-09 PROCEDURE — G8427 DOCREV CUR MEDS BY ELIG CLIN: HCPCS | Performed by: INTERNAL MEDICINE

## 2021-08-09 PROCEDURE — G8754 DIAS BP LESS 90: HCPCS | Performed by: INTERNAL MEDICINE

## 2021-08-09 PROCEDURE — G8536 NO DOC ELDER MAL SCRN: HCPCS | Performed by: INTERNAL MEDICINE

## 2021-08-09 PROCEDURE — 71046 X-RAY EXAM CHEST 2 VIEWS: CPT | Performed by: INTERNAL MEDICINE

## 2021-08-09 PROCEDURE — G8753 SYS BP > OR = 140: HCPCS | Performed by: INTERNAL MEDICINE

## 2021-08-09 PROCEDURE — G8510 SCR DEP NEG, NO PLAN REQD: HCPCS | Performed by: INTERNAL MEDICINE

## 2021-08-09 NOTE — PROGRESS NOTES
Chief Complaint   Patient presents with    Cough     Patient states that he has been experiencing a \"dry\" cough x 1 month. 1. Have you been to the ER, urgent care clinic since your last visit? Hospitalized since your last visit? No    2. Have you seen or consulted any other health care providers outside of the [address] [place] [address] since your last visit? Include any pap smears or colon screening.  No
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
## 2021-10-21 ENCOUNTER — OFFICE VISIT (OUTPATIENT)
Dept: INTERNAL MEDICINE CLINIC | Age: 81
End: 2021-10-21
Payer: MEDICARE

## 2021-10-21 VITALS
SYSTOLIC BLOOD PRESSURE: 164 MMHG | DIASTOLIC BLOOD PRESSURE: 74 MMHG | OXYGEN SATURATION: 96 % | BODY MASS INDEX: 31.57 KG/M2 | RESPIRATION RATE: 18 BRPM | HEIGHT: 78 IN | TEMPERATURE: 98.4 F | HEART RATE: 84 BPM

## 2021-10-21 DIAGNOSIS — T87.89: Primary | ICD-10-CM

## 2021-10-21 DIAGNOSIS — L97.911: Primary | ICD-10-CM

## 2021-10-21 DIAGNOSIS — I10 ESSENTIAL HYPERTENSION: ICD-10-CM

## 2021-10-21 DIAGNOSIS — E78.5 DYSLIPIDEMIA: ICD-10-CM

## 2021-10-21 PROCEDURE — G8432 DEP SCR NOT DOC, RNG: HCPCS | Performed by: INTERNAL MEDICINE

## 2021-10-21 PROCEDURE — G8536 NO DOC ELDER MAL SCRN: HCPCS | Performed by: INTERNAL MEDICINE

## 2021-10-21 PROCEDURE — 1101F PT FALLS ASSESS-DOCD LE1/YR: CPT | Performed by: INTERNAL MEDICINE

## 2021-10-21 PROCEDURE — G8417 CALC BMI ABV UP PARAM F/U: HCPCS | Performed by: INTERNAL MEDICINE

## 2021-10-21 PROCEDURE — G8427 DOCREV CUR MEDS BY ELIG CLIN: HCPCS | Performed by: INTERNAL MEDICINE

## 2021-10-21 PROCEDURE — 99214 OFFICE O/P EST MOD 30 MIN: CPT | Performed by: INTERNAL MEDICINE

## 2021-10-21 PROCEDURE — G8753 SYS BP > OR = 140: HCPCS | Performed by: INTERNAL MEDICINE

## 2021-10-21 PROCEDURE — G8754 DIAS BP LESS 90: HCPCS | Performed by: INTERNAL MEDICINE

## 2021-10-21 NOTE — PROGRESS NOTES
Chief Complaint   Patient presents with    Skin Ulcer     right leg         1. Have you been to the ER, urgent care clinic since your last visit? Hospitalized since your last visit? No    2. Have you seen or consulted any other health care providers outside of the [address] [place] [address] since your last visit? Include any pap smears or colon screening.  No
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
## 2021-10-22 LAB
EST. AVERAGE GLUCOSE BLD GHB EST-MCNC: 258 MG/DL
HBA1C MFR BLD: 10.6 % (ref 4–5.6)

## 2021-10-23 RX ORDER — AMPICILLIN 500 MG/1
500 CAPSULE ORAL
Qty: 40 CAPSULE | Refills: 0 | Status: SHIPPED | OUTPATIENT
Start: 2021-10-23 | End: 2021-11-02

## 2021-10-24 LAB
BACTERIA SPEC CULT: ABNORMAL
BACTERIA SPEC CULT: ABNORMAL
GRAM STN SPEC: ABNORMAL
GRAM STN SPEC: ABNORMAL
SERVICE CMNT-IMP: ABNORMAL

## 2021-10-26 DIAGNOSIS — L97.912 LEG ULCER, RIGHT, WITH FAT LAYER EXPOSED (HCC): Primary | ICD-10-CM

## 2021-10-26 RX ORDER — CIPROFLOXACIN 500 MG/1
500 TABLET ORAL 2 TIMES DAILY
Qty: 20 TABLET | Refills: 0 | Status: SHIPPED | OUTPATIENT
Start: 2021-10-26 | End: 2021-11-05

## 2022-01-31 ENCOUNTER — OFFICE VISIT (OUTPATIENT)
Dept: INTERNAL MEDICINE CLINIC | Age: 82
End: 2022-01-31
Payer: MEDICARE

## 2022-01-31 VITALS
BODY MASS INDEX: 34.61 KG/M2 | RESPIRATION RATE: 18 BRPM | OXYGEN SATURATION: 98 % | HEART RATE: 71 BPM | DIASTOLIC BLOOD PRESSURE: 96 MMHG | TEMPERATURE: 97.8 F | WEIGHT: 299.1 LBS | SYSTOLIC BLOOD PRESSURE: 212 MMHG | HEIGHT: 78 IN

## 2022-01-31 DIAGNOSIS — R53.1 WEAKNESS: ICD-10-CM

## 2022-01-31 DIAGNOSIS — E78.5 DYSLIPIDEMIA: ICD-10-CM

## 2022-01-31 DIAGNOSIS — Z13.31 SCREENING FOR DEPRESSION: ICD-10-CM

## 2022-01-31 DIAGNOSIS — L97.529 ULCER OF LEFT FOOT, UNSPECIFIED ULCER STAGE (HCC): Primary | ICD-10-CM

## 2022-01-31 DIAGNOSIS — E66.9 OBESITY (BMI 30.0-34.9): ICD-10-CM

## 2022-01-31 DIAGNOSIS — I10 ESSENTIAL HYPERTENSION: ICD-10-CM

## 2022-01-31 DIAGNOSIS — N40.0 BENIGN PROSTATIC HYPERPLASIA WITHOUT LOWER URINARY TRACT SYMPTOMS: ICD-10-CM

## 2022-01-31 DIAGNOSIS — E88.09 PLASMA CELL DYSCRASIA: ICD-10-CM

## 2022-01-31 DIAGNOSIS — I87.2 VENOUS INSUFFICIENCY: ICD-10-CM

## 2022-01-31 PROCEDURE — G8510 SCR DEP NEG, NO PLAN REQD: HCPCS | Performed by: INTERNAL MEDICINE

## 2022-01-31 PROCEDURE — G8753 SYS BP > OR = 140: HCPCS | Performed by: INTERNAL MEDICINE

## 2022-01-31 PROCEDURE — G8417 CALC BMI ABV UP PARAM F/U: HCPCS | Performed by: INTERNAL MEDICINE

## 2022-01-31 PROCEDURE — 99214 OFFICE O/P EST MOD 30 MIN: CPT | Performed by: INTERNAL MEDICINE

## 2022-01-31 PROCEDURE — G8536 NO DOC ELDER MAL SCRN: HCPCS | Performed by: INTERNAL MEDICINE

## 2022-01-31 PROCEDURE — 1101F PT FALLS ASSESS-DOCD LE1/YR: CPT | Performed by: INTERNAL MEDICINE

## 2022-01-31 PROCEDURE — G0439 PPPS, SUBSEQ VISIT: HCPCS | Performed by: INTERNAL MEDICINE

## 2022-01-31 PROCEDURE — G8427 DOCREV CUR MEDS BY ELIG CLIN: HCPCS | Performed by: INTERNAL MEDICINE

## 2022-01-31 PROCEDURE — G8755 DIAS BP > OR = 90: HCPCS | Performed by: INTERNAL MEDICINE
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
## 2022-01-31 NOTE — PROGRESS NOTES
Chief Complaint   Patient presents with   [facility] Annual Wellness Visit         1. Have you been to the ER, urgent care clinic since your last visit? Hospitalized since your last visit? No    2. Have you seen or consulted any other health care providers outside of the [address] [place] [address] since your last visit? Include any pap smears or colon screening.  No

## 2022-02-01 LAB
ALBUMIN SERPL-MCNC: 3.8 G/DL (ref 3.5–5)
ALBUMIN/GLOB SERPL: 1 {RATIO} (ref 1.1–2.2)
ALP SERPL-CCNC: 80 U/L (ref 45–117)
ALT SERPL-CCNC: 52 U/L (ref 12–78)
ANION GAP SERPL CALC-SCNC: 2 MMOL/L (ref 5–15)
APPEARANCE UR: ABNORMAL
AST SERPL-CCNC: 28 U/L (ref 15–37)
BACTERIA URNS QL MICRO: NEGATIVE /HPF
BASOPHILS # BLD: 0 K/UL (ref 0–0.1)
BASOPHILS NFR BLD: 0 % (ref 0–1)
BILIRUB SERPL-MCNC: 0.4 MG/DL (ref 0.2–1)
BILIRUB UR QL: NEGATIVE
BUN SERPL-MCNC: 20 MG/DL (ref 6–20)
BUN/CREAT SERPL: 13 (ref 12–20)
CALCIUM SERPL-MCNC: 9 MG/DL (ref 8.5–10.1)
CHLORIDE SERPL-SCNC: 112 MMOL/L (ref 97–108)
CHOLEST SERPL-MCNC: 133 MG/DL
CO2 SERPL-SCNC: 26 MMOL/L (ref 21–32)
COLOR UR: ABNORMAL
CREAT SERPL-MCNC: 1.51 MG/DL (ref 0.7–1.3)
CREAT UR-MCNC: 172 MG/DL
DIFFERENTIAL METHOD BLD: ABNORMAL
EOSINOPHIL # BLD: 0.1 K/UL (ref 0–0.4)
EOSINOPHIL NFR BLD: 1 % (ref 0–7)
EPITH CASTS URNS QL MICRO: ABNORMAL /LPF
ERYTHROCYTE [DISTWIDTH] IN BLOOD BY AUTOMATED COUNT: 14.1 % (ref 11.5–14.5)
EST. AVERAGE GLUCOSE BLD GHB EST-MCNC: 240 MG/DL
GLOBULIN SER CALC-MCNC: 3.8 G/DL (ref 2–4)
GLUCOSE SERPL-MCNC: 53 MG/DL (ref 65–100)
GLUCOSE UR STRIP.AUTO-MCNC: 250 MG/DL
HBA1C MFR BLD: 10 % (ref 4–5.6)
HCT VFR BLD AUTO: 37.9 % (ref 36.6–50.3)
HDLC SERPL-MCNC: 47 MG/DL
HDLC SERPL: 2.8 {RATIO} (ref 0–5)
HGB BLD-MCNC: 12.2 G/DL (ref 12.1–17)
HGB UR QL STRIP: ABNORMAL
HYALINE CASTS URNS QL MICRO: ABNORMAL /LPF (ref 0–5)
IMM GRANULOCYTES # BLD AUTO: 0.1 K/UL (ref 0–0.04)
IMM GRANULOCYTES NFR BLD AUTO: 1 % (ref 0–0.5)
KETONES UR QL STRIP.AUTO: NEGATIVE MG/DL
LDLC SERPL CALC-MCNC: 75.2 MG/DL (ref 0–100)
LEUKOCYTE ESTERASE UR QL STRIP.AUTO: NEGATIVE
LYMPHOCYTES # BLD: 2.5 K/UL (ref 0.8–3.5)
LYMPHOCYTES NFR BLD: 36 % (ref 12–49)
MCH RBC QN AUTO: 27.5 PG (ref 26–34)
MCHC RBC AUTO-ENTMCNC: 32.2 G/DL (ref 30–36.5)
MCV RBC AUTO: 85.6 FL (ref 80–99)
MONOCYTES # BLD: 0.9 K/UL (ref 0–1)
MONOCYTES NFR BLD: 13 % (ref 5–13)
NEUTS SEG # BLD: 3.4 K/UL (ref 1.8–8)
NEUTS SEG NFR BLD: 49 % (ref 32–75)
NITRITE UR QL STRIP.AUTO: NEGATIVE
NRBC # BLD: 0.02 K/UL (ref 0–0.01)
NRBC BLD-RTO: 0.3 PER 100 WBC
PH UR STRIP: 5 [PH] (ref 5–8)
PLATELET # BLD AUTO: 200 K/UL (ref 150–400)
PMV BLD AUTO: 11.1 FL (ref 8.9–12.9)
POTASSIUM SERPL-SCNC: 4.6 MMOL/L (ref 3.5–5.1)
PROT SERPL-MCNC: 7.6 G/DL (ref 6.4–8.2)
PROT UR STRIP-MCNC: 300 MG/DL
PROT UR-MCNC: 269 MG/DL (ref 0–11.9)
PSA SERPL-MCNC: 1.9 NG/ML (ref 0.01–4)
RBC # BLD AUTO: 4.43 M/UL (ref 4.1–5.7)
RBC #/AREA URNS HPF: ABNORMAL /HPF (ref 0–5)
SODIUM SERPL-SCNC: 140 MMOL/L (ref 136–145)
SP GR UR REFRACTOMETRY: 1.02 (ref 1–1.03)
TRIGL SERPL-MCNC: 54 MG/DL (ref ?–150)
UROBILINOGEN UR QL STRIP.AUTO: 0.2 EU/DL (ref 0.2–1)
VLDLC SERPL CALC-MCNC: 10.8 MG/DL
WBC # BLD AUTO: 7 K/UL (ref 4.1–11.1)
WBC URNS QL MICRO: ABNORMAL /HPF (ref 0–4)

## 2022-02-01 NOTE — PATIENT INSTRUCTIONS
Medicare Wellness Visit, Male    The best way to live healthy is to have a lifestyle where you eat a well-balanced diet, exercise regularly, limit alcohol use, and quit all forms of tobacco/nicotine, if applicable. Regular preventive services are another way to keep healthy. Preventive services (vaccines, screening tests, monitoring & exams) can help personalize your care plan, which helps you manage your own care. Screening tests can find health problems at the earliest stages, when they are easiest to treat. [facility] follows the current, evidence-based guidelines published by the [facility] ([facility]) when recommending preventive services for our patients. Because we follow these guidelines, sometimes recommendations change over time as research supports it. (For example, a prostate screening blood test is no longer routinely recommended for men with no symptoms). Of course, you and your doctor may decide to screen more often for some diseases, based on your risk and co-morbidities (chronic disease you are already diagnosed with). Preventive services for you include:  - Medicare offers their members a free annual wellness visit, which is time for you and your primary care provider to discuss and plan for your preventive service needs. Take advantage of this benefit every year!  -All adults over age 72 should receive the recommended pneumonia vaccines. Current USPSTF guidelines recommend a series of two vaccines for the best pneumonia protection.   -All adults should have a flu vaccine yearly and tetanus vaccine every 10 years.  -All adults age 48 and older should receive the shingles vaccines (series of two vaccines).        -All adults age 38-68 who are overweight should have a diabetes screening test once every three years.   -Other screening tests & preventive services for persons with diabetes include: an eye exam to screen for diabetic retinopathy, a kidney function test, a foot exam, and stricter control over your cholesterol.   -Cardiovascular screening for adults with routine risk involves an electrocardiogram (ECG) at intervals determined by the provider.   -Colorectal cancer screening should be done for adults age 54-65 with no increased risk factors for colorectal cancer. There are a number of acceptable methods of screening for this type of cancer. Each test has its own benefits and drawbacks. Discuss with your provider what is most appropriate for you during your annual wellness visit. The different tests include: colonoscopy (considered the best screening method), a fecal occult blood test, a fecal DNA test, and sigmoidoscopy.  -All adults born between [facility] should be screened once for Hepatitis C.  -An Abdominal Aortic Aneurysm (AAA) Screening is recommended for men age 73-68 who has ever smoked in their lifetime.      Here is a list of your current Health Maintenance items (your personalized list of preventive services) with a due date:  Health Maintenance Due   Topic Date Due    COVID-19 Vaccine (1) Never done    Eye Exam  03/31/2017    Pneumococcal Vaccine (2 of 2 - PPSV23) 12/19/2019    Diabetic Foot Care  03/05/2020    Yearly Flu Vaccine (1) 09/01/2021    Albumin Urine Test  10/26/2021    Cholesterol Test   10/26/2021

## 2022-02-02 LAB — APO B SERPL-MCNC: 68 MG/DL

## 2022-02-08 NOTE — TELEPHONE ENCOUNTER
Patient notified by phone and ask to increase his insulin by 4 units both ways . Patient currently takes 20 units, his new dose will be 24 units bid per Dr. Terri Nunez. Applied

## 2022-03-08 DIAGNOSIS — M54.12 CERVICAL RADICULOPATHY: ICD-10-CM

## 2022-03-08 RX ORDER — MELOXICAM 15 MG/1
15 TABLET ORAL DAILY
Qty: 30 TABLET | Refills: 0 | Status: SHIPPED | OUTPATIENT
Start: 2022-03-08

## 2022-03-18 PROBLEM — Z89.511 STATUS POST BELOW KNEE AMPUTATION OF RIGHT LOWER EXTREMITY (HCC): Status: ACTIVE | Noted: 2018-05-01

## 2022-03-18 PROBLEM — D64.9 ANEMIA: Status: ACTIVE | Noted: 2018-06-01

## 2022-03-18 PROBLEM — E88.09 PLASMA CELL DYSCRASIA: Status: ACTIVE | Noted: 2018-06-01

## 2022-03-19 PROBLEM — E66.9 OBESITY (BMI 30.0-34.9): Status: ACTIVE | Noted: 2019-10-06

## 2022-03-19 PROBLEM — G62.9 PERIPHERAL NEUROPATHY: Status: ACTIVE | Noted: 2017-09-28

## 2022-03-19 PROBLEM — E11.21 TYPE 2 DIABETES WITH NEPHROPATHY (HCC): Status: ACTIVE | Noted: 2019-04-21

## 2022-03-19 PROBLEM — E66.811 OBESITY (BMI 30.0-34.9): Status: ACTIVE | Noted: 2019-10-06

## 2022-03-19 PROBLEM — R53.81 PHYSICAL DECONDITIONING: Status: ACTIVE | Noted: 2018-06-01

## 2022-03-20 PROBLEM — E11.40 TYPE 2 DIABETES MELLITUS WITH DIABETIC NEUROPATHY (HCC): Status: ACTIVE | Noted: 2019-03-05

## 2022-04-05 ENCOUNTER — OFFICE VISIT (OUTPATIENT)
Dept: INTERNAL MEDICINE CLINIC | Age: 82
End: 2022-04-05
Payer: MEDICARE

## 2022-04-05 VITALS
RESPIRATION RATE: 18 BRPM | BODY MASS INDEX: 35.24 KG/M2 | SYSTOLIC BLOOD PRESSURE: 206 MMHG | HEIGHT: 78 IN | TEMPERATURE: 98 F | DIASTOLIC BLOOD PRESSURE: 92 MMHG | HEART RATE: 72 BPM | WEIGHT: 304.6 LBS | OXYGEN SATURATION: 98 %

## 2022-04-05 DIAGNOSIS — Z79.4 TYPE 2 DIABETES MELLITUS WITH DIABETIC NEUROPATHY, WITH LONG-TERM CURRENT USE OF INSULIN (HCC): Primary | ICD-10-CM

## 2022-04-05 DIAGNOSIS — G61.89 OTHER INFLAMMATORY POLYNEUROPATHIES (HCC): ICD-10-CM

## 2022-04-05 DIAGNOSIS — E78.5 DYSLIPIDEMIA: ICD-10-CM

## 2022-04-05 DIAGNOSIS — E88.09 PLASMA CELL DYSCRASIA: ICD-10-CM

## 2022-04-05 DIAGNOSIS — I10 ESSENTIAL HYPERTENSION: ICD-10-CM

## 2022-04-05 DIAGNOSIS — E11.40 TYPE 2 DIABETES MELLITUS WITH DIABETIC NEUROPATHY, WITH LONG-TERM CURRENT USE OF INSULIN (HCC): Primary | ICD-10-CM

## 2022-04-05 PROCEDURE — G8417 CALC BMI ABV UP PARAM F/U: HCPCS | Performed by: INTERNAL MEDICINE

## 2022-04-05 PROCEDURE — 1101F PT FALLS ASSESS-DOCD LE1/YR: CPT | Performed by: INTERNAL MEDICINE

## 2022-04-05 PROCEDURE — G8755 DIAS BP > OR = 90: HCPCS | Performed by: INTERNAL MEDICINE

## 2022-04-05 PROCEDURE — G8432 DEP SCR NOT DOC, RNG: HCPCS | Performed by: INTERNAL MEDICINE

## 2022-04-05 PROCEDURE — G8536 NO DOC ELDER MAL SCRN: HCPCS | Performed by: INTERNAL MEDICINE

## 2022-04-05 PROCEDURE — G8427 DOCREV CUR MEDS BY ELIG CLIN: HCPCS | Performed by: INTERNAL MEDICINE

## 2022-04-05 PROCEDURE — 3046F HEMOGLOBIN A1C LEVEL >9.0%: CPT | Performed by: INTERNAL MEDICINE

## 2022-04-05 PROCEDURE — 99214 OFFICE O/P EST MOD 30 MIN: CPT | Performed by: INTERNAL MEDICINE

## 2022-04-05 PROCEDURE — G8753 SYS BP > OR = 140: HCPCS | Performed by: INTERNAL MEDICINE

## 2022-04-05 NOTE — PROGRESS NOTES
Chief Complaint   Patient presents with    Foot Ulcer     1 month follow up          1. Have you been to the ER, urgent care clinic since your last visit? Hospitalized since your last visit? No    2. Have you seen or consulted any other health care providers outside of the [address] [place] [address] since your last visit? Include any pap smears or colon screening.  No

## 2022-04-06 LAB
ANION GAP SERPL CALC-SCNC: 4 MMOL/L (ref 5–15)
BUN SERPL-MCNC: 22 MG/DL (ref 6–20)
BUN/CREAT SERPL: 12 (ref 12–20)
CALCIUM SERPL-MCNC: 9.2 MG/DL (ref 8.5–10.1)
CHLORIDE SERPL-SCNC: 107 MMOL/L (ref 97–108)
CO2 SERPL-SCNC: 25 MMOL/L (ref 21–32)
CREAT SERPL-MCNC: 1.78 MG/DL (ref 0.7–1.3)
GLUCOSE SERPL-MCNC: 181 MG/DL (ref 65–100)
POTASSIUM SERPL-SCNC: 5.1 MMOL/L (ref 3.5–5.1)
SODIUM SERPL-SCNC: 136 MMOL/L (ref 136–145)

## 2022-04-06 RX ORDER — AMLODIPINE BESYLATE 5 MG/1
5 TABLET ORAL DAILY
Qty: 30 TABLET | Refills: 11 | Status: SHIPPED | OUTPATIENT
Start: 2022-04-06

## 2022-04-07 LAB — FRUCTOSAMINE SERPL-SCNC: 334 UMOL/L (ref 0–285)
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
## 2022-10-27 NOTE — CONSULTS
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 H/O venous sufficiency   Patient denies any prior h/o circulation disorders. Risk factors include former tobacco use, HLD, CAD, peripheral neuropathy, and DM. DM foot wound of the right ventral foot involving the 3-5th digits. 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 Day 5 of IV antibx. Currently on Vancomycin and Zosyn w wound care. Wound cx positive for Providencia stuartii, Proteus, and Pseudomonas. Poorly controlled IDDM - BG control improved since admission. Plan  Arrange for Arteriogram as soon as possible in 1-2 days. Management of comorbidities by primary team.              Subjective   Mr Edwin Wheeler is a very pleasant 76yo AAM who presented to his PCP for evaluation of a DM foot ulcer. [place] he noticed a large bullae to the right ventral foot. It was decompressed and he followed up in the outpatient setting. Upon arrival to the PCP he had a large ventral right foot ulcer involving the 3rd, 4th, and 5th digits. The patient denies any s/s of claudication; however, he has significant neuropathy secondary to poorly controlled DM. He does follow w a podiatrist  We are asked to see the patient in consult for evaluation of the patient's vascular status. ABIs were obtained on Friday the right ankle/brachial index is 0.89 and the left ankle/brachial  index is 0.99. The right great toe/brachial index is 0.18 and the left great  toe/brachial index is 0.52 (Right severe pedal pulse/digit disease). Past Medical History:  CAD (coronary artery disease)  Imsulin Dependent Diabetes ([facility] [address]  Hypertension  Hx of left foot ulcer.       Past Surgical History:  Bilateral cataract surgery  Surgery no edema,  no murmurs,  regular rate and rhythm to left foot ulcer   PICC right arm    Family History    Cancer Father     No Known Problems Mother          Smoking status: Former Smoker     Smokeless tobacco: Never Used    Alcohol use No       Current Facility-Administered Medications:  vancomycin (VANCOCIN) 1250 mg in  ml infusion 1,250 mg IntraVENous Q12H Eugenia Garcia MD Last Rate: 125 mL/hr at 09/11/17 1248 1,250 mg at 09/11/17 1248   insulin lispro (HUMALOG) injection 10 Units 10 Units SubCUTAneous ACL Eugenia Garcia MD 10 Units at 09/11/17 1213    insulin lispro (HUMALOG) injection 15 Units 15 Units SubCUTAneous ACB Eugenia Garcia MD 6 Units at 09/11/17 0826    insulin lispro (HUMALOG) injection 10 Units 10 Units SubCUTAneous ACD Eugenia Garcia MD 10 Units at 09/10/17 1655    piperacillin-tazobactam (ZOSYN) 3.375 g in 0.9% sodium chloride (MBP/ADV) 100 mL 3.375 g IntraVENous Jean Claude Holguin MD Last Rate: 25 mL/hr at 09/11/17 0828 3.375 g at 09/11/17 0828   insulin glargine (LANTUS) injection 10 Units 10 Units SubCUTAneous DAILY Eugenia Garcia MD 10 Units at 09/11/17 1022    aspirin delayed-release tablet 81 mg 81 mg Oral DAILY Eugenia Garcia MD 81 mg at 09/11/17 0830    lisinopril (PRINIVIL, ZESTRIL) tablet 40 mg 40 mg Oral DAILY Eugenia Garcia MD 40 mg at 09/11/17 0830    metoprolol tartrate (LOPRESSOR) tablet 25 mg 25 mg Oral BID Eugenia Garcia MD 25 mg at 09/11/17 0830    atorvastatin (LIPITOR) tablet 20 mg 20 mg Oral DAILY Eugenia Garcia MD 20 mg at 09/11/17 0830    sodium chloride (NS) flush 5-10 mL 5-10 mL IntraVENous Jean Claude Holguin MD 10 mL at 09/11/17 0840    sodium chloride (NS) flush 5-10 mL 5-10 mL IntraVENous PRN Eugenia Garcia MD     naloxone [facility]) injection 0.4 mg 0.4 mg IntraVENous PRN Eugenia Garcia MD     acetaminophen (TYLENOL) tablet 650 mg 650 mg Oral Q4H PRN Eugenia Garcia MD     HYDROcodone-acetaminophen (NORCO) 5-325 mg per tablet 1 Tab 1 Tab Oral Q4H PRN Eugenia Garcia MD 1 Tab at 09/11/17 0022 Review of Systems   Constitutional: Negative for chills, fatigue and fever. HENT: Negative. Eyes: Negative. Respiratory: Negative for cough and shortness of breath. Cardiovascular: Negative for chest pain. Endocrine: Negative for polydipsia and polyuria. Genitourinary: Negative. Allergic/Immunologic: Negative. Neurological: Negative. Hematological: Negative. Objective    Vital signs for last 24 hours:  /65  Pulse 75  Temp 98.4 °F (36.9 °C)  Resp 18  SpO2 99%    Intake/Output this shift:  Current Shift:    Last 3 Shifts: 09/09 1901 - 09/11 0700  In: 600 [I.V.:600]  Out: -     Data Review:   Recent Results (from the past 24 hour(s))  -GLUCOSE, POC   Collection Time: 09/10/17  4:43 PM   Result Value Ref Range   Glucose (POC) 114 (H) 65 - 100 mg/dL   Performed by Nelson Hayden, TROUGH   Collection Time: 09/10/17  7:23 PM   Result Value Ref Range   Vancomycin,trough 10.4 (H) 5.0 - 10.0 ug/mL   Reported dose date: NOT PROVIDED    Reported dose time: NOT PROVIDED    Reported dose: NOT PROVIDED UNITS   -GLUCOSE, POC   Collection Time: 09/10/17  9:07 PM   Result Value Ref Range   Glucose (POC) 123 (H) 65 - 100 mg/dL   Performed by Raj Arndt, POC   Collection Time: 09/11/17  7:23 AM   Result Value Ref Range   Glucose (POC) 115 (H) 65 - 100 mg/dL   Performed by Jojo Mora    -GLUCOSE, POC   Collection Time: 09/11/17 11:07 AM   Result Value Ref Range   Glucose (POC) 187 (H) 65 - 100 mg/dL   Performed by [facility]        Physical Exam   Constitutional: He appears well-developed and well-nourished. HENT:   Head: Normocephalic and atraumatic. Pupils post surgical bilaterally. Eyes: EOM are normal. Pupils are equal, round, and reactive to light. Neck: Normal range of motion. Neck supple. Cardiovascular: Normal rate and regular rhythm. Pulmonary/Chest: Effort normal and breath sounds normal.   Abdominal: Soft.  Bowel sounds are normal.   Musculoskeletal: Normal range of motion. Charcot's deformity bilaterally. Neurological: He is alert. Skin:   Bulky dressing to right foot w small amt of drainge. Appears to be betadine. Psychiatric: Judgment and thought content normal.   Vascular:  Popliteal and groin pulses intact bilaterally. Unable to appreciate pedal or posterior pulses bilaterally.

## 2022-12-30 NOTE — PROGRESS NOTES
[address] [facility] 
[place] [address] 
[address] [address] Phone:  [phone]  Fax: [phone] Chief Complaint Patient presents with  Diabetes  
  check up Jayce Baca SUBJECTIVE: 
  Damian Nagy is a 66 y.o. male who comes in for a return visit accompanied by his daughter. He tells me that I informed him that I told him that he did not have to come back for six months which is obviously not the case. His diabetic compliance remains quite poor. He checks his blood sugars quite sporadically. His average fasting sugars are in the 110 range allegedly. AC dinner sugars are in the 140-150 range. If this is indeed true, then his hemoglobin A1c is excellent. He has not had any interventional hypoglycemia. He has become quite sedentary. He is now walking with his prosthesis only with no need for a device other than a cane. His daughter requests physical therapy in the home setting. The patient does not make any effort to get out of the house on a consistent basis for exercising reasons. He has not seen his podiatrist either and he definitely needs offloading of the contralateral foot. He has a past history of primary hypertension and dyslipidemia. Current Outpatient Medications Medication Sig Dispense Refill  simvastatin (ZOCOR) 20 mg tablet TAKE ONE TABLET BY MOUTH ONCE NIGHTLY 90 Tab 3  
 metoprolol tartrate (LOPRESSOR) 25 mg tablet TAKE ONE TABLET BY MOUTH TWICE DAILY 180 Tab 3  
 lisinopril (PRINIVIL, ZESTRIL) 40 mg tablet TAKE ONE TABLET BY MOUTH ONCE DAILY 90 Tab 3  
 Insulin Syringes, Disposable, 1 mL syrg [id number]--- before meals breakfast and dinner 100 Syringe 11  
 insulin NPH/insulin regular (NOVOLIN 70/30) 100 unit/mL (70-30) injection Take 24 units before breakfast and 24 units before dinner.  (Patient taking differently: Take 30 units before breakfast and 26 units before dinner.) 2 Vial 11  
 Addended by: NATASHA DANIELSON on: 8/8/2017 04:06 PM     Modules accepted: Orders     TIA/ISCHEMIC/HEMORRHAGIC STROKE    YOUR STROKE RISK FACTORS INCLUDE:   It is important that you know your risk factors and that you work with your doctor on treatment and lifestyle changes to lower your risk of having a future stroke.  Personal Stroke Risks: Non-Modifable: Age over 55 years, Male, Race, Prior Stroke/TIA/AMI  Personal Stroke Risks: Modifiable: High blood pressure, High cholesterol      MEDICATIONS:  See Discharge Medication List  Take medications as they are scheduled, and talk with your physician if there are problems taking medications.  Keep a list of medications up to date and carry with you at all times.    VACCINES:  Most Recent Immunizations   Administered Date(s) Administered    COVID-19 12Y+ Pfizer-BioNtChainalytics - Requires Dilution 12/29/2021    COVID-19 18Y+ Ephraim[employer] 04/08/2021    Tdap 09/07/2022     Follow up with your doctor regarding influenza and/or pneumonia vaccine(s).    ACTIVITY:  Daniel has been cleared to resume activity per Rehab recommendations.    SMOKING:  Avoid all tobacco products and second hand smoke.  Smoking Cessation Counseling offered, [place] Toll Free Quit Line: [phone]  [place] Toll Free Quit Line: [phone]    DIET:  Heart healthy    EDUCATION MATERIALS:  Education provided per discharge instructions and/or stroke information booklet given to patient/family member with understanding of provided materials.       Symptoms of a Stroke  During a stroke, blood stops flowing to part of the brain. This can damage areas in the brain that control the rest of the body. A stroke can happen to anyone at any age. Call 911 and get help right away if any of these symptoms come on suddenly, even if the symptoms don’t last.  Know the symptoms of a stroke  Weakness. You may feel a sudden weakness, tingling, or a loss of feeling on one side of your face or body including your arm or leg.   Vision problems. You may have sudden double vision or trouble  seeing in one or both eyes.  Speech problems. You may have sudden trouble talking, slurred speech, or problems understanding others.  Headache. You may have a sudden, severe headache.  Movement problems. You may have sudden trouble walking, dizziness, a feeling of spinning, a loss of balance, a feeling of falling, or blackouts.  Seizure. You may also have a seizure as the first symptom of a stroke.     B. E.  F.A.S.T. is an easy way to remember the signs of stroke.    Call 9-1-1 if:    BALANCE-Sudden loss of coordination or balance    EYES-Sudden change in vision    FACE-Sudden weakness on one side of the face or facial droop    ARM-Sudden arm or leg weakness or numbness    SPEECH-Sudden slurred speech, trouble speaking, trouble understanding speech    TERRIBLE HEADACHE-Sudden onset of a terrible headache    If someone has any of these symptoms, even if they go away, it is important to call 911 immediately. It is important to make note of the time the symptoms first appeared to receive the best treatment.         Grief Support    [facility]  “[facility]”  [address]  [place], [place]    [phone]    [facility]  [phone]  Call for locations    [facility]  [address] [place], [place]  [phone]    [facility]  [address]  [place], [place]  [phone]    [facility]  [address], [place]    [phone]    [facility]ide Survivors [facility]  [facility]  Call for locations  [phone]    [facility]  [address]  [place], [place]   [phone]    [facility]  [place], [place]  [phone]    [facility]  [address]  [place], [place]    [phone]    senna (SENOKOT) 8.6 mg tablet Take 2 Tabs by mouth nightly. 60 Tab 0  
 aspirin 81 mg tablet Take 81 mg by mouth daily.  ferrous gluconate 324 mg (38 mg iron) tablet TAKE ONE TABLET BY MOUTH THREE TIMES DAILY WITH  A  MEAL 90 Tab 0  
 insulin lispro (HUMALOG) 100 unit/mL injection 8 units sq 3 times daily acmeals 1 Vial 0  
 oxyCODONE-acetaminophen (PERCOCET) 5-325 mg per tablet Take 1-2 Tabs by mouth every four (4) hours as needed. Max Daily Amount: 12 Tabs. 30 Tab 0 Past Medical History:  
Diagnosis Date  CAD (coronary artery disease)   
 palpitations  Diabetes ([facility] [address]  Hypertension  Other ill-defined conditions(799.89) left foot ulcer Past Surgical History:  
Procedure Laterality Date  HX AMPUTATION Status post right BK amputation  HX HEENT    
 bilateral cataract surgery  HX ORTHOPAEDIC    
 \"2 surgeries on right foot\"  HX OTHER SURGICAL    
 surgery to left foot ulcer, PICC right arm No Known Allergies REVIEW OF SYSTEMS: 
General: negative for - chills or fever ENT: negative for - headaches, nasal congestion or tinnitus Respiratory: negative for - cough, hemoptysis, shortness of breath or wheezing Cardiovascular : negative for - chest pain, edema, palpitations or shortness of breath Gastrointestinal: negative for - abdominal pain, blood in stools, heartburn or nausea/vomiting Genito-Urinary: no dysuria, trouble voiding, or hematuria Musculoskeletal: negative for - gait disturbance, joint pain, joint stiffness or joint swelling Neurological: no TIA or stroke symptoms Hematologic: no bruises, no bleeding, no swollen glands Integument: no lumps, mole changes, nail changes or rash Endocrine: no malaise/lethargy or unexpected weight changes Family History Problem Relation Age of Onset  Cancer Father  No Known Problems Mother OBJECTIVE: 
 
Visit Vitals /73 Pulse 68 Temp 98.2 °F (36.8 °C) (Oral) Resp 20 Ht 6' 9\" (2.057 m) Wt 274 lb 12.8 oz (124.6 kg) SpO2 98% BMI 29.45 kg/m² CONSTITUTIONAL: well , well nourished, appears age appropriate EYES: perrla, eom intact ENMT:moist mucous membranes, pharynx clear NECK: supple. Thyroid normal 
RESPIRATORY: Chest: clear to ascultation and percussion CARDIOVASCULAR: Heart: regular rate and rhythm GASTROINTESTINAL: Abdomen: soft, bowel sounds active HEMATOLOGIC: no pathological lymph nodes palpated MUSCULOSKELETAL: Extremities: no edema, pulse 1+, right BK amputation, charcot L foot INTEGUMENT: No unusual rashes or suspicious skin lesions noted. Nails appear normal. 
NEUROLOGIC: non-focal exam  
MENTAL STATUS: alert and oriented, appropriate affect ASSESSMENT: 
1. DM (diabetes mellitus), type 2, uncontrolled, periph vascular complic ([address] 2. Essential hypertension 3. Other inflammatory polyneuropathies ([address] 4. Dyslipidemia 5. Plasma cell dyscrasia 6. Physical deconditioning 7. 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 Charcot's joint of left ankle PLAN: 
 
1. I have no idea how the patient's diabetes is doing. I remind him of the importance of eating at the same time every day. He states that on occasion he takes his insulin after eating but this can be up to 30 minutes to an hour which is totally inappropriate. Insulin should be taken [facility] in view of the fact that he is using a premixed preparation fortunately which is analog in nature. 2. Blood pressure is excellent today, no adjustments are made. 3. He has microvascular complications consisting predominantly of his polyneuropathy. This has led to a charcot joint of the left foot and previously of the right foot. 4. He will continue statin as prescribed and efficacy and compliance will be assessed. 5. He does have a plasma cell dysplasia. I will assess his immunoglobulin as it relates to this. 6. He is deconditioned and needs to walk more, preferably outside. 7. He is not following with his vascular surgeon, which is fine, because he is relatively stable with his prosthesis. . 
Orders Placed This Encounter  HEMOGLOBIN A1C WITH EAG  
 METABOLIC PANEL, BASIC  CBC WITH AUTOMATED DIFF  
 APOLIPOPROTEIN B  
 PROTEIN ELECTROPHORESIS  
 IMMUNOELECTROPHORESIS (IMMUNOFIX.) [facility]  REFERRAL TO PODIATRY Follow-up Disposition: 
Return in about 3 months (around 2/15/2019).  
 
 
Yanet Garcia MD

## 2023-01-03 RX ORDER — BLOOD SUGAR DIAGNOSTIC
STRIP MISCELLANEOUS
Qty: 300 STRIP | Refills: 3 | Status: SHIPPED | OUTPATIENT
Start: 2023-01-03

## 2023-01-03 RX ORDER — CALCIUM CARB/VITAMIN D3/VIT K1 500-100-40
TABLET,CHEWABLE ORAL
Qty: 200 EACH | Refills: 3 | Status: SHIPPED | OUTPATIENT
Start: 2023-01-03

## 2023-01-19 ENCOUNTER — OFFICE VISIT (OUTPATIENT)
Dept: INTERNAL MEDICINE CLINIC | Age: 83
End: 2023-01-19
Payer: MEDICARE

## 2023-01-19 DIAGNOSIS — E78.5 DYSLIPIDEMIA: ICD-10-CM

## 2023-01-19 DIAGNOSIS — Z79.4 TYPE 2 DIABETES MELLITUS WITH DIABETIC NEUROPATHY, WITH LONG-TERM CURRENT USE OF INSULIN (HCC): Primary | ICD-10-CM

## 2023-01-19 DIAGNOSIS — N18.31 STAGE 3A CHRONIC KIDNEY DISEASE (HCC): ICD-10-CM

## 2023-01-19 DIAGNOSIS — E11.40 TYPE 2 DIABETES MELLITUS WITH DIABETIC NEUROPATHY, WITH LONG-TERM CURRENT USE OF INSULIN (HCC): Primary | ICD-10-CM

## 2023-01-19 DIAGNOSIS — I10 ESSENTIAL HYPERTENSION: ICD-10-CM

## 2023-01-19 DIAGNOSIS — I87.2 VENOUS INSUFFICIENCY: ICD-10-CM

## 2023-01-19 DIAGNOSIS — E88.09 PLASMA CELL DYSCRASIA: ICD-10-CM

## 2023-01-19 DIAGNOSIS — N40.0 BENIGN PROSTATIC HYPERPLASIA WITHOUT LOWER URINARY TRACT SYMPTOMS: ICD-10-CM

## 2023-01-19 PROBLEM — N18.30 CHRONIC RENAL DISEASE, STAGE III (HCC): Status: ACTIVE | Noted: 2023-01-19

## 2023-01-19 NOTE — PROGRESS NOTES
-You may stop your Brlinta prior to your procedure (1 week prior). -When you start up again, start the 60 mg twice daily dose (or start this after you run out of your 90 mg doses).   -You should CONTINUE to take your 81 mg of aspirin, however.  -See Dr. Anthony Hannon in 6 months  -Otherwise no medicine changes Vanessa Pringle is a 80 y.o. male  Chief Complaint   Patient presents with    Follow-up    Diabetes     Patient here for follow up diabetes. 1. Have you been to the ER, urgent care clinic since your last visit? Hospitalized since your last visit? No    2. Have you seen or consulted any other health care providers outside of the [address] [place] [address] since your last visit? Include any pap smears or colon screening. Yes When: 10 days ago Where:  [facility]  Reason for visit: Foot Exam.

## 2023-01-20 LAB
ALBUMIN SERPL-MCNC: 3.7 G/DL (ref 3.5–5)
ALBUMIN/GLOB SERPL: 0.9 (ref 1.1–2.2)
ALP SERPL-CCNC: 87 U/L (ref 45–117)
ALT SERPL-CCNC: 35 U/L (ref 12–78)
ANION GAP SERPL CALC-SCNC: 6 MMOL/L (ref 5–15)
APPEARANCE UR: CLEAR
AST SERPL-CCNC: 31 U/L (ref 15–37)
BACTERIA URNS QL MICRO: NEGATIVE /HPF
BASOPHILS # BLD: 0 K/UL (ref 0–0.1)
BASOPHILS NFR BLD: 1 % (ref 0–1)
BILIRUB SERPL-MCNC: 0.4 MG/DL (ref 0.2–1)
BILIRUB UR QL: NEGATIVE
BUN SERPL-MCNC: 33 MG/DL (ref 6–20)
BUN/CREAT SERPL: 20 (ref 12–20)
CALCIUM SERPL-MCNC: 9.3 MG/DL (ref 8.5–10.1)
CHLORIDE SERPL-SCNC: 110 MMOL/L (ref 97–108)
CHOLEST SERPL-MCNC: 134 MG/DL
CO2 SERPL-SCNC: 23 MMOL/L (ref 21–32)
COLOR UR: ABNORMAL
CREAT SERPL-MCNC: 1.65 MG/DL (ref 0.7–1.3)
CREAT UR-MCNC: 63 MG/DL
CREAT UR-MCNC: 67.1 MG/DL
CRP SERPL HS-MCNC: >9.5 MG/L
DIFFERENTIAL METHOD BLD: ABNORMAL
EOSINOPHIL # BLD: 0.1 K/UL (ref 0–0.4)
EOSINOPHIL NFR BLD: 2 % (ref 0–7)
EPITH CASTS URNS QL MICRO: ABNORMAL /LPF
ERYTHROCYTE [DISTWIDTH] IN BLOOD BY AUTOMATED COUNT: 14.6 % (ref 11.5–14.5)
EST. AVERAGE GLUCOSE BLD GHB EST-MCNC: 212 MG/DL
GLOBULIN SER CALC-MCNC: 4.2 G/DL (ref 2–4)
GLUCOSE SERPL-MCNC: 88 MG/DL (ref 65–100)
GLUCOSE UR STRIP.AUTO-MCNC: NEGATIVE MG/DL
HBA1C MFR BLD: 9 % (ref 4–5.6)
HCT VFR BLD AUTO: 41.3 % (ref 36.6–50.3)
HDLC SERPL-MCNC: 49 MG/DL
HDLC SERPL: 2.7 (ref 0–5)
HGB BLD-MCNC: 12.5 G/DL (ref 12.1–17)
HGB UR QL STRIP: ABNORMAL
HYALINE CASTS URNS QL MICRO: ABNORMAL /LPF (ref 0–5)
IMM GRANULOCYTES # BLD AUTO: 0 K/UL (ref 0–0.04)
IMM GRANULOCYTES NFR BLD AUTO: 1 % (ref 0–0.5)
KETONES UR QL STRIP.AUTO: NEGATIVE MG/DL
LDLC SERPL CALC-MCNC: 72.8 MG/DL (ref 0–100)
LEUKOCYTE ESTERASE UR QL STRIP.AUTO: NEGATIVE
LYMPHOCYTES # BLD: 1.8 K/UL (ref 0.8–3.5)
LYMPHOCYTES NFR BLD: 30 % (ref 12–49)
MCH RBC QN AUTO: 26.9 PG (ref 26–34)
MCHC RBC AUTO-ENTMCNC: 30.3 G/DL (ref 30–36.5)
MCV RBC AUTO: 89 FL (ref 80–99)
MICROALBUMIN UR-MCNC: 70.6 MG/DL
MICROALBUMIN/CREAT UR-RTO: 1121 MG/G (ref 0–30)
MONOCYTES # BLD: 0.6 K/UL (ref 0–1)
MONOCYTES NFR BLD: 10 % (ref 5–13)
NEUTS SEG # BLD: 3.4 K/UL (ref 1.8–8)
NEUTS SEG NFR BLD: 56 % (ref 32–75)
NITRITE UR QL STRIP.AUTO: NEGATIVE
NRBC # BLD: 0 K/UL (ref 0–0.01)
NRBC BLD-RTO: 0 PER 100 WBC
PH UR STRIP: 5 (ref 5–8)
PLATELET # BLD AUTO: 174 K/UL (ref 150–400)
PMV BLD AUTO: 11.7 FL (ref 8.9–12.9)
POTASSIUM SERPL-SCNC: 5.1 MMOL/L (ref 3.5–5.1)
PROT SERPL-MCNC: 7.9 G/DL (ref 6.4–8.2)
PROT UR STRIP-MCNC: 100 MG/DL
PROT UR-MCNC: 92 MG/DL (ref 0–11.9)
RBC # BLD AUTO: 4.64 M/UL (ref 4.1–5.7)
RBC #/AREA URNS HPF: ABNORMAL /HPF (ref 0–5)
SODIUM SERPL-SCNC: 139 MMOL/L (ref 136–145)
SP GR UR REFRACTOMETRY: 1.01 (ref 1–1.03)
TRIGL SERPL-MCNC: 61 MG/DL (ref ?–150)
UROBILINOGEN UR QL STRIP.AUTO: 0.2 EU/DL (ref 0.2–1)
VLDLC SERPL CALC-MCNC: 12.2 MG/DL
WBC # BLD AUTO: 6 K/UL (ref 4.1–11.1)
WBC URNS QL MICRO: ABNORMAL /HPF (ref 0–4)

## 2023-01-21 VITALS
OXYGEN SATURATION: 100 % | HEART RATE: 74 BPM | DIASTOLIC BLOOD PRESSURE: 70 MMHG | RESPIRATION RATE: 18 BRPM | BODY MASS INDEX: 33.74 KG/M2 | SYSTOLIC BLOOD PRESSURE: 144 MMHG | WEIGHT: 291.6 LBS | TEMPERATURE: 98.6 F | HEIGHT: 78 IN
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
## 2023-01-22 LAB — APO B SERPL-MCNC: 68 MG/DL

## 2023-02-24 ENCOUNTER — CLINICAL SUPPORT (OUTPATIENT)
Dept: INTERNAL MEDICINE CLINIC | Age: 83
End: 2023-02-24

## 2023-02-24 DIAGNOSIS — N40.0 BENIGN PROSTATIC HYPERPLASIA WITHOUT LOWER URINARY TRACT SYMPTOMS: Primary | ICD-10-CM

## 2023-02-25 LAB — PSA SERPL-MCNC: 2.5 NG/ML (ref 0.01–4)

## 2023-04-25 ENCOUNTER — OFFICE VISIT (OUTPATIENT)
Dept: INTERNAL MEDICINE CLINIC | Age: 83
End: 2023-04-25
Payer: MEDICARE

## 2023-04-25 VITALS
BODY MASS INDEX: 33.92 KG/M2 | TEMPERATURE: 98.2 F | DIASTOLIC BLOOD PRESSURE: 65 MMHG | OXYGEN SATURATION: 100 % | HEIGHT: 78 IN | SYSTOLIC BLOOD PRESSURE: 179 MMHG | WEIGHT: 293.2 LBS | HEART RATE: 78 BPM | RESPIRATION RATE: 18 BRPM

## 2023-04-25 DIAGNOSIS — Z00.00 WELLNESS EXAMINATION: ICD-10-CM

## 2023-04-25 DIAGNOSIS — I10 PRIMARY HYPERTENSION: Primary | Chronic | ICD-10-CM

## 2023-04-25 DIAGNOSIS — I87.2 VENOUS INSUFFICIENCY: ICD-10-CM

## 2023-04-25 DIAGNOSIS — N18.32 STAGE 3B CHRONIC KIDNEY DISEASE (HCC): ICD-10-CM

## 2023-04-25 DIAGNOSIS — E78.5 DYSLIPIDEMIA: ICD-10-CM

## 2023-04-25 DIAGNOSIS — Z79.4 TYPE 2 DIABETES MELLITUS WITH DIABETIC NEUROPATHY, WITH LONG-TERM CURRENT USE OF INSULIN (HCC): ICD-10-CM

## 2023-04-25 DIAGNOSIS — E11.40 TYPE 2 DIABETES MELLITUS WITH DIABETIC NEUROPATHY, WITH LONG-TERM CURRENT USE OF INSULIN (HCC): ICD-10-CM

## 2023-04-25 DIAGNOSIS — E88.09 PLASMA CELL DYSCRASIA: ICD-10-CM

## 2023-04-25 DIAGNOSIS — H91.93 DECREASED HEARING OF BOTH EARS: ICD-10-CM

## 2023-04-25 DIAGNOSIS — Z13.31 SCREENING FOR DEPRESSION: ICD-10-CM

## 2023-04-25 DIAGNOSIS — M17.0 PRIMARY OSTEOARTHRITIS OF BOTH KNEES: ICD-10-CM

## 2023-04-25 PROCEDURE — G0439 PPPS, SUBSEQ VISIT: HCPCS | Performed by: INTERNAL MEDICINE

## 2023-04-25 PROCEDURE — 3052F HG A1C>EQUAL 8.0%<EQUAL 9.0%: CPT | Performed by: INTERNAL MEDICINE

## 2023-04-25 PROCEDURE — G8536 NO DOC ELDER MAL SCRN: HCPCS | Performed by: INTERNAL MEDICINE

## 2023-04-25 PROCEDURE — 1101F PT FALLS ASSESS-DOCD LE1/YR: CPT | Performed by: INTERNAL MEDICINE

## 2023-04-25 PROCEDURE — G8417 CALC BMI ABV UP PARAM F/U: HCPCS | Performed by: INTERNAL MEDICINE

## 2023-04-25 PROCEDURE — 1123F ACP DISCUSS/DSCN MKR DOCD: CPT | Performed by: INTERNAL MEDICINE

## 2023-04-25 PROCEDURE — 99214 OFFICE O/P EST MOD 30 MIN: CPT | Performed by: INTERNAL MEDICINE

## 2023-04-25 PROCEDURE — G8427 DOCREV CUR MEDS BY ELIG CLIN: HCPCS | Performed by: INTERNAL MEDICINE

## 2023-04-25 PROCEDURE — G8510 SCR DEP NEG, NO PLAN REQD: HCPCS | Performed by: INTERNAL MEDICINE

## 2023-04-25 PROCEDURE — 3078F DIAST BP <80 MM HG: CPT | Performed by: INTERNAL MEDICINE

## 2023-04-25 PROCEDURE — 3074F SYST BP LT 130 MM HG: CPT | Performed by: INTERNAL MEDICINE

## 2023-04-26 LAB
ANION GAP SERPL CALC-SCNC: 3 MMOL/L (ref 5–15)
BUN SERPL-MCNC: 23 MG/DL (ref 6–20)
BUN/CREAT SERPL: 14 (ref 12–20)
CALCIUM SERPL-MCNC: 9.4 MG/DL (ref 8.5–10.1)
CHLORIDE SERPL-SCNC: 109 MMOL/L (ref 97–108)
CO2 SERPL-SCNC: 24 MMOL/L (ref 21–32)
CREAT SERPL-MCNC: 1.68 MG/DL (ref 0.7–1.3)
CREAT UR-MCNC: 28.3 MG/DL
EST. AVERAGE GLUCOSE BLD GHB EST-MCNC: 192 MG/DL
GLUCOSE SERPL-MCNC: 113 MG/DL (ref 65–100)
HBA1C MFR BLD: 8.3 % (ref 4–5.6)
POTASSIUM SERPL-SCNC: 5 MMOL/L (ref 3.5–5.1)
PROT UR-MCNC: 26 MG/DL (ref 0–11.9)
SODIUM SERPL-SCNC: 136 MMOL/L (ref 136–145)

## 2023-04-27 LAB — APO B SERPL-MCNC: 74 MG/DL

## 2023-05-09 RX ORDER — AMLODIPINE BESYLATE 10 MG/1
10 TABLET ORAL DAILY
Qty: 30 TABLET | Refills: 11 | Status: SHIPPED | OUTPATIENT
Start: 2023-05-09 | End: 2023-05-10 | Stop reason: SDUPTHER

## 2023-05-10 RX ORDER — AMLODIPINE BESYLATE 10 MG/1
10 TABLET ORAL DAILY
Qty: 30 TABLET | Refills: 11 | Status: SHIPPED | OUTPATIENT
Start: 2023-05-10

## 2023-05-12 ENCOUNTER — TELEPHONE (OUTPATIENT)
Facility: CLINIC | Age: 83
End: 2023-05-12

## 2023-05-12 NOTE — TELEPHONE ENCOUNTER
Patient notified rx was sent to [facility] DR ROSAURA THIBODEAUX on 5/10/23.

## 2023-05-12 NOTE — TELEPHONE ENCOUNTER
Patient is calling stating that at his last visit Dr Ryan Dior upped his amlodipine 5mg to 10mg. Says he has run out of his 5 mg because he was told to double up on them until the new rx was sent. New rx was sent to a mail order pharmacy which he does not use any longer and he has been out of his rx for 4 - 5 days. Would like for his rx to be cancelled at [facility] mail order pharmacy and sent to local pharmacy which he only wants to use now and that is [address] [place] [address], [address] [place] [address]. Says need rx asap. Jude Casey

## 2023-07-06 NOTE — MR AVS SNAPSHOT
[address] [facility] 
 
 
 [address]ona [address] [id number] 
[phone] Patient: Jerson Yusuf MRN: [id number] CFE:[id number]/6/6776 Visit Information Date & Time Provider Department Dept. Phone Encounter #  
 6/29/2018 11:30 AM Elisha Moritz, MD [facility] [phone] [id number] Your Appointments 12/21/2018  9:30 AM  
Any with Elisha Moritz, MD  
[address] [place] [address] and Primary Care [address] [place] [address]) Appt Note: 6 month f/u  
 Rhona Hernandez [address] [facility]  
  
   
 Rhona Hernandez [address] [id number] Upcoming Health Maintenance Date Due  
 GLAUCOMA SCREENING Q2Y 12/29/2018* Influenza Age 5 to Adult 8/1/2018 HEMOGLOBIN A1C Q6M 9/27/2018 FOOT EXAM Q1 12/26/2018 MICROALBUMIN Q1 3/27/2019 LIPID PANEL Q1 3/27/2019 MEDICARE YEARLY EXAM 5/2/2019 DTaP/Tdap/Td series (2 - Td) 2/6/2027 *Topic was postponed. The date shown is not the original due date. Allergies as of 6/29/2018  Review Complete On: 6/29/2018 By: Mary Piedad No Known Allergies Current Immunizations  Reviewed on 2/28/2011 Name Date Influenza Vaccine (Quad) PF 10/11/2017 10:25 AM  
 Influenza Vaccine Whole 11/1/2010 Not reviewed this visit You Were Diagnosed With   
  
 Codes Comments DM (diabetes mellitus), type 2, uncontrolled, periph vascular complic ([facility] [address]    -  Primary ICD-10-CM: E11.51, E11.65 ICD-9-CM: 250.72, 443.81 Essential hypertension     ICD-10-CM: I10 
ICD-9-CM: 401.9 Plasma cell dyscrasia     ICD-10-CM: E88.09 
ICD-9-CM: 273.9 Dyslipidemia     ICD-10-CM: E78.5 ICD-9-CM: 272.4 Vitals BP Pulse Temp Resp Height(growth percentile) Weight(growth percentile) 161/67 (!) 58 97.9 °F (36.6 °C) (Oral) 16 6' 9\" (2.057 m) 272 lb 4.8 oz (123.5 kg) SpO2 BMI Smoking Status 98% 29.18 kg/m2 Former Smoker Vitals History Rx was discontinued at 5/31 visit due to side effects. Sent fax to pharmacy with this information.  Gita Freeman RN    [facility]     BMI and BSA Data Body Mass Index Body Surface Area  
 29.18 kg/m 2 2.66 m 2 Preferred Pharmacy Pharmacy Name Phone Kenya Banda [address] [place] [address] - [facility] [address] [place] [address] [phone] Your Updated Medication List  
  
   
This list is accurate as of 6/29/18 12:21 PM.  Always use your most recent med list.  
  
  
  
  
 aspirin 81 mg tablet Take 81 mg by mouth daily. ferrous gluconate 324 mg (38 mg iron) tablet TAKE ONE TABLET BY MOUTH THREE TIMES DAILY WITH  A  MEAL  
  
 insulin lispro 100 unit/mL injection Commonly known as:  HUMALOG  
8 units sq 3 times daily acmeals  
  
 insulin NPH/insulin regular 100 unit/mL (70-30) injection Commonly known as:  NovoLIN 70/30 U-100 Insulin Take 24 units before breakfast and 24 units before dinner. Insulin Syringes (Disposable) 1 mL Syrg Dx.e11.9--- before meals breakfast and dinner  
  
 lisinopril 40 mg tablet Commonly known as:  Jose Juan Littler Take 1 Tab by mouth daily. metoprolol tartrate 25 mg tablet Commonly known as:  LOPRESSOR  
TAKE ONE TABLET BY MOUTH TWICE DAILY  
  
 oxyCODONE-acetaminophen 5-325 mg per tablet Commonly known as:  PERCOCET Take 1-2 Tabs by mouth every four (4) hours as needed. Max Daily Amount: 12 Tabs. senna 8.6 mg tablet Commonly known as:  [employer] Take 2 Tabs by mouth nightly. simvastatin 20 mg tablet Commonly known as:  ZOCOR  
TAKE ONE TABLET BY MOUTH NIGHTLY. We Performed the Following APOLIPOPROTEIN B [id number] CPT(R)] HEMOGLOBIN A1C WITH EAG [29310 CPT(R)] Introducing [facility]! Dear Judy Cho: Thank you for requesting a ZENT account. Our records indicate that you already have an active ZENT account. You can access your account anytime at [email or web address] Did you know that you can access your hospital and ER discharge instructions at any time in [employer]? You can also review all of your test results from your hospital stay or ER visit. Additional Information If you have questions, please visit the Frequently Asked Questions section of the [employer] website at [email or web address] Remember, [employer] is NOT to be used for urgent needs. For medical emergencies, dial 911. Now available from your iPhone and Android! Please provide this summary of care documentation to your next provider. Your primary care clinician is listed as Liz Fam. If you have any questions after today's visit, please call [phone].

## 2023-07-25 ENCOUNTER — OFFICE VISIT (OUTPATIENT)
Facility: CLINIC | Age: 83
End: 2023-07-25
Payer: COMMERCIAL

## 2023-07-25 VITALS
WEIGHT: 289.9 LBS | OXYGEN SATURATION: 98 % | HEART RATE: 76 BPM | TEMPERATURE: 97.6 F | HEIGHT: 78 IN | SYSTOLIC BLOOD PRESSURE: 167 MMHG | BODY MASS INDEX: 33.54 KG/M2 | DIASTOLIC BLOOD PRESSURE: 70 MMHG | RESPIRATION RATE: 15 BRPM

## 2023-07-25 DIAGNOSIS — H91.93 DECREASED HEARING OF BOTH EARS: Primary | ICD-10-CM

## 2023-07-25 DIAGNOSIS — Z89.511 STATUS POST BELOW KNEE AMPUTATION OF RIGHT LOWER EXTREMITY (HCC): ICD-10-CM

## 2023-07-25 DIAGNOSIS — I10 ESSENTIAL HYPERTENSION: ICD-10-CM

## 2023-07-25 DIAGNOSIS — E11.21 TYPE 2 DIABETES WITH NEPHROPATHY (HCC): ICD-10-CM

## 2023-07-25 PROCEDURE — 1123F ACP DISCUSS/DSCN MKR DOCD: CPT | Performed by: INTERNAL MEDICINE

## 2023-07-25 PROCEDURE — 3074F SYST BP LT 130 MM HG: CPT | Performed by: INTERNAL MEDICINE

## 2023-07-25 PROCEDURE — 99214 OFFICE O/P EST MOD 30 MIN: CPT | Performed by: INTERNAL MEDICINE

## 2023-07-25 PROCEDURE — 3078F DIAST BP <80 MM HG: CPT | Performed by: INTERNAL MEDICINE

## 2023-07-25 PROCEDURE — 3052F HG A1C>EQUAL 8.0%<EQUAL 9.0%: CPT | Performed by: INTERNAL MEDICINE

## 2023-07-25 SDOH — ECONOMIC STABILITY: FOOD INSECURITY: WITHIN THE PAST 12 MONTHS, YOU WORRIED THAT YOUR FOOD WOULD RUN OUT BEFORE YOU GOT MONEY TO BUY MORE.: NEVER TRUE

## 2023-07-25 SDOH — HEALTH STABILITY: PHYSICAL HEALTH: ON AVERAGE, HOW MANY MINUTES DO YOU ENGAGE IN EXERCISE AT THIS LEVEL?: 0 MIN

## 2023-07-25 SDOH — ECONOMIC STABILITY: FOOD INSECURITY: WITHIN THE PAST 12 MONTHS, THE FOOD YOU BOUGHT JUST DIDN'T LAST AND YOU DIDN'T HAVE MONEY TO GET MORE.: NEVER TRUE

## 2023-07-25 SDOH — HEALTH STABILITY: PHYSICAL HEALTH: ON AVERAGE, HOW MANY DAYS PER WEEK DO YOU ENGAGE IN MODERATE TO STRENUOUS EXERCISE (LIKE A BRISK WALK)?: 0 DAYS

## 2023-07-25 SDOH — ECONOMIC STABILITY: HOUSING INSECURITY
IN THE LAST 12 MONTHS, WAS THERE A TIME WHEN YOU DID NOT HAVE A STEADY PLACE TO SLEEP OR SLEPT IN A SHELTER (INCLUDING NOW)?: NO

## 2023-07-25 SDOH — ECONOMIC STABILITY: TRANSPORTATION INSECURITY
IN THE PAST 12 MONTHS, HAS THE LACK OF TRANSPORTATION KEPT YOU FROM MEDICAL APPOINTMENTS OR FROM GETTING MEDICATIONS?: NO

## 2023-07-25 SDOH — ECONOMIC STABILITY: TRANSPORTATION INSECURITY
IN THE PAST 12 MONTHS, HAS LACK OF TRANSPORTATION KEPT YOU FROM MEETINGS, WORK, OR FROM GETTING THINGS NEEDED FOR DAILY LIVING?: NO

## 2023-07-25 SDOH — ECONOMIC STABILITY: HOUSING INSECURITY: IN THE LAST 12 MONTHS, HOW MANY PLACES HAVE YOU LIVED?: 1

## 2023-07-25 SDOH — ECONOMIC STABILITY: INCOME INSECURITY: IN THE LAST 12 MONTHS, WAS THERE A TIME WHEN YOU WERE NOT ABLE TO PAY THE MORTGAGE OR RENT ON TIME?: NO

## 2023-07-25 ASSESSMENT — PATIENT HEALTH QUESTIONNAIRE - PHQ9
SUM OF ALL RESPONSES TO PHQ QUESTIONS 1-9: 0
SUM OF ALL RESPONSES TO PHQ QUESTIONS 1-9: 0
2. FEELING DOWN, DEPRESSED OR HOPELESS: 0
SUM OF ALL RESPONSES TO PHQ QUESTIONS 1-9: 0
SUM OF ALL RESPONSES TO PHQ QUESTIONS 1-9: 0
1. LITTLE INTEREST OR PLEASURE IN DOING THINGS: 0
SUM OF ALL RESPONSES TO PHQ9 QUESTIONS 1 & 2: 0

## 2023-07-25 ASSESSMENT — ANXIETY QUESTIONNAIRES
2. NOT BEING ABLE TO STOP OR CONTROL WORRYING: 0
4. TROUBLE RELAXING: 0
6. BECOMING EASILY ANNOYED OR IRRITABLE: 0
3. WORRYING TOO MUCH ABOUT DIFFERENT THINGS: 0
5. BEING SO RESTLESS THAT IT IS HARD TO SIT STILL: 0
7. FEELING AFRAID AS IF SOMETHING AWFUL MIGHT HAPPEN: 0
GAD7 TOTAL SCORE: 0
1. FEELING NERVOUS, ANXIOUS, OR ON EDGE: 0

## 2023-07-25 ASSESSMENT — SOCIAL DETERMINANTS OF HEALTH (SDOH)
WITHIN THE LAST YEAR, HAVE YOU BEEN AFRAID OF YOUR PARTNER OR EX-PARTNER?: NO
HOW OFTEN DO YOU GET TOGETHER WITH FRIENDS OR RELATIVES?: MORE THAN THREE TIMES A WEEK
WITHIN THE LAST YEAR, HAVE YOU BEEN HUMILIATED OR EMOTIONALLY ABUSED IN OTHER WAYS BY YOUR PARTNER OR EX-PARTNER?: NO
WITHIN THE LAST YEAR, HAVE TO BEEN RAPED OR FORCED TO HAVE ANY KIND OF SEXUAL ACTIVITY BY YOUR PARTNER OR EX-PARTNER?: NO
DO YOU BELONG TO ANY CLUBS OR ORGANIZATIONS SUCH AS CHURCH GROUPS UNIONS, FRATERNAL OR ATHLETIC GROUPS, OR SCHOOL GROUPS?: YES
HOW HARD IS IT FOR YOU TO PAY FOR THE VERY BASICS LIKE FOOD, HOUSING, MEDICAL CARE, AND HEATING?: NOT HARD AT ALL
IN A TYPICAL WEEK, HOW MANY TIMES DO YOU TALK ON THE PHONE WITH FAMILY, FRIENDS, OR NEIGHBORS?: MORE THAN THREE TIMES A WEEK
WITHIN THE LAST YEAR, HAVE YOU BEEN KICKED, HIT, SLAPPED, OR OTHERWISE PHYSICALLY HURT BY YOUR PARTNER OR EX-PARTNER?: NO
HOW OFTEN DO YOU ATTENT MEETINGS OF THE CLUB OR ORGANIZATION YOU BELONG TO?: MORE THAN 4 TIMES PER YEAR
HOW OFTEN DO YOU ATTEND CHURCH OR RELIGIOUS SERVICES?: NEVER

## 2023-07-25 ASSESSMENT — LIFESTYLE VARIABLES
HOW OFTEN DO YOU HAVE A DRINK CONTAINING ALCOHOL: NEVER
HOW MANY STANDARD DRINKS CONTAINING ALCOHOL DO YOU HAVE ON A TYPICAL DAY: PATIENT DOES NOT DRINK

## 2023-07-26 LAB
ANION GAP SERPL CALC-SCNC: 3 MMOL/L (ref 5–15)
BUN SERPL-MCNC: 29 MG/DL (ref 6–20)
BUN/CREAT SERPL: 16 (ref 12–20)
CALCIUM SERPL-MCNC: 8.7 MG/DL (ref 8.5–10.1)
CHLORIDE SERPL-SCNC: 110 MMOL/L (ref 97–108)
CO2 SERPL-SCNC: 23 MMOL/L (ref 21–32)
CREAT SERPL-MCNC: 1.78 MG/DL (ref 0.7–1.3)
EST. AVERAGE GLUCOSE BLD GHB EST-MCNC: 183 MG/DL
GLUCOSE SERPL-MCNC: 177 MG/DL (ref 65–100)
HBA1C MFR BLD: 8 % (ref 4–5.6)
POTASSIUM SERPL-SCNC: 5 MMOL/L (ref 3.5–5.1)
SODIUM SERPL-SCNC: 136 MMOL/L (ref 136–145)

## 2023-08-06 PROBLEM — H91.93 DECREASED HEARING OF BOTH EARS: Status: ACTIVE | Noted: 2023-08-06

## 2023-10-30 NOTE — ROUTINE PROCESS
Frequent pain on urination after intercourse. She was prescribed nitrofurantoin to be used after intercourse. Requesting script. Plan  nitrofurantoin monohydrate macro 100 MG Oral Cap Take 1 capsule (100 mg total) by mouth daily as needed.  POSTCOITAL 30 capsule TRANSFER - IN REPORT:    Verbal report received from Yanira Colby, [facility] (name) on Madina Cole  being received from PACU (unit) for routine post - op      Report consisted of patients Situation, Background, Assessment and   Recommendations(SBAR). Information from the following report(s) SBAR, Kardex, OR Summary, Intake/Output, MAR, Recent Results and Med Rec Status was reviewed with the receiving nurse. Opportunity for questions and clarification was provided. Assessment completed upon patients arrival to unit and care assumed.

## 2023-11-15 RX ORDER — BLOOD SUGAR DIAGNOSTIC
STRIP MISCELLANEOUS
Qty: 300 STRIP | Refills: 10 | Status: SHIPPED | OUTPATIENT
Start: 2023-11-15

## 2023-11-15 RX ORDER — SYRINGE AND NEEDLE,INSULIN,1ML 31 GX5/16"
SYRINGE, EMPTY DISPOSABLE MISCELLANEOUS
Qty: 200 EACH | Refills: 10 | Status: SHIPPED | OUTPATIENT
Start: 2023-11-15

## 2023-11-30 ENCOUNTER — OFFICE VISIT (OUTPATIENT)
Facility: CLINIC | Age: 83
End: 2023-11-30
Payer: COMMERCIAL

## 2023-11-30 DIAGNOSIS — Z79.4 TYPE 2 DIABETES MELLITUS WITH DIABETIC PERIPHERAL ANGIOPATHY WITHOUT GANGRENE, WITH LONG-TERM CURRENT USE OF INSULIN (HCC): ICD-10-CM

## 2023-11-30 DIAGNOSIS — E11.51 TYPE 2 DIABETES MELLITUS WITH DIABETIC PERIPHERAL ANGIOPATHY WITHOUT GANGRENE, WITH LONG-TERM CURRENT USE OF INSULIN (HCC): Primary | ICD-10-CM

## 2023-11-30 DIAGNOSIS — E11.40 TYPE 2 DIABETES MELLITUS WITH DIABETIC NEUROPATHY, WITH LONG-TERM CURRENT USE OF INSULIN (HCC): ICD-10-CM

## 2023-11-30 DIAGNOSIS — I10 ESSENTIAL HYPERTENSION: ICD-10-CM

## 2023-11-30 DIAGNOSIS — G61.89 OTHER INFLAMMATORY POLYNEUROPATHIES (HCC): ICD-10-CM

## 2023-11-30 DIAGNOSIS — Z79.4 TYPE 2 DIABETES MELLITUS WITH DIABETIC NEUROPATHY, WITH LONG-TERM CURRENT USE OF INSULIN (HCC): ICD-10-CM

## 2023-11-30 DIAGNOSIS — Z79.4 TYPE 2 DIABETES MELLITUS WITH DIABETIC PERIPHERAL ANGIOPATHY WITHOUT GANGRENE, WITH LONG-TERM CURRENT USE OF INSULIN (HCC): Primary | ICD-10-CM

## 2023-11-30 DIAGNOSIS — J43.1 PANLOBULAR EMPHYSEMA (HCC): ICD-10-CM

## 2023-11-30 DIAGNOSIS — E78.5 DYSLIPIDEMIA: ICD-10-CM

## 2023-11-30 DIAGNOSIS — Z89.511 STATUS POST BELOW KNEE AMPUTATION OF RIGHT LOWER EXTREMITY (HCC): ICD-10-CM

## 2023-11-30 DIAGNOSIS — E88.09 PLASMA CELL DYSCRASIA: ICD-10-CM

## 2023-11-30 DIAGNOSIS — E11.51 TYPE 2 DIABETES MELLITUS WITH DIABETIC PERIPHERAL ANGIOPATHY WITHOUT GANGRENE, WITH LONG-TERM CURRENT USE OF INSULIN (HCC): ICD-10-CM

## 2023-11-30 PROCEDURE — 1123F ACP DISCUSS/DSCN MKR DOCD: CPT | Performed by: INTERNAL MEDICINE

## 2023-11-30 PROCEDURE — 3052F HG A1C>EQUAL 8.0%<EQUAL 9.0%: CPT | Performed by: INTERNAL MEDICINE

## 2023-11-30 PROCEDURE — 3079F DIAST BP 80-89 MM HG: CPT | Performed by: INTERNAL MEDICINE

## 2023-11-30 PROCEDURE — 3075F SYST BP GE 130 - 139MM HG: CPT | Performed by: INTERNAL MEDICINE

## 2023-11-30 PROCEDURE — 99214 OFFICE O/P EST MOD 30 MIN: CPT | Performed by: INTERNAL MEDICINE

## 2023-11-30 ASSESSMENT — PATIENT HEALTH QUESTIONNAIRE - PHQ9
2. FEELING DOWN, DEPRESSED OR HOPELESS: 0
SUM OF ALL RESPONSES TO PHQ9 QUESTIONS 1 & 2: 0
SUM OF ALL RESPONSES TO PHQ QUESTIONS 1-9: 0
1. LITTLE INTEREST OR PLEASURE IN DOING THINGS: 0
SUM OF ALL RESPONSES TO PHQ QUESTIONS 1-9: 0

## 2023-12-01 LAB
EST. AVERAGE GLUCOSE BLD GHB EST-MCNC: 183 MG/DL
HBA1C MFR BLD: 8 % (ref 4–5.6)

## 2023-12-02 VITALS
HEIGHT: 78 IN | RESPIRATION RATE: 16 BRPM | HEART RATE: 84 BPM | SYSTOLIC BLOOD PRESSURE: 130 MMHG | TEMPERATURE: 98.3 F | BODY MASS INDEX: 34.06 KG/M2 | OXYGEN SATURATION: 95 % | DIASTOLIC BLOOD PRESSURE: 80 MMHG | WEIGHT: 294.4 LBS
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
## 2024-01-04 RX ORDER — LISINOPRIL 40 MG/1
40 TABLET ORAL DAILY
Qty: 90 TABLET | Refills: 3 | Status: SHIPPED | OUTPATIENT
Start: 2024-01-04

## 2024-01-09 RX ORDER — LISINOPRIL 40 MG/1
40 TABLET ORAL DAILY
Qty: 90 TABLET | Refills: 3 | Status: SHIPPED | OUTPATIENT
Start: 2024-01-09

## 2024-01-09 RX ORDER — SIMVASTATIN 20 MG
20 TABLET ORAL NIGHTLY
Qty: 90 TABLET | Refills: 3 | Status: SHIPPED | OUTPATIENT
Start: 2024-01-09

## 2024-04-16 ENCOUNTER — OFFICE VISIT (OUTPATIENT)
Facility: CLINIC | Age: 84
End: 2024-04-16
Payer: MEDICARE

## 2024-04-16 VITALS
DIASTOLIC BLOOD PRESSURE: 60 MMHG | OXYGEN SATURATION: 99 % | TEMPERATURE: 97.9 F | RESPIRATION RATE: 16 BRPM | BODY MASS INDEX: 33.15 KG/M2 | HEART RATE: 62 BPM | WEIGHT: 286.5 LBS | SYSTOLIC BLOOD PRESSURE: 139 MMHG | HEIGHT: 78 IN

## 2024-04-16 DIAGNOSIS — I10 ESSENTIAL HYPERTENSION: ICD-10-CM

## 2024-04-16 DIAGNOSIS — J43.1 PANLOBULAR EMPHYSEMA (HCC): ICD-10-CM

## 2024-04-16 DIAGNOSIS — Z79.4 TYPE 2 DIABETES MELLITUS WITH DIABETIC NEUROPATHY, WITH LONG-TERM CURRENT USE OF INSULIN (HCC): ICD-10-CM

## 2024-04-16 DIAGNOSIS — Z12.5 SPECIAL SCREENING FOR MALIGNANT NEOPLASM OF PROSTATE: ICD-10-CM

## 2024-04-16 DIAGNOSIS — E11.40 TYPE 2 DIABETES MELLITUS WITH DIABETIC NEUROPATHY, WITH LONG-TERM CURRENT USE OF INSULIN (HCC): ICD-10-CM

## 2024-04-16 DIAGNOSIS — E88.09 PLASMA CELL DYSCRASIA: ICD-10-CM

## 2024-04-16 DIAGNOSIS — E78.5 DYSLIPIDEMIA: ICD-10-CM

## 2024-04-16 DIAGNOSIS — Z00.00 MEDICARE ANNUAL WELLNESS VISIT, SUBSEQUENT: ICD-10-CM

## 2024-04-16 DIAGNOSIS — N18.32 CHRONIC KIDNEY DISEASE, STAGE 3B (HCC): ICD-10-CM

## 2024-04-16 DIAGNOSIS — Z89.511 STATUS POST BELOW KNEE AMPUTATION OF RIGHT LOWER EXTREMITY (HCC): ICD-10-CM

## 2024-04-16 DIAGNOSIS — D64.9 ANEMIA, UNSPECIFIED TYPE: Primary | ICD-10-CM

## 2024-04-16 PROBLEM — G61.89 OTHER INFLAMMATORY POLYNEUROPATHIES (HCC): Status: RESOLVED | Noted: 2017-09-28 | Resolved: 2024-04-16

## 2024-04-16 PROCEDURE — 3075F SYST BP GE 130 - 139MM HG: CPT | Performed by: INTERNAL MEDICINE

## 2024-04-16 PROCEDURE — 3078F DIAST BP <80 MM HG: CPT | Performed by: INTERNAL MEDICINE

## 2024-04-16 PROCEDURE — 1123F ACP DISCUSS/DSCN MKR DOCD: CPT | Performed by: INTERNAL MEDICINE

## 2024-04-16 PROCEDURE — 3051F HG A1C>EQUAL 7.0%<8.0%: CPT | Performed by: INTERNAL MEDICINE

## 2024-04-16 PROCEDURE — G0439 PPPS, SUBSEQ VISIT: HCPCS | Performed by: INTERNAL MEDICINE

## 2024-04-16 ASSESSMENT — PATIENT HEALTH QUESTIONNAIRE - PHQ9
SUM OF ALL RESPONSES TO PHQ QUESTIONS 1-9: 0
2. FEELING DOWN, DEPRESSED OR HOPELESS: NOT AT ALL
SUM OF ALL RESPONSES TO PHQ QUESTIONS 1-9: 0
SUM OF ALL RESPONSES TO PHQ QUESTIONS 1-9: 0
SUM OF ALL RESPONSES TO PHQ9 QUESTIONS 1 & 2: 0
SUM OF ALL RESPONSES TO PHQ QUESTIONS 1-9: 0
1. LITTLE INTEREST OR PLEASURE IN DOING THINGS: NOT AT ALL

## 2024-04-16 ASSESSMENT — LIFESTYLE VARIABLES
HOW MANY STANDARD DRINKS CONTAINING ALCOHOL DO YOU HAVE ON A TYPICAL DAY: PATIENT DOES NOT DRINK
HOW OFTEN DO YOU HAVE A DRINK CONTAINING ALCOHOL: NEVER

## 2024-04-16 NOTE — PROGRESS NOTES
Chief Complaint   Patient presents with    Medicare AWV     \"Have you been to the ER, urgent care clinic since your last visit?  Hospitalized since your last visit?\"    NO    “Have you seen or consulted any other health care providers outside of [facility] since your last visit?”    NO            Click Here for Release of Records Request

## 2024-04-17 LAB
ALBUMIN SERPL-MCNC: 3.8 G/DL (ref 3.5–5)
ALBUMIN/GLOB SERPL: 0.9 (ref 1.1–2.2)
ALP SERPL-CCNC: 90 U/L (ref 45–117)
ALT SERPL-CCNC: 23 U/L (ref 12–78)
ANION GAP SERPL CALC-SCNC: 4 MMOL/L (ref 5–15)
APPEARANCE UR: CLEAR
AST SERPL-CCNC: 16 U/L (ref 15–37)
BACTERIA URNS QL MICRO: NEGATIVE /HPF
BASOPHILS # BLD: 0 K/UL (ref 0–0.1)
BASOPHILS NFR BLD: 1 % (ref 0–1)
BILIRUB SERPL-MCNC: 0.4 MG/DL (ref 0.2–1)
BILIRUB UR QL: NEGATIVE
BUN SERPL-MCNC: 30 MG/DL (ref 6–20)
BUN/CREAT SERPL: 15 (ref 12–20)
CALCIUM SERPL-MCNC: 10.1 MG/DL (ref 8.5–10.1)
CHLORIDE SERPL-SCNC: 109 MMOL/L (ref 97–108)
CHOLEST SERPL-MCNC: 127 MG/DL
CO2 SERPL-SCNC: 23 MMOL/L (ref 21–32)
COLOR UR: ABNORMAL
CREAT SERPL-MCNC: 2.01 MG/DL (ref 0.7–1.3)
CREAT UR-MCNC: 50.1 MG/DL
CRP SERPL HS-MCNC: >9.5 MG/L
DIFFERENTIAL METHOD BLD: ABNORMAL
EOSINOPHIL # BLD: 0.1 K/UL (ref 0–0.4)
EOSINOPHIL NFR BLD: 2 % (ref 0–7)
EPITH CASTS URNS QL MICRO: ABNORMAL /LPF
ERYTHROCYTE [DISTWIDTH] IN BLOOD BY AUTOMATED COUNT: 14.7 % (ref 11.5–14.5)
EST. AVERAGE GLUCOSE BLD GHB EST-MCNC: 171 MG/DL
GLOBULIN SER CALC-MCNC: 4.3 G/DL (ref 2–4)
GLUCOSE SERPL-MCNC: 152 MG/DL (ref 65–100)
GLUCOSE UR STRIP.AUTO-MCNC: NEGATIVE MG/DL
HBA1C MFR BLD: 7.6 % (ref 4–5.6)
HCT VFR BLD AUTO: 40.9 % (ref 36.6–50.3)
HDLC SERPL-MCNC: 43 MG/DL
HDLC SERPL: 3 (ref 0–5)
HGB BLD-MCNC: 12.8 G/DL (ref 12.1–17)
HGB UR QL STRIP: NEGATIVE
HYALINE CASTS URNS QL MICRO: ABNORMAL /LPF (ref 0–5)
IMM GRANULOCYTES # BLD AUTO: 0 K/UL (ref 0–0.04)
IMM GRANULOCYTES NFR BLD AUTO: 0 % (ref 0–0.5)
KETONES UR QL STRIP.AUTO: NEGATIVE MG/DL
LDLC SERPL CALC-MCNC: 71 MG/DL (ref 0–100)
LEUKOCYTE ESTERASE UR QL STRIP.AUTO: NEGATIVE
LYMPHOCYTES # BLD: 2.1 K/UL (ref 0.8–3.5)
LYMPHOCYTES NFR BLD: 31 % (ref 12–49)
MCH RBC QN AUTO: 27 PG (ref 26–34)
MCHC RBC AUTO-ENTMCNC: 31.3 G/DL (ref 30–36.5)
MCV RBC AUTO: 86.3 FL (ref 80–99)
MICROALBUMIN UR-MCNC: 20.3 MG/DL
MICROALBUMIN/CREAT UR-RTO: 405 MG/G (ref 0–30)
MONOCYTES # BLD: 0.6 K/UL (ref 0–1)
MONOCYTES NFR BLD: 9 % (ref 5–13)
NEUTS SEG # BLD: 3.9 K/UL (ref 1.8–8)
NEUTS SEG NFR BLD: 57 % (ref 32–75)
NITRITE UR QL STRIP.AUTO: NEGATIVE
NRBC # BLD: 0 K/UL (ref 0–0.01)
NRBC BLD-RTO: 0 PER 100 WBC
PH UR STRIP: 5.5 (ref 5–8)
PLATELET # BLD AUTO: 239 K/UL (ref 150–400)
PMV BLD AUTO: 10.5 FL (ref 8.9–12.9)
POTASSIUM SERPL-SCNC: 5.4 MMOL/L (ref 3.5–5.1)
PROT SERPL-MCNC: 8.1 G/DL (ref 6.4–8.2)
PROT UR STRIP-MCNC: 30 MG/DL
PSA SERPL-MCNC: 2.6 NG/ML (ref 0.01–4)
RBC # BLD AUTO: 4.74 M/UL (ref 4.1–5.7)
RBC #/AREA URNS HPF: ABNORMAL /HPF (ref 0–5)
SODIUM SERPL-SCNC: 136 MMOL/L (ref 136–145)
SP GR UR REFRACTOMETRY: 1.01 (ref 1–1.03)
TRIGL SERPL-MCNC: 65 MG/DL
UROBILINOGEN UR QL STRIP.AUTO: 0.2 EU/DL (ref 0.2–1)
VLDLC SERPL CALC-MCNC: 13 MG/DL
WBC # BLD AUTO: 6.8 K/UL (ref 4.1–11.1)
WBC URNS QL MICRO: ABNORMAL /HPF (ref 0–4)

## 2024-04-22 LAB
ALBUMIN SERPL ELPH-MCNC: 3.6 G/DL (ref 2.9–4.4)
ALBUMIN/GLOB SERPL: 0.9 (ref 0.7–1.7)
ALPHA1 GLOB SERPL ELPH-MCNC: 0.3 G/DL (ref 0–0.4)
ALPHA2 GLOB SERPL ELPH-MCNC: 0.9 G/DL (ref 0.4–1)
APO B SERPL-MCNC: 71 MG/DL
B-GLOBULIN SERPL ELPH-MCNC: 1.2 G/DL (ref 0.7–1.3)
GAMMA GLOB SERPL ELPH-MCNC: 1.9 G/DL (ref 0.4–1.8)
GLOBULIN SER-MCNC: 4.3 G/DL (ref 2.2–3.9)
IGA SERPL-MCNC: 116 MG/DL (ref 61–437)
IGG SERPL-MCNC: 2121 MG/DL (ref 603–1613)
IGM SERPL-MCNC: 45 MG/DL (ref 15–143)
INTERPRETATION SERPL IEP-IMP: ABNORMAL
KAPPA LC FREE SER-MCNC: 100.8 MG/L (ref 3.3–19.4)
KAPPA LC FREE/LAMBDA FREE SER: 4.54 (ref 0.26–1.65)
LAMBDA LC FREE SERPL-MCNC: 22.2 MG/L (ref 5.7–26.3)
M PROTEIN SERPL ELPH-MCNC: 1 G/DL
PROT SERPL-MCNC: 7.9 G/DL (ref 6–8.5)

## 2024-04-22 RX ORDER — BLOOD SUGAR DIAGNOSTIC
STRIP MISCELLANEOUS
Qty: 300 STRIP | Refills: 10 | Status: SHIPPED | OUTPATIENT
Start: 2024-04-22

## 2024-04-22 RX ORDER — SYRINGE-NEEDLE,INSULIN,0.5 ML 27GX1/2"
SYRINGE, EMPTY DISPOSABLE MISCELLANEOUS
Qty: 200 EACH | Refills: 10 | Status: SHIPPED | OUTPATIENT
Start: 2024-04-22

## 2024-04-23 NOTE — PROGRESS NOTES
Comes in for return visit, stating that he is doing better.  He has more self-control currently and is not eating the large amounts of processed carbohydrates that he currently did.  This makes a big difference because his blood sugars are much better.  He has not had any interventional hypoglycemia.  He is currently taking 40 units of his basal insulin every morning and 34 units in the evening.    He states that he feels better since he has been more euglycemic than before.    He follows up with the podiatrist as relates to his left leg, which is doing quite well.    He has a past history of primary hypertension, as well as dyslipidemia.
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
## 2024-04-24 NOTE — PROGRESS NOTES
1. The patient has a chronic anemia and I will check a CBC to ensure that this is not progressive.  2. He is in a secondary risk prevention mode and takes his statin on a regular basis.  3. Blood pressure is excellent, no adjustments are made.  4. He does have a plasma cell dyscrasia, but it may be classified as a MGUS defect.  I will assess his gamma globulin evaluation today.  5. His diabetes hopefully is doing better.  I will await the results of his hemoglobin A1c.  6. He has a right BK amputation, which he is doing quite well with because he has mastered his prosthesis.  7. He has a history of COPD, but this is stable and he no longer smokes cigarettes, as has been the case for well over several decades.  8. He does have mild chronic renal disease stage 3B and I will continue to monitor this.  The most likely etiology is diabetes because he has been poorly controlled for an extended period of time.     no

## 2024-05-19 DIAGNOSIS — D47.2 MGUS (MONOCLONAL GAMMOPATHY OF UNKNOWN SIGNIFICANCE): Primary | ICD-10-CM

## 2024-05-28 ENCOUNTER — TELEPHONE (OUTPATIENT)
Facility: CLINIC | Age: 84
End: 2024-05-28

## 2024-05-28 NOTE — TELEPHONE ENCOUNTER
----- Message from Seth Tidwell MD sent at 5/19/2024  3:31 PM EDT -----  Patient needs to see hematologist because his M spike is getting higher.

## 2024-05-29 RX ORDER — SIMVASTATIN 20 MG
20 TABLET ORAL NIGHTLY
Qty: 90 TABLET | Refills: 3 | Status: SHIPPED | OUTPATIENT
Start: 2024-05-29

## 2024-05-29 RX ORDER — LISINOPRIL 40 MG/1
40 TABLET ORAL DAILY
Qty: 90 TABLET | Refills: 3 | Status: SHIPPED | OUTPATIENT
Start: 2024-05-29

## 2024-05-29 RX ORDER — AMLODIPINE BESYLATE 10 MG/1
10 TABLET ORAL DAILY
Qty: 90 TABLET | Refills: 3 | Status: SHIPPED | OUTPATIENT
Start: 2024-05-29

## 2024-06-03 ENCOUNTER — OFFICE VISIT (OUTPATIENT)
Age: 84
End: 2024-06-03
Payer: MEDICARE

## 2024-06-03 VITALS
BODY MASS INDEX: 32.85 KG/M2 | WEIGHT: 283.9 LBS | HEIGHT: 78 IN | OXYGEN SATURATION: 97 % | RESPIRATION RATE: 18 BRPM | TEMPERATURE: 98.1 F | HEART RATE: 58 BPM | SYSTOLIC BLOOD PRESSURE: 155 MMHG | DIASTOLIC BLOOD PRESSURE: 66 MMHG

## 2024-06-03 DIAGNOSIS — D47.2 MGUS (MONOCLONAL GAMMOPATHY OF UNKNOWN SIGNIFICANCE): Primary | ICD-10-CM

## 2024-06-03 PROCEDURE — 1036F TOBACCO NON-USER: CPT | Performed by: STUDENT IN AN ORGANIZED HEALTH CARE EDUCATION/TRAINING PROGRAM

## 2024-06-03 PROCEDURE — 3078F DIAST BP <80 MM HG: CPT | Performed by: STUDENT IN AN ORGANIZED HEALTH CARE EDUCATION/TRAINING PROGRAM

## 2024-06-03 PROCEDURE — 99204 OFFICE O/P NEW MOD 45 MIN: CPT | Performed by: STUDENT IN AN ORGANIZED HEALTH CARE EDUCATION/TRAINING PROGRAM

## 2024-06-03 PROCEDURE — 3077F SYST BP >= 140 MM HG: CPT | Performed by: STUDENT IN AN ORGANIZED HEALTH CARE EDUCATION/TRAINING PROGRAM

## 2024-06-03 PROCEDURE — 1123F ACP DISCUSS/DSCN MKR DOCD: CPT | Performed by: STUDENT IN AN ORGANIZED HEALTH CARE EDUCATION/TRAINING PROGRAM

## 2024-06-03 PROCEDURE — G8428 CUR MEDS NOT DOCUMENT: HCPCS | Performed by: STUDENT IN AN ORGANIZED HEALTH CARE EDUCATION/TRAINING PROGRAM

## 2024-06-03 PROCEDURE — G8417 CALC BMI ABV UP PARAM F/U: HCPCS | Performed by: STUDENT IN AN ORGANIZED HEALTH CARE EDUCATION/TRAINING PROGRAM

## 2024-06-03 NOTE — PROGRESS NOTES
[facility] [place] [facility]  [address] [place] [id number]  W: [phone] F: [phone]    Reason for Visit:   Wilfrid Mello is a 83 y.o. male who is seen in consultation at the request of Dr. Tidwell for evaluation of suspected plasma cell dyscrasia.      Hematology / Oncology Treatment Information:     Hematological/Oncological Diagnosis: Monoclonal Gammopathy    Date of Diagnosis: 4/2024    Oncology/Hematology Treatment Course:   Treatment course:   1) Surveillance       Pathology and Molecular Testing:       Initial Presentation  (HPI):   The patient (or guardian, if applicable) and other individuals in attendance with the patient were advised that Artificial Intelligence will be utilized during this visit to record and process the conversation to generate a clinical note. The patient (or guardian, if applicable) and other individuals in attendance at the appointment consented to the use of AI, including the recording.        History of Present Illness  The patient is an 83-year-old male who presents to me as a referral from Dr. Tidwell for evaluation of abnormal free light chains and monoclonal gammopathy. The patient was last seen by Dr. Tidwell in 04/2024. His other medical problems include diabetes mellitus type 2, COPD, and chronic kidney disease stage 3b.    Labs reviewed from last PCP visit, notable for monoclonal gammopathy. No focal bone pain. No severe fatigue or other constitutional symptoms. 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      He has CKD3b.     The patient has been a diabetic for the past 30 years. He reports no discomfort in any specific area of his body.        Family history reviewed, non contributory  Social history reviewed, also non contributory          Review of Systems: A complete review of systems was obtained, negative except as described above.    Past Medical History:   Diagnosis Date

## 2024-06-03 NOTE — PROGRESS NOTES
Wilfrid Mello is a 83 y.o. male new patient referred by Dr. Tidwell to provider for MGUS. Pt ambulates independently w/ a walker; steady gait noted. V/S stable w/ exception of elevated B/P; pt confirmed taking B/P medication today. Pt denies pain. Pt report decreased appetite for about 2 weeks.       Chief Complaint   Patient presents with    New Patient     MDS

## 2024-06-07 ENCOUNTER — TELEPHONE (OUTPATIENT)
Age: 84
End: 2024-06-07

## 2024-06-07 NOTE — TELEPHONE ENCOUNTER
Return call placed to pt. HIPAA verified by two patient identifiers.     Pt made aware he needs to call scheduling to schedule bone survey; pt give telephone number to scheduling.    Pt then advised he needs to have labs drawn. Pt will p/u lab slip at [facility] to have labs drawn.

## 2024-06-12 RX ORDER — AMLODIPINE BESYLATE 10 MG/1
10 TABLET ORAL DAILY
Qty: 90 TABLET | Refills: 3 | Status: SHIPPED | OUTPATIENT
Start: 2024-06-12

## 2024-06-14 ENCOUNTER — HOSPITAL ENCOUNTER (OUTPATIENT)
Facility: HOSPITAL | Age: 84
End: 2024-06-14
Attending: STUDENT IN AN ORGANIZED HEALTH CARE EDUCATION/TRAINING PROGRAM
Payer: MEDICARE

## 2024-06-14 DIAGNOSIS — D47.2 MGUS (MONOCLONAL GAMMOPATHY OF UNKNOWN SIGNIFICANCE): ICD-10-CM

## 2024-06-14 PROCEDURE — 77075 RADEX OSSEOUS SURVEY COMPL: CPT

## 2024-06-15 LAB
ALBUMIN SERPL-MCNC: 3.6 G/DL (ref 3.5–5)
ALBUMIN/GLOB SERPL: 0.9 (ref 1.1–2.2)
ALP SERPL-CCNC: 77 U/L (ref 45–117)
ALT SERPL-CCNC: 24 U/L (ref 12–78)
ANION GAP SERPL CALC-SCNC: 4 MMOL/L (ref 5–15)
AST SERPL-CCNC: 13 U/L (ref 15–37)
B2 MICROGLOB SERPL-MCNC: 3.2 MG/L (ref 0.6–2.4)
BASOPHILS # BLD: 0 K/UL (ref 0–0.1)
BASOPHILS NFR BLD: 0 % (ref 0–1)
BILIRUB SERPL-MCNC: 0.4 MG/DL (ref 0.2–1)
BUN SERPL-MCNC: 32 MG/DL (ref 6–20)
BUN/CREAT SERPL: 16 (ref 12–20)
CALCIUM SERPL-MCNC: 8.9 MG/DL (ref 8.5–10.1)
CHLORIDE SERPL-SCNC: 110 MMOL/L (ref 97–108)
CO2 SERPL-SCNC: 23 MMOL/L (ref 21–32)
CREAT SERPL-MCNC: 2.06 MG/DL (ref 0.7–1.3)
DIFFERENTIAL METHOD BLD: ABNORMAL
EOSINOPHIL # BLD: 0.1 K/UL (ref 0–0.4)
EOSINOPHIL NFR BLD: 2 % (ref 0–7)
ERYTHROCYTE [DISTWIDTH] IN BLOOD BY AUTOMATED COUNT: 14.9 % (ref 11.5–14.5)
GLOBULIN SER CALC-MCNC: 4 G/DL (ref 2–4)
GLUCOSE SERPL-MCNC: 224 MG/DL (ref 65–100)
HCT VFR BLD AUTO: 35.7 % (ref 36.6–50.3)
HGB BLD-MCNC: 11.4 G/DL (ref 12.1–17)
IMM GRANULOCYTES # BLD AUTO: 0 K/UL (ref 0–0.04)
IMM GRANULOCYTES NFR BLD AUTO: 0 % (ref 0–0.5)
LDH SERPL L TO P-CCNC: 177 U/L (ref 85–241)
LYMPHOCYTES # BLD: 1.1 K/UL (ref 0.8–3.5)
LYMPHOCYTES NFR BLD: 15 % (ref 12–49)
MCH RBC QN AUTO: 27.1 PG (ref 26–34)
MCHC RBC AUTO-ENTMCNC: 31.9 G/DL (ref 30–36.5)
MCV RBC AUTO: 84.8 FL (ref 80–99)
MONOCYTES # BLD: 0.7 K/UL (ref 0–1)
MONOCYTES NFR BLD: 10 % (ref 5–13)
NEUTS SEG # BLD: 5.4 K/UL (ref 1.8–8)
NEUTS SEG NFR BLD: 73 % (ref 32–75)
NRBC # BLD: 0 K/UL (ref 0–0.01)
NRBC BLD-RTO: 0 PER 100 WBC
PLATELET # BLD AUTO: 219 K/UL (ref 150–400)
PMV BLD AUTO: 10.2 FL (ref 8.9–12.9)
POTASSIUM SERPL-SCNC: 5 MMOL/L (ref 3.5–5.1)
PROT SERPL-MCNC: 7.6 G/DL (ref 6.4–8.2)
RBC # BLD AUTO: 4.21 M/UL (ref 4.1–5.7)
SODIUM SERPL-SCNC: 137 MMOL/L (ref 136–145)
WBC # BLD AUTO: 7.4 K/UL (ref 4.1–11.1)

## 2024-06-18 ENCOUNTER — HOSPITAL ENCOUNTER (EMERGENCY)
Facility: HOSPITAL | Age: 84
Discharge: HOME | End: 2024-06-18
Payer: MEDICARE

## 2024-06-18 ENCOUNTER — APPOINTMENT (OUTPATIENT)
Facility: HOSPITAL | Age: 84
End: 2024-06-18
Payer: MEDICARE

## 2024-06-18 VITALS
HEIGHT: 78 IN | HEART RATE: 72 BPM | DIASTOLIC BLOOD PRESSURE: 59 MMHG | RESPIRATION RATE: 20 BRPM | BODY MASS INDEX: 32.63 KG/M2 | SYSTOLIC BLOOD PRESSURE: 164 MMHG | WEIGHT: 282 LBS | OXYGEN SATURATION: 99 % | TEMPERATURE: 98.5 F

## 2024-06-18 DIAGNOSIS — J18.9 PNEUMONIA OF BOTH LUNGS DUE TO INFECTIOUS ORGANISM, UNSPECIFIED PART OF LUNG: Primary | ICD-10-CM

## 2024-06-18 LAB
GLUCOSE BLD STRIP.AUTO-MCNC: 160 MG/DL (ref 65–117)
SERVICE CMNT-IMP: ABNORMAL

## 2024-06-18 PROCEDURE — 71045 X-RAY EXAM CHEST 1 VIEW: CPT

## 2024-06-18 PROCEDURE — 82962 GLUCOSE BLOOD TEST: CPT

## 2024-06-18 PROCEDURE — 99283 EMERGENCY DEPT VISIT LOW MDM: CPT

## 2024-06-18 RX ORDER — AMOXICILLIN AND CLAVULANATE POTASSIUM 875; 125 MG/1; MG/1
1 TABLET, FILM COATED ORAL 2 TIMES DAILY
Qty: 14 TABLET | Refills: 0 | Status: SHIPPED | OUTPATIENT
Start: 2024-06-18 | End: 2024-06-25

## 2024-06-18 RX ORDER — AZITHROMYCIN 250 MG/1
TABLET, FILM COATED ORAL
Qty: 1 PACKET | Refills: 0 | Status: SHIPPED | OUTPATIENT
Start: 2024-06-18 | End: 2024-06-22

## 2024-06-18 ASSESSMENT — ENCOUNTER SYMPTOMS
APNEA: 0
SINUS PAIN: 0
SINUS PRESSURE: 0
BACK PAIN: 0
SORE THROAT: 1
TROUBLE SWALLOWING: 0
NAUSEA: 0
EYE PAIN: 0
VOMITING: 0
VOICE CHANGE: 0
ABDOMINAL PAIN: 0
CHOKING: 0
FACIAL SWELLING: 0
COUGH: 1
CHEST TIGHTNESS: 0
RHINORRHEA: 1
DIARRHEA: 0
SHORTNESS OF BREATH: 0
WHEEZING: 0
PHOTOPHOBIA: 0
STRIDOR: 0

## 2024-06-18 ASSESSMENT — PAIN - FUNCTIONAL ASSESSMENT: PAIN_FUNCTIONAL_ASSESSMENT: NONE - DENIES PAIN

## 2024-06-18 NOTE — ED NOTES
Discharge instructions were given to the patient by SRINIVASA Rg.     The patient left the Emergency Department alert and oriented and in no acute distress with prescriptions. The patient was encouraged to call or return to the ED for worsening issues or problems and was encouraged to schedule a follow up appointment for continuing care.     Ambulation assessment completed before discharge.  Pt left Emergency Department ambulating at baseline with no ortho devices  Ortho device education: none    The patient verbalized understanding of discharge instructions and prescriptions, all questions were answered. The patient has no further concerns at this time.

## 2024-06-18 NOTE — ED NOTES
Pt presents to ED complaining of productive cough, runny nose, chest congestion, and sore throat for the past 2 weeks.Pt states he called his PCP and he told him to go to the ED for evaluation. Pt reports taking an OTC cough and cold medication but it unsure of the name at 0500 with no relief of sx. Pt denies SOB at this time. Pt has hx of diabetes and states he did not take his insulin today. Pt is alert and oriented x 4, RR even and unlabored, skin is warm and dry. Pt appears in NAD at this time. Assessment completed and pt updated on plan of care.  Call bell in reach.  Emergency Department Nursing Plan of Care  The Nursing Plan of Care is developed from the Nursing assessment and Emergency Department Attending provider initial evaluation.  The plan of care may be reviewed in the “ED Provider note”.  The Plan of Care was developed with the following considerations:  Patient / Family readiness to learn indicated by:Refer to Medical chart in [facility]  Persons(s) to be included in education: Refer to Medical chart in [facility]  Barriers to Learning/Limitations:Normal

## 2024-06-18 NOTE — ED TRIAGE NOTES
Anesthesia Evaluation     Patient summary reviewed and Nursing notes reviewed   NPO Solid Status: > 8 hours  NPO Liquid Status: > 2 hours           Airway   Mallampati: II  TM distance: >3 FB  Neck ROM: full  no difficulty expected  Dental - normal exam     Pulmonary - normal exam   (+) ,shortness of breath, sleep apnea  (-) COPD, asthma, not a smoker, lung cancer  Cardiovascular - normal exam  Exercise tolerance: good (4-7 METS)    Rhythm: regular  Rate: normal    (+) hypertension well controlled less than 2 medications, hyperlipidemia  (-) valvular problems/murmurs, past MI, CAD, dysrhythmias, angina, CHF, cardiac stents, CABG      Neuro/Psych  (-) seizures, TIA, CVA  GI/Hepatic/Renal/Endo    (+) morbid obesity, GERD well controlled, renal disease- CRI, diabetes mellitus type 2  (-) PUD, hepatitis, liver disease, GI bleed    Musculoskeletal     Abdominal  - normal exam   Substance History      OB/GYN          Other   arthritis,   history of cancer                Anesthesia Plan    ASA 3     general     intravenous induction     Anesthetic plan, risks, benefits, and alternatives have been provided, discussed and informed consent has been obtained with: patient.    CODE STATUS:          Pt presents ambulatory with a walker reporting productive cough, runny nose, chest congestion, sore throat x 2 weeks. Pt called PCP and he told him to go to the ER for evaluation.     Pt reports he took an OTC cough and cold medication but unsure of the name at 0500 with no relief in symptoms.

## 2024-06-18 NOTE — ED PROVIDER NOTES
CT, Ultrasound and MRI are read by the radiologist. Plain radiographic images are visualized and preliminarily interpreted by the ED Provider with the below findings:          Interpretation per the Radiologist below, if available at the time of this note:     XR CHEST PORTABLE   Final Result   No acute process.      Electronically signed by Marcelo Carranza            PROCEDURES   Unless otherwise noted below, none  Procedures     CRITICAL CARE TIME   none    EMERGENCY DEPARTMENT COURSE and DIFFERENTIAL DIAGNOSIS/MDM   Initial assessment performed. The patients presenting problems have been discussed, and they are in agreement with the care plan formulated and outlined with them.  I have encouraged them to ask questions as they arise throughout their visit.    Vitals:    Vitals:    06/18/24 1117   BP: (!) 164/59   Pulse: 72   Resp: 20   Temp: 98.5 °F (36.9 °C)   TempSrc: Oral   SpO2: 99%   Weight: 127.9 kg (282 lb)   Height: 2.057 m (6' 9\")        Patient was given the following medications:  Medications - No data to display    CONSULTS: (Who and What was discussed)  None      Chronic Conditions: diabetes, hypertension, arthritis, monoclonal gammopathy    Social Determinants affecting Dx or Tx: None    Records Reviewed (source and summary): Nursing Notes, Old Medical Records, Previous Radiology Studies, and Previous Laboratory Studies    CC/HPI Summary, DDx, ED Course, and Reassessment: Overall well-appearing afebrile and hemodynamically stable 83-year-old male patient complains of acute moderate persistent productive cough, nasal congestion, rhinorrhea, sore throat X 2 weeks.  Endorses that he called his PCP today but they are at a conference so he was referred to the ED.  Endorses taking over-the-counter medications without relief last dose 5 AM today.  Specifically denies any fever, chills, nausea, vomiting, chest pain, shortness of breath, wheezing, lightheadedness, dizziness, syncope, seizure, abdominal pain, rash

## 2024-06-19 LAB
ALBUMIN SERPL ELPH-MCNC: 3.5 G/DL (ref 2.9–4.4)
ALBUMIN/GLOB SERPL: 1 (ref 0.7–1.7)
ALPHA1 GLOB SERPL ELPH-MCNC: 0.2 G/DL (ref 0–0.4)
ALPHA2 GLOB SERPL ELPH-MCNC: 0.8 G/DL (ref 0.4–1)
B-GLOBULIN SERPL ELPH-MCNC: 1 G/DL (ref 0.7–1.3)
GAMMA GLOB SERPL ELPH-MCNC: 1.6 G/DL (ref 0.4–1.8)
GLOBULIN SER-MCNC: 3.6 G/DL (ref 2.2–3.9)
IGA SERPL-MCNC: 82 MG/DL (ref 61–437)
IGG SERPL-MCNC: 1813 MG/DL (ref 603–1613)
IGM SERPL-MCNC: 35 MG/DL (ref 15–143)
INTERPRETATION SERPL IEP-IMP: ABNORMAL
KAPPA LC FREE SER-MCNC: 79.5 MG/L (ref 3.3–19.4)
KAPPA LC FREE/LAMBDA FREE SER: 4.1 (ref 0.26–1.65)
LAMBDA LC FREE SERPL-MCNC: 19.4 MG/L (ref 5.7–26.3)
M PROTEIN SERPL ELPH-MCNC: 0.8 G/DL
PROT SERPL-MCNC: 7.1 G/DL (ref 6–8.5)

## 2024-07-01 ENCOUNTER — OFFICE VISIT (OUTPATIENT)
Age: 84
End: 2024-07-01
Payer: MEDICARE

## 2024-07-01 VITALS
TEMPERATURE: 97.8 F | BODY MASS INDEX: 30.22 KG/M2 | OXYGEN SATURATION: 100 % | HEART RATE: 62 BPM | SYSTOLIC BLOOD PRESSURE: 155 MMHG | RESPIRATION RATE: 16 BRPM | DIASTOLIC BLOOD PRESSURE: 62 MMHG | HEIGHT: 78 IN

## 2024-07-01 DIAGNOSIS — D47.2 MGUS (MONOCLONAL GAMMOPATHY OF UNKNOWN SIGNIFICANCE): Primary | ICD-10-CM

## 2024-07-01 PROCEDURE — 3077F SYST BP >= 140 MM HG: CPT | Performed by: STUDENT IN AN ORGANIZED HEALTH CARE EDUCATION/TRAINING PROGRAM

## 2024-07-01 PROCEDURE — G8427 DOCREV CUR MEDS BY ELIG CLIN: HCPCS | Performed by: STUDENT IN AN ORGANIZED HEALTH CARE EDUCATION/TRAINING PROGRAM

## 2024-07-01 PROCEDURE — 99214 OFFICE O/P EST MOD 30 MIN: CPT | Performed by: STUDENT IN AN ORGANIZED HEALTH CARE EDUCATION/TRAINING PROGRAM

## 2024-07-01 PROCEDURE — 1123F ACP DISCUSS/DSCN MKR DOCD: CPT | Performed by: STUDENT IN AN ORGANIZED HEALTH CARE EDUCATION/TRAINING PROGRAM

## 2024-07-01 PROCEDURE — 3078F DIAST BP <80 MM HG: CPT | Performed by: STUDENT IN AN ORGANIZED HEALTH CARE EDUCATION/TRAINING PROGRAM

## 2024-07-01 PROCEDURE — 1036F TOBACCO NON-USER: CPT | Performed by: STUDENT IN AN ORGANIZED HEALTH CARE EDUCATION/TRAINING PROGRAM

## 2024-07-01 PROCEDURE — G8417 CALC BMI ABV UP PARAM F/U: HCPCS | Performed by: STUDENT IN AN ORGANIZED HEALTH CARE EDUCATION/TRAINING PROGRAM

## 2024-07-01 NOTE — PROGRESS NOTES
Wilfrid Mello is a 83 y.o. male who presents for follow up of   Chief Complaint   Patient presents with    Follow-up     MGUS (monoclonal gammopathy of unknown significance)       The patient reports no new clinical symptoms or new complaints since last clinic evaluation. He reports doing very well is her for lab follow up. Without any significant changes.     He reports ED visit 2 weeks ago for pneumonia.     No interval surgery or procedures reported    No reported new medication changes reported       Medications reviewed with the patient, and chart updated to reflect changes.        
Oncologist   [facility]

## 2024-07-09 DIAGNOSIS — D47.2 MGUS (MONOCLONAL GAMMOPATHY OF UNKNOWN SIGNIFICANCE): ICD-10-CM

## 2024-07-11 LAB
ALBUMIN 24H MFR UR ELPH: 58.7 %
ALPHA1 GLOB 24H MFR UR ELPH: 2.1 %
ALPHA2 GLOB 24H MFR UR ELPH: 4.4 %
B-GLOBULIN MFR UR ELPH: 26.2 %
COLLECT DURATION TIME UR: 24 HR
GAMMA GLOB 24H MFR UR ELPH: 8.6 %
INTERPRETATION UR IFE-IMP: ABNORMAL
Lab: ABNORMAL
M PROTEIN 24H MFR UR ELPH: 11.4 %
M PROTEIN 24H UR ELPH-MRATE: 83 MG/24 HR
PROT 24H UR-MRATE: 725 MG/24 HR (ref 30–150)
PROT UR-MCNC: 34.5 MG/DL
SPECIMEN VOL ?TM UR: 2100 ML

## 2024-08-16 ENCOUNTER — OFFICE VISIT (OUTPATIENT)
Facility: CLINIC | Age: 84
End: 2024-08-16
Payer: MEDICARE

## 2024-08-16 VITALS
RESPIRATION RATE: 16 BRPM | DIASTOLIC BLOOD PRESSURE: 70 MMHG | SYSTOLIC BLOOD PRESSURE: 160 MMHG | WEIGHT: 287.6 LBS | OXYGEN SATURATION: 100 % | BODY MASS INDEX: 33.27 KG/M2 | HEIGHT: 78 IN | HEART RATE: 71 BPM | TEMPERATURE: 97.8 F

## 2024-08-16 DIAGNOSIS — E11.51 TYPE 2 DIABETES MELLITUS WITH DIABETIC PERIPHERAL ANGIOPATHY WITHOUT GANGRENE, WITH LONG-TERM CURRENT USE OF INSULIN (HCC): Primary | ICD-10-CM

## 2024-08-16 DIAGNOSIS — N18.32 CHRONIC KIDNEY DISEASE, STAGE 3B (HCC): ICD-10-CM

## 2024-08-16 DIAGNOSIS — Z79.4 TYPE 2 DIABETES MELLITUS WITH DIABETIC PERIPHERAL ANGIOPATHY WITHOUT GANGRENE, WITH LONG-TERM CURRENT USE OF INSULIN (HCC): Primary | ICD-10-CM

## 2024-08-16 DIAGNOSIS — I10 ESSENTIAL HYPERTENSION: ICD-10-CM

## 2024-08-16 DIAGNOSIS — E11.40 TYPE 2 DIABETES MELLITUS WITH DIABETIC NEUROPATHY, WITH LONG-TERM CURRENT USE OF INSULIN (HCC): ICD-10-CM

## 2024-08-16 DIAGNOSIS — E78.5 DYSLIPIDEMIA: ICD-10-CM

## 2024-08-16 DIAGNOSIS — Z79.4 TYPE 2 DIABETES MELLITUS WITH DIABETIC NEUROPATHY, WITH LONG-TERM CURRENT USE OF INSULIN (HCC): ICD-10-CM

## 2024-08-16 PROCEDURE — 1123F ACP DISCUSS/DSCN MKR DOCD: CPT | Performed by: INTERNAL MEDICINE

## 2024-08-16 PROCEDURE — 1036F TOBACCO NON-USER: CPT | Performed by: INTERNAL MEDICINE

## 2024-08-16 PROCEDURE — 99214 OFFICE O/P EST MOD 30 MIN: CPT | Performed by: INTERNAL MEDICINE

## 2024-08-16 PROCEDURE — G8417 CALC BMI ABV UP PARAM F/U: HCPCS | Performed by: INTERNAL MEDICINE

## 2024-08-16 PROCEDURE — 3051F HG A1C>EQUAL 7.0%<8.0%: CPT | Performed by: INTERNAL MEDICINE

## 2024-08-16 PROCEDURE — 3078F DIAST BP <80 MM HG: CPT | Performed by: INTERNAL MEDICINE

## 2024-08-16 PROCEDURE — 3077F SYST BP >= 140 MM HG: CPT | Performed by: INTERNAL MEDICINE

## 2024-08-16 PROCEDURE — G8427 DOCREV CUR MEDS BY ELIG CLIN: HCPCS | Performed by: INTERNAL MEDICINE

## 2024-08-16 SDOH — ECONOMIC STABILITY: FOOD INSECURITY: WITHIN THE PAST 12 MONTHS, THE FOOD YOU BOUGHT JUST DIDN'T LAST AND YOU DIDN'T HAVE MONEY TO GET MORE.: NEVER TRUE

## 2024-08-16 SDOH — ECONOMIC STABILITY: FOOD INSECURITY: WITHIN THE PAST 12 MONTHS, YOU WORRIED THAT YOUR FOOD WOULD RUN OUT BEFORE YOU GOT MONEY TO BUY MORE.: NEVER TRUE

## 2024-08-16 SDOH — ECONOMIC STABILITY: INCOME INSECURITY: HOW HARD IS IT FOR YOU TO PAY FOR THE VERY BASICS LIKE FOOD, HOUSING, MEDICAL CARE, AND HEATING?: NOT HARD AT ALL

## 2024-08-16 ASSESSMENT — PATIENT HEALTH QUESTIONNAIRE - PHQ9
2. FEELING DOWN, DEPRESSED OR HOPELESS: NOT AT ALL
SUM OF ALL RESPONSES TO PHQ QUESTIONS 1-9: 0
SUM OF ALL RESPONSES TO PHQ QUESTIONS 1-9: 0
SUM OF ALL RESPONSES TO PHQ9 QUESTIONS 1 & 2: 0
1. LITTLE INTEREST OR PLEASURE IN DOING THINGS: NOT AT ALL
SUM OF ALL RESPONSES TO PHQ QUESTIONS 1-9: 0
SUM OF ALL RESPONSES TO PHQ QUESTIONS 1-9: 0

## 2024-08-16 ASSESSMENT — ANXIETY QUESTIONNAIRES
GAD7 TOTAL SCORE: 0
7. FEELING AFRAID AS IF SOMETHING AWFUL MIGHT HAPPEN: NOT AT ALL
4. TROUBLE RELAXING: NOT AT ALL
6. BECOMING EASILY ANNOYED OR IRRITABLE: NOT AT ALL
5. BEING SO RESTLESS THAT IT IS HARD TO SIT STILL: NOT AT ALL
3. WORRYING TOO MUCH ABOUT DIFFERENT THINGS: NOT AT ALL
IF YOU CHECKED OFF ANY PROBLEMS ON THIS QUESTIONNAIRE, HOW DIFFICULT HAVE THESE PROBLEMS MADE IT FOR YOU TO DO YOUR WORK, TAKE CARE OF THINGS AT HOME, OR GET ALONG WITH OTHER PEOPLE: NOT DIFFICULT AT ALL
2. NOT BEING ABLE TO STOP OR CONTROL WORRYING: NOT AT ALL
1. FEELING NERVOUS, ANXIOUS, OR ON EDGE: NOT AT ALL

## 2024-08-16 NOTE — PROGRESS NOTES
Chief Complaint   Patient presents with    Diabetes     \"Have you been to the ER, urgent care clinic since your last visit?  Hospitalized since your last visit?\"    NO    “Have you seen or consulted any other health care providers outside of [facility] since your last visit?”    NO            Click Here for Release of Records Request

## 2024-08-17 LAB
ANION GAP SERPL CALC-SCNC: 7 MMOL/L (ref 5–15)
BUN SERPL-MCNC: 25 MG/DL (ref 6–20)
BUN/CREAT SERPL: 14 (ref 12–20)
CALCIUM SERPL-MCNC: 9.4 MG/DL (ref 8.5–10.1)
CHLORIDE SERPL-SCNC: 107 MMOL/L (ref 97–108)
CO2 SERPL-SCNC: 25 MMOL/L (ref 21–32)
CREAT SERPL-MCNC: 1.82 MG/DL (ref 0.7–1.3)
EST. AVERAGE GLUCOSE BLD GHB EST-MCNC: 171 MG/DL
GLUCOSE SERPL-MCNC: 137 MG/DL (ref 65–100)
HBA1C MFR BLD: 7.6 % (ref 4–5.6)
POTASSIUM SERPL-SCNC: 5.2 MMOL/L (ref 3.5–5.1)
SODIUM SERPL-SCNC: 139 MMOL/L (ref 136–145)
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
## 2024-08-19 LAB — APO B SERPL-MCNC: 65 MG/DL

## 2024-09-18 DIAGNOSIS — Z79.4 TYPE 2 DIABETES MELLITUS WITH DIABETIC NEUROPATHY, WITH LONG-TERM CURRENT USE OF INSULIN (HCC): ICD-10-CM

## 2024-09-18 DIAGNOSIS — E11.40 TYPE 2 DIABETES MELLITUS WITH DIABETIC NEUROPATHY, WITH LONG-TERM CURRENT USE OF INSULIN (HCC): ICD-10-CM

## 2024-09-19 RX ORDER — SYRINGE-NEEDLE,INSULIN,0.5 ML 27GX1/2"
SYRINGE, EMPTY DISPOSABLE MISCELLANEOUS
Qty: 200 EACH | Refills: 3 | Status: SHIPPED | OUTPATIENT
Start: 2024-09-19 | End: 2024-09-20 | Stop reason: SDUPTHER

## 2024-09-20 ENCOUNTER — TELEPHONE (OUTPATIENT)
Facility: CLINIC | Age: 84
End: 2024-09-20

## 2024-09-20 DIAGNOSIS — E11.40 TYPE 2 DIABETES MELLITUS WITH DIABETIC NEUROPATHY, WITH LONG-TERM CURRENT USE OF INSULIN (HCC): ICD-10-CM

## 2024-09-20 DIAGNOSIS — Z79.4 TYPE 2 DIABETES MELLITUS WITH DIABETIC NEUROPATHY, WITH LONG-TERM CURRENT USE OF INSULIN (HCC): ICD-10-CM

## 2024-09-20 NOTE — TELEPHONE ENCOUNTER
----- Message from Dr. Seth Tidwell MD sent at 9/16/2024 12:39 AM EDT -----  Reduce lisinopril to half a tablet daily------ next prescription called in should be lisinopril 20 mg daily  Also told him to increase his intake of liquids.

## 2024-09-22 RX ORDER — SYRINGE-NEEDLE,INSULIN,0.5 ML 27GX1/2"
SYRINGE, EMPTY DISPOSABLE MISCELLANEOUS
Qty: 200 EACH | Refills: 3 | Status: SHIPPED | OUTPATIENT
Start: 2024-09-22

## 2024-09-22 RX ORDER — SYRINGE-NEEDLE,INSULIN,0.5 ML 27GX1/2"
SYRINGE, EMPTY DISPOSABLE MISCELLANEOUS
Qty: 300 EACH | Refills: 3 | Status: SHIPPED | OUTPATIENT
Start: 2024-09-22

## 2024-09-24 ENCOUNTER — TELEPHONE (OUTPATIENT)
Facility: CLINIC | Age: 84
End: 2024-09-24

## 2024-09-25 ENCOUNTER — TELEPHONE (OUTPATIENT)
Facility: CLINIC | Age: 84
End: 2024-09-25

## 2024-10-04 ENCOUNTER — TELEPHONE (OUTPATIENT)
Age: 84
End: 2024-10-04

## 2024-10-04 NOTE — TELEPHONE ENCOUNTER
Patient LVM. Stated needing to cancel ov appt on 10/7 at 9am. Pt stated needing to reschedule due to a conflict.     #[phone]

## 2024-10-07 DIAGNOSIS — E11.40 TYPE 2 DIABETES MELLITUS WITH DIABETIC NEUROPATHY, WITH LONG-TERM CURRENT USE OF INSULIN (HCC): ICD-10-CM

## 2024-10-07 DIAGNOSIS — Z79.4 TYPE 2 DIABETES MELLITUS WITH DIABETIC NEUROPATHY, WITH LONG-TERM CURRENT USE OF INSULIN (HCC): ICD-10-CM

## 2024-10-09 RX ORDER — BLOOD SUGAR DIAGNOSTIC
STRIP MISCELLANEOUS
Qty: 100 STRIP | Refills: 11 | Status: SHIPPED | OUTPATIENT
Start: 2024-10-09

## 2024-10-21 ENCOUNTER — OFFICE VISIT (OUTPATIENT)
Age: 84
End: 2024-10-21
Payer: MEDICARE

## 2024-10-21 VITALS
TEMPERATURE: 97.8 F | BODY MASS INDEX: 33.21 KG/M2 | HEART RATE: 96 BPM | RESPIRATION RATE: 16 BRPM | WEIGHT: 287 LBS | DIASTOLIC BLOOD PRESSURE: 76 MMHG | HEIGHT: 78 IN | SYSTOLIC BLOOD PRESSURE: 173 MMHG | OXYGEN SATURATION: 98 %

## 2024-10-21 DIAGNOSIS — D47.2 MGUS (MONOCLONAL GAMMOPATHY OF UNKNOWN SIGNIFICANCE): Primary | ICD-10-CM

## 2024-10-21 DIAGNOSIS — D47.2 MGUS (MONOCLONAL GAMMOPATHY OF UNKNOWN SIGNIFICANCE): ICD-10-CM

## 2024-10-21 PROCEDURE — G8484 FLU IMMUNIZE NO ADMIN: HCPCS | Performed by: STUDENT IN AN ORGANIZED HEALTH CARE EDUCATION/TRAINING PROGRAM

## 2024-10-21 PROCEDURE — 3077F SYST BP >= 140 MM HG: CPT | Performed by: STUDENT IN AN ORGANIZED HEALTH CARE EDUCATION/TRAINING PROGRAM

## 2024-10-21 PROCEDURE — 99214 OFFICE O/P EST MOD 30 MIN: CPT | Performed by: STUDENT IN AN ORGANIZED HEALTH CARE EDUCATION/TRAINING PROGRAM

## 2024-10-21 PROCEDURE — G8427 DOCREV CUR MEDS BY ELIG CLIN: HCPCS | Performed by: STUDENT IN AN ORGANIZED HEALTH CARE EDUCATION/TRAINING PROGRAM

## 2024-10-21 PROCEDURE — 1123F ACP DISCUSS/DSCN MKR DOCD: CPT | Performed by: STUDENT IN AN ORGANIZED HEALTH CARE EDUCATION/TRAINING PROGRAM

## 2024-10-21 PROCEDURE — 1036F TOBACCO NON-USER: CPT | Performed by: STUDENT IN AN ORGANIZED HEALTH CARE EDUCATION/TRAINING PROGRAM

## 2024-10-21 PROCEDURE — G8417 CALC BMI ABV UP PARAM F/U: HCPCS | Performed by: STUDENT IN AN ORGANIZED HEALTH CARE EDUCATION/TRAINING PROGRAM

## 2024-10-21 PROCEDURE — 3078F DIAST BP <80 MM HG: CPT | Performed by: STUDENT IN AN ORGANIZED HEALTH CARE EDUCATION/TRAINING PROGRAM

## 2024-10-21 NOTE — PROGRESS NOTES
[facility]  [address] [place] [id number]  W: [phone] F: [phone]    Reason for Visit:   Wilfrid Mello is a 84 y.o. male who is seen in consultation at the request of Dr. Tidwell for evaluation of suspected plasma cell dyscrasia.      Hematology / Oncology Treatment Information:     Hematological/Oncological Diagnosis: Monoclonal Gammopathy    Date of Diagnosis: 4/2024    Oncology/Hematology Treatment Course:   Treatment course:   1) Surveillance       Pathology and Molecular Testing:       Initial Presentation  (HPI):   The patient (or guardian, if applicable) and other individuals in attendance with the patient were advised that Artificial Intelligence will be utilized during this visit to record and process the conversation to generate a clinical note. The patient (or guardian, if applicable) and other individuals in attendance at the appointment consented to the use of AI, including the recording.        History of Present Illness  The patient is an 83-year-old male who presents to me as a referral from Dr. Tidwell for evaluation of abnormal free light chains and monoclonal gammopathy. The patient was last seen by Dr. Tidwell in 04/2024. His other medical problems include diabetes mellitus type 2, COPD, and chronic kidney disease stage 3b.    Labs reviewed from last PCP visit, notable for monoclonal gammopathy. No focal bone pain. No severe fatigue or other constitutional symptoms. 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      He has CKD3b.     The patient has been a diabetic for the past 30 years. He reports no discomfort in any specific area of his body.        Family history reviewed, non contributory  Social history reviewed, also non contributory      Interval History:     10/21/24    Doing well. No clinical worsening.  Overall in good spirits.  Completed the urine 24h spep.  M spike present, but not

## 2024-10-21 NOTE — PROGRESS NOTES
Wilfrid Mello is a 84 y.o. male who presents for follow up of   Chief Complaint   Patient presents with    Follow-up     MGUS       The patient reports no new clinical symptoms or new complaints since last clinic evaluation. He reports doing well. Just getting over a cold. His blood pressure is elevated he reports not taking his medication this morning. Overall pt reports no significant changes.       No interval hospitalizations reported    No interval surgery or procedures reported    No reported new medication changes reported       Medications reviewed with the patient, and chart updated to reflect changes.

## 2024-10-22 ENCOUNTER — HOSPITAL ENCOUNTER (EMERGENCY)
Facility: HOSPITAL | Age: 84
Discharge: HOME | End: 2024-10-22
Payer: MEDICARE

## 2024-10-22 VITALS
HEIGHT: 78 IN | DIASTOLIC BLOOD PRESSURE: 69 MMHG | BODY MASS INDEX: 33.21 KG/M2 | SYSTOLIC BLOOD PRESSURE: 152 MMHG | OXYGEN SATURATION: 98 % | TEMPERATURE: 99.3 F | WEIGHT: 287 LBS | HEART RATE: 69 BPM | RESPIRATION RATE: 18 BRPM

## 2024-10-22 DIAGNOSIS — L50.9 URTICARIA: Primary | ICD-10-CM

## 2024-10-22 DIAGNOSIS — L29.9 EAR ITCHING: ICD-10-CM

## 2024-10-22 LAB
ALBUMIN SERPL-MCNC: 3.6 G/DL (ref 3.5–5)
ALBUMIN/GLOB SERPL: 0.9 (ref 1.1–2.2)
ALP SERPL-CCNC: 92 U/L (ref 45–117)
ALT SERPL-CCNC: 20 U/L (ref 12–78)
ANION GAP SERPL CALC-SCNC: 8 MMOL/L (ref 2–12)
AST SERPL-CCNC: 13 U/L (ref 15–37)
BASOPHILS # BLD: 0 K/UL (ref 0–0.1)
BASOPHILS NFR BLD: 0 % (ref 0–1)
BILIRUB SERPL-MCNC: 0.4 MG/DL (ref 0.2–1)
BUN SERPL-MCNC: 32 MG/DL (ref 6–20)
BUN/CREAT SERPL: 17 (ref 12–20)
CALCIUM SERPL-MCNC: 8.9 MG/DL (ref 8.5–10.1)
CHLORIDE SERPL-SCNC: 109 MMOL/L (ref 97–108)
CO2 SERPL-SCNC: 19 MMOL/L (ref 21–32)
CREAT SERPL-MCNC: 1.91 MG/DL (ref 0.7–1.3)
DIFFERENTIAL METHOD BLD: ABNORMAL
EOSINOPHIL # BLD: 0.2 K/UL (ref 0–0.4)
EOSINOPHIL NFR BLD: 4 % (ref 0–7)
ERYTHROCYTE [DISTWIDTH] IN BLOOD BY AUTOMATED COUNT: 14.9 % (ref 11.5–14.5)
GLOBULIN SER CALC-MCNC: 4 G/DL (ref 2–4)
GLUCOSE BLD STRIP.AUTO-MCNC: 204 MG/DL (ref 65–117)
GLUCOSE SERPL-MCNC: 326 MG/DL (ref 65–100)
HCT VFR BLD AUTO: 34.5 % (ref 36.6–50.3)
HGB BLD-MCNC: 11.2 G/DL (ref 12.1–17)
IMM GRANULOCYTES # BLD AUTO: 0 K/UL (ref 0–0.04)
IMM GRANULOCYTES NFR BLD AUTO: 1 % (ref 0–0.5)
LDH SERPL L TO P-CCNC: 188 U/L (ref 85–241)
LYMPHOCYTES # BLD: 1.4 K/UL (ref 0.8–3.5)
LYMPHOCYTES NFR BLD: 23 % (ref 12–49)
MCH RBC QN AUTO: 27.4 PG (ref 26–34)
MCHC RBC AUTO-ENTMCNC: 32.5 G/DL (ref 30–36.5)
MCV RBC AUTO: 84.4 FL (ref 80–99)
MONOCYTES # BLD: 0.5 K/UL (ref 0–1)
MONOCYTES NFR BLD: 8 % (ref 5–13)
NEUTS SEG # BLD: 3.9 K/UL (ref 1.8–8)
NEUTS SEG NFR BLD: 64 % (ref 32–75)
NRBC # BLD: 0 K/UL (ref 0–0.01)
NRBC BLD-RTO: 0 PER 100 WBC
PLATELET # BLD AUTO: 213 K/UL (ref 150–400)
PMV BLD AUTO: 10.1 FL (ref 8.9–12.9)
POTASSIUM SERPL-SCNC: 4.8 MMOL/L (ref 3.5–5.1)
PROT SERPL-MCNC: 7.6 G/DL (ref 6.4–8.2)
RBC # BLD AUTO: 4.09 M/UL (ref 4.1–5.7)
SERVICE CMNT-IMP: ABNORMAL
SODIUM SERPL-SCNC: 136 MMOL/L (ref 136–145)
WBC # BLD AUTO: 6.2 K/UL (ref 4.1–11.1)

## 2024-10-22 PROCEDURE — 99283 EMERGENCY DEPT VISIT LOW MDM: CPT

## 2024-10-22 PROCEDURE — 82962 GLUCOSE BLOOD TEST: CPT

## 2024-10-22 PROCEDURE — 6370000000 HC RX 637 (ALT 250 FOR IP)

## 2024-10-22 RX ORDER — DIPHENHYDRAMINE HYDROCHLORIDE, ZINC ACETATE 2; .1 G/100G; G/100G
CREAM TOPICAL
Qty: 35 G | Refills: 0 | Status: SHIPPED | OUTPATIENT
Start: 2024-10-22

## 2024-10-22 RX ORDER — HYDROXYZINE HYDROCHLORIDE 25 MG/1
25 TABLET, FILM COATED ORAL
Status: COMPLETED | OUTPATIENT
Start: 2024-10-22 | End: 2024-10-22

## 2024-10-22 RX ORDER — KETOCONAZOLE 20 MG/G
CREAM TOPICAL
Qty: 30 G | Refills: 0 | Status: SHIPPED | OUTPATIENT
Start: 2024-10-22

## 2024-10-22 RX ORDER — HYDROXYZINE HYDROCHLORIDE 25 MG/1
25 TABLET, FILM COATED ORAL EVERY 8 HOURS PRN
Qty: 30 TABLET | Refills: 0 | Status: SHIPPED | OUTPATIENT
Start: 2024-10-22 | End: 2024-11-01

## 2024-10-22 RX ADMIN — HYDROXYZINE HYDROCHLORIDE 25 MG: 25 TABLET, FILM COATED ORAL at 22:11

## 2024-10-22 ASSESSMENT — PAIN - FUNCTIONAL ASSESSMENT: PAIN_FUNCTIONAL_ASSESSMENT: NONE - DENIES PAIN

## 2024-10-23 LAB — B2 MICROGLOB SERPL-MCNC: 2.8 MG/L (ref 0.6–2.4)

## 2024-10-23 NOTE — ED NOTES
Discharge instructions were given to the patient by ROSMERY Duron.     The patient left the Emergency Department alert and oriented and in no acute distress with 2 prescriptions. The patient was encouraged to call or return to the ED for worsening issues or problems and was encouraged to schedule a follow up appointment for continuing care.     Ambulation assessment completed before discharge.  Pt left Emergency Department at expected ambulatory status with no ortho devices  Ortho device education: none    The patient verbalized understanding of discharge instructions and prescriptions, all questions were answered. The patient has no further concerns at this time.
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
## 2024-10-23 NOTE — ED NOTES
See triage note. Pt is alert and oriented x 4, RR even and unlabored, skin is warm and dry. Assesment completed and pt updated on plan of care.       Emergency Department Nursing Plan of Care       The Nursing Plan of Care is developed from the Nursing assessment and Emergency Department Attending provider initial evaluation.  The plan of care may be reviewed in the “ED Provider note”.    The Plan of Care was developed with the following considerations:   Patient / Family readiness to learn indicated by:verbalized understanding  Persons(s) to be included in education: patient  Barriers to Learning/Limitations:None    Signed     Iris Melgar RN    10/22/2024   10:15 PM

## 2024-10-23 NOTE — ED TRIAGE NOTES
Pt c/o itching \"all over\" that started to get unbearable tonight. Pt states the itching started in his right ear and then affected his right eye and is now all over. Pt denies taking anything OTC to help with symptoms. Pt was at his optometrist today and prescribed erythromycin for his right eye itching.     Daughter added that patient has been out of his insulin x 2 days. Humulin 70/30. Blood sugar checked in triage - 204

## 2024-10-25 LAB
ALBUMIN SERPL ELPH-MCNC: 3.4 G/DL (ref 2.9–4.4)
ALBUMIN/GLOB SERPL: 1 (ref 0.7–1.7)
ALPHA1 GLOB SERPL ELPH-MCNC: 0.2 G/DL (ref 0–0.4)
ALPHA2 GLOB SERPL ELPH-MCNC: 0.8 G/DL (ref 0.4–1)
B-GLOBULIN SERPL ELPH-MCNC: 1 G/DL (ref 0.7–1.3)
GAMMA GLOB SERPL ELPH-MCNC: 1.7 G/DL (ref 0.4–1.8)
GLOBULIN SER-MCNC: 3.6 G/DL (ref 2.2–3.9)
IGA SERPL-MCNC: 94 MG/DL (ref 61–437)
IGG SERPL-MCNC: 1905 MG/DL (ref 603–1613)
IGM SERPL-MCNC: 38 MG/DL (ref 15–143)
INTERPRETATION SERPL IEP-IMP: ABNORMAL
KAPPA LC FREE SER-MCNC: 94.6 MG/L (ref 3.3–19.4)
KAPPA LC FREE/LAMBDA FREE SER: 4.64 (ref 0.26–1.65)
LAMBDA LC FREE SERPL-MCNC: 20.4 MG/L (ref 5.7–26.3)
M PROTEIN SERPL ELPH-MCNC: 1 G/DL
PROT SERPL-MCNC: 7 G/DL (ref 6–8.5)

## 2024-11-27 ENCOUNTER — OFFICE VISIT (OUTPATIENT)
Facility: CLINIC | Age: 84
End: 2024-11-27
Payer: MEDICARE

## 2024-11-27 VITALS
HEART RATE: 62 BPM | OXYGEN SATURATION: 100 % | DIASTOLIC BLOOD PRESSURE: 63 MMHG | BODY MASS INDEX: 32.74 KG/M2 | WEIGHT: 283 LBS | SYSTOLIC BLOOD PRESSURE: 150 MMHG | RESPIRATION RATE: 15 BRPM | TEMPERATURE: 97.9 F | HEIGHT: 78 IN

## 2024-11-27 DIAGNOSIS — E11.40 TYPE 2 DIABETES MELLITUS WITH DIABETIC NEUROPATHY, WITH LONG-TERM CURRENT USE OF INSULIN (HCC): ICD-10-CM

## 2024-11-27 DIAGNOSIS — L30.9 ECZEMA, UNSPECIFIED TYPE: Primary | ICD-10-CM

## 2024-11-27 DIAGNOSIS — I87.2 VENOUS INSUFFICIENCY: ICD-10-CM

## 2024-11-27 DIAGNOSIS — Z79.4 TYPE 2 DIABETES MELLITUS WITH DIABETIC NEUROPATHY, WITH LONG-TERM CURRENT USE OF INSULIN (HCC): ICD-10-CM

## 2024-11-27 DIAGNOSIS — I10 ESSENTIAL HYPERTENSION: ICD-10-CM

## 2024-11-27 DIAGNOSIS — Z89.511 STATUS POST BELOW KNEE AMPUTATION OF RIGHT LOWER EXTREMITY (HCC): ICD-10-CM

## 2024-11-27 DIAGNOSIS — N18.32 CHRONIC KIDNEY DISEASE, STAGE 3B (HCC): ICD-10-CM

## 2024-11-27 DIAGNOSIS — H91.93 DECREASED HEARING OF BOTH EARS: ICD-10-CM

## 2024-11-27 DIAGNOSIS — L85.3 XEROSIS CUTIS: ICD-10-CM

## 2024-11-27 PROCEDURE — 3051F HG A1C>EQUAL 7.0%<8.0%: CPT | Performed by: INTERNAL MEDICINE

## 2024-11-27 PROCEDURE — 3078F DIAST BP <80 MM HG: CPT | Performed by: INTERNAL MEDICINE

## 2024-11-27 PROCEDURE — 1159F MED LIST DOCD IN RCRD: CPT | Performed by: INTERNAL MEDICINE

## 2024-11-27 PROCEDURE — 1123F ACP DISCUSS/DSCN MKR DOCD: CPT | Performed by: INTERNAL MEDICINE

## 2024-11-27 PROCEDURE — G8484 FLU IMMUNIZE NO ADMIN: HCPCS | Performed by: INTERNAL MEDICINE

## 2024-11-27 PROCEDURE — 99214 OFFICE O/P EST MOD 30 MIN: CPT | Performed by: INTERNAL MEDICINE

## 2024-11-27 PROCEDURE — G8427 DOCREV CUR MEDS BY ELIG CLIN: HCPCS | Performed by: INTERNAL MEDICINE

## 2024-11-27 PROCEDURE — 1036F TOBACCO NON-USER: CPT | Performed by: INTERNAL MEDICINE

## 2024-11-27 PROCEDURE — G8417 CALC BMI ABV UP PARAM F/U: HCPCS | Performed by: INTERNAL MEDICINE

## 2024-11-27 PROCEDURE — 3077F SYST BP >= 140 MM HG: CPT | Performed by: INTERNAL MEDICINE

## 2024-11-27 RX ORDER — CLOBETASOL PROPIONATE 0.5 MG/G
OINTMENT TOPICAL
Qty: 120 G | Refills: 4 | Status: SHIPPED | OUTPATIENT
Start: 2024-11-27

## 2024-11-28 NOTE — PROGRESS NOTES
1. Patient's symptoms are quite consistent with eczema.  He has extremely dry skin, along with slight scaling.  This is on his arms and trunk.  He will be given Temovate Ointment, apply locally twice a day, along with an emollient, which will be CeraVe.  Ideally, a short course of systemic steroids would be of benefit, but I am somewhat leery about doing this because of his diabetes.  For now, I will see him back in the office in one month and hopefully this will indeed improve.    2. On his return visit, hemoglobin A1c will be checked, along with a BMP.  3. He has an increased cardiovascular risk and remains on his statin.  4. I encouraged him to minimize further weight gain.  5. He will follow up with his ophthalmologist regarding his proliferative diabetic retinopathy.    
Chief Complaint   Patient presents with    Diabetes     Patient is here for a follow up.     Rash     Patient is here for  a rash all over his body      \"Have you been to the ER, urgent care clinic since your last visit?  Hospitalized since your last visit?\"    YES - When: approximately 1 months ago.  Where and Why: Rash all over body .    “Have you seen or consulted any other health care providers outside our system since your last visit?”    NO          
Patient comes in for return visit complaining of itchy skin.  Apparently, it started sometime in October and this is the first time this has happened.  It was so intense he went to the emergency room and interestingly, they gave him a long-acting antihistamine, along with a short-acting one and Ketoconazole.      His blood sugars have been quite reasonable with his last hemoglobin A1c having improved significantly.    He has a past history of primary hypertension, dyslipidemia, status post right BK amputation and various microvascular complications from his longstanding, poorly controlled diabetes.    
Onset    Diabetes Father     Cancer Father         colon    Alcohol Abuse Mother        OBJECTIVE:    BP (!) 150/63   Pulse 62   Temp 97.9 °F (36.6 °C)   Resp 15   Ht 2.057 m (6' 9\")   Wt 128.4 kg (283 lb)   SpO2 100%   BMI 30.33 kg/m²   CONSTITUTIONAL: well , well nourished, appears age appropriate  EYES: perrla, eom intact  ENMT:moist mucous membranes, pharynx clear  NECK: supple. Thyroid normal  RESPIRATORY: Chest: clear to ascultation and percussion   CARDIOVASCULAR: Heart: regular rate and rhythm  GASTROINTESTINAL: Abdomen: soft, bowel sounds active  HEMATOLOGIC: no pathological lymph nodes palpated  MUSCULOSKELETAL: Extremities: no edema, pulse 1+   INTEGUMENT: No unusual rashes or suspicious skin lesions noted. Nails appear normal.  NEUROLOGIC: non-focal exam   MENTAL STATUS: alert and oriented, appropriate affect      ASSESSMENT:  1. Eczema, unspecified type    2. Xerosis cutis    3. Chronic kidney disease, stage 3b ([facility])    4. Status post below knee amputation of right lower extremity ([facility])    5. Type 2 diabetes mellitus with diabetic neuropathy, with long-term current use of insulin ([facility])    6. Essential hypertension    7. Venous insufficiency    8. Decreased hearing of both ears        PLAN:   Dictation on: 11/27/2024 11:27 AM by: LISSETH TIDWELL [[id number]]     No orders of the defined types were placed in this encounter.       Follow-up and Dispositions    Return in about 4 weeks (around 12/25/2024).             Lisseth Tidwell MD

## 2024-12-29 RX ORDER — INSULIN HUMAN 100 [IU]/ML
INJECTION, SUSPENSION SUBCUTANEOUS
Qty: 8 EACH | Refills: 11 | Status: SHIPPED | OUTPATIENT
Start: 2024-12-29

## 2025-03-20 ENCOUNTER — OFFICE VISIT (OUTPATIENT)
Facility: CLINIC | Age: 85
End: 2025-03-20

## 2025-03-20 VITALS
DIASTOLIC BLOOD PRESSURE: 73 MMHG | BODY MASS INDEX: 32.47 KG/M2 | RESPIRATION RATE: 16 BRPM | SYSTOLIC BLOOD PRESSURE: 175 MMHG | OXYGEN SATURATION: 100 % | HEIGHT: 78 IN | HEART RATE: 79 BPM | WEIGHT: 280.6 LBS | TEMPERATURE: 98 F

## 2025-03-20 DIAGNOSIS — E78.5 DYSLIPIDEMIA: ICD-10-CM

## 2025-03-20 DIAGNOSIS — I10 ESSENTIAL HYPERTENSION: ICD-10-CM

## 2025-03-20 DIAGNOSIS — E11.51 TYPE 2 DIABETES MELLITUS WITH DIABETIC PERIPHERAL ANGIOPATHY WITHOUT GANGRENE, WITH LONG-TERM CURRENT USE OF INSULIN (HCC): Primary | ICD-10-CM

## 2025-03-20 DIAGNOSIS — Z89.511 ACQUIRED ABSENCE OF RIGHT LEG BELOW KNEE (HCC): ICD-10-CM

## 2025-03-20 DIAGNOSIS — N18.32 CHRONIC KIDNEY DISEASE, STAGE 3B (HCC): ICD-10-CM

## 2025-03-20 DIAGNOSIS — Z79.4 TYPE 2 DIABETES MELLITUS WITH DIABETIC PERIPHERAL ANGIOPATHY WITHOUT GANGRENE, WITH LONG-TERM CURRENT USE OF INSULIN (HCC): Primary | ICD-10-CM

## 2025-03-20 DIAGNOSIS — Z12.5 SPECIAL SCREENING FOR MALIGNANT NEOPLASM OF PROSTATE: ICD-10-CM

## 2025-03-20 PROBLEM — J43.1 PANLOBULAR EMPHYSEMA (HCC): Status: RESOLVED | Noted: 2023-11-30 | Resolved: 2025-03-20

## 2025-03-20 RX ORDER — AMLODIPINE BESYLATE 10 MG/1
10 TABLET ORAL DAILY
Qty: 90 TABLET | Refills: 3 | Status: SHIPPED | OUTPATIENT
Start: 2025-03-20 | End: 2025-03-23

## 2025-03-20 SDOH — ECONOMIC STABILITY: FOOD INSECURITY: WITHIN THE PAST 12 MONTHS, YOU WORRIED THAT YOUR FOOD WOULD RUN OUT BEFORE YOU GOT MONEY TO BUY MORE.: NEVER TRUE

## 2025-03-20 SDOH — ECONOMIC STABILITY: FOOD INSECURITY: WITHIN THE PAST 12 MONTHS, THE FOOD YOU BOUGHT JUST DIDN'T LAST AND YOU DIDN'T HAVE MONEY TO GET MORE.: NEVER TRUE

## 2025-03-20 ASSESSMENT — PATIENT HEALTH QUESTIONNAIRE - PHQ9
1. LITTLE INTEREST OR PLEASURE IN DOING THINGS: NOT AT ALL
SUM OF ALL RESPONSES TO PHQ QUESTIONS 1-9: 0
2. FEELING DOWN, DEPRESSED OR HOPELESS: NOT AT ALL
SUM OF ALL RESPONSES TO PHQ QUESTIONS 1-9: 0

## 2025-03-20 ASSESSMENT — ANXIETY QUESTIONNAIRES
6. BECOMING EASILY ANNOYED OR IRRITABLE: NOT AT ALL
4. TROUBLE RELAXING: NOT AT ALL
1. FEELING NERVOUS, ANXIOUS, OR ON EDGE: NOT AT ALL
5. BEING SO RESTLESS THAT IT IS HARD TO SIT STILL: NOT AT ALL
7. FEELING AFRAID AS IF SOMETHING AWFUL MIGHT HAPPEN: NOT AT ALL
IF YOU CHECKED OFF ANY PROBLEMS ON THIS QUESTIONNAIRE, HOW DIFFICULT HAVE THESE PROBLEMS MADE IT FOR YOU TO DO YOUR WORK, TAKE CARE OF THINGS AT HOME, OR GET ALONG WITH OTHER PEOPLE: NOT DIFFICULT AT ALL
GAD7 TOTAL SCORE: 0
2. NOT BEING ABLE TO STOP OR CONTROL WORRYING: NOT AT ALL
3. WORRYING TOO MUCH ABOUT DIFFERENT THINGS: NOT AT ALL

## 2025-03-20 NOTE — PROGRESS NOTES
Chief Complaint   Patient presents with    Medicare AWV     Patient states \" he is present for a follow-up.\"    \"Have you been to the ER, urgent care clinic since your last visit?  Hospitalized since your last visit?\"    NO    “Have you seen or consulted any other health care providers outside our system since your last visit?”    NO

## 2025-03-21 ENCOUNTER — PATIENT MESSAGE (OUTPATIENT)
Facility: CLINIC | Age: 85
End: 2025-03-21

## 2025-03-21 LAB
ALBUMIN SERPL-MCNC: 3.5 G/DL (ref 3.5–5)
ALBUMIN/GLOB SERPL: 0.8 (ref 1.1–2.2)
ALP SERPL-CCNC: 83 U/L (ref 45–117)
ALT SERPL-CCNC: 21 U/L (ref 12–78)
ANION GAP SERPL CALC-SCNC: 4 MMOL/L (ref 2–12)
APPEARANCE UR: CLEAR
AST SERPL-CCNC: 16 U/L (ref 15–37)
BACTERIA URNS QL MICRO: NEGATIVE /HPF
BILIRUB SERPL-MCNC: 0.3 MG/DL (ref 0.2–1)
BILIRUB UR QL: NEGATIVE
BUN SERPL-MCNC: 24 MG/DL (ref 6–20)
BUN/CREAT SERPL: 15 (ref 12–20)
CALCIUM SERPL-MCNC: 9.2 MG/DL (ref 8.5–10.1)
CHLORIDE SERPL-SCNC: 113 MMOL/L (ref 97–108)
CHOLEST SERPL-MCNC: 136 MG/DL
CO2 SERPL-SCNC: 24 MMOL/L (ref 21–32)
COLOR UR: ABNORMAL
CREAT SERPL-MCNC: 1.62 MG/DL (ref 0.7–1.3)
CREAT UR-MCNC: 88.2 MG/DL
CRP SERPL HS-MCNC: 4.5 MG/L
EPITH CASTS URNS QL MICRO: ABNORMAL /LPF
GLOBULIN SER CALC-MCNC: 4.3 G/DL (ref 2–4)
GLUCOSE SERPL-MCNC: 106 MG/DL (ref 65–100)
GLUCOSE UR STRIP.AUTO-MCNC: NEGATIVE MG/DL
HDLC SERPL-MCNC: 54 MG/DL
HDLC SERPL: 2.5 (ref 0–5)
HGB UR QL STRIP: NEGATIVE
HYALINE CASTS URNS QL MICRO: ABNORMAL /LPF (ref 0–5)
KETONES UR QL STRIP.AUTO: NEGATIVE MG/DL
LDLC SERPL CALC-MCNC: 69.6 MG/DL (ref 0–100)
LEUKOCYTE ESTERASE UR QL STRIP.AUTO: NEGATIVE
NITRITE UR QL STRIP.AUTO: NEGATIVE
PH UR STRIP: 5.5 (ref 5–8)
POTASSIUM SERPL-SCNC: 4.5 MMOL/L (ref 3.5–5.1)
PROT SERPL-MCNC: 7.8 G/DL (ref 6.4–8.2)
PROT UR STRIP-MCNC: 100 MG/DL
PROT UR-MCNC: 134 MG/DL (ref 0–11.9)
PROT/CREAT UR-RTO: 1.5
PSA SERPL-MCNC: 3 NG/ML (ref 0.01–4)
RBC #/AREA URNS HPF: ABNORMAL /HPF (ref 0–5)
SODIUM SERPL-SCNC: 141 MMOL/L (ref 136–145)
SP GR UR REFRACTOMETRY: 1.01 (ref 1–1.03)
TRIGL SERPL-MCNC: 62 MG/DL
UROBILINOGEN UR QL STRIP.AUTO: 0.2 EU/DL (ref 0.2–1)
VLDLC SERPL CALC-MCNC: 12.4 MG/DL
WBC URNS QL MICRO: ABNORMAL /HPF (ref 0–4)

## 2025-03-23 ENCOUNTER — RESULTS FOLLOW-UP (OUTPATIENT)
Facility: CLINIC | Age: 85
End: 2025-03-23

## 2025-03-23 PROBLEM — Z89.511 ACQUIRED ABSENCE OF RIGHT LEG BELOW KNEE (HCC): Status: ACTIVE | Noted: 2018-05-01

## 2025-03-23 LAB — APO B SERPL-MCNC: 66 MG/DL

## 2025-03-23 RX ORDER — NIFEDIPINE 60 MG/1
60 TABLET, EXTENDED RELEASE ORAL DAILY
Qty: 30 TABLET | Refills: 11 | Status: SHIPPED | OUTPATIENT
Start: 2025-03-23

## 2025-03-23 RX ORDER — OLMESARTAN MEDOXOMIL 20 MG/1
20 TABLET ORAL DAILY
Qty: 30 TABLET | Refills: 11 | Status: SHIPPED | OUTPATIENT
Start: 2025-03-23

## 2025-03-23 NOTE — PROGRESS NOTES
[facility]  [address]  [facility] [address]  Phone:  [phone]  Fax: [phone]       Chief Complaint   Patient presents with    Medicare AWV   .      SUBJECTIVE:      History of Present Illness  The patient presents for evaluation of diabetes, hypertension, and heel ulcer.    He reports satisfactory control of his blood glucose levels, attributing this to a consistent diet of grits for breakfast and lunch. He occasionally substitutes cornflakes for grits and includes jin and eggs in his meals. He administers 34 units of insulin in the morning and 30 units in the evening, with the timing of the evening dose varying between pre-dinner and bedtime. He utilizes a glucometer for regular blood glucose monitoring but does not possess a continuous glucose monitor. He is compliant with his medication regimen and reports no shortness of breath.    He has been informed that his blood pressure is elevated due to stress, although he does not perceive himself as stressed. He is currently on amlodipine 10 mg and lisinopril, the latter of which he halves the tablet daily.    He has a small ulcer on his heel, which was caused by rubbing his foot against the wheelchair while sitting at the computer. The ulcer was larger initially but has since decreased in size. He is seeing a podiatrist every Friday for this issue.    Supplemental Information  He has not experienced any recent falls.    SOCIAL HISTORY  The patient used to smoke and quit about 20 years ago.    MEDICATIONS  Current: Insulin, amlodipine, lisinopril         Current Outpatient Medications   Medication Sig Dispense Refill    amLODIPine (NORVASC) 10 MG tablet TAKE 1 TABLET EVERY DAY 90 tablet 3    insulin 70-30 (HUMULIN 70/30) (70-30) 100 UNIT per ML injection vial INJECT 34 UNITS UNDER THE SKIN IN THE MORNING AND 40 UNITS IN THE EVENING. 8 each 11    insulin 70-30 (HUMULIN 70/30) (70-30) 100 UNIT per ML injection

## 2025-04-09 RX ORDER — SIMVASTATIN 20 MG
20 TABLET ORAL NIGHTLY
Qty: 90 TABLET | Refills: 3 | Status: SHIPPED | OUTPATIENT
Start: 2025-04-09

## 2025-04-09 RX ORDER — METOPROLOL TARTRATE 25 MG/1
25 TABLET, FILM COATED ORAL 2 TIMES DAILY
Qty: 180 TABLET | Refills: 3 | Status: SHIPPED | OUTPATIENT
Start: 2025-04-09

## 2025-04-21 ENCOUNTER — TELEPHONE (OUTPATIENT)
Age: 85
End: 2025-04-21

## 2025-04-21 NOTE — TELEPHONE ENCOUNTER
Called pt on both numbers listed. Left vm on both. If pt calls back please reschedule missed appt at [facility].

## 2025-05-14 ENCOUNTER — OFFICE VISIT (OUTPATIENT)
Facility: CLINIC | Age: 85
End: 2025-05-14
Payer: MEDICARE

## 2025-05-14 VITALS
TEMPERATURE: 98.9 F | SYSTOLIC BLOOD PRESSURE: 121 MMHG | OXYGEN SATURATION: 98 % | HEART RATE: 84 BPM | WEIGHT: 256.5 LBS | HEIGHT: 78 IN | RESPIRATION RATE: 16 BRPM | DIASTOLIC BLOOD PRESSURE: 51 MMHG | BODY MASS INDEX: 29.68 KG/M2

## 2025-05-14 DIAGNOSIS — J45.20 MILD INTERMITTENT REACTIVE AIRWAY DISEASE WITHOUT COMPLICATION: ICD-10-CM

## 2025-05-14 DIAGNOSIS — N18.32 CHRONIC KIDNEY DISEASE, STAGE 3B (HCC): ICD-10-CM

## 2025-05-14 DIAGNOSIS — R05.1 ACUTE COUGH: Primary | ICD-10-CM

## 2025-05-14 DIAGNOSIS — E78.5 DYSLIPIDEMIA: ICD-10-CM

## 2025-05-14 DIAGNOSIS — I87.2 VENOUS INSUFFICIENCY: ICD-10-CM

## 2025-05-14 DIAGNOSIS — E11.21 TYPE 2 DIABETES WITH NEPHROPATHY (HCC): ICD-10-CM

## 2025-05-14 DIAGNOSIS — E88.09 PLASMA CELL DYSCRASIA: ICD-10-CM

## 2025-05-14 DIAGNOSIS — I10 ESSENTIAL HYPERTENSION: ICD-10-CM

## 2025-05-14 DIAGNOSIS — H91.93 DECREASED HEARING OF BOTH EARS: ICD-10-CM

## 2025-05-14 PROBLEM — J45.909 REACTIVE AIRWAY DISEASE: Status: ACTIVE | Noted: 2025-05-14

## 2025-05-14 PROCEDURE — 99214 OFFICE O/P EST MOD 30 MIN: CPT | Performed by: INTERNAL MEDICINE

## 2025-05-14 PROCEDURE — 1123F ACP DISCUSS/DSCN MKR DOCD: CPT | Performed by: INTERNAL MEDICINE

## 2025-05-14 PROCEDURE — G8427 DOCREV CUR MEDS BY ELIG CLIN: HCPCS | Performed by: INTERNAL MEDICINE

## 2025-05-14 PROCEDURE — 1036F TOBACCO NON-USER: CPT | Performed by: INTERNAL MEDICINE

## 2025-05-14 PROCEDURE — 3078F DIAST BP <80 MM HG: CPT | Performed by: INTERNAL MEDICINE

## 2025-05-14 PROCEDURE — 1126F AMNT PAIN NOTED NONE PRSNT: CPT | Performed by: INTERNAL MEDICINE

## 2025-05-14 PROCEDURE — 1159F MED LIST DOCD IN RCRD: CPT | Performed by: INTERNAL MEDICINE

## 2025-05-14 PROCEDURE — 3074F SYST BP LT 130 MM HG: CPT | Performed by: INTERNAL MEDICINE

## 2025-05-14 PROCEDURE — G8417 CALC BMI ABV UP PARAM F/U: HCPCS | Performed by: INTERNAL MEDICINE

## 2025-05-14 RX ORDER — AZITHROMYCIN 250 MG/1
TABLET, FILM COATED ORAL
Qty: 6 TABLET | Refills: 0 | Status: SHIPPED | OUTPATIENT
Start: 2025-05-14 | End: 2025-05-24

## 2025-05-14 RX ORDER — PREDNISONE 20 MG/1
20 TABLET ORAL 3 TIMES DAILY
Qty: 21 TABLET | Refills: 0 | Status: SHIPPED | OUTPATIENT
Start: 2025-05-14 | End: 2025-05-21

## 2025-05-14 SDOH — ECONOMIC STABILITY: FOOD INSECURITY: WITHIN THE PAST 12 MONTHS, YOU WORRIED THAT YOUR FOOD WOULD RUN OUT BEFORE YOU GOT MONEY TO BUY MORE.: NEVER TRUE

## 2025-05-14 SDOH — ECONOMIC STABILITY: FOOD INSECURITY: WITHIN THE PAST 12 MONTHS, THE FOOD YOU BOUGHT JUST DIDN'T LAST AND YOU DIDN'T HAVE MONEY TO GET MORE.: NEVER TRUE

## 2025-05-14 ASSESSMENT — PATIENT HEALTH QUESTIONNAIRE - PHQ9
SUM OF ALL RESPONSES TO PHQ QUESTIONS 1-9: 0
2. FEELING DOWN, DEPRESSED OR HOPELESS: NOT AT ALL
SUM OF ALL RESPONSES TO PHQ QUESTIONS 1-9: 0
SUM OF ALL RESPONSES TO PHQ QUESTIONS 1-9: 0
1. LITTLE INTEREST OR PLEASURE IN DOING THINGS: NOT AT ALL
SUM OF ALL RESPONSES TO PHQ QUESTIONS 1-9: 0
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
## 2025-05-20 ENCOUNTER — RESULTS FOLLOW-UP (OUTPATIENT)
Age: 85
End: 2025-05-20

## 2025-05-26 ENCOUNTER — RESULTS FOLLOW-UP (OUTPATIENT)
Facility: CLINIC | Age: 85
End: 2025-05-26

## 2025-06-02 NOTE — TELEPHONE ENCOUNTER
Called pt again on both numbers listed. Left vm on both. If pt calls back please reschedule missed appt at [facility].

## 2025-06-11 ENCOUNTER — OFFICE VISIT (OUTPATIENT)
Facility: CLINIC | Age: 85
End: 2025-06-11
Payer: MEDICARE

## 2025-06-11 VITALS
DIASTOLIC BLOOD PRESSURE: 57 MMHG | SYSTOLIC BLOOD PRESSURE: 145 MMHG | WEIGHT: 287.8 LBS | HEIGHT: 78 IN | HEART RATE: 71 BPM | OXYGEN SATURATION: 99 % | TEMPERATURE: 98.3 F | BODY MASS INDEX: 33.3 KG/M2 | RESPIRATION RATE: 16 BRPM

## 2025-06-11 DIAGNOSIS — R53.81 PHYSICAL DECONDITIONING: ICD-10-CM

## 2025-06-11 DIAGNOSIS — Z79.4 TYPE 2 DIABETES MELLITUS WITH DIABETIC NEUROPATHY, WITH LONG-TERM CURRENT USE OF INSULIN (HCC): Primary | ICD-10-CM

## 2025-06-11 DIAGNOSIS — E11.40 TYPE 2 DIABETES MELLITUS WITH DIABETIC NEUROPATHY, WITH LONG-TERM CURRENT USE OF INSULIN (HCC): Primary | ICD-10-CM

## 2025-06-11 DIAGNOSIS — I10 ESSENTIAL HYPERTENSION: ICD-10-CM

## 2025-06-11 DIAGNOSIS — H91.93 DECREASED HEARING OF BOTH EARS: ICD-10-CM

## 2025-06-11 DIAGNOSIS — N18.32 CHRONIC KIDNEY DISEASE, STAGE 3B (HCC): ICD-10-CM

## 2025-06-11 PROCEDURE — 99214 OFFICE O/P EST MOD 30 MIN: CPT | Performed by: INTERNAL MEDICINE

## 2025-06-11 PROCEDURE — 1126F AMNT PAIN NOTED NONE PRSNT: CPT | Performed by: INTERNAL MEDICINE

## 2025-06-11 PROCEDURE — 1123F ACP DISCUSS/DSCN MKR DOCD: CPT | Performed by: INTERNAL MEDICINE

## 2025-06-11 PROCEDURE — 1159F MED LIST DOCD IN RCRD: CPT | Performed by: INTERNAL MEDICINE

## 2025-06-11 PROCEDURE — G8417 CALC BMI ABV UP PARAM F/U: HCPCS | Performed by: INTERNAL MEDICINE

## 2025-06-11 PROCEDURE — 3077F SYST BP >= 140 MM HG: CPT | Performed by: INTERNAL MEDICINE

## 2025-06-11 PROCEDURE — 1036F TOBACCO NON-USER: CPT | Performed by: INTERNAL MEDICINE

## 2025-06-11 PROCEDURE — 3046F HEMOGLOBIN A1C LEVEL >9.0%: CPT | Performed by: INTERNAL MEDICINE

## 2025-06-11 PROCEDURE — 3078F DIAST BP <80 MM HG: CPT | Performed by: INTERNAL MEDICINE

## 2025-06-11 PROCEDURE — G8427 DOCREV CUR MEDS BY ELIG CLIN: HCPCS | Performed by: INTERNAL MEDICINE

## 2025-06-11 RX ORDER — OLMESARTAN MEDOXOMIL 20 MG/1
20 TABLET ORAL DAILY
Qty: 90 TABLET | Refills: 3 | Status: SHIPPED | OUTPATIENT
Start: 2025-06-11

## 2025-06-11 RX ORDER — NIFEDIPINE 60 MG/1
60 TABLET, EXTENDED RELEASE ORAL DAILY
Qty: 90 TABLET | Refills: 3 | Status: SHIPPED | OUTPATIENT
Start: 2025-06-11

## 2025-06-11 RX ORDER — AMLODIPINE BESYLATE 10 MG/1
10 TABLET ORAL DAILY
COMMUNITY

## 2025-06-11 RX ORDER — LISINOPRIL 40 MG/1
40 TABLET ORAL DAILY
COMMUNITY
End: 2025-06-11

## 2025-06-11 SDOH — ECONOMIC STABILITY: FOOD INSECURITY: WITHIN THE PAST 12 MONTHS, YOU WORRIED THAT YOUR FOOD WOULD RUN OUT BEFORE YOU GOT MONEY TO BUY MORE.: NEVER TRUE

## 2025-06-11 SDOH — ECONOMIC STABILITY: FOOD INSECURITY: WITHIN THE PAST 12 MONTHS, THE FOOD YOU BOUGHT JUST DIDN'T LAST AND YOU DIDN'T HAVE MONEY TO GET MORE.: NEVER TRUE

## 2025-06-11 ASSESSMENT — PATIENT HEALTH QUESTIONNAIRE - PHQ9
SUM OF ALL RESPONSES TO PHQ QUESTIONS 1-9: 0
2. FEELING DOWN, DEPRESSED OR HOPELESS: NOT AT ALL
SUM OF ALL RESPONSES TO PHQ QUESTIONS 1-9: 0
1. LITTLE INTEREST OR PLEASURE IN DOING THINGS: NOT AT ALL
SUM OF ALL RESPONSES TO PHQ QUESTIONS 1-9: 0
SUM OF ALL RESPONSES TO PHQ QUESTIONS 1-9: 0

## 2025-06-11 NOTE — PROGRESS NOTES
Chief Complaint   Patient presents with    Diabetes     Patient states he is present for a follow up.    Have you been to the ER, urgent care clinic since your last visit?  Hospitalized since your last visit?   NO    Have you seen or consulted any other health care providers outside our system since your last visit?   NO

## 2025-06-12 LAB
ANION GAP SERPL CALC-SCNC: 7 MMOL/L (ref 2–12)
BUN SERPL-MCNC: 28 MG/DL (ref 6–20)
BUN/CREAT SERPL: 14 (ref 12–20)
CALCIUM SERPL-MCNC: 8.8 MG/DL (ref 8.5–10.1)
CHLORIDE SERPL-SCNC: 105 MMOL/L (ref 97–108)
CO2 SERPL-SCNC: 23 MMOL/L (ref 21–32)
CREAT SERPL-MCNC: 1.98 MG/DL (ref 0.7–1.3)
EST. AVERAGE GLUCOSE BLD GHB EST-MCNC: 217 MG/DL
GLUCOSE SERPL-MCNC: 254 MG/DL (ref 65–100)
HBA1C MFR BLD: 9.2 % (ref 4–5.6)
POTASSIUM SERPL-SCNC: 5.4 MMOL/L (ref 3.5–5.1)
SODIUM SERPL-SCNC: 135 MMOL/L (ref 136–145)

## 2025-06-15 ENCOUNTER — RESULTS FOLLOW-UP (OUTPATIENT)
Facility: CLINIC | Age: 85
End: 2025-06-15

## 2025-06-16 DIAGNOSIS — E11.40 TYPE 2 DIABETES MELLITUS WITH DIABETIC NEUROPATHY, WITH LONG-TERM CURRENT USE OF INSULIN (HCC): ICD-10-CM

## 2025-06-16 DIAGNOSIS — Z79.4 TYPE 2 DIABETES MELLITUS WITH DIABETIC NEUROPATHY, WITH LONG-TERM CURRENT USE OF INSULIN (HCC): ICD-10-CM

## 2025-06-16 DIAGNOSIS — I10 ESSENTIAL HYPERTENSION: Primary | ICD-10-CM

## 2025-07-17 NOTE — PROGRESS NOTES
On 2025, Vianney NEW, [place] scribed the services personally performed by Melanie Rea DC.    Chief Complaint   Patient presents with   • Pain   • Office Visit     Today's Date: 2025  Date of Injury: 2025  Initial Treatment Date: 2025    Date Last Seen: 2025  Mechanism of Onset: gradually     Referred by:Jing Kulkarni MD  Primary Care Physician: Jing Kulkarni MD  Fullscript/ChiroUp Email: [email or web address]  Date informed consent signed: 2025 (Rona)  Initial pain ratin/10  Visit Number of Current Episode: 07  Relevant insurance information (i.e. visits allowed per year): No information available (no benefit check performed)     **Patient preferences:  **Things to consider for future:     SUBJECTIVE:  The patient is a pleasant 67 year old male that presents to our office today for an evaluation and treatment of neck pain.    Patient states: Had relief following treatment. Having left sided neck pain when numbness down the left arm with random movement. Trying to sleep on his right side with a pillow that keeps his neck straight. Will wake up on his stomach, but has less numbness in the morning.    Current pain level: 0/10    During the past 24 hours how has pain interfered with your normal activities: 0/10  During the past 24 hours how has pain interfered with your sleep: 0/10  During the past 24 hours how has pain affected your mood: 0/10  During the past 24 hours how has pain contributed to your stress: 0/10    Functional Disability Percentage 0/40    CHIROPRACTIC PATIENT HISTORY:   Patient rates his neck pain today at 0/10 with 10 being worst, and states that the pain is occasionally (26-50% of the time).    Patient describes his pain as Sharp and Shooting.    Patient states: Having pain in the neck on the left with tingling down the left arm to the two center fingers. Thinks he might have slept wrong a few weeks ago. Sleeps on his stomach with his arms  RN contacted Dr. Micky Everett to inquire about morning Humalog. RN advised Dr. Micky Everett that patients BG was 115 and 15 units of Humalog is ordered. Dr. Micky Everett instructed RN to administer 6 units of Humalog this am along with scheduled 10 units of Lantus. 1210:  RN contacted Dr. Micky Everett to inform of patients afternoon blood glucose of 187. Dr. Micky Everett instructed RN to administer 10 units of Humalog as ordered. Dr. Micky Everett informed RN to administer the evening dose of Humalog with patients dinner as long as patients blood glucose is above 120. RN readback insulin administration instructions. Dr. Micky Everett stated he would \"evaluate and adjust the patients insulin requirements in the morning\". 1730:  RN called Dr. Micky Everett to report patients blood glucose of 133. Dr. Micky Everett instructed RN to administer 10 units of Humalog as ordered. RN verified by readback. above his head. Not having any headaches. Lower back is feeling fine. Normally will have a flare up of the lower back once or twice a year. Last lower back flare up was a couple month ago. After putting on icyhot decreased the lower back pain. Sitting more than normal due to the hot weather.       What supplements do you take? None  Diagnostic studies/imaging of area of concern: Yes. (Listed below under Dx studies/Imaging)  Hospitalizations/Urgent Care/Emergency Department: No.      Patient Emotional screening was completed (6/24/2025) ;    During the past 24 hours, how has pain interfered with normal activities: 0/10  During the past 24 hours, how has pain interfered with your sleep: 1/10  During the past 24 hours, how has pain affected your mood: 0/10  During the past 24 hours, how has pain contributed to your stress: 1/10    DoD/[place] Pain Supplemental Questionnaire score was 2/40    INITIAL OBJECTIVE FINDINGS:     Objective Rating Index is     NDI: 10% (6/24/2025)  ABHI: 0% (6/24/2025)                                                                                                                                                                                                                                                                                                                                                                                    I have reviewed the past medical history, past surgical history, family history, social history, medications and allergies listed in the medical record as obtained by my support staff and agree with their documentation.    REVIEW OF SYSTEMS:  Reviewed in SmartChart  MEDICATIONS:  Reviewed in SmartChart.  ALLERGIES:  Reviewed in SmartChart.    INITIAL OBJECTIVE FINDINGS:  Complicating FactorsRelevant co-morbidities: L3-L5 laminectomy and L4-5 fusion, COPD      DX STUDIES/IMAGING:  I personally reviewed the following pertinent imaging and agree with the below listed  impressions:    (12/28/2023) Xray Cervical Spine  FINDINGS:       There is loss of the expected cervical lordosis, with mild kyphosis centered at C5. The cervicothoracic junction is well aligned on swimmer's  view. There is no fracture nor subluxation. Marginal osteophytes are present throughout. There is loss of intervertebral disc height and  uncovertebral hypertrophy, most prominent at C5-6.     The airway is patent. Prevertebral soft tissues are within normal limits.  Visualized lung apices are clear.    (07/09/2022) XRay Lumbar Spine    FINDINGS:  There are 5 nonrib-bearing lumbar-type vertebral bodies.  No evidence of fracture or subluxation. Postoperative changes identified of laminectomy  and fusion at L4-L5 with transpedicular screws and vertical stabilization bars. Intervertebral disc cage device at L4-L5. No evident hardware  complication such as lucencies to suggest loosening. No hardware fractures. Body heights are preserved. Moderate intervertebral disc space loss at  L5-S1, minimal narrowing at other levels. Marginal osteophytes vertebral bodies.     Impression:  No acute osseous findings. Stable postoperative changes of L4-L5.     IMPRESSION:   1. No fracture seen.  2. Moderate degenerative changes.    Today's Problem focused examination revealed:    **Drop chest piece when laying prone**    (Cervical spine) Cervical spine facet joint function is within normal limits except for C5/C6, C6/C7 that exhibited limited passive range of motion and segmental restriction with tenderness upon palpation. The following muscles were examined for normal flexibility and tone; bilateral upper and lower cervicothoracic paraspinals. These muscles were within normal limits except for left levator scapulae and left trapezius muscles, that exhibited limited flexibility and were hypertonic at rest.    (Thoracic spine) Thoracic spine facet joint function is within normal limits except for T5/T6, T9/T10 that exhibited  limited passive range of motion and segmental restriction and tenderness upon palpation; The following muscles were examined for normal flexibility and tone; bilateral thoracolumbar paraspinals. These muscles were within normal limits except for left rhomboid that exhibited limited flexibility and abnormal resting tone.    (Lumbar and Pelvic spine) Lumbar spine facet joint function is within normal limits except for L1/L2 that exhibited limited passive range of motion and segmental restriction and tenderness on palpation; sacroiliac joint function is within normal limits. The following muscles were examined for flexibility and tone at rest; right thoracolumbar  and left thoracolumbar . These muscles were found to be within normal limits except for right lumbar paraspinals and left lumbar paraspinals that exhibited limited flexibility and were hypertonic at rest.      DIRECTIONAL PREFERENCE:  The patient does not demonstrate a directional preference      ASSESSMENT:overall improving painfree rom  1. Cervical radiculopathy    2. Segmental dysfunction of cervical region    3. Segmental dysfunction of thoracic region    4. Myofascial pain    5. Segmental dysfunction of lumbar region        Chiropractic care for the above listed conditions will involve manual therapy for correction of aberrant cervical, thoracic, and lumbar segmental motion and symptom relief, myofascial release techniques for release of affected hypertonic musculature, and exercise therapy to improve activation of inhibited musculature.    ACTION:  Possible complications, probability of complications, risks of remaining untreated, alternative treatment options, and benefits of treatment were discussed with the patient. Informed consent signed and uploaded into media tab.     Following reassessment of joint function and soft tissue tonicity, Romel was treated with lumbar right lateral flexion, lumbar left lateral flexion, lumbar axial distraction , and  manual cervical traction spinal manipulation utilizing Fortune Protocol 2 to stretch cervical, thoracic, and lumbar paraspinal muscles, to increase intersegmental joint function, and to decrease intradiscal pressure and neural tension of his cervical, thoracic, and lumbar .  Romel was also treated with Passive myofascial release and Pin and Stretch for 10 minutes to the above listed muscles noted as taut in objective findings to increase circulation, improve flexibility and decrease strain to associated spinal structures.       Fortune Distraction spinal mobilization was applied to the cervical, thoracic, and lumbar spine.  Diversified spinal mobilization was applied to the cervical and thoracic spine.    ChiroUp condition report:   The patient's home-based rehabilitation exercise plan was updated, and the patient was provided with written descriptions and demonstrations of new exercises including:   * Median Nerve Floss (3 sets of 10 reps, 2 times per day)   * Radial Nerve Floss (3 sets of 10 reps, 2 times per day)   * Stay Active ()   * Sciatic Nerve Floss - S/L (2 sets of 10 reps, 2 times per day)   * Mad Cat/Old Horse (3 sets of 20 reps, 2 times per day)   * Knee to Chest (1 set of 1 rep, 2 times per day)   * Seated Lumbar Flexion (1 sets of 10 seconds, 3 times per day)   * Hamstring Doorway Stretch (1 sets of 3 contract/relax cycles, 2 times per day)   * Cervical Retractions (1 sets of 10 reps, 3 times per day)  * Deep Neck Flexion (1 sets of 10 reps, 3 times per day)  * Trapezius Stretch- Sitting (1 sets of 3 reps, 2 times per day)  * Corner Pectoral Stretch (1 sets of 3 reps, 2 times)  * Isometric Cervical Flexion (3 sets of 10 reps, 2 times per day)  * Isometric Cervical Rotation (3 sets of 10 reps, 2 times per day)  * Isometric Cervical Lateral Flexion (3 sets of 10 reps, 2 times per day)     DC verbally educated and received verbal consent for hand placement, positioning of patient, and techniques to be performed  today from patient for clothing adjustments for techniques, hand placement and palpation for techniques and therapist position for techniques as described above.    PLAN:  It was recommended that Romel Myles continue performance of the prescribed home exercise program. Ice as needed for any residual soreness post-treatment and call our office with any problems, questions, or worsening of symptoms.     Goals of Care: Goal of care is to improve muscular and skeletal function and provide symptom relief, and to reduce the Neck Disability Index by 10 points by 4 weeks.    Follow up with me next week. Care is planned at 1x/week for 2 weeks.     Recommendations:   * Discussed with patient adding magnesium glycinate (200mg 1st week, 400mg 2nd week) supplementation for nutritional support.   * Patient was given a handout regarding Cervical Neck Pillow.    Following treatment, neck pain is improved.     Call our office with any concerns: [facility] [phone], or message in [employer].    Total visit time 20 minutes. In addition to face to face time, this may include time spent reviewing past medical documents, labs, imaging, referral notes; and coordination of care. While medical notes are accessible to the patient, these notes are not \"patient-centered\" documents and may not always be easily interpreted or written in layman's terms. Questions or concerns regarding exam findings, disease processes, and treatment strategies should be asked directly to the provider during your office visit.    I have reviewed and agree with my Chiropractic Technician’s note.

## 2025-07-28 ENCOUNTER — OFFICE VISIT (OUTPATIENT)
Age: 85
End: 2025-07-28
Payer: MEDICARE

## 2025-07-28 VITALS
OXYGEN SATURATION: 100 % | HEART RATE: 86 BPM | BODY MASS INDEX: 32.86 KG/M2 | SYSTOLIC BLOOD PRESSURE: 140 MMHG | DIASTOLIC BLOOD PRESSURE: 52 MMHG | WEIGHT: 284 LBS | HEIGHT: 78 IN

## 2025-07-28 DIAGNOSIS — I10 ESSENTIAL HYPERTENSION: ICD-10-CM

## 2025-07-28 DIAGNOSIS — D47.2 MGUS (MONOCLONAL GAMMOPATHY OF UNKNOWN SIGNIFICANCE): ICD-10-CM

## 2025-07-28 DIAGNOSIS — D47.2 MGUS (MONOCLONAL GAMMOPATHY OF UNKNOWN SIGNIFICANCE): Primary | ICD-10-CM

## 2025-07-28 LAB
ALBUMIN SERPL-MCNC: 3.6 G/DL (ref 3.5–5)
ALBUMIN/GLOB SERPL: 1 (ref 1.1–2.2)
ALP SERPL-CCNC: 103 U/L (ref 45–117)
ALT SERPL-CCNC: 20 U/L (ref 12–78)
ANION GAP SERPL CALC-SCNC: 8 MMOL/L (ref 2–12)
AST SERPL-CCNC: 13 U/L (ref 15–37)
BASOPHILS # BLD: 0.03 K/UL (ref 0–0.1)
BASOPHILS NFR BLD: 0.5 % (ref 0–1)
BILIRUB SERPL-MCNC: 0.5 MG/DL (ref 0.2–1)
BUN SERPL-MCNC: 26 MG/DL (ref 6–20)
BUN/CREAT SERPL: 13 (ref 12–20)
CALCIUM SERPL-MCNC: 9.2 MG/DL (ref 8.5–10.1)
CHLORIDE SERPL-SCNC: 106 MMOL/L (ref 97–108)
CO2 SERPL-SCNC: 21 MMOL/L (ref 21–32)
CREAT SERPL-MCNC: 1.93 MG/DL (ref 0.7–1.3)
DIFFERENTIAL METHOD BLD: ABNORMAL
EOSINOPHIL # BLD: 0.08 K/UL (ref 0–0.4)
EOSINOPHIL NFR BLD: 1.3 % (ref 0–7)
ERYTHROCYTE [DISTWIDTH] IN BLOOD BY AUTOMATED COUNT: 15.7 % (ref 11.5–14.5)
GLOBULIN SER CALC-MCNC: 3.7 G/DL (ref 2–4)
GLUCOSE SERPL-MCNC: 386 MG/DL (ref 65–100)
HCT VFR BLD AUTO: 37.4 % (ref 36.6–50.3)
HGB BLD-MCNC: 11.7 G/DL (ref 12.1–17)
IMM GRANULOCYTES # BLD AUTO: 0.03 K/UL (ref 0–0.04)
IMM GRANULOCYTES NFR BLD AUTO: 0.5 % (ref 0–0.5)
LDH SERPL L TO P-CCNC: 184 U/L (ref 85–241)
LYMPHOCYTES # BLD: 1.49 K/UL (ref 0.8–3.5)
LYMPHOCYTES NFR BLD: 24.8 % (ref 12–49)
MCH RBC QN AUTO: 26.5 PG (ref 26–34)
MCHC RBC AUTO-ENTMCNC: 31.3 G/DL (ref 30–36.5)
MCV RBC AUTO: 84.6 FL (ref 80–99)
MONOCYTES # BLD: 0.51 K/UL (ref 0–1)
MONOCYTES NFR BLD: 8.5 % (ref 5–13)
NEUTS SEG # BLD: 3.87 K/UL (ref 1.8–8)
NEUTS SEG NFR BLD: 64.4 % (ref 32–75)
NRBC # BLD: 0 K/UL (ref 0–0.01)
NRBC BLD-RTO: 0 PER 100 WBC
PLATELET # BLD AUTO: 252 K/UL (ref 150–400)
PMV BLD AUTO: 10.4 FL (ref 8.9–12.9)
POTASSIUM SERPL-SCNC: 5.2 MMOL/L (ref 3.5–5.1)
PROT SERPL-MCNC: 7.3 G/DL (ref 6.4–8.2)
RBC # BLD AUTO: 4.42 M/UL (ref 4.1–5.7)
SODIUM SERPL-SCNC: 135 MMOL/L (ref 136–145)
WBC # BLD AUTO: 6 K/UL (ref 4.1–11.1)

## 2025-07-28 PROCEDURE — 99214 OFFICE O/P EST MOD 30 MIN: CPT | Performed by: STUDENT IN AN ORGANIZED HEALTH CARE EDUCATION/TRAINING PROGRAM

## 2025-07-28 PROCEDURE — 1123F ACP DISCUSS/DSCN MKR DOCD: CPT | Performed by: STUDENT IN AN ORGANIZED HEALTH CARE EDUCATION/TRAINING PROGRAM

## 2025-07-28 PROCEDURE — 3078F DIAST BP <80 MM HG: CPT | Performed by: STUDENT IN AN ORGANIZED HEALTH CARE EDUCATION/TRAINING PROGRAM

## 2025-07-28 PROCEDURE — 1036F TOBACCO NON-USER: CPT | Performed by: STUDENT IN AN ORGANIZED HEALTH CARE EDUCATION/TRAINING PROGRAM

## 2025-07-28 PROCEDURE — G8417 CALC BMI ABV UP PARAM F/U: HCPCS | Performed by: STUDENT IN AN ORGANIZED HEALTH CARE EDUCATION/TRAINING PROGRAM

## 2025-07-28 PROCEDURE — 1159F MED LIST DOCD IN RCRD: CPT | Performed by: STUDENT IN AN ORGANIZED HEALTH CARE EDUCATION/TRAINING PROGRAM

## 2025-07-28 PROCEDURE — 3077F SYST BP >= 140 MM HG: CPT | Performed by: STUDENT IN AN ORGANIZED HEALTH CARE EDUCATION/TRAINING PROGRAM

## 2025-07-28 PROCEDURE — G8427 DOCREV CUR MEDS BY ELIG CLIN: HCPCS | Performed by: STUDENT IN AN ORGANIZED HEALTH CARE EDUCATION/TRAINING PROGRAM

## 2025-07-28 NOTE — PROGRESS NOTES
Wilfrid Mello is a 84 y.o. male   Follow-up    Vitals:    07/28/25 0852 07/28/25 0901   BP: (!) 155/72 (!) 140/52   BP Site: Left Upper Arm    Pulse: 86    SpO2: 100%    Weight: 128.8 kg (284 lb)    Height: 1.981 m (6' 6\")      No LMP for male patient.    \"Have you been to the ER, urgent care clinic since your last visit?  Hospitalized since your last visit?\"    NO    “Have you seen or consulted any other health care providers outside of [facility] since your last visit?”    NO

## 2025-07-28 NOTE — PROGRESS NOTES
[facility] [place] [facility]  [address] [place] [id number]  W: [phone] F: [phone]    Reason for Visit:   Wilfrid Mello is a 84 y.o. male who is seen in consultation at the request of Dr. Tidwell for evaluation of suspected plasma cell dyscrasia.      Hematology / Oncology Treatment Information:     Hematological/Oncological Diagnosis: Monoclonal Gammopathy    Date of Diagnosis: 4/2024    Oncology/Hematology Treatment Course:   Treatment course:   1) Surveillance       Pathology and Molecular Testing:       Initial Presentation  (HPI):   The patient (or guardian, if applicable) and other individuals in attendance with the patient were advised that Artificial Intelligence will be utilized during this visit to record and process the conversation to generate a clinical note. The patient (or guardian, if applicable) and other individuals in attendance at the appointment consented to the use of AI, including the recording.        History of Present Illness  The patient is an 83-year-old male who presents to me as a referral from Dr. Tidwell for evaluation of abnormal free light chains and monoclonal gammopathy. The patient was last seen by Dr. Tidwell in 04/2024. His other medical problems include diabetes mellitus type 2, COPD, and chronic kidney disease stage 3b.    Labs reviewed from last PCP visit, notable for monoclonal gammopathy. No focal bone pain. No severe fatigue or other constitutional symptoms. 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      He has CKD3b.     The patient has been a diabetic for the past 30 years. He reports no discomfort in any specific area of his body.        Family history reviewed, non contributory  Social history reviewed, also non contributory      Interval History:     7/28/25    Doing well. No clinical worsening.  Overall in good spirits.  M spike present, but not myeloma defining amount.

## 2025-07-29 LAB — B2 MICROGLOB SERPL-MCNC: 2.7 MG/L (ref 0.6–2.4)

## 2025-07-31 LAB
ALBUMIN SERPL ELPH-MCNC: 3.4 G/DL (ref 2.9–4.4)
ALBUMIN/GLOB SERPL: 1 (ref 0.7–1.7)
ALPHA1 GLOB SERPL ELPH-MCNC: 0.2 G/DL (ref 0–0.4)
ALPHA2 GLOB SERPL ELPH-MCNC: 0.8 G/DL (ref 0.4–1)
B-GLOBULIN SERPL ELPH-MCNC: 1.1 G/DL (ref 0.7–1.3)
GAMMA GLOB SERPL ELPH-MCNC: 1.5 G/DL (ref 0.4–1.8)
GLOBULIN SER-MCNC: 3.5 G/DL (ref 2.2–3.9)
IGA SERPL-MCNC: 106 MG/DL (ref 61–437)
IGG SERPL-MCNC: 1797 MG/DL (ref 603–1613)
IGM SERPL-MCNC: 59 MG/DL (ref 15–143)
INTERPRETATION SERPL IEP-IMP: ABNORMAL
KAPPA LC FREE SER-MCNC: 76.3 MG/L (ref 3.3–19.4)
KAPPA LC FREE/LAMBDA FREE SER: 4.19 (ref 0.26–1.65)
LAMBDA LC FREE SERPL-MCNC: 18.2 MG/L (ref 5.7–26.3)
M PROTEIN SERPL ELPH-MCNC: 0.8 G/DL
PROT SERPL-MCNC: 6.9 G/DL (ref 6–8.5)

## (undated) DEVICE — SUTURE VCRL SZ 2-0 L36IN ABSRB VLT L36MM CT-1 1/2 CIR J345H

## (undated) DEVICE — HOOK LOCK LATEX FREE ELASTIC BANDAGE 4INX5YD

## (undated) DEVICE — NEEDLE HYPO 18GA L1.5IN PNK S STL HUB POLYPR SHLD REG BVL

## (undated) DEVICE — TRAP SURG QUAD PARABOLA SLOT DSGN SFTY SCRN TRAPEASE

## (undated) DEVICE — DRAIN SURG W10MMXL20CM SIL FULL PERF HUBLESS FLAT RADPQ

## (undated) DEVICE — SUTURE ETHLN SZ 2-0 L18IN NONABSORBABLE BLK L19MM PS-2 PRIM 593H

## (undated) DEVICE — QUILTED PREMIUM COMFORT UNDERPAD,EXTRA HEAVY: Brand: WINGS

## (undated) DEVICE — COVER,TABLE,60X90,STERILE: Brand: MEDLINE

## (undated) DEVICE — 2108 SERIES SAGITTAL BLADE AGGRESSIVE  (25.0 X 1.19 X 85.0MM)

## (undated) DEVICE — BAG BELONG PT PERS CLEAR HANDL

## (undated) DEVICE — BW-412T DISP COMBO CLEANING BRUSH: Brand: SINGLE USE COMBINATION CLEANING BRUSH

## (undated) DEVICE — ROCKER SWITCH PENCIL BLADE ELECTRODE, HOLSTER: Brand: EDGE

## (undated) DEVICE — STRETCH BANDAGE ROLL: Brand: DERMACEA

## (undated) DEVICE — IMMOBILIZER KNEE 3 PNL 19 IN

## (undated) DEVICE — 1200 GUARD II KIT W/5MM TUBE W/O VAC TUBE: Brand: GUARDIAN

## (undated) DEVICE — FCPS BX HOT RJ4 2.2MMX240CM -- RADIAL JAW 4 BX/40

## (undated) DEVICE — STERILE POLYISOPRENE POWDER-FREE SURGICAL GLOVES: Brand: PROTEXIS

## (undated) DEVICE — TRAY PREP DRY W/ PREM GLV 2 APPL 6 SPNG 2 UNDPD 1 OVERWRAP

## (undated) DEVICE — AIRLIFE™ U/CONNECT-IT OXYGEN TUBING 7 FEET (2.1 M) CRUSH-RESISTANT OXYGEN TUBING, VINYL TIPPED: Brand: AIRLIFE™

## (undated) DEVICE — ABDOMINAL PAD: Brand: DERMACEA

## (undated) DEVICE — DEVON™ KNEE AND BODY STRAP 60" X 3" (1.5 M X 7.6 CM): Brand: DEVON

## (undated) DEVICE — BAG RED 3PLY 2MIL 30X40 IN

## (undated) DEVICE — REM POLYHESIVE ADULT PATIENT RETURN ELECTRODE: Brand: VALLEYLAB

## (undated) DEVICE — CONVERTORS STOCKINETTE: Brand: CONVERTORS

## (undated) DEVICE — CUSTOM CAST PD STR

## (undated) DEVICE — CANN NASAL O2 CAPNOGRAPHY AD -- FILTERLINE

## (undated) DEVICE — BANDAGE CAST W6INXL5YD PLSTR GYPSONA FAST SET 5-8MIN LO [307376] [BSN MEDICAL INC]

## (undated) DEVICE — SPONGE LAP 18X18IN STRL -- 5/PK

## (undated) DEVICE — SURGICAL PROCEDURE PACK BASIN MAJ SET CUST NO CAUT

## (undated) DEVICE — INTENDED FOR TISSUE SEPARATION, AND OTHER PROCEDURES THAT REQUIRE A SHARP SURGICAL BLADE TO PUNCTURE OR CUT.: Brand: BARD-PARKER ® CARBON RIB-BACK BLADES

## (undated) DEVICE — DRESSING PETRO GZ XRFRM CURAD ST OVERWRAP 5 X 9 IN

## (undated) DEVICE — STAPLER SKIN H3.9MM WIRE DIA0.58MM CRWN 6.9MM 35 STPL ROT

## (undated) DEVICE — BAG SPEC BIOHZD LF 2MIL 6X10IN -- CONVERT TO ITEM 357326

## (undated) DEVICE — NEONATAL-ADULT SPO2 SENSOR: Brand: NELLCOR

## (undated) DEVICE — SET ADMIN 16ML TBNG L100IN 2 Y INJ SITE IV PIGGY BK DISP

## (undated) DEVICE — KENDALL RADIOLUCENT FOAM MONITORING ELECTRODE -RECTANGULAR SHAPE: Brand: KENDALL

## (undated) DEVICE — Device: Brand: MEDICAL ACTION INDUSTRIES

## (undated) DEVICE — DRAPE,EXTREMITY,89X128,STERILE: Brand: MEDLINE

## (undated) DEVICE — Device

## (undated) DEVICE — 2108 SERIES SAGITTAL BLADE (24.8 X 0.88 X 73.8MM)

## (undated) DEVICE — SUTURE PERMAHAND SZ 2-0 L18IN NONABSORBABLE BLK L26MM SH C012D

## (undated) DEVICE — CATH IV AUTOGRD BC BLU 22GA 25 -- INSYTE

## (undated) DEVICE — CONTAINER SPEC 20 ML LID NEUT BUFF FORMALIN 10 % POLYPR STS

## (undated) DEVICE — HANDLE LT SNAP ON ULT DURABLE LENS FOR TRUMPF ALC DISPOSABLE

## (undated) DEVICE — DRAPE,REIN 53X77,STERILE: Brand: MEDLINE

## (undated) DEVICE — OCCLUSIVE GAUZE STRIP,3% BISMUTH TRIBROMOPHENATE IN PETROLATUM BLEND: Brand: XEROFORM

## (undated) DEVICE — KERLIX BANDAGE ROLL: Brand: KERLIX

## (undated) DEVICE — UNDERPAD XTR STRGHT 30X36IN --

## (undated) DEVICE — GAUZE SPONGES,12 PLY: Brand: CURITY

## (undated) DEVICE — SOLIDIFIER FLUID 3000 CC ABSORB

## (undated) DEVICE — SYRINGE MED 20ML STD CLR PLAS LUERLOCK TIP N CTRL DISP

## (undated) DEVICE — SYR 50ML SLIP TIP NSAF LF STRL --

## (undated) DEVICE — Z DISCONTINUED USE 2751540 TUBING IRRIG L10IN DISP PMP ENDOGATOR

## (undated) DEVICE — Z DISCONTINUED NO SUB IDED SET EXTN W/ 4 W STPCOCK M SPIN LOK 36IN

## (undated) DEVICE — ENDO CARRY-ON PROCEDURE KIT INCLUDES ENZYMATIC SPONGE, GAUZE, BIOHAZARD LABEL, TRAY, LUBRICANT, DIRTY SCOPE LABEL, WATER LABEL, TRAY, DRAWSTRING PAD, AND DEFENDO 4-PIECE KIT.: Brand: ENDO CARRY-ON PROCEDURE KIT

## (undated) DEVICE — SOLUTION IV 1000ML 0.9% SOD CHL

## (undated) DEVICE — TOWEL SURG W17XL27IN STD BLU COT NONFENESTRATED PREWASHED

## (undated) DEVICE — GOWN,SIRUS,NONRNF,SETINSLV,2XL,18/CS: Brand: MEDLINE

## (undated) DEVICE — SUT ETHLN 3-0 18IN PS1 BLK --

## (undated) DEVICE — SUTURE PERMAHAND SZ 2-0 L30IN NONABSORBABLE BLK SILK W/O A305H

## (undated) DEVICE — SUTURE PERMAHAND SZ 0 L30IN NONABSORBABLE BLK SILK BRAID A306H

## (undated) DEVICE — SUTURE VCRL SZ 3-0 L54IN ABSRB VLT LIGAPAK REEL NDL J205G

## (undated) DEVICE — SUTURE VCRL SZ 3-0 L27IN ABSRB VLT L36MM CT-1 1/2 CIR J338H

## (undated) DEVICE — ZIMMER® STERILE DISPOSABLE TOURNIQUET CUFF WITH PROTECTIVE SLEEVE AND PLC, DUAL PORT, SINGLE BLADDER, 34 IN. (86 CM)

## (undated) DEVICE — STERILE HOOK LOCK ELASTIC BANDAGE 6IN X 10 YARD: Brand: HOOK LOCK™

## (undated) DEVICE — (D)PREP SKN CHLRAPRP APPL 26ML -- CONVERT TO ITEM 371833

## (undated) DEVICE — INFECTION CONTROL KIT SYS

## (undated) DEVICE — CONNECTOR TBNG AUX H2O JET DISP FOR OLY 160/180 SER

## (undated) DEVICE — TABLE COVER: Brand: CONVERTORS